# Patient Record
Sex: FEMALE | Race: WHITE | Employment: OTHER | URBAN - METROPOLITAN AREA
[De-identification: names, ages, dates, MRNs, and addresses within clinical notes are randomized per-mention and may not be internally consistent; named-entity substitution may affect disease eponyms.]

---

## 2017-08-01 ENCOUNTER — HOSPITAL ENCOUNTER (INPATIENT)
Age: 67
LOS: 3 days | Discharge: HOME | DRG: 287 | End: 2017-08-04
Attending: EMERGENCY MEDICINE | Admitting: INTERNAL MEDICINE
Payer: MEDICARE

## 2017-08-01 ENCOUNTER — APPOINTMENT (OUTPATIENT)
Dept: GENERAL RADIOLOGY | Age: 67
DRG: 287 | End: 2017-08-01
Attending: PHYSICIAN ASSISTANT
Payer: MEDICARE

## 2017-08-01 DIAGNOSIS — I48.91 ATRIAL FIBRILLATION, UNSPECIFIED TYPE (HCC): ICD-10-CM

## 2017-08-01 DIAGNOSIS — E87.1 HYPONATREMIA: ICD-10-CM

## 2017-08-01 DIAGNOSIS — R07.89 CHEST TIGHTNESS: Primary | ICD-10-CM

## 2017-08-01 PROBLEM — I50.9 CHF (CONGESTIVE HEART FAILURE) (HCC): Status: ACTIVE | Noted: 2017-08-01

## 2017-08-01 PROBLEM — R07.9 CHEST PAIN: Status: ACTIVE | Noted: 2017-08-01

## 2017-08-01 LAB
ALBUMIN SERPL BCP-MCNC: 4.4 G/DL (ref 3.5–5)
ALBUMIN/GLOB SERPL: 1.6 {RATIO} (ref 1.1–2.2)
ALP SERPL-CCNC: 87 U/L (ref 45–117)
ALT SERPL-CCNC: 23 U/L (ref 12–78)
ANION GAP BLD CALC-SCNC: 7 MMOL/L (ref 5–15)
AST SERPL W P-5'-P-CCNC: 27 U/L (ref 15–37)
BASOPHILS # BLD AUTO: 0 K/UL (ref 0–0.1)
BASOPHILS # BLD: 0 % (ref 0–1)
BILIRUB SERPL-MCNC: 0.8 MG/DL (ref 0.2–1)
BNP SERPL-MCNC: 3929 PG/ML (ref 0–125)
BUN SERPL-MCNC: 13 MG/DL (ref 6–20)
BUN/CREAT SERPL: 16 (ref 12–20)
CALCIUM SERPL-MCNC: 8.6 MG/DL (ref 8.5–10.1)
CHLORIDE SERPL-SCNC: 83 MMOL/L (ref 97–108)
CK SERPL-CCNC: 144 U/L (ref 26–192)
CO2 SERPL-SCNC: 31 MMOL/L (ref 21–32)
CREAT SERPL-MCNC: 0.8 MG/DL (ref 0.55–1.02)
DIFFERENTIAL METHOD BLD: ABNORMAL
EOSINOPHIL # BLD: 0 K/UL (ref 0–0.4)
EOSINOPHIL NFR BLD: 0 % (ref 0–7)
ERYTHROCYTE [DISTWIDTH] IN BLOOD BY AUTOMATED COUNT: 13.6 % (ref 11.5–14.5)
GLOBULIN SER CALC-MCNC: 2.7 G/DL (ref 2–4)
GLUCOSE SERPL-MCNC: 113 MG/DL (ref 65–100)
HCT VFR BLD AUTO: 35.7 % (ref 35–47)
HGB BLD-MCNC: 12.7 G/DL (ref 11.5–16)
LYMPHOCYTES # BLD AUTO: 7 % (ref 12–49)
LYMPHOCYTES # BLD: 0.6 K/UL (ref 0.8–3.5)
MCH RBC QN AUTO: 33.7 PG (ref 26–34)
MCHC RBC AUTO-ENTMCNC: 35.6 G/DL (ref 30–36.5)
MCV RBC AUTO: 94.7 FL (ref 80–99)
MONOCYTES # BLD: 0.8 K/UL (ref 0–1)
MONOCYTES NFR BLD AUTO: 9 % (ref 5–13)
NEUTS SEG # BLD: 7.7 K/UL (ref 1.8–8)
NEUTS SEG NFR BLD AUTO: 84 % (ref 32–75)
PLATELET # BLD AUTO: 188 K/UL (ref 150–400)
POTASSIUM SERPL-SCNC: 3.6 MMOL/L (ref 3.5–5.1)
PROT SERPL-MCNC: 7.1 G/DL (ref 6.4–8.2)
RBC # BLD AUTO: 3.77 M/UL (ref 3.8–5.2)
RBC MORPH BLD: ABNORMAL
SODIUM SERPL-SCNC: 121 MMOL/L (ref 136–145)
TROPONIN I SERPL-MCNC: 0.04 NG/ML
WBC # BLD AUTO: 9.1 K/UL (ref 3.6–11)

## 2017-08-01 PROCEDURE — 84484 ASSAY OF TROPONIN QUANT: CPT | Performed by: PHYSICIAN ASSISTANT

## 2017-08-01 PROCEDURE — 82550 ASSAY OF CK (CPK): CPT | Performed by: PHYSICIAN ASSISTANT

## 2017-08-01 PROCEDURE — 99285 EMERGENCY DEPT VISIT HI MDM: CPT

## 2017-08-01 PROCEDURE — 74011250636 HC RX REV CODE- 250/636: Performed by: INTERNAL MEDICINE

## 2017-08-01 PROCEDURE — 93005 ELECTROCARDIOGRAM TRACING: CPT

## 2017-08-01 PROCEDURE — 71010 XR CHEST PORT: CPT

## 2017-08-01 PROCEDURE — 74011250637 HC RX REV CODE- 250/637: Performed by: INTERNAL MEDICINE

## 2017-08-01 PROCEDURE — 65660000000 HC RM CCU STEPDOWN

## 2017-08-01 PROCEDURE — 83880 ASSAY OF NATRIURETIC PEPTIDE: CPT | Performed by: EMERGENCY MEDICINE

## 2017-08-01 PROCEDURE — 85025 COMPLETE CBC W/AUTO DIFF WBC: CPT | Performed by: PHYSICIAN ASSISTANT

## 2017-08-01 PROCEDURE — 80053 COMPREHEN METABOLIC PANEL: CPT | Performed by: PHYSICIAN ASSISTANT

## 2017-08-01 RX ORDER — POTASSIUM CHLORIDE 750 MG/1
30 TABLET, FILM COATED, EXTENDED RELEASE ORAL EVERY 4 HOURS
Status: COMPLETED | OUTPATIENT
Start: 2017-08-01 | End: 2017-08-02

## 2017-08-01 RX ORDER — DIAZEPAM 2 MG/1
2 TABLET ORAL
Status: DISCONTINUED | OUTPATIENT
Start: 2017-08-01 | End: 2017-08-04 | Stop reason: HOSPADM

## 2017-08-01 RX ORDER — LEVOTHYROXINE SODIUM 300 UG/1
300 TABLET ORAL
COMMUNITY
End: 2017-08-01 | Stop reason: CLARIF

## 2017-08-01 RX ORDER — LEVOTHYROXINE SODIUM 150 UG/1
150 TABLET ORAL
Status: DISCONTINUED | OUTPATIENT
Start: 2017-08-02 | End: 2017-08-02

## 2017-08-01 RX ORDER — METOPROLOL TARTRATE 25 MG/1
25 TABLET, FILM COATED ORAL 2 TIMES DAILY
Status: DISCONTINUED | OUTPATIENT
Start: 2017-08-01 | End: 2017-08-04 | Stop reason: HOSPADM

## 2017-08-01 RX ORDER — ONDANSETRON 2 MG/ML
4 INJECTION INTRAMUSCULAR; INTRAVENOUS
Status: DISCONTINUED | OUTPATIENT
Start: 2017-08-01 | End: 2017-08-04 | Stop reason: HOSPADM

## 2017-08-01 RX ORDER — BUTALBITAL, ACETAMINOPHEN AND CAFFEINE 50; 325; 40 MG/1; MG/1; MG/1
1-2 TABLET ORAL
Status: DISCONTINUED | OUTPATIENT
Start: 2017-08-01 | End: 2017-08-04 | Stop reason: HOSPADM

## 2017-08-01 RX ORDER — METOPROLOL TARTRATE 25 MG/1
25 TABLET, FILM COATED ORAL 2 TIMES DAILY
COMMUNITY
End: 2018-04-10 | Stop reason: SDUPTHER

## 2017-08-01 RX ORDER — BUTALBITAL, ACETAMINOPHEN AND CAFFEINE 50; 325; 40 MG/1; MG/1; MG/1
1-2 TABLET ORAL
COMMUNITY
End: 2017-08-22 | Stop reason: SDUPTHER

## 2017-08-01 RX ORDER — TRIAMTERENE AND HYDROCHLOROTHIAZIDE 37.5; 25 MG/1; MG/1
1 CAPSULE ORAL DAILY
COMMUNITY
End: 2017-08-22

## 2017-08-01 RX ORDER — LEVOTHYROXINE SODIUM 150 UG/1
150 TABLET ORAL
COMMUNITY
End: 2017-08-22 | Stop reason: SDUPTHER

## 2017-08-01 RX ORDER — ACETAMINOPHEN 325 MG/1
650 TABLET ORAL
Status: DISCONTINUED | OUTPATIENT
Start: 2017-08-01 | End: 2017-08-04 | Stop reason: HOSPADM

## 2017-08-01 RX ORDER — FUROSEMIDE 10 MG/ML
40 INJECTION INTRAMUSCULAR; INTRAVENOUS 2 TIMES DAILY
Status: DISCONTINUED | OUTPATIENT
Start: 2017-08-01 | End: 2017-08-03

## 2017-08-01 RX ORDER — BUPROPION HYDROCHLORIDE 150 MG/1
150 TABLET, EXTENDED RELEASE ORAL DAILY
COMMUNITY
End: 2017-10-03 | Stop reason: SDUPTHER

## 2017-08-01 RX ORDER — ASPIRIN 81 MG/1
81 TABLET ORAL DAILY
Status: DISCONTINUED | OUTPATIENT
Start: 2017-08-01 | End: 2017-08-04 | Stop reason: HOSPADM

## 2017-08-01 RX ORDER — GUAIFENESIN 100 MG/5ML
325 LIQUID (ML) ORAL
Status: DISCONTINUED | OUTPATIENT
Start: 2017-08-01 | End: 2017-08-01

## 2017-08-01 RX ORDER — BUPROPION HYDROCHLORIDE 150 MG/1
150 TABLET, EXTENDED RELEASE ORAL DAILY
Status: DISCONTINUED | OUTPATIENT
Start: 2017-08-02 | End: 2017-08-04 | Stop reason: HOSPADM

## 2017-08-01 RX ADMIN — APIXABAN 5 MG: 5 TABLET, FILM COATED ORAL at 23:09

## 2017-08-01 RX ADMIN — FUROSEMIDE 40 MG: 10 INJECTION, SOLUTION INTRAMUSCULAR; INTRAVENOUS at 23:09

## 2017-08-01 RX ADMIN — POTASSIUM CHLORIDE 30 MEQ: 750 TABLET, FILM COATED, EXTENDED RELEASE ORAL at 23:09

## 2017-08-01 RX ADMIN — ASPIRIN 81 MG: 81 TABLET, COATED ORAL at 23:09

## 2017-08-01 RX ADMIN — METOPROLOL TARTRATE 25 MG: 25 TABLET ORAL at 23:09

## 2017-08-01 NOTE — ED PROVIDER NOTES
HPI Comments: 79 y/o  female presents to the ED for the evaluation of chest tightness. According to patient, she was awoken in the middle of the night around 2 AM with chest tightness. She was given 324 mg ASA on arrival.  She has noticed increased stress and leg swelling. She has noticed some shortness of breath and getting short winded with exertion. She does have hx of atrial fibrillation but does not want to take medications and has already told her PCP this. No other acute medical complaints expressed at this time. The history is provided by the patient. No  was used. 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 rep 10 yrs.  THYROIDECTOMY  11/05         Family History:   Problem Relation Age of Onset    Cancer Mother      lung       Social History     Social History    Marital status:      Spouse name: N/A    Number of children: N/A    Years of education: N/A     Occupational History    Not on file. Social History Main Topics    Smoking status: Never Smoker    Smokeless tobacco: Never Used    Alcohol use Yes      Comment: soc    Drug use: No    Sexual activity: Not on file     Other Topics Concern    Not on file     Social History Narrative         ALLERGIES: Codeine    Review of Systems   Constitutional: Negative for chills, fatigue and fever. HENT: Negative for ear pain, facial swelling, hearing loss, sore throat and trouble swallowing. Eyes: Negative. Respiratory: Positive for chest tightness and shortness of breath. Negative for cough and wheezing. Cardiovascular: Positive for leg swelling. Negative for chest pain. Gastrointestinal: Negative for abdominal pain, constipation, diarrhea, nausea and vomiting. Musculoskeletal: Negative. Negative for neck pain and neck stiffness. Skin: Negative. Negative for rash and wound. Neurological: Negative for dizziness, weakness, light-headedness, numbness and headaches. Psychiatric/Behavioral: Negative. All other systems reviewed and are negative. Vitals:    08/01/17 1345 08/01/17 1400 08/01/17 1415 08/01/17 1445   BP: (!) 159/109 (!) 140/91 141/60 146/87   Pulse: 71 75 72 72   Resp: 19 21 18 15   Temp:       SpO2: 95% 96% 95% 96%   Height:                Physical Exam   Constitutional: She is oriented to person, place, and time. She appears well-developed and well-nourished. No distress. HENT:   Head: Normocephalic and atraumatic. Eyes: Conjunctivae and EOM are normal. Pupils are equal, round, and reactive to light. Neck: Normal range of motion. Neck supple. Cardiovascular: Normal heart sounds and intact distal pulses. An irregularly irregular rhythm present. No murmur heard. Pulmonary/Chest: Effort normal and breath sounds normal. She has no wheezes. Abdominal: Soft. Bowel sounds are normal. She exhibits no distension and no mass. There is no tenderness. There is no rebound and no guarding. Musculoskeletal: Normal range of motion. Neurological: She is alert and oriented to person, place, and time. She has normal reflexes. Skin: Skin is warm and dry. No rash noted. No erythema. 1-2+ pitting edema BLE    Psychiatric: She has a normal mood and affect. Her behavior is normal.   Nursing note and vitals reviewed.        [facility]  ED Course       Procedures    77 y/o male with chest tightness  Will check labs, ekg and then re-assess  Discussed with Dr. Sherine Cabezas     Reviewed lab and imaging results with patient   Spoke with Dr. Julian Rojas, PCP, regarding patients hx, pe findings and lab/imaging results  Will come see patient for admission

## 2017-08-01 NOTE — IP AVS SNAPSHOT
[address] 
[phone] Patient: Mariama Chow MRN: [id number] :1950 You are allergic to the following Allergen Reactions Opioids - Morphine Analogues Itching Nausea and Vomiting Penicillins Unknown (comments) Patient reports allergy to penicillin with unknown reaction, but states she has tolerated amoxicillin Recent Documentation Height Weight BMI Smoking Status 1.702 m 81.2 kg 28.04 kg/m2 Never Smoker About your hospitalization You were admitted on:  2017 You last received care in the:  [facility] 4 [facility] You were discharged on:  2017 Unit phone number:  [phone] Why you were hospitalized Your primary diagnosis was:  Chf (Congestive Heart Failure) (Hcc) Your diagnoses also included:  Chest Pain, Htn (Hypertension), Atrial Fibrillation (Hcc), Acquired Hypothyroidism, Hyponatremia Providers Seen During Your Hospitalizations Provider Role Specialty Primary office phone Andreea Kelsey MD Attending Provider Emergency Medicine [phone] Shanice Orr MD Attending Provider Internal Medicine [phone] Your Primary Care Physician (PCP) Primary Care Physician Office Phone Office Fax Bard Winkler [phone] Follow-up Information Follow up With Details Comments Contact Info [facility] on [facility]  [address] [address] 
[phone] [address] [facility] [address] 
[phone] Shanice Orr MD   [place] [address] 
[phone] Rosie Todd MD On 8/10/2017 Appointment time 3:00 PM [address] [place] [address] [address] [place] [address] 
[phone] Current Discharge Medication List  
  
 START taking these medications Dose & Instructions Dispensing Information Comments Morning Noon Evening Bedtime  
 apixaban 5 mg tablet Commonly known as:  Tosha Miguel Your last dose was: Your next dose is:    
   
   
 Dose:  5 mg Take 1 Tab by mouth two (2) times a day. Quantity:  60 Tab Refills:  1 CONTINUE these medications which have CHANGED Dose & Instructions Dispensing Information Comments Morning Noon Evening Bedtime  
 levothyroxine 150 mcg tablet Commonly known as:  SYNTHROID What changed:  Another medication with the same name was removed. Continue taking this medication, and follow the directions you see here. Your last dose was: Your next dose is:    
   
   
 Dose:  150 mcg Take 150 mcg by mouth Daily (before breakfast). Refills:  0 CONTINUE these medications which have NOT CHANGED Dose & Instructions Dispensing Information Comments Morning Noon Evening Bedtime  
 aspirin delayed-release 81 mg tablet Your last dose was: Your next dose is:    
   
   
 Dose:  81 mg Take 81 mg by mouth daily. Refills:  0  
     
   
   
   
  
 buPROPion  mg SR tablet Commonly known as:  Devin Hughes Your last dose was: Your next dose is:    
   
   
 Dose:  150 mg Take 150 mg by mouth daily. Refills:  0  
     
   
   
   
  
 butalbital-acetaminophen-caffeine -40 mg per tablet Commonly known as:  Braden Lin Your last dose was: Your next dose is:    
   
   
 Dose:  1-2 Tab Take 1-2 Tabs by mouth every six (6) hours as needed for Headache. Refills:  0  
     
   
   
   
  
 diazePAM 2 mg tablet Commonly known as:  VALIUM Your last dose was: Your next dose is: TAKE 1 TABLET BY MOUTH EVERY DAY AS NEEDED FOR ANXIETY Quantity:  30 Tab Refills:  3 This request is for a new prescription for a controlled substance as required by Federal/State law. RX . HAD REFILLS BUT WENT OUT OF DATE. metoprolol tartrate 25 mg tablet Commonly known as:  LOPRESSOR Your last dose was: Your next dose is:    
   
   
 Dose:  25 mg Take 25 mg by mouth two (2) times a day. Refills:  0  
     
   
   
   
  
 triamterene-hydroCHLOROthiazide 37.5-25 mg per capsule Commonly known as:  Remi Sol Your last dose was: Your next dose is:    
   
   
 Dose:  1 Cap Take 1 Cap by mouth daily. Refills:  0 Where to Get Your Medications These medications were sent to [facility]/pharmacy #[id number]- [address], [address]  [address], [address] Phone:  [phone]  
  apixaban 5 mg tablet Discharge Instructions None Discharge Instructions Attachments/References HEART FAILURE: AVOIDING TRIGGERS (ENGLISH) Discharge Orders None Introducing [facility] & HE[facility]! Dear [facility]: Thank you for requesting a [employer] account. Our records indicate that you already have an active [employer] account. You can access your account anytime at [email or web address] Did you know that you can access your hospital and ER discharge instructions at any time in [employer]? You can also review all of your test results from your hospital stay or ER visit. Additional Information If you have questions, please visit the Frequently Asked Questions section of the [employer] website at [email or web address] Remember, [employer] is NOT to be used for urgent needs. For medical emergencies, dial 911. Now available from your iPhone and Android! General Information Please provide this summary of care documentation to your next provider.  
  
  
    
    
 Patient Signature: ____________________________________________________________ Date:  ____________________________________________________________  
  
Afsaneh Mesa Provider Signature:  ____________________________________________________________ Date:  ____________________________________________________________ More Information Avoiding Triggers With Heart Failure: Care Instructions Your Care Instructions Triggers are anything that make your heart failure flare up. A flare-up is also called \"sudden heart failure\" or \"acute heart failure. \" When you have a flare-up, fluid builds up in your lungs, and you have problems breathing. You might need to go to the hospital. By watching for changes in your condition and avoiding triggers, you can prevent heart failure flare-ups. Follow-up care is a key part of your treatment and safety. Be sure to make and go to all appointments, and call your doctor if you are having problems. It's also a good idea to know your test results and keep a list of the medicines you take. How can you care for yourself at home? Watch for changes in your weight and condition · Weigh yourself without clothing at the same time each day. Record your weight. Call your doctor if you have sudden weight gain, such as more than 2 to 3 pounds in a day or 5 pounds in a week. (Your doctor may suggest a different range of weight gain.) A sudden weight gain may mean that your heart failure is getting worse. · Keep a daily record of your symptoms. Write down any changes in how you feel, such as new shortness of breath, cough, or problems eating. Also record if your ankles are more swollen than usual and if you feel more tired than usual. Note anything that you ate or did that could have triggered these changes. Limit sodium Sodium causes your body to hold on to extra water. This may cause your heart failure symptoms to get worse.  People get most of their sodium from processed foods. Fast food and restaurant meals also tend to be very high in sodium. · Your doctor may suggest that you limit sodium to 2,000 milligrams (mg) a day or less. That is less than 1 teaspoon of salt a day, including all the salt you eat in cooking or in packaged foods. · Read food labels on cans and food packages. They tell you how much sodium you get in one serving. Check the serving size. If you eat more than one serving, you are getting more sodium. · Be aware that sodium can come in forms other than salt, including monosodium glutamate (MSG), sodium citrate, and sodium bicarbonate (baking soda). MSG is often added to Asian food. You can sometimes ask for food without MSG or salt. · Slowly reducing salt will help you adjust to the taste. Take the salt shaker off the table. · Flavor your food with garlic, lemon juice, onion, vinegar, herbs, and spices instead of salt. Do not use soy sauce, steak sauce, onion salt, garlic salt, mustard, or ketchup on your food, unless it is labeled \"low-sodium\" or \"low-salt. \" 
· Make your own salad dressings, sauces, and ketchup without adding salt. · Use fresh or frozen ingredients, instead of canned ones, whenever you can. Choose low-sodium canned goods. · Eat less processed food and food from restaurants, including fast food. Exercise as directed Moderate, regular exercise is very good for your heart. It improves your blood flow and helps control your weight. But too much exercise can stress your heart and cause a heart failure flare-up. · Check with your doctor before you start an exercise program. 
· Walking is an easy way to get exercise. Start out slowly. Gradually increase the length and pace of your walk. Swimming, riding a bike, and using a treadmill are also good forms of exercise. · When you exercise, watch for signs that your heart is working too hard.  You are pushing yourself too hard if you cannot talk while you are exercising. If you become short of breath or dizzy or have chest pain, stop, sit down, and rest. 
· Do not exercise when you do not feel well. Take medicines correctly · Take your medicines exactly as prescribed. Call your doctor if you think you are having a problem with your medicine. · Make a list of all the medicines you take. Include those prescribed to you by other doctors and any over-the-counter medicines, vitamins, or supplements you take. Take this list with you when you go to any doctor. · Take your medicines at the same time every day. It may help you to post a list of all the medicines you take every day and what time of day you take them. · Make taking your medicine as simple as you can. Plan times to take your medicines when you are doing other things, such as eating a meal or getting ready for bed. This will make it easier to remember to take your medicines. · Get organized. Use helpful tools, such as daily or weekly pill containers. When should you call for help? Call 911 if you have symptoms of sudden heart failure such as: 
· You have severe trouble breathing. · You cough up pink, foamy mucus. · You have a new irregular or rapid heartbeat. Call your doctor now or seek immediate medical care if: 
· You have new or increased shortness of breath. · You are dizzy or lightheaded, or you feel like you may faint. · You have sudden weight gain, such as more than 2 to 3 pounds in a day or 5 pounds in a week. (Your doctor may suggest a different range of weight gain.) · You have increased swelling in your legs, ankles, or feet. · You are suddenly so tired or weak that you cannot do your usual activities. Watch closely for changes in your health, and be sure to contact your doctor if you develop new symptoms. Where can you learn more? Go to http://sherif-karyn.info/.  
Enter X839 in the search box to learn more about \"Avoiding Triggers With Heart Failure: Care Instructions. \" Current as of: February 23, 2017 Content Version: 11.3 © 6275-5117 [employer], [employer]. Care instructions adapted under license by [employer] (which disclaims liability or warranty for this information). If you have questions about a medical condition or this instruction, always ask your healthcare professional. [place] [address] any warranty or liability for your use of this information.

## 2017-08-01 NOTE — H&P
History and Physical    Subjective:     Paco Walker is a 78 y.o. white female who woke up with chest tightness this am.  She also had some SOB as well. She says she woke up around 2 am with sx's. She eventually called EMS around lunchtime and was brought to the ER. In the ER, EKG showed atrial fibrillation with controlled rate and non-specific T wave changes. CXR shows cardiomegaly without acute process. Her BNP was 3,900. Sodium 121. Pt's CP is currently better, and her breathing appears to be improved. I last saw pt in the spring of 2016. At that time, she was dx'ed with atrial fibrillation. She was prescribed Xarelto, but she did not take it. She has been taking her metoprolol, though. I had also referred her to a cardiologist, but she did not comply with this. She does admit to some HERNDON and b/l LE edema for many months. Pt is being admitted for inpatient management. 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 Authorizing Provider   triamterene-hydroCHLOROthiazide (DYAZIDE) 37.5-25 mg per capsule Take 1 Cap by mouth daily. Yes Historical Provider   butalbital-acetaminophen-caffeine (FIORICET, ESGIC) -40 mg per tablet Take 1-2 Tabs by mouth every six (6) hours as needed for Headache. Yes Historical Provider   buPROPion SR [facility] SR) 150 mg SR tablet Take 150 mg by mouth daily.    Yes Historical Provider   levothyroxine (SYNTHROID) 150 mcg tablet Take 150 mcg by mouth Daily (before breakfast). Yes Historical Provider   metoprolol tartrate (LOPRESSOR) 25 mg tablet Take 25 mg by mouth two (2) times a day. Yes Historical Provider   diazepam (VALIUM) 2 mg tablet TAKE 1 TABLET BY MOUTH EVERY DAY AS NEEDED FOR ANXIETY 10/6/16  Yes JAYLIN Pritchett MD   aspirin delayed-release 81 mg tablet Take 81 mg by mouth daily. Yes Historical Provider     Social History   Substance Use Topics    Smoking status: Never Smoker    Smokeless tobacco: Never Used    Alcohol use Yes      Comment: soc     Family History   Problem Relation Age of Onset    Cancer Mother      lung               Review of Systems:  As above. Objective:       Physical Exam:   In NAD. A&O. HEENT -- Unremarkable. Neck -- Supple. No JVD. Heart -- Irr Irr (Rate controlled). No R/M/G. Lungs -- CTA. Abdomen -- Soft. Non-tender. Non-distended. No masses. Bowel sounds present. Extremeties -- 1-2+ LE edema b/l. Data Review:   Recent Results (from the past 24 hour(s))   EKG, 12 LEAD, INITIAL    Collection Time: 08/01/17  1:20 PM   Result Value Ref Range    Ventricular Rate 85 BPM    Atrial Rate 340 BPM    QRS Duration 82 ms    Q-T Interval 372 ms    QTC Calculation (Bezet) 442 ms    Calculated R Axis 58 degrees    Calculated T Axis 18 degrees    Diagnosis Atrial fibrillation  No previous ECGs available      CBC WITH AUTOMATED DIFF    Collection Time: 08/01/17  2:18 PM   Result Value Ref Range    WBC 9.1 3.6 - 11.0 K/uL    RBC 3.77 (L) 3.80 - 5.20 M/uL    HGB 12.7 11.5 - 16.0 g/dL    HCT 35.7 35.0 - 47.0 %    MCV 94.7 80.0 - 99.0 FL    MCH 33.7 26.0 - 34.0 PG    MCHC 35.6 30.0 - 36.5 g/dL    RDW 13.6 11.5 - 14.5 %    PLATELET 460 081 - 817 K/uL    NEUTROPHILS 84 (H) 32 - 75 %    LYMPHOCYTES 7 (L) 12 - 49 %    MONOCYTES 9 5 - 13 %    EOSINOPHILS 0 0 - 7 %    BASOPHILS 0 0 - 1 %    ABS. NEUTROPHILS 7.7 1.8 - 8.0 K/UL    ABS. LYMPHOCYTES 0.6 (L) 0.8 - 3.5 K/UL    ABS.  MONOCYTES 0.8 0.0 - 1.0 K/UL    ABS. EOSINOPHILS 0.0 0.0 - 0.4 K/UL    ABS. BASOPHILS 0.0 0.0 - 0.1 K/UL    DF SMEAR SCANNED      RBC COMMENTS NORMOCYTIC, NORMOCHROMIC     CK W/ REFLX CKMB    Collection Time: 08/01/17  2:18 PM   Result Value Ref Range     26 - 929 U/L   METABOLIC PANEL, COMPREHENSIVE    Collection Time: 08/01/17  2:18 PM   Result Value Ref Range    Sodium 121 (L) 136 - 145 mmol/L    Potassium 3.6 3.5 - 5.1 mmol/L    Chloride 83 (L) 97 - 108 mmol/L    CO2 31 21 - 32 mmol/L    Anion gap 7 5 - 15 mmol/L    Glucose 113 (H) 65 - 100 mg/dL    BUN 13 6 - 20 MG/DL    Creatinine 0.80 0.55 - 1.02 MG/DL    BUN/Creatinine ratio 16 12 - 20      GFR est AA >60 >60 ml/min/1.73m2    GFR est non-AA >60 >60 ml/min/1.73m2    Calcium 8.6 8.5 - 10.1 MG/DL    Bilirubin, total 0.8 0.2 - 1.0 MG/DL    ALT (SGPT) 23 12 - 78 U/L    AST (SGOT) 27 15 - 37 U/L    Alk. phosphatase 87 45 - 117 U/L    Protein, total 7.1 6.4 - 8.2 g/dL    Albumin 4.4 3.5 - 5.0 g/dL    Globulin 2.7 2.0 - 4.0 g/dL    A-G Ratio 1.6 1.1 - 2.2     TROPONIN I    Collection Time: 08/01/17  2:18 PM   Result Value Ref Range    Troponin-I, Qt. 0.04 <0.05 ng/mL   NT-PRO BNP    Collection Time: 08/01/17  2:18 PM   Result Value Ref Range    NT pro-BNP 3929 (H) 0 - 125 PG/ML         Assessment:     Principal Problem:    CHF (congestive heart failure) ([facility] [address] (8/1/2017) -- I suspect this will be newly dx'ed acute systolic dysfunction. Active Problems:    HTN (hypertension) (7/14/2011)      Atrial fibrillation -- Probably chronic, dx'ed spring 2016. .... Again she did not f/u on this. Acquired hypothyroidism (4/8/2016)      Chest pain (8/1/2017) -- Possible angina. Plan:     1. Admit tele. 2.  Cont ASA and Metoprolol. 3.  IV Lasix with KCl.    4.  Check serial cardiac enzymes. 5.  Start Eliquis for her afib. 6.  ECHO ordered. 7.  Cont cards -- She may need a cath.   Pt stable at present, so cards may see in am unless something changes, etc.        Signed By: Lucy Goldsmith MD     August 1, 2017

## 2017-08-01 NOTE — ED TRIAGE NOTES
Patient arrives via EMS from [facility]. Patient reports waking up in the middle of the night with chest tightness. Received 324 ASA upon arrival. Patient reports increased stress and leg swelling \"that comes and goes. \" Denies shortness of breath.

## 2017-08-01 NOTE — PROGRESS NOTES
Admission Medication Reconciliation:    Information obtained from: Patient and medication bottles    Significant PMH/Disease States:   Past Medical History:   Diagnosis Date    Atrial fibrillation ([facility] 7/15/2011    Depression     HTN (hypertension) 7/14/2011    Paroxysmal atrial fibrillation ([facility]     Thyroid ca [facility]) 2005    Papillary    Unspecified hypothyroidism 7/15/2011       Chief Complaint for this Admission:    Chief Complaint   Patient presents with    Chest Pain         Allergies:  Opioids - morphine analogues and Penicillins    Prior to Admission Medications:   Prior to Admission Medications   Prescriptions Last Dose Informant Patient Reported? Taking?   aspirin delayed-release 81 mg tablet 8/1/2017 at Unknown time  Yes Yes   Sig: Take 81 mg by mouth daily. buPROPion SR (WELLBUTRIN SR) 150 mg SR tablet 8/1/2017 at Unknown time  Yes Yes   Sig: Take 150 mg by mouth daily. butalbital-acetaminophen-caffeine (FIORICET, ESGIC) -40 mg per tablet 7/25/2017 at Unknown time  Yes Yes   Sig: Take 1-2 Tabs by mouth every six (6) hours as needed for Headache.   diazepam (VALIUM) 2 mg tablet 7/1/2017 at Unknown time  No Yes   Sig: TAKE 1 TABLET BY MOUTH EVERY DAY AS NEEDED FOR ANXIETY   levothyroxine (SYNTHROID) 150 mcg tablet 8/1/2017 at Unknown time Self Yes Yes   Sig: Take 150 mcg by mouth six (6) days a week. Patient takes 1 tablet Mon, Tues, Wed, Thurs, Fri, and Sat   levothyroxine (SYNTHROID) 300 mcg tablet 7/25/2017 at Unknown time Self Yes Yes   Sig: Take 300 mcg by mouth every Sunday. metoprolol tartrate (LOPRESSOR) 25 mg tablet   Yes Yes   Sig: Take 25 mg by mouth two (2) times a day. triamterene-hydroCHLOROthiazide (DYAZIDE) 37.5-25 mg per capsule 7/31/2017 at Unknown time  Yes Yes   Sig: Take 1 Cap by mouth daily. Facility-Administered Medications: None          Comments/Recommendations: Gathered medication history from patient and her medication bottles.      1.) Xarelto removed from medication list and confirmed that patient is not taking any blood thinners currently. 2.) Patient is prescribed bupropion SR 150mg one tablet by mouth twice daily, but she only takes one tablet by mouth once daily. 3.) Patient is prescribed levothyroxine 150mcg: Take 2 tablets by mouth 3 days of the week and 1 tablet by mouth 4 days of the week, but she states she takes 1 tablet by mouth every day Monday-Saturday and takes 2 tablets on Sunday. 4.) Patient has some remaining diazepam from a prescription from over a year ago that she takes roughly 1 tablet every few weeks for anxiety. Last dose was about a week ago.

## 2017-08-01 NOTE — IP AVS SNAPSHOT
[address] 
[phone] Patient: Davida Truong MRN: [id number] :1950 Current Discharge Medication List  
  
START taking these medications Dose & Instructions Dispensing Information Comments Morning Noon Evening Bedtime  
 apixaban 5 mg tablet Commonly known as:  Arbutus Bon Your last dose was: Your next dose is:    
   
   
 Dose:  5 mg Take 1 Tab by mouth two (2) times a day. Quantity:  60 Tab Refills:  1 CONTINUE these medications which have CHANGED Dose & Instructions Dispensing Information Comments Morning Noon Evening Bedtime  
 levothyroxine 150 mcg tablet Commonly known as:  SYNTHROID What changed:  Another medication with the same name was removed. Continue taking this medication, and follow the directions you see here. Your last dose was: Your next dose is:    
   
   
 Dose:  150 mcg Take 150 mcg by mouth Daily (before breakfast). Refills:  0 CONTINUE these medications which have NOT CHANGED Dose & Instructions Dispensing Information Comments Morning Noon Evening Bedtime  
 aspirin delayed-release 81 mg tablet Your last dose was: Your next dose is:    
   
   
 Dose:  81 mg Take 81 mg by mouth daily. Refills:  0  
     
   
   
   
  
 buPROPion  mg SR tablet Commonly known as:  Jonathan Shaper Your last dose was: Your next dose is:    
   
   
 Dose:  150 mg Take 150 mg by mouth daily. Refills:  0  
     
   
   
   
  
 butalbital-acetaminophen-caffeine -40 mg per tablet Commonly known as:  Uneeda Coke Your last dose was: Your next dose is:    
   
   
 Dose:  1-2 Tab Take 1-2 Tabs by mouth every six (6) hours as needed for Headache. Refills:  0  
     
   
   
   
  
 diazePAM 2 mg tablet Commonly known as:  VALIUM Your last dose was: Your next dose is: TAKE 1 TABLET BY MOUTH EVERY DAY AS NEEDED FOR ANXIETY Quantity:  30 Tab Refills:  3 This request is for a new prescription for a controlled substance as required by Federal/State law. RX . HAD REFILLS BUT WENT OUT OF DATE. metoprolol tartrate 25 mg tablet Commonly known as:  LOPRESSOR Your last dose was: Your next dose is:    
   
   
 Dose:  25 mg Take 25 mg by mouth two (2) times a day. Refills:  0  
     
   
   
   
  
 triamterene-hydroCHLOROthiazide 37.5-25 mg per capsule Commonly known as:  Emelia Mccoy Your last dose was: Your next dose is:    
   
   
 Dose:  1 Cap Take 1 Cap by mouth daily. Refills:  0 Where to Get Your Medications These medications were sent to [facility]/pharmacy #[id number], [address]  [address], [address] Phone:  [phone]  
  apixaban 5 mg tablet

## 2017-08-02 PROBLEM — E87.1 HYPONATREMIA: Status: ACTIVE | Noted: 2017-08-02

## 2017-08-02 LAB
ALBUMIN SERPL BCP-MCNC: 3.9 G/DL (ref 3.5–5)
ALBUMIN/GLOB SERPL: 1.3 {RATIO} (ref 1.1–2.2)
ALP SERPL-CCNC: 80 U/L (ref 45–117)
ALT SERPL-CCNC: 21 U/L (ref 12–78)
ANION GAP BLD CALC-SCNC: 8 MMOL/L (ref 5–15)
AST SERPL W P-5'-P-CCNC: 20 U/L (ref 15–37)
ATRIAL RATE: 340 BPM
BASOPHILS # BLD AUTO: 0 K/UL (ref 0–0.1)
BASOPHILS # BLD: 0 % (ref 0–1)
BILIRUB SERPL-MCNC: 0.9 MG/DL (ref 0.2–1)
BNP SERPL-MCNC: 453 PG/ML (ref 0–100)
BUN SERPL-MCNC: 11 MG/DL (ref 6–20)
BUN/CREAT SERPL: 16 (ref 12–20)
CALCIUM SERPL-MCNC: 8.5 MG/DL (ref 8.5–10.1)
CALCULATED R AXIS, ECG10: 58 DEGREES
CALCULATED T AXIS, ECG11: 18 DEGREES
CHLORIDE SERPL-SCNC: 85 MMOL/L (ref 97–108)
CK MB CFR SERPL CALC: 1.8 % (ref 0–2.5)
CK MB SERPL-MCNC: 2 NG/ML (ref 5–25)
CK SERPL-CCNC: 111 U/L (ref 26–192)
CO2 SERPL-SCNC: 31 MMOL/L (ref 21–32)
CREAT SERPL-MCNC: 0.69 MG/DL (ref 0.55–1.02)
DIAGNOSIS, 93000: NORMAL
EOSINOPHIL # BLD: 0 K/UL (ref 0–0.4)
EOSINOPHIL NFR BLD: 0 % (ref 0–7)
ERYTHROCYTE [DISTWIDTH] IN BLOOD BY AUTOMATED COUNT: 13.5 % (ref 11.5–14.5)
GLOBULIN SER CALC-MCNC: 3 G/DL (ref 2–4)
GLUCOSE SERPL-MCNC: 98 MG/DL (ref 65–100)
HCT VFR BLD AUTO: 35.1 % (ref 35–47)
HGB BLD-MCNC: 12.4 G/DL (ref 11.5–16)
LYMPHOCYTES # BLD AUTO: 14 % (ref 12–49)
LYMPHOCYTES # BLD: 1 K/UL (ref 0.8–3.5)
MCH RBC QN AUTO: 34 PG (ref 26–34)
MCHC RBC AUTO-ENTMCNC: 35.3 G/DL (ref 30–36.5)
MCV RBC AUTO: 96.2 FL (ref 80–99)
MONOCYTES # BLD: 0.8 K/UL (ref 0–1)
MONOCYTES NFR BLD AUTO: 12 % (ref 5–13)
NEUTS SEG # BLD: 5 K/UL (ref 1.8–8)
NEUTS SEG NFR BLD AUTO: 74 % (ref 32–75)
PLATELET # BLD AUTO: 196 K/UL (ref 150–400)
POTASSIUM SERPL-SCNC: 3.3 MMOL/L (ref 3.5–5.1)
PROT SERPL-MCNC: 6.9 G/DL (ref 6.4–8.2)
Q-T INTERVAL, ECG07: 372 MS
QRS DURATION, ECG06: 82 MS
QTC CALCULATION (BEZET), ECG08: 442 MS
RBC # BLD AUTO: 3.65 M/UL (ref 3.8–5.2)
SODIUM SERPL-SCNC: 124 MMOL/L (ref 136–145)
T4 FREE SERPL-MCNC: 1.1 NG/DL (ref 0.8–1.5)
TROPONIN I SERPL-MCNC: <0.04 NG/ML
TSH SERPL DL<=0.05 MIU/L-ACNC: 13.4 UIU/ML (ref 0.36–3.74)
VENTRICULAR RATE, ECG03: 85 BPM
WBC # BLD AUTO: 6.8 K/UL (ref 3.6–11)

## 2017-08-02 PROCEDURE — 85025 COMPLETE CBC W/AUTO DIFF WBC: CPT | Performed by: INTERNAL MEDICINE

## 2017-08-02 PROCEDURE — 84443 ASSAY THYROID STIM HORMONE: CPT | Performed by: INTERNAL MEDICINE

## 2017-08-02 PROCEDURE — 93306 TTE W/DOPPLER COMPLETE: CPT

## 2017-08-02 PROCEDURE — 74011250637 HC RX REV CODE- 250/637: Performed by: INTERNAL MEDICINE

## 2017-08-02 PROCEDURE — 65660000000 HC RM CCU STEPDOWN

## 2017-08-02 PROCEDURE — 74011250636 HC RX REV CODE- 250/636: Performed by: INTERNAL MEDICINE

## 2017-08-02 PROCEDURE — 83880 ASSAY OF NATRIURETIC PEPTIDE: CPT | Performed by: INTERNAL MEDICINE

## 2017-08-02 PROCEDURE — 36415 COLL VENOUS BLD VENIPUNCTURE: CPT | Performed by: INTERNAL MEDICINE

## 2017-08-02 PROCEDURE — 82550 ASSAY OF CK (CPK): CPT | Performed by: INTERNAL MEDICINE

## 2017-08-02 PROCEDURE — 84439 ASSAY OF FREE THYROXINE: CPT | Performed by: INTERNAL MEDICINE

## 2017-08-02 PROCEDURE — 80053 COMPREHEN METABOLIC PANEL: CPT | Performed by: INTERNAL MEDICINE

## 2017-08-02 PROCEDURE — 84484 ASSAY OF TROPONIN QUANT: CPT | Performed by: INTERNAL MEDICINE

## 2017-08-02 RX ORDER — POTASSIUM CHLORIDE 750 MG/1
40 TABLET, FILM COATED, EXTENDED RELEASE ORAL EVERY 4 HOURS
Status: COMPLETED | OUTPATIENT
Start: 2017-08-02 | End: 2017-08-02

## 2017-08-02 RX ORDER — SODIUM CHLORIDE 0.9 % (FLUSH) 0.9 %
SYRINGE (ML) INJECTION
Status: COMPLETED
Start: 2017-08-02 | End: 2017-08-02

## 2017-08-02 RX ADMIN — POTASSIUM CHLORIDE 40 MEQ: 750 TABLET, FILM COATED, EXTENDED RELEASE ORAL at 08:41

## 2017-08-02 RX ADMIN — FUROSEMIDE 40 MG: 10 INJECTION, SOLUTION INTRAMUSCULAR; INTRAVENOUS at 08:42

## 2017-08-02 RX ADMIN — FUROSEMIDE 40 MG: 10 INJECTION, SOLUTION INTRAMUSCULAR; INTRAVENOUS at 17:47

## 2017-08-02 RX ADMIN — METOPROLOL TARTRATE 25 MG: 25 TABLET ORAL at 08:42

## 2017-08-02 RX ADMIN — APIXABAN 5 MG: 5 TABLET, FILM COATED ORAL at 08:42

## 2017-08-02 RX ADMIN — POTASSIUM CHLORIDE 40 MEQ: 750 TABLET, FILM COATED, EXTENDED RELEASE ORAL at 11:54

## 2017-08-02 RX ADMIN — APIXABAN 5 MG: 5 TABLET, FILM COATED ORAL at 17:47

## 2017-08-02 RX ADMIN — BUPROPION HYDROCHLORIDE 150 MG: 150 TABLET, EXTENDED RELEASE ORAL at 08:41

## 2017-08-02 RX ADMIN — POTASSIUM CHLORIDE 30 MEQ: 750 TABLET, FILM COATED, EXTENDED RELEASE ORAL at 04:21

## 2017-08-02 RX ADMIN — ACETAMINOPHEN 650 MG: 325 TABLET, FILM COATED ORAL at 11:58

## 2017-08-02 RX ADMIN — LEVOTHYROXINE SODIUM 175 MCG: 150 TABLET ORAL at 07:29

## 2017-08-02 RX ADMIN — ACETAMINOPHEN 650 MG: 325 TABLET, FILM COATED ORAL at 04:17

## 2017-08-02 RX ADMIN — Medication 10 ML: at 08:41

## 2017-08-02 RX ADMIN — METOPROLOL TARTRATE 25 MG: 25 TABLET ORAL at 17:47

## 2017-08-02 NOTE — ED NOTES
TRANSFER - OUT REPORT:    Verbal report given to HARIKA Campa(name) on Alli Bello  being transferred to [facility] (unit) for routine progression of care       Report consisted of patients Situation, Background, Assessment and   Recommendations(SBAR). Information from the following report(s) SBAR, Kardex, ED Summary, Intake/Output, MAR, Recent Results and Cardiac Rhythm afib was reviewed with the receiving nurse. Lines:   Peripheral IV 08/01/17 Left Hand (Active)   Site Assessment Clean, dry, & intact 8/1/2017  1:22 PM   Phlebitis Assessment 0 8/1/2017  1:22 PM   Infiltration Assessment 0 8/1/2017  1:22 PM   Dressing Status Clean, dry, & intact 8/1/2017  1:22 PM       Peripheral IV 08/01/17 Right Antecubital (Active)   Site Assessment Clean, dry, & intact 8/1/2017  2:20 PM   Phlebitis Assessment 0 8/1/2017  2:20 PM   Infiltration Assessment 0 8/1/2017  2:20 PM   Dressing Status Clean, dry, & intact 8/1/2017  2:20 PM   Dressing Type Transparent 8/1/2017  2:20 PM   Hub Color/Line Status Pink;Flushed;Patent 8/1/2017  2:20 PM   Action Taken Blood drawn 8/1/2017  2:20 PM        Opportunity for questions and clarification was provided.       Patient transported with:   Monitor

## 2017-08-02 NOTE — PROGRESS NOTES
Primary Nurse Krystyna Miller and Yaniv Benavides RN performed a dual skin assessment on this patient No impairment noted  Tyrell score is in flow sheet    Bedside and Verbal shift change report given to Yaniv Benavides RN (oncoming nurse) by Gerhardt Loveless (offgoing nurse). Report included the following information SBAR, Kardex, ED Summary, Procedure Summary, Intake/Output, MAR, Accordion, Recent Results and Cardiac Rhythm A. Fib.

## 2017-08-02 NOTE — CONSULTS
Cardiology Consult Note    CC: CP  Reason for consult:  CP/CHF  Requesting MD:       Subjective:      Date of  Admission: 8/1/2017  1:17 PM     Admission type:Emergency    Kianna Lopez is a 78 y.o. female admitted for CHF (congestive heart failure) ([facility] Patient complains of SS chest tightness with no radiation or nausea or dizziness. It woke her up from sleep. It accompanied with SOB. In fact she might have had PND. She describes recent worsening HERNDON. Her past cardiac data include Afib diagnosed more than a year ago. No prior history of CAD, MI, or CHF. Her enzyme and EKG were unremarkable but her BNP was elevated. She has noted recent increase of her ankle edema with some weight gain. Patient Active Problem List    Diagnosis Date Noted    Hyponatremia 08/02/2017    CHF (congestive heart failure) ([facility] 08/01/2017    Chest pain 08/01/2017    Acquired hypothyroidism 04/08/2016    Atrial fibrillation ([facility] 07/15/2011    HTN (hypertension) 07/14/2011      Lizabeth Victoria MD  Past Medical History:   Diagnosis Date    Atrial fibrillation ([facility] 7/15/2011    Depression     HTN (hypertension) 7/14/2011    Paroxysmal atrial fibrillation ([facility]     Thyroid ca [facility]) 2005    Papillary    Unspecified hypothyroidism 7/15/2011      Past Surgical History:   Procedure Laterality Date    ENDOSCOPY, COLON, DIAGNOSTIC  2003    neg. rep 10 yrs.     THYROIDECTOMY  11/05     Allergies   Allergen Reactions    Opioids - Morphine Analogues Itching and Nausea and Vomiting    Penicillins Unknown (comments)     Patient reports allergy to penicillin with unknown reaction, but states she has tolerated amoxicillin      Family History   Problem Relation Age of Onset    Cancer Mother      lung      Current Facility-Administered Medications   Medication Dose Route Frequency    levothyroxine (SYNTHROID) tablet 175 mcg  175 mcg Oral ACB    potassium chloride SR (KLOR-CON 10) tablet 40 mEq  40 mEq Oral Q4H  aspirin delayed-release tablet 81 mg  81 mg Oral DAILY    buPROPion SR (WELLBUTRIN SR) tablet 150 mg  150 mg Oral DAILY    butalbital-acetaminophen-caffeine (FIORICET, ESGIC) -40 mg per tablet 1-2 Tab  1-2 Tab Oral Q6H PRN    diazePAM (VALIUM) tablet 2 mg  2 mg Oral BID PRN    metoprolol tartrate (LOPRESSOR) tablet 25 mg  25 mg Oral BID    furosemide (LASIX) injection 40 mg  40 mg IntraVENous BID    apixaban (ELIQUIS) tablet 5 mg  5 mg Oral BID    ondansetron (ZOFRAN) injection 4 mg  4 mg IntraVENous Q6H PRN    acetaminophen (TYLENOL) tablet 650 mg  650 mg Oral Q4H PRN        Prior to Admission Medications:  Prior to Admission medications    Medication Sig Start Date End Date Taking? Authorizing Provider   triamterene-hydroCHLOROthiazide (DYAZIDE) 37.5-25 mg per capsule Take 1 Cap by mouth daily. Yes Historical Provider   butalbital-acetaminophen-caffeine (FIORICET, ESGIC) -40 mg per tablet Take 1-2 Tabs by mouth every six (6) hours as needed for Headache. Yes Historical Provider   buPROPion SR [facility] SR) 150 mg SR tablet Take 150 mg by mouth daily. Yes Historical Provider   levothyroxine (SYNTHROID) 150 mcg tablet Take 150 mcg by mouth Daily (before breakfast). Yes Historical Provider   metoprolol tartrate (LOPRESSOR) 25 mg tablet Take 25 mg by mouth two (2) times a day. Yes Historical Provider   diazepam (VALIUM) 2 mg tablet TAKE 1 TABLET BY MOUTH EVERY DAY AS NEEDED FOR ANXIETY 10/6/16  Yes JAYLIN Berumen MD   aspirin delayed-release 81 mg tablet Take 81 mg by mouth daily.    Yes Historical Provider        Review of Symptoms:  Except as noted in HPI, patient denies recent fever or chills, nausea, vomiting, diarrhea, hemoptysis, hematemesis, dysuria, myalgias, focal neurologic symptoms, ecchymosis, angioedema, odynophagia, dysphagia, sore throat, earache,rash, melena, hematochezia, depression, GERD, cold intolerance, petechia, bleeding gums, or significant weight loss.    A comprehensive review of systems was negative except for that written in the HPI. Subjective:    24 hr VS reviewed, overall VSSAF  Temp (24hrs), Av.2 °F (36.8 °C), Min:97.8 °F (36.6 °C), Max:98.6 °F (37 °C)    Patient Vitals for the past 8 hrs:   Pulse   17 0722 75   17 0358 77   17 0300 63    Patient Vitals for the past 8 hrs:   Resp   17 0722 17   17 0300 19    Patient Vitals for the past 8 hrs:   BP   17 0722 125/88   17 0358 141/85   17 0300 140/82          Intake/Output Summary (Last 24 hours) at 17 1054  Last data filed at 17 0910   Gross per 24 hour   Intake              240 ml   Output                0 ml   Net              240 ml         Physical Exam (complete single organ system exam)      Visit Vitals    /88 (BP 1 Location: Right arm, BP Patient Position: At rest;Head of bed elevated (Comment degrees))    Pulse 75    Temp 97.8 °F (36.6 °C)    Resp 17    Ht 5' 7\" (1.702 m)    Wt 80.4 kg (177 lb 4 oz)    SpO2 97%    BMI 27.76 kg/m2     General Appearance:  Well developed, well nourished,alert and oriented x 3, and individual in no acute distress. Ears/Nose/Mouth/Throat:   Hearing grossly normal.         Neck: Supple. Chest:   Lungs clear to auscultation bilaterally. Cardiovascular:  irregular rate and rhythm, S1, S2 normal, no murmur. Abdomen:   Soft, non-tender, bowel sounds are active. Extremities: 1+ edema bilaterally. Skin: Warm and dry.                  Cardiographics    Telemetry: AFIB  ECG:  atrial fibrillation, rate 80s  Echocardiogram: just done with no result yet    Labs:   Recent Results (from the past 24 hour(s))   EKG, 12 LEAD, INITIAL    Collection Time: 17  1:20 PM   Result Value Ref Range    Ventricular Rate 85 BPM    Atrial Rate 340 BPM    QRS Duration 82 ms    Q-T Interval 372 ms    QTC Calculation (Bezet) 442 ms    Calculated R Axis 58 degrees    Calculated T Axis 18 degrees Diagnosis       Atrial fibrillation  No previous ECGs available  Confirmed by Anya Miranda M.D., Nick Evans ([id number]) on 8/2/2017 7:02:04 AM     CBC WITH AUTOMATED DIFF    Collection Time: 08/01/17  2:18 PM   Result Value Ref Range    WBC 9.1 3.6 - 11.0 K/uL    RBC 3.77 (L) 3.80 - 5.20 M/uL    HGB 12.7 11.5 - 16.0 g/dL    HCT 35.7 35.0 - 47.0 %    MCV 94.7 80.0 - 99.0 FL    MCH 33.7 26.0 - 34.0 PG    MCHC 35.6 30.0 - 36.5 g/dL    RDW 13.6 11.5 - 14.5 %    PLATELET 673 928 - 209 K/uL    NEUTROPHILS 84 (H) 32 - 75 %    LYMPHOCYTES 7 (L) 12 - 49 %    MONOCYTES 9 5 - 13 %    EOSINOPHILS 0 0 - 7 %    BASOPHILS 0 0 - 1 %    ABS. NEUTROPHILS 7.7 1.8 - 8.0 K/UL    ABS. LYMPHOCYTES 0.6 (L) 0.8 - 3.5 K/UL    ABS. MONOCYTES 0.8 0.0 - 1.0 K/UL    ABS. EOSINOPHILS 0.0 0.0 - 0.4 K/UL    ABS. BASOPHILS 0.0 0.0 - 0.1 K/UL    DF SMEAR SCANNED      RBC COMMENTS NORMOCYTIC, NORMOCHROMIC     CK W/ REFLX CKMB    Collection Time: 08/01/17  2:18 PM   Result Value Ref Range     26 - 479 U/L   METABOLIC PANEL, COMPREHENSIVE    Collection Time: 08/01/17  2:18 PM   Result Value Ref Range    Sodium 121 (L) 136 - 145 mmol/L    Potassium 3.6 3.5 - 5.1 mmol/L    Chloride 83 (L) 97 - 108 mmol/L    CO2 31 21 - 32 mmol/L    Anion gap 7 5 - 15 mmol/L    Glucose 113 (H) 65 - 100 mg/dL    BUN 13 6 - 20 MG/DL    Creatinine 0.80 0.55 - 1.02 MG/DL    BUN/Creatinine ratio 16 12 - 20      GFR est AA >60 >60 ml/min/1.73m2    GFR est non-AA >60 >60 ml/min/1.73m2    Calcium 8.6 8.5 - 10.1 MG/DL    Bilirubin, total 0.8 0.2 - 1.0 MG/DL    ALT (SGPT) 23 12 - 78 U/L    AST (SGOT) 27 15 - 37 U/L    Alk.  phosphatase 87 45 - 117 U/L    Protein, total 7.1 6.4 - 8.2 g/dL    Albumin 4.4 3.5 - 5.0 g/dL    Globulin 2.7 2.0 - 4.0 g/dL    A-G Ratio 1.6 1.1 - 2.2     TROPONIN I    Collection Time: 08/01/17  2:18 PM   Result Value Ref Range    Troponin-I, Qt. 0.04 <0.05 ng/mL   NT-PRO BNP    Collection Time: 08/01/17  2:18 PM   Result Value Ref Range    NT pro-BNP 3929 (H) 0 - 125 PG/ML   CBC WITH AUTOMATED DIFF    Collection Time: 08/02/17  2:49 AM   Result Value Ref Range    WBC 6.8 3.6 - 11.0 K/uL    RBC 3.65 (L) 3.80 - 5.20 M/uL    HGB 12.4 11.5 - 16.0 g/dL    HCT 35.1 35.0 - 47.0 %    MCV 96.2 80.0 - 99.0 FL    MCH 34.0 26.0 - 34.0 PG    MCHC 35.3 30.0 - 36.5 g/dL    RDW 13.5 11.5 - 14.5 %    PLATELET 334 705 - 050 K/uL    NEUTROPHILS 74 32 - 75 %    LYMPHOCYTES 14 12 - 49 %    MONOCYTES 12 5 - 13 %    EOSINOPHILS 0 0 - 7 %    BASOPHILS 0 0 - 1 %    ABS. NEUTROPHILS 5.0 1.8 - 8.0 K/UL    ABS. LYMPHOCYTES 1.0 0.8 - 3.5 K/UL    ABS. MONOCYTES 0.8 0.0 - 1.0 K/UL    ABS. EOSINOPHILS 0.0 0.0 - 0.4 K/UL    ABS. BASOPHILS 0.0 0.0 - 0.1 K/UL   METABOLIC PANEL, COMPREHENSIVE    Collection Time: 08/02/17  2:49 AM   Result Value Ref Range    Sodium 124 (L) 136 - 145 mmol/L    Potassium 3.3 (L) 3.5 - 5.1 mmol/L    Chloride 85 (L) 97 - 108 mmol/L    CO2 31 21 - 32 mmol/L    Anion gap 8 5 - 15 mmol/L    Glucose 98 65 - 100 mg/dL    BUN 11 6 - 20 MG/DL    Creatinine 0.69 0.55 - 1.02 MG/DL    BUN/Creatinine ratio 16 12 - 20      GFR est AA >60 >60 ml/min/1.73m2    GFR est non-AA >60 >60 ml/min/1.73m2    Calcium 8.5 8.5 - 10.1 MG/DL    Bilirubin, total 0.9 0.2 - 1.0 MG/DL    ALT (SGPT) 21 12 - 78 U/L    AST (SGOT) 20 15 - 37 U/L    Alk.  phosphatase 80 45 - 117 U/L    Protein, total 6.9 6.4 - 8.2 g/dL    Albumin 3.9 3.5 - 5.0 g/dL    Globulin 3.0 2.0 - 4.0 g/dL    A-G Ratio 1.3 1.1 - 2.2     CK W/ CKMB & INDEX    Collection Time: 08/02/17  2:49 AM   Result Value Ref Range     26 - 192 U/L    CK - MB 2.0 <3.6 NG/ML    CK-MB Index 1.8 0 - 2.5     TROPONIN I    Collection Time: 08/02/17  2:49 AM   Result Value Ref Range    Troponin-I, Qt. <0.04 <0.05 ng/mL   TSH 3RD GENERATION    Collection Time: 08/02/17  2:49 AM   Result Value Ref Range    TSH 13.40 (H) 0.36 - 3.74 uIU/mL   T4, FREE    Collection Time: 08/02/17  2:49 AM   Result Value Ref Range    T4, Free 1.1 0.8 - 1.5 NG/DL   BNP Collection Time: 08/02/17  2:49 AM   Result Value Ref Range     (H) 0 - 100 pg/mL        Assessment:     Assessment:   CHF; acute diastolic;need to rule out ischemia vs AFib  AFib;chronic; rate is fine  CP;suggestive of ischemia  HTN      Plan:   Tele  Echo  She needs cath  Anticoagulation after cath  ASA deferred after cath    Brenda Mckeon MD

## 2017-08-02 NOTE — PROGRESS NOTES
attempted to visit with patient and complete initial assessment at this time. Patient had a visitor and asked that I return at another time. I do note she does not have an advanced directive in emr and has a history of thyroid cancer. I updated Anthony Bonner RN CRM on above.

## 2017-08-02 NOTE — PROGRESS NOTES
's Genevive Session, Júnior [facility]att [facility] Jerome    Admit Date: 8/1/2017    Subjective:     Pt says she is breathing better. Chest tightness gone. No new complaints. Current Facility-Administered Medications   Medication Dose Route Frequency    levothyroxine (SYNTHROID) tablet 175 mcg  175 mcg Oral ACB    potassium chloride SR (KLOR-CON 10) tablet 40 mEq  40 mEq Oral Q4H    aspirin delayed-release tablet 81 mg  81 mg Oral DAILY    buPROPion SR (WELLBUTRIN SR) tablet 150 mg  150 mg Oral DAILY    butalbital-acetaminophen-caffeine (FIORICET, ESGIC) -40 mg per tablet 1-2 Tab  1-2 Tab Oral Q6H PRN    diazePAM (VALIUM) tablet 2 mg  2 mg Oral BID PRN    metoprolol tartrate (LOPRESSOR) tablet 25 mg  25 mg Oral BID    furosemide (LASIX) injection 40 mg  40 mg IntraVENous BID    apixaban (ELIQUIS) tablet 5 mg  5 mg Oral BID    ondansetron (ZOFRAN) injection 4 mg  4 mg IntraVENous Q6H PRN    acetaminophen (TYLENOL) tablet 650 mg  650 mg Oral Q4H PRN          Objective:     Patient Vitals for the past 8 hrs:   BP Temp Pulse Resp SpO2 Weight   08/02/17 0358 141/85 98.2 °F (36.8 °C) 77 - 98 % 177 lb 4 oz (80.4 kg)   08/02/17 0300 140/82 98.4 °F (36.9 °C) 63 19 99 % -   08/01/17 2347 130/81 98.1 °F (36.7 °C) 67 17 100 % -             Physical Exam: NAD. A&O. Neck -- Supple. No JVD. Heart -- Irr Irr (Rate OK). Lungs -- Fairly CTA. Abd -- Benign. Ext -- 1+ LE edema, b/l.       Data Review   Recent Results (from the past 24 hour(s))   EKG, 12 LEAD, INITIAL    Collection Time: 08/01/17  1:20 PM   Result Value Ref Range    Ventricular Rate 85 BPM    Atrial Rate 340 BPM    QRS Duration 82 ms    Q-T Interval 372 ms    QTC Calculation (Bezet) 442 ms    Calculated R Axis 58 degrees    Calculated T Axis 18 degrees    Diagnosis       Atrial fibrillation  No previous ECGs available  Confirmed by Aurora Adame M.D., Lulu Brown ([id number]) on 8/2/2017 7:02:04 AM     CBC WITH AUTOMATED DIFF    Collection Time: 08/01/17  2:18 PM   Result Value Ref Range    WBC 9.1 3.6 - 11.0 K/uL    RBC 3.77 (L) 3.80 - 5.20 M/uL    HGB 12.7 11.5 - 16.0 g/dL    HCT 35.7 35.0 - 47.0 %    MCV 94.7 80.0 - 99.0 FL    MCH 33.7 26.0 - 34.0 PG    MCHC 35.6 30.0 - 36.5 g/dL    RDW 13.6 11.5 - 14.5 %    PLATELET 788 802 - 626 K/uL    NEUTROPHILS 84 (H) 32 - 75 %    LYMPHOCYTES 7 (L) 12 - 49 %    MONOCYTES 9 5 - 13 %    EOSINOPHILS 0 0 - 7 %    BASOPHILS 0 0 - 1 %    ABS. NEUTROPHILS 7.7 1.8 - 8.0 K/UL    ABS. LYMPHOCYTES 0.6 (L) 0.8 - 3.5 K/UL    ABS. MONOCYTES 0.8 0.0 - 1.0 K/UL    ABS. EOSINOPHILS 0.0 0.0 - 0.4 K/UL    ABS. BASOPHILS 0.0 0.0 - 0.1 K/UL    DF SMEAR SCANNED      RBC COMMENTS NORMOCYTIC, NORMOCHROMIC     CK W/ REFLX CKMB    Collection Time: 08/01/17  2:18 PM   Result Value Ref Range     26 - 989 U/L   METABOLIC PANEL, COMPREHENSIVE    Collection Time: 08/01/17  2:18 PM   Result Value Ref Range    Sodium 121 (L) 136 - 145 mmol/L    Potassium 3.6 3.5 - 5.1 mmol/L    Chloride 83 (L) 97 - 108 mmol/L    CO2 31 21 - 32 mmol/L    Anion gap 7 5 - 15 mmol/L    Glucose 113 (H) 65 - 100 mg/dL    BUN 13 6 - 20 MG/DL    Creatinine 0.80 0.55 - 1.02 MG/DL    BUN/Creatinine ratio 16 12 - 20      GFR est AA >60 >60 ml/min/1.73m2    GFR est non-AA >60 >60 ml/min/1.73m2    Calcium 8.6 8.5 - 10.1 MG/DL    Bilirubin, total 0.8 0.2 - 1.0 MG/DL    ALT (SGPT) 23 12 - 78 U/L    AST (SGOT) 27 15 - 37 U/L    Alk.  phosphatase 87 45 - 117 U/L    Protein, total 7.1 6.4 - 8.2 g/dL    Albumin 4.4 3.5 - 5.0 g/dL    Globulin 2.7 2.0 - 4.0 g/dL    A-G Ratio 1.6 1.1 - 2.2     TROPONIN I    Collection Time: 08/01/17  2:18 PM   Result Value Ref Range    Troponin-I, Qt. 0.04 <0.05 ng/mL   NT-PRO BNP    Collection Time: 08/01/17  2:18 PM   Result Value Ref Range    NT pro-BNP 3929 (H) 0 - 125 PG/ML   CBC WITH AUTOMATED DIFF    Collection Time: 08/02/17  2:49 AM   Result Value Ref Range    WBC 6.8 3.6 - 11.0 K/uL    RBC 3.65 (L) 3.80 - 5.20 M/uL    HGB 12.4 11.5 - 16.0 g/dL    HCT 35.1 35.0 - 47.0 %    MCV 96.2 80.0 - 99.0 FL    MCH 34.0 26.0 - 34.0 PG    MCHC 35.3 30.0 - 36.5 g/dL    RDW 13.5 11.5 - 14.5 %    PLATELET 554 879 - 399 K/uL    NEUTROPHILS 74 32 - 75 %    LYMPHOCYTES 14 12 - 49 %    MONOCYTES 12 5 - 13 %    EOSINOPHILS 0 0 - 7 %    BASOPHILS 0 0 - 1 %    ABS. NEUTROPHILS 5.0 1.8 - 8.0 K/UL    ABS. LYMPHOCYTES 1.0 0.8 - 3.5 K/UL    ABS. MONOCYTES 0.8 0.0 - 1.0 K/UL    ABS. EOSINOPHILS 0.0 0.0 - 0.4 K/UL    ABS. BASOPHILS 0.0 0.0 - 0.1 K/UL   METABOLIC PANEL, COMPREHENSIVE    Collection Time: 08/02/17  2:49 AM   Result Value Ref Range    Sodium 124 (L) 136 - 145 mmol/L    Potassium 3.3 (L) 3.5 - 5.1 mmol/L    Chloride 85 (L) 97 - 108 mmol/L    CO2 31 21 - 32 mmol/L    Anion gap 8 5 - 15 mmol/L    Glucose 98 65 - 100 mg/dL    BUN 11 6 - 20 MG/DL    Creatinine 0.69 0.55 - 1.02 MG/DL    BUN/Creatinine ratio 16 12 - 20      GFR est AA >60 >60 ml/min/1.73m2    GFR est non-AA >60 >60 ml/min/1.73m2    Calcium 8.5 8.5 - 10.1 MG/DL    Bilirubin, total 0.9 0.2 - 1.0 MG/DL    ALT (SGPT) 21 12 - 78 U/L    AST (SGOT) 20 15 - 37 U/L    Alk.  phosphatase 80 45 - 117 U/L    Protein, total 6.9 6.4 - 8.2 g/dL    Albumin 3.9 3.5 - 5.0 g/dL    Globulin 3.0 2.0 - 4.0 g/dL    A-G Ratio 1.3 1.1 - 2.2     CK W/ CKMB & INDEX    Collection Time: 08/02/17  2:49 AM   Result Value Ref Range     26 - 192 U/L    CK - MB 2.0 <3.6 NG/ML    CK-MB Index 1.8 0 - 2.5     TROPONIN I    Collection Time: 08/02/17  2:49 AM   Result Value Ref Range    Troponin-I, Qt. <0.04 <0.05 ng/mL   TSH 3RD GENERATION    Collection Time: 08/02/17  2:49 AM   Result Value Ref Range    TSH 13.40 (H) 0.36 - 3.74 uIU/mL   T4, FREE    Collection Time: 08/02/17  2:49 AM   Result Value Ref Range    T4, Free 1.1 0.8 - 1.5 NG/DL   BNP    Collection Time: 08/02/17  2:49 AM   Result Value Ref Range  (H) 0 - 100 pg/mL           Assessment:     Principal Problem:    CHF (congestive heart failure) -- Probably acute systolic dysfunction. Active Problems:    HTN (hypertension) (7/14/2011)      Atrial fibrillation, chronic -- again, she did not f/u with cardiology as I had recommended over a year ago. Acquired hypothyroidism (4/8/2016)      Chest pain (8/1/2017)      Hyponatremia -- Prob due to CHF & aggravated by the Dyazide. Plan:     1. Cont IV Lasix for now. K+ supplementation ordered. 2. Cont metoprolol as she was taking at home. 3. For ECHO. 4. Cont Eliquis for afib. Cont ASA in case there is some CAD. 5. Holding Dyazide, and follow Na+. 6. Cards to see. Pt may need cath. 7. Increase Levothyroxine to 175 mcg every day.           Kalin Cavazos MD

## 2017-08-02 NOTE — NURSE NAVIGATOR
Chart reviewed by Heart Failure Nurse Navigator. Heart Failure database completed. EF Pending. ACEi/ARB: Dyazide 37.5/5.25mg qd. BB: Lopressor 25mg qd. CRT not indicated at this time. NYHA Functional Class not assigned. Documentation requested for NYHA Functional Class via Provider message on the [address] [place] [address] Failure Teach Back in Patient Education. Heart Failure Avoiding Triggers on Discharge Instructions.       Cardiology;  Dr. Robert Marktein

## 2017-08-03 ENCOUNTER — HOME HEALTH ADMISSION (OUTPATIENT)
Dept: HOME HEALTH SERVICES | Facility: HOME HEALTH | Age: 67
End: 2017-08-03

## 2017-08-03 LAB
ANION GAP BLD CALC-SCNC: 8 MMOL/L (ref 5–15)
BUN SERPL-MCNC: 19 MG/DL (ref 6–20)
BUN/CREAT SERPL: 21 (ref 12–20)
CALCIUM SERPL-MCNC: 8.9 MG/DL (ref 8.5–10.1)
CHLORIDE SERPL-SCNC: 88 MMOL/L (ref 97–108)
CO2 SERPL-SCNC: 32 MMOL/L (ref 21–32)
CREAT SERPL-MCNC: 0.92 MG/DL (ref 0.55–1.02)
GLUCOSE SERPL-MCNC: 98 MG/DL (ref 65–100)
POTASSIUM SERPL-SCNC: 3.6 MMOL/L (ref 3.5–5.1)
SODIUM SERPL-SCNC: 128 MMOL/L (ref 136–145)

## 2017-08-03 PROCEDURE — 65660000000 HC RM CCU STEPDOWN

## 2017-08-03 PROCEDURE — C1894 INTRO/SHEATH, NON-LASER: HCPCS

## 2017-08-03 PROCEDURE — 80048 BASIC METABOLIC PNL TOTAL CA: CPT | Performed by: INTERNAL MEDICINE

## 2017-08-03 PROCEDURE — 74011250637 HC RX REV CODE- 250/637: Performed by: INTERNAL MEDICINE

## 2017-08-03 PROCEDURE — B2111ZZ FLUOROSCOPY OF MULTIPLE CORONARY ARTERIES USING LOW OSMOLAR CONTRAST: ICD-10-PCS | Performed by: INTERNAL MEDICINE

## 2017-08-03 PROCEDURE — 93458 L HRT ARTERY/VENTRICLE ANGIO: CPT

## 2017-08-03 PROCEDURE — 74011636320 HC RX REV CODE- 636/320: Performed by: INTERNAL MEDICINE

## 2017-08-03 PROCEDURE — 36415 COLL VENOUS BLD VENIPUNCTURE: CPT | Performed by: INTERNAL MEDICINE

## 2017-08-03 PROCEDURE — 4A023N7 MEASUREMENT OF CARDIAC SAMPLING AND PRESSURE, LEFT HEART, PERCUTANEOUS APPROACH: ICD-10-PCS | Performed by: INTERNAL MEDICINE

## 2017-08-03 PROCEDURE — 77030013744

## 2017-08-03 PROCEDURE — 74011000250 HC RX REV CODE- 250: Performed by: INTERNAL MEDICINE

## 2017-08-03 PROCEDURE — 99152 MOD SED SAME PHYS/QHP 5/>YRS: CPT

## 2017-08-03 PROCEDURE — 74011250636 HC RX REV CODE- 250/636: Performed by: INTERNAL MEDICINE

## 2017-08-03 PROCEDURE — C1760 CLOSURE DEV, VASC: HCPCS

## 2017-08-03 PROCEDURE — 77030004533 HC CATH ANGI DX IMP BSC -B

## 2017-08-03 RX ORDER — SODIUM CHLORIDE 0.9 % (FLUSH) 0.9 %
5-10 SYRINGE (ML) INJECTION AS NEEDED
Status: DISCONTINUED | OUTPATIENT
Start: 2017-08-03 | End: 2017-08-04 | Stop reason: HOSPADM

## 2017-08-03 RX ORDER — SODIUM CHLORIDE 0.9 % (FLUSH) 0.9 %
5-10 SYRINGE (ML) INJECTION EVERY 8 HOURS
Status: DISCONTINUED | OUTPATIENT
Start: 2017-08-03 | End: 2017-08-04 | Stop reason: HOSPADM

## 2017-08-03 RX ORDER — FENTANYL CITRATE 50 UG/ML
25-200 INJECTION, SOLUTION INTRAMUSCULAR; INTRAVENOUS
Status: DISCONTINUED | OUTPATIENT
Start: 2017-08-03 | End: 2017-08-03

## 2017-08-03 RX ORDER — FUROSEMIDE 40 MG/1
40 TABLET ORAL DAILY
Status: DISCONTINUED | OUTPATIENT
Start: 2017-08-03 | End: 2017-08-04 | Stop reason: HOSPADM

## 2017-08-03 RX ORDER — SODIUM CHLORIDE 0.9 % (FLUSH) 0.9 %
5-10 SYRINGE (ML) INJECTION AS NEEDED
Status: DISCONTINUED | OUTPATIENT
Start: 2017-08-03 | End: 2017-08-03

## 2017-08-03 RX ORDER — ATROPINE SULFATE 0.1 MG/ML
.5-1 INJECTION INTRAVENOUS AS NEEDED
Status: DISCONTINUED | OUTPATIENT
Start: 2017-08-03 | End: 2017-08-03

## 2017-08-03 RX ORDER — LIDOCAINE HYDROCHLORIDE 10 MG/ML
10-30 INJECTION INFILTRATION; PERINEURAL
Status: DISCONTINUED | OUTPATIENT
Start: 2017-08-03 | End: 2017-08-03

## 2017-08-03 RX ORDER — CLOPIDOGREL 300 MG/1
600 TABLET, FILM COATED ORAL ONCE
Status: DISCONTINUED | OUTPATIENT
Start: 2017-08-03 | End: 2017-08-03 | Stop reason: HOSPADM

## 2017-08-03 RX ORDER — HEPARIN SODIUM 200 [USP'U]/100ML
2000 INJECTION, SOLUTION INTRAVENOUS AS NEEDED
Status: DISCONTINUED | OUTPATIENT
Start: 2017-08-03 | End: 2017-08-03

## 2017-08-03 RX ORDER — MIDAZOLAM HYDROCHLORIDE 1 MG/ML
.5-1 INJECTION, SOLUTION INTRAMUSCULAR; INTRAVENOUS
Status: DISCONTINUED | OUTPATIENT
Start: 2017-08-03 | End: 2017-08-03

## 2017-08-03 RX ORDER — SODIUM CHLORIDE 9 MG/ML
3 INJECTION, SOLUTION INTRAVENOUS CONTINUOUS
Status: DISPENSED | OUTPATIENT
Start: 2017-08-03 | End: 2017-08-03

## 2017-08-03 RX ORDER — HEPARIN SODIUM 1000 [USP'U]/ML
1000-5000 INJECTION, SOLUTION INTRAVENOUS; SUBCUTANEOUS
Status: DISCONTINUED | OUTPATIENT
Start: 2017-08-03 | End: 2017-08-03

## 2017-08-03 RX ORDER — SODIUM CHLORIDE 9 MG/ML
1.5 INJECTION, SOLUTION INTRAVENOUS CONTINUOUS
Status: DISCONTINUED | OUTPATIENT
Start: 2017-08-03 | End: 2017-08-03 | Stop reason: HOSPADM

## 2017-08-03 RX ADMIN — FENTANYL CITRATE 25 MCG: 50 INJECTION, SOLUTION INTRAMUSCULAR; INTRAVENOUS at 07:12

## 2017-08-03 RX ADMIN — MIDAZOLAM HYDROCHLORIDE 1 MG: 1 INJECTION, SOLUTION INTRAMUSCULAR; INTRAVENOUS at 07:18

## 2017-08-03 RX ADMIN — LEVOTHYROXINE SODIUM 175 MCG: 150 TABLET ORAL at 10:05

## 2017-08-03 RX ADMIN — SODIUM CHLORIDE 1.5 ML/KG/HR: 900 INJECTION, SOLUTION INTRAVENOUS at 08:10

## 2017-08-03 RX ADMIN — MIDAZOLAM HYDROCHLORIDE 2 MG: 1 INJECTION, SOLUTION INTRAMUSCULAR; INTRAVENOUS at 07:12

## 2017-08-03 RX ADMIN — BUPROPION HYDROCHLORIDE 150 MG: 150 TABLET, EXTENDED RELEASE ORAL at 11:03

## 2017-08-03 RX ADMIN — METOPROLOL TARTRATE 25 MG: 25 TABLET ORAL at 11:03

## 2017-08-03 RX ADMIN — APIXABAN 5 MG: 5 TABLET, FILM COATED ORAL at 17:47

## 2017-08-03 RX ADMIN — FENTANYL CITRATE 50 MCG: 50 INJECTION, SOLUTION INTRAMUSCULAR; INTRAVENOUS at 07:18

## 2017-08-03 RX ADMIN — DIAZEPAM 2 MG: 2 TABLET ORAL at 21:40

## 2017-08-03 RX ADMIN — IOPAMIDOL 78 ML: 755 INJECTION, SOLUTION INTRAVENOUS at 07:28

## 2017-08-03 RX ADMIN — ASPIRIN 81 MG: 81 TABLET, COATED ORAL at 11:03

## 2017-08-03 RX ADMIN — FUROSEMIDE 40 MG: 40 TABLET ORAL at 11:03

## 2017-08-03 RX ADMIN — LIDOCAINE HYDROCHLORIDE 10 ML: 10 INJECTION, SOLUTION INFILTRATION; PERINEURAL at 07:12

## 2017-08-03 RX ADMIN — APIXABAN 5 MG: 5 TABLET, FILM COATED ORAL at 11:03

## 2017-08-03 RX ADMIN — METOPROLOL TARTRATE 25 MG: 25 TABLET ORAL at 17:47

## 2017-08-03 RX ADMIN — Medication 10 ML: at 21:41

## 2017-08-03 RX ADMIN — SODIUM CHLORIDE 3 ML/KG/HR: 900 INJECTION, SOLUTION INTRAVENOUS at 07:10

## 2017-08-03 RX ADMIN — HEPARIN SODIUM 2000 UNITS: 200 INJECTION, SOLUTION INTRAVENOUS at 07:15

## 2017-08-03 NOTE — PROGRESS NOTES
TRANSFER - IN REPORT:    Verbal report received from [address] on Joy Mares  being received from procedure area for routine progression of care. Report consisted of patients Situation, Background, Assessment and Recommendations(SBAR). Information from the following report(s) SBAR, MAR, Recent Results and Cardiac Rhythm AFIB was reviewed with the receiving clinician. Opportunity for questions and clarification was provided. Assessment completed upon patients arrival to [address] [place] [address] and care assumed. Cardiac Cath Lab Recovery Arrival Note:    Joy Mares arrived to [facility] recovery area. Patient procedure= LHC. Patient on cardiac monitor, non-invasive blood pressure, SPO2 monitor. O2 @ 2 lpm via NC.  IV  of NS on pump at 242 ml/hr. Patient status doing well without problems. Patient is A&Ox 4  . Patient reports No Pain. PROCEDURE SITE CHECK:    Procedure site:without any bleeding and No Hematoma, No pain/discomfort reported at procedure site. No change in patient status. Continue to monitor patient and status.

## 2017-08-03 NOTE — PROGRESS NOTES
TRANSFER - IN REPORT:    Verbal report received from iKm Mendenhall RN(name) on Pam Hills  being received from [facility]) for ordered procedure      Report consisted of patients Situation, Background, Assessment and   Recommendations(SBAR). Information from the following report(s) SBAR, Kardex and MAR was reviewed with the receiving nurse. Opportunity for questions and clarification was provided. Assessment completed upon patients arrival to unit and care assumed.

## 2017-08-03 NOTE — PROGRESS NOTES
Cardiac Cath Lab Procedure Area Arrival Note:    Honorio Carr arrived to Cardiac Cath Lab, Procedure Area. Patient identifiers verified with NAME and DATE OF BIRTH. Procedure verified with patient. Consent forms verified. Allergies verified. Patient informed of procedure and plan of care. Questions answered with review. Patient voiced understanding of procedure and plan of care. Patient on cardiac monitor, non-invasive blood pressure, SPO2 monitor. On room air then placed on O2 @ 2 lpm via NC.  IV of normal saline on pump at 242 ml/hr. Patient status doing well without problems. Patient is A&Ox 4. Patient reports no pain. Patient medicated during procedure with orders obtained and verified by Dr. Beth Ledesma. Refer to patients Cardiac Cath Lab PROCEDURE REPORT for vital signs, assessment, status, and response during procedure, printed at end of case. Printed report on chart or scanned into chart.

## 2017-08-03 NOTE — NURSE NAVIGATOR
Heart Failure education folder and Avoiding Hidden Sodium book given to   [employer]    Educated using teach back method. Discussed diagnosis definition and assessed patient understanding. Reviewed importance of daily weight monitoring and low sodium diet (less than 1500 mg. daily). She states she does not weigh self daily. States she will be purchasing a scale for her new condo. instructed on use of weight calendar. Reinforced daily weights and what weight gain to report to MD.  Encouraged activity and rest periods within symptom limitations and as ordered by physician. Reviewed lasix, eliquis and metoprolol, purpose of medication, potential side effects. Discussed importance of reporting signs and symptoms of exacerbation  (moving into the next zone) and when to report them to the doctor to prevent re-hospitalization. Debora Luo was encouraged to keep all appointments with doctor. advised [facility] would schedule JAYDE appt with Dr. Ashley Jones and appt will be placed on AVS at discharge. Patient agreeable to [facility] and given information on [facility]. Smoking history assessed. Pt is a non smoker. She/he has no questions at this time.       Zachariah Francis, RN-CHFN/HFNN

## 2017-08-03 NOTE — PROGRESS NOTES
TRANSFER - OUT REPORT:    Verbal report given to Jasmin SHABAZZ on Eldon Otoole being transferred to Room 410 for routine progression of care       Report consisted of patients Situation, Background, Assessment and   Recommendations(SBAR). Information from the following report(s) SBAR, MAR, Recent Results and Cardiac Rhythm AFIB was reviewed with the receiving nurse. Opportunity for questions and clarification was provided.       Pt transported to room on cardiac monitor with RN

## 2017-08-03 NOTE — PROGRESS NOTES
Met with patient at bedside to introduce role and to assess for any needs/concerns regarding disposition. Patient admitted with complaints of chest pain. She has a medical history that includes A-fib, HTN, Depression, and thyroid cancer. Post a cardiac catheter today with relatively normal results. Patient  admitted from home where she is independent with her ADLs. Her PCP is Dr Mechelle Luna and she was seen 4/2016. she does not utilize any medical services or equipment at home. She anticipates discharge to home when medically stable. Will continue to follow and assist as needed. Care Management Interventions  PCP Verified by CM:  Yes (Dr Mechelle Luna)  [address] [place] [address] (Criteria: CHF and RRAT>21): No  Reason for No Palliative Care Consult:  (Does not meet criteria)  Mode of Transport at Discharge:  (TBD)  Transition of Care Consult (CM Consult): Discharge Planning (History of CHF will need H2H at discharge)  MyChart Signup: Yes  Confirm Follow Up Transport: Self  Plan discussed with Pt/Family/Caregiver: Yes  Discharge Location  Discharge Placement: [address] [place] [address], RN CRM

## 2017-08-03 NOTE — PROCEDURES
Cardiac Catheterization Procedure Note   Patient: Manuel Rodríguez  MRN: [id number]  SSN: [id number]   YOB: 1950 Age: 78 y.o.   Sex: female    Date of Procedure: 8/3/2017   Pre-procedure Diagnosis: Congestive Heart Failure  Post-procedure Diagnosis: Non-cardiac Chest Pain  Procedure: Left Heart Cath  :  Dr. Lowell Cohen MD    Assistant(s):  None  Anesthesia: Moderate Sedation   Estimated Blood Loss: Less than 10 mL   Specimens Removed: None  Findings: LVG; EF 60% with normal LVEDP; no MR; LM/LAD normal; LCX is dominant and tortuous but normal ; RCA small non-dominant but normal  Complications: None   Implants:  None  Signed by:  Lowell Cohen MD  8/3/2017  7:32 AM

## 2017-08-03 NOTE — PROGRESS NOTES
Ina Layne, Jam Sanchez, Júnior & Jerome    Admit Date: 8/1/2017    Subjective:     Pt just had cath, and it is surprising normal -- No significant CAD, and nl LVEF. Pt without new c/o's. She seems to be breathing OK, and her edema has improved. Current Facility-Administered Medications   Medication Dose Route Frequency    clopidogrel (PLAVIX) 600 mg  600 mg Oral ONCE    0.9% sodium chloride infusion  1.5 mL/kg/hr IntraVENous CONTINUOUS    furosemide (LASIX) tablet 40 mg  40 mg Oral DAILY    levothyroxine (SYNTHROID) tablet 175 mcg  175 mcg Oral ACB    aspirin delayed-release tablet 81 mg  81 mg Oral DAILY    buPROPion SR (WELLBUTRIN SR) tablet 150 mg  150 mg Oral DAILY    butalbital-acetaminophen-caffeine (FIORICET, ESGIC) -40 mg per tablet 1-2 Tab  1-2 Tab Oral Q6H PRN    diazePAM (VALIUM) tablet 2 mg  2 mg Oral BID PRN    metoprolol tartrate (LOPRESSOR) tablet 25 mg  25 mg Oral BID    apixaban (ELIQUIS) tablet 5 mg  5 mg Oral BID    ondansetron (ZOFRAN) injection 4 mg  4 mg IntraVENous Q6H PRN    acetaminophen (TYLENOL) tablet 650 mg  650 mg Oral Q4H PRN          Objective:     Patient Vitals for the past 8 hrs:   BP Temp Pulse Resp SpO2 Weight   08/03/17 0738 142/77 97.5 °F (36.4 °C) 76 18 95 % -   08/03/17 0728 143/83 - 78 20 100 % -   08/03/17 0353 - - - - - 177 lb 14.6 oz (80.7 kg)   08/03/17 0351 129/62 98.2 °F (36.8 °C) 76 18 99 % -        08/01 1901 - 08/03 0700  In: 240 [P.O.:240]  Out: 200 [Urine:200]    Physical Exam: NAD. A&O. Neck -- Supple. No JVD. Heart -- Irr Irr (Rate OK). Lungs -- Fairly CTA. Abd -- Benign. Ext -- Trace LE edema, b/l.       Data Review   Recent Results (from the past 24 hour(s))   METABOLIC PANEL, BASIC    Collection Time: 08/03/17  6:37 AM   Result Value Ref Range    Sodium 128 (L) 136 - 145 mmol/L Potassium 3.6 3.5 - 5.1 mmol/L    Chloride 88 (L) 97 - 108 mmol/L    CO2 32 21 - 32 mmol/L    Anion gap 8 5 - 15 mmol/L    Glucose 98 65 - 100 mg/dL    BUN 19 6 - 20 MG/DL    Creatinine 0.92 0.55 - 1.02 MG/DL    BUN/Creatinine ratio 21 (H) 12 - 20      GFR est AA >60 >60 ml/min/1.73m2    GFR est non-AA >60 >60 ml/min/1.73m2    Calcium 8.9 8.5 - 10.1 MG/DL           Assessment:     Principal Problem:    CHF (congestive heart failure) -- appears to be acute diastolic dysfunction. .... Active Problems:    HTN (hypertension) (7/14/2011)      Atrial fibrillation, chronic -- again, she did not f/u with cardiology as I had recommended over a year ago. Acquired hypothyroidism (4/8/2016)      Chest pain (8/1/2017)      Hyponatremia -- Prob due to CHF & aggravated by the Dyazide. Plan:     1. On PO Lasix now. 2. Cont metoprolol as she was taking at home. 3. Cont Eliquis for afib.  4. ? Need to continue ASA? 5. Cont to hold Dyazide, and follow Na+. 6. Levothyroxine been increased to 175 mcg every day. Dr. Isabel Jones help appreciated! I am away until Monday -- My partners to follow. Pt may be able to be discharged as soon as tomorrow.           Simba Kowalski MD

## 2017-08-04 VITALS
TEMPERATURE: 97.8 F | SYSTOLIC BLOOD PRESSURE: 124 MMHG | OXYGEN SATURATION: 98 % | HEIGHT: 67 IN | BODY MASS INDEX: 28.1 KG/M2 | DIASTOLIC BLOOD PRESSURE: 75 MMHG | HEART RATE: 79 BPM | WEIGHT: 179.01 LBS | RESPIRATION RATE: 20 BRPM

## 2017-08-04 LAB
ANION GAP BLD CALC-SCNC: 10 MMOL/L (ref 5–15)
BUN SERPL-MCNC: 22 MG/DL (ref 6–20)
BUN/CREAT SERPL: 24 (ref 12–20)
CALCIUM SERPL-MCNC: 9 MG/DL (ref 8.5–10.1)
CHLORIDE SERPL-SCNC: 87 MMOL/L (ref 97–108)
CO2 SERPL-SCNC: 31 MMOL/L (ref 21–32)
CREAT SERPL-MCNC: 0.93 MG/DL (ref 0.55–1.02)
GLUCOSE SERPL-MCNC: 77 MG/DL (ref 65–100)
POTASSIUM SERPL-SCNC: 3.4 MMOL/L (ref 3.5–5.1)
SODIUM SERPL-SCNC: 128 MMOL/L (ref 136–145)

## 2017-08-04 PROCEDURE — 36415 COLL VENOUS BLD VENIPUNCTURE: CPT | Performed by: INTERNAL MEDICINE

## 2017-08-04 PROCEDURE — 74011250637 HC RX REV CODE- 250/637: Performed by: INTERNAL MEDICINE

## 2017-08-04 PROCEDURE — 80048 BASIC METABOLIC PNL TOTAL CA: CPT | Performed by: INTERNAL MEDICINE

## 2017-08-04 RX ADMIN — METOPROLOL TARTRATE 25 MG: 25 TABLET ORAL at 08:53

## 2017-08-04 RX ADMIN — FUROSEMIDE 40 MG: 40 TABLET ORAL at 08:54

## 2017-08-04 RX ADMIN — APIXABAN 5 MG: 5 TABLET, FILM COATED ORAL at 08:53

## 2017-08-04 RX ADMIN — LEVOTHYROXINE SODIUM 175 MCG: 150 TABLET ORAL at 07:13

## 2017-08-04 RX ADMIN — Medication 10 ML: at 06:00

## 2017-08-04 RX ADMIN — BUPROPION HYDROCHLORIDE 150 MG: 150 TABLET, EXTENDED RELEASE ORAL at 08:54

## 2017-08-04 RX ADMIN — ASPIRIN 81 MG: 81 TABLET, COATED ORAL at 10:40

## 2017-08-04 NOTE — PROGRESS NOTES
Referral sent to [facility] for [facility] secondary to CHF.  Authorization noted and contact information placed on AVS.    Ekta Reid RN CRM

## 2017-08-04 NOTE — PROGRESS NOTES
Medical Progress Note      NAME: Sarah Good   :  1950  MRM:  [id number]    Date/Time: 2017  9:16 AM         Problem List:     Principal Problem:    CHF (congestive heart failure) ([address] (2017)    Active Problems:    HTN (hypertension) (2011)      Atrial fibrillation ([address] (7/15/2011)      Acquired hypothyroidism (2016)      Chest pain (2017)      Hyponatremia (2017)             Subjective:     Patient feeling well. Past Medical History:   Diagnosis Date    Atrial fibrillation ([address] 7/15/2011    Depression     HTN (hypertension) 2011    [facility]oxysmal [facility] fibrillation [facility]     [facility]     Papillary    Unspecified hypothyroidism 7/15/2011       ROS:  General: negative for fever, chills, sweats, weakness  Cardiology:  negative for chest pain, palpitations, orthopnea, PND, edema, syncope          Objective:       Vitals:          Last 24hrs VS reviewed since prior progress note.  Most recent are:    Visit Vitals    /87    Pulse 81    Temp 97.7 °F (36.5 °C)    Resp 20    Ht 5' 7\" (1.702 m)    Wt 179 lb 0.2 oz (81.2 kg)    SpO2 100%    BMI 28.04 kg/m2     SpO2 Readings from Last 6 Encounters:   17 100%    O2 Flow Rate (L/min): 2 l/min     Intake/Output Summary (Last 24 hours) at 17 0916  Last data filed at 17 1201   Gross per 24 hour   Intake                0 ml   Output              650 ml   Net             -650 ml          Exam:     General   well developed, well nourished, appears stated age, in no acute distress  Respiratory   Clear To Auscultation bilaterally - no wheezes, rales, rhonchi, or crackles  Cardiology  Sl irreg  Abdominal  Soft, non-tender, non-distended, positive bowel sounds, no hepatosplenomegaly  Extremities  +1 edema    Lab Data Reviewed: (see below)    Medications Reviewed: (see below)    ______________________________________________________________________    Medications:     Current Facility-Administered Medications   Medication Dose Route Frequency    sodium chloride (NS) flush 5-10 mL  5-10 mL IntraVENous Q8H    sodium chloride (NS) flush 5-10 mL  5-10 mL IntraVENous PRN    sodium chloride (NS) flush 5-10 mL  5-10 mL IntraVENous Q8H    sodium chloride (NS) flush 5-10 mL  5-10 mL IntraVENous PRN    furosemide (LASIX) tablet 40 mg  40 mg Oral DAILY    levothyroxine (SYNTHROID) tablet 175 mcg  175 mcg Oral ACB    aspirin delayed-release tablet 81 mg  81 mg Oral DAILY    buPROPion SR (WELLBUTRIN SR) tablet 150 mg  150 mg Oral DAILY    butalbital-acetaminophen-caffeine (FIORICET, ESGIC) -40 mg per tablet 1-2 Tab  1-2 Tab Oral Q6H PRN    diazePAM (VALIUM) tablet 2 mg  2 mg Oral BID PRN    metoprolol tartrate (LOPRESSOR) tablet 25 mg  25 mg Oral BID    apixaban (ELIQUIS) tablet 5 mg  5 mg Oral BID    ondansetron (ZOFRAN) injection 4 mg  4 mg IntraVENous Q6H PRN    acetaminophen (TYLENOL) tablet 650 mg  650 mg Oral Q4H PRN            Lab Review:     Recent Labs      08/02/17   0249  08/01/17   1418   WBC  6.8  9.1   HGB  12.4  12.7   HCT  35.1  35.7   PLT  196  188     Recent Labs      08/04/17   0118  08/03/17   0637  08/02/17   0249  08/01/17   1418   NA  128*  128*  124*  121*   K  3.4*  3.6  3.3*  3.6   CL  87*  88*  85*  83*   CO2  31  32  31  31   GLU  77  98  98  113*   BUN  22*  19  11  13   CREA  0.93  0.92  0.69  0.80   CA  9.0  8.9  8.5  8.6   ALB   --    --   3.9  4.4   TBILI   --    --   0.9  0.8   SGOT   --    --   20  27   ALT   --    --   21  23     No results found for: GLUCPOC  No results for input(s): PH, PCO2, PO2, HCO3, FIO2 in the last 72 hours. No results for input(s): INR in the last 72 hours.     No lab exists for component: INREXT    Other pertinent lab: NA         Assessment:     Patient Active Problem List   Diagnosis Code    HTN (hypertension) I10    [facility] I48.91    Acquired hypothyroidism E03.9    [facility] I50.9    Chest pain R07.9    Hyponatremia E87.1          Plan:                 1. Acute diastolic dysfunction- s/p cath clean corornary arteries. 2. A fib- continue eliquis  3. Hyponatremia- follow up outpatient  4.  Discharge home this am if ok with Dr. Nadine Jernigan.                ___________________________________________________    Attending Physician: Christopher Alexander MD

## 2017-08-04 NOTE — NURSE NAVIGATOR
HF NN scheduled post hospital follow up with cardiology, Dr. Yury Huber on Thursday 8/10/17 at 3 PM at the [facility] [place] [address] location.   Appointment details placed on Discharge AVS.

## 2017-08-06 ENCOUNTER — HOME CARE VISIT (OUTPATIENT)
Dept: HOME HEALTH SERVICES | Facility: HOME HEALTH | Age: 67
End: 2017-08-06

## 2017-08-07 ENCOUNTER — HOME CARE VISIT (OUTPATIENT)
Dept: HOME HEALTH SERVICES | Facility: HOME HEALTH | Age: 67
End: 2017-08-07

## 2017-08-09 NOTE — DISCHARGE SUMMARY
Physician Discharge Summary     Patient ID:  Damon Salinas  [id number]  17 y.o.  1950    Admit date: 8/1/2017    Discharge date and time: 8/8/2017    Briefly, pt was admitted with CHF (congestive heart failure) ([facility] For details of admission, see H&P. Hospital Course:  Pt was admitted with clinical CHF (SOB, edema) and hyponatremia. She was diuresed. She underwent a cardiac cath -- Surprisingly showed normal coronaries and Nl EF. Therefore, it is felt she had acute diastolic dysfunction. (NYHA Class II). She clinically improved and is discharged home. Her Dyazide is being held due to the hyponatremia. Discharge Dx: Diastolic CHF, acute; hyponatremia. Condition at discharge: improved. Disposition: home    Patient Instructions:   Cannot display discharge medications since this patient is not currently admitted. Follow-up with Dr. Olga Salas in 1 week.       Signed:  Earl Nicholson MD  8/8/2017  9:56 PM

## 2017-08-22 PROBLEM — I50.9 CHF (CONGESTIVE HEART FAILURE) (HCC): Status: RESOLVED | Noted: 2017-08-01 | Resolved: 2017-08-22

## 2017-08-22 PROBLEM — I50.32 CHRONIC DIASTOLIC CONGESTIVE HEART FAILURE (HCC): Status: ACTIVE | Noted: 2017-08-22

## 2017-08-22 PROBLEM — Z79.899 ENCOUNTER FOR LONG-TERM (CURRENT) USE OF OTHER MEDICATIONS: Status: ACTIVE | Noted: 2017-08-22

## 2019-01-25 PROBLEM — F32.A MILD DEPRESSION: Status: ACTIVE | Noted: 2019-01-25

## 2021-02-15 ENCOUNTER — TRANSCRIBE ORDER (OUTPATIENT)
Dept: SCHEDULING | Age: 71
End: 2021-02-15

## 2021-02-15 DIAGNOSIS — C90.00 MYELOMA ASSOCIATED AMYLOIDOSIS (HCC): Primary | ICD-10-CM

## 2021-02-15 DIAGNOSIS — E85.9 MYELOMA ASSOCIATED AMYLOIDOSIS (HCC): Primary | ICD-10-CM

## 2021-10-23 ENCOUNTER — APPOINTMENT (OUTPATIENT)
Dept: GENERAL RADIOLOGY | Age: 71
End: 2021-10-23
Attending: NURSE PRACTITIONER
Payer: MEDICARE

## 2021-10-23 ENCOUNTER — HOSPITAL ENCOUNTER (EMERGENCY)
Age: 71
Discharge: HOME | End: 2021-10-23
Attending: EMERGENCY MEDICINE | Admitting: EMERGENCY MEDICINE
Payer: MEDICARE

## 2021-10-23 VITALS
OXYGEN SATURATION: 93 % | DIASTOLIC BLOOD PRESSURE: 83 MMHG | RESPIRATION RATE: 13 BRPM | HEART RATE: 68 BPM | TEMPERATURE: 98.3 F | SYSTOLIC BLOOD PRESSURE: 141 MMHG

## 2021-10-23 DIAGNOSIS — T14.8XXA HEMATOMA: Primary | ICD-10-CM

## 2021-10-23 LAB
COMMENT, HOLDF: NORMAL
SAMPLES BEING HELD,HOLD: NORMAL

## 2021-10-23 PROCEDURE — 73590 X-RAY EXAM OF LOWER LEG: CPT

## 2021-10-23 PROCEDURE — 99283 EMERGENCY DEPT VISIT LOW MDM: CPT

## 2021-10-23 NOTE — DISCHARGE INSTRUCTIONS
Your x-ray is normal.  You do have a large hematoma on your leg, which is a collection of blood that might take a little bit longer to heal since you are on Xarelto. Continue to elevate your legs and use an Ace wrap to help prevent the blood from pooling in your legs.

## 2021-10-23 NOTE — ED TRIAGE NOTES
Patient arrives ambulatory to the ED with a chief complaint of R leg hematoma. 1 week ago patient hit her R lower leg against the door jam. There is a large hematoma on her R lower leg. Patient was evaluated by her PCP on Thursday and evaluated by [facility] yesterday. Hx of afib on Xarelto. Patient presents with multiple bruising spots, large on her back and multiple spots on her arms.

## 2021-10-23 NOTE — ED PROVIDER NOTES
This is a 65-year-old female with past medical history of atrial fibrillation (chronic anticoagulation with Xarelto), depression, hypertension, anxiety, and papillary thyroid cancer who presents to the ER today for evaluation of traumatic right leg hematoma. Patient states that she bumped her right leg about 1 week ago, and subsequently developed a hematoma along her right shin. Since the injury, she has been icing it and elevating it, but she states that the swelling has not gone down. She was seen for this at an outside facility in which she was prescribed antibiotics. She is here to make sure that she does not have to have her leg amputated. She denies any numbness, weakness, fevers. Past Medical History:   Diagnosis Date    Atrial fibrillation ([facility] 7/15/2011    Depression     HTN (hypertension) 7/14/2011    Paroxysmal atrial fibrillation ([facility]     Thyroid ca [facility]) 2005    Papillary    Unspecified hypothyroidism 7/15/2011       Past Surgical History:   Procedure Laterality Date    ENDOSCOPY, COLON, DIAGNOSTIC  2003    neg. rep 10 yrs.     [place] THYROIDECTOMY  11/05         Family History:   Problem Relation Age of Onset    Cancer Mother         lung       Social History     Socioeconomic History    Marital status:      Spouse name: Not on file    Number of children: Not on file    Years of education: Not on file    Highest education level: Not on file   Occupational History    Not on file   Tobacco Use    Smoking status: Never Smoker    Smokeless tobacco: Never Used   Substance and Sexual Activity    Alcohol use: Yes     Comment: soc    Drug use: No    Sexual activity: Not on file   Other Topics Concern    Not on file   Social History Narrative    Not on file     Social Determinants of Health     Financial Resource Strain:     Difficulty of Paying Living Expenses:    Food Insecurity:     Worried About Running Out of Food in the Last Year:     [address] in the Last Year:    Transportation Needs:     Lack of Transportation (Medical):  Lack of Transportation (Non-Medical):    Physical Activity:     Days of Exercise per Week:     Minutes of Exercise per Session:    Stress:     Feeling of Stress :    Social Connections:     Frequency of Communication with Friends and Family:     Frequency of Social Gatherings with Friends and Family:     Attends [employer] Services:     Active Member of Clubs or Organizations:     Attends Club or Organization Meetings:     Marital Status:    Intimate Partner Violence:     Fear of Current or Ex-Partner:     Emotionally Abused:     Physically Abused:     Sexually Abused: ALLERGIES: Opioids - morphine analogues and Penicillins    Review of Systems   Constitutional: Negative for fever. HENT: Negative for sore throat. Eyes: Negative for visual disturbance. Respiratory: Negative for shortness of breath. Cardiovascular: Negative for palpitations. Gastrointestinal: Negative for vomiting. Genitourinary: Negative for dysuria. Musculoskeletal: Negative for myalgias. Skin: Positive for color change and wound. Neurological: Negative for dizziness and weakness. Psychiatric/Behavioral: Negative for dysphoric mood. Vitals:    10/23/21 1110   BP: (!) 174/84   Pulse: 77   Resp: 18   Temp: 98.3 °F (36.8 °C)   SpO2: 99%            Physical Exam  Vitals and nursing note reviewed. Constitutional:       General: She is not in acute distress. Appearance: Normal appearance. She is not ill-appearing. HENT:      Head: Normocephalic and atraumatic. Right Ear: External ear normal.      Left Ear: External ear normal.      Nose: Nose normal.      Mouth/Throat:      Mouth: Mucous membranes are moist.      Pharynx: Oropharynx is clear. Eyes:      General: No scleral icterus. Extraocular Movements: Extraocular movements intact.       Conjunctiva/sclera: Conjunctivae normal.      Pupils: Pupils are equal, round, and reactive to light. Cardiovascular:      Rate and Rhythm: Normal rate and regular rhythm. Pulses: Normal pulses. Dorsalis pedis pulses are 2+ on the right side and 2+ on the left side. Posterior tibial pulses are 2+ on the right side and 2+ on the left side. Heart sounds: Normal heart sounds. Comments: There is stigmata of chronic venous insufficiency in both lower extremities. There is a relatively large hematoma that feels somewhat firm and nontender to palpation along the anterior shin. Brisk capillary refill. No bony tenderness to palpation. Pulmonary:      Effort: Pulmonary effort is normal.      Breath sounds: Normal breath sounds. Abdominal:      General: Abdomen is flat. Bowel sounds are normal.      Palpations: Abdomen is soft. Musculoskeletal:         General: Normal range of motion. Cervical back: Normal range of motion and neck supple. No rigidity. No muscular tenderness. Right lower le+ Pitting Edema present. Left lower le+ Pitting Edema present. Skin:     General: Skin is warm and dry. Findings: Bruising present. Neurological:      General: No focal deficit present. Mental Status: She is alert and oriented to person, place, and time. Psychiatric:         Mood and Affect: Mood is anxious. Behavior: Behavior normal.         Thought Content: Thought content normal.         Judgment: Judgment normal.              MDM      VITAL SIGNS:  Patient Vitals for the past 4 hrs:   Temp Pulse Resp BP SpO2   10/23/21 1110 98.3 °F (36.8 °C) 77 18 (!) 174/84 99 %         LABS:  Recent Results (from the past 6 hour(s))   SAMPLES BEING HELD    Collection Time: 10/23/21 11:11 AM   Result Value Ref Range    SAMPLES BEING HELD 1RED,1PST,1LAV,1BLU     COMMENT        Add-on orders for these samples will be processed based on acceptable specimen integrity and analyte stability, which may vary by analyte.         IMAGING:  XR TIB/FIB RT   Final Result   No acute abnormality. Medications During Visit:  Medications - No data to display      DECISION MAKING:  Claude Lux is a 66 y.o. female who comes in as above. Unremarkable x-ray. Symptoms consistent with hematoma that is likely slower to reabsorb secondary to venous stasis and anticoagulation with Xarelto. Recommended continued leg elevation and using Ace wrap to facilitate venous drainage. The clinical decision making for this encounter included ordering and interpreting the above diagnostic tests with comparison to prior studies that are within our EMR. Past medical and surgical histories were reviewed, as were records from recent outpatient and emergency department visits. The above results discussed and reviewed with the patient. Patient verbalized understanding of the care plan, including any changes to current outpatient medication regimen, discussed disease process, symptom control, and follow-up care. Return precautions reviewed. IMPRESSION:  1. Hematoma        DISPOSITION:  Discharged      Current Discharge Medication List           Follow-up Information     Follow up With Specialties Details Why Contact Info    Brooklyn Vasquez MD Internal Medicine   [phone]              The patient is asked to follow-up with their primary care provider in the next several days. They are to call tomorrow for an appointment. The patient is asked to return promptly for any increased concerns or worsening of symptoms. They can return to this emergency department or any other emergency department.       Procedures

## 2021-10-25 ENCOUNTER — HOSPITAL ENCOUNTER (EMERGENCY)
Age: 71
Discharge: HOME | End: 2021-10-25
Attending: EMERGENCY MEDICINE | Admitting: EMERGENCY MEDICINE
Payer: MEDICARE

## 2021-10-25 VITALS
OXYGEN SATURATION: 100 % | TEMPERATURE: 98 F | SYSTOLIC BLOOD PRESSURE: 151 MMHG | RESPIRATION RATE: 17 BRPM | DIASTOLIC BLOOD PRESSURE: 79 MMHG | HEART RATE: 71 BPM

## 2021-10-25 DIAGNOSIS — T14.8XXA HEMATOMA: Primary | ICD-10-CM

## 2021-10-25 PROCEDURE — 99281 EMR DPT VST MAYX REQ PHY/QHP: CPT

## 2021-10-25 NOTE — DISCHARGE INSTRUCTIONS
Continue elevation and compression of your leg. This can takes several weeks to resolve. Follow-up with your primary care physician as needed for wound care.

## 2021-10-25 NOTE — ED PROVIDER NOTES
Please note that this dictation was completed with DormNoise, the computer voice recognition software.  Quite often unanticipated grammatical, syntax, homophones, and other interpretive errors are inadvertently transcribed by the computer software.  Please disregard these errors.  Please excuse any errors that have escaped final proofreading. Patient is a 71-year-old female with history of A. fib, hypertension, presenting to ED for evaluation of swelling and bruising to the left lower leg. She states that she \"banged it\" several days ago and was seen in this ED 2 days ago and has since been using compression elevation for treatment. She became concerned today because she felt like she was experiencing pain in the area, which she was not having before. She made appoint with her PCP this morning who advised her to come to the ED instead. She denies any additional medical complaints at this time. 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 rep 10 yrs.     [place] THYROIDECTOMY  11/05         Family History:   Problem Relation Age of Onset    Cancer Mother         lung       Social History     Socioeconomic History    Marital status:      Spouse name: Not on file    Number of children: Not on file    Years of education: Not on file    Highest education level: Not on file   Occupational History    Not on file   Tobacco Use    Smoking status: Never Smoker    Smokeless tobacco: Never Used   Substance and Sexual Activity    Alcohol use: Yes     Comment: soc    Drug use: No    Sexual activity: Not on file   Other Topics Concern    Not on file   Social History Narrative    Not on file Social Determinants of Health     Financial Resource Strain:     Difficulty of Paying Living Expenses:    Food Insecurity:     Worried About Running Out of Food in the Last Year:     [address] in the Last Year:    Transportation Needs:     Lack of Transportation (Medical):  Lack of Transportation (Non-Medical):    Physical Activity:     Days of Exercise per Week:     Minutes of Exercise per Session:    Stress:     Feeling of Stress :    Social Connections:     Frequency of Communication with Friends and Family:     Frequency of Social Gatherings with Friends and Family:     Attends [employer] Services:     Active Member of Clubs or Organizations:     Attends Club or Organization Meetings:     Marital Status:    Intimate Partner Violence:     Fear of Current or Ex-Partner:     Emotionally Abused:     Physically Abused:     Sexually Abused: ALLERGIES: Opioids - morphine analogues and Penicillins    Review of Systems   Constitutional: Negative for chills and fever. HENT: Negative for ear pain and sore throat. Eyes: Negative for visual disturbance. Respiratory: Negative for cough and shortness of breath. Cardiovascular: Negative for chest pain. Gastrointestinal: Negative for abdominal pain. Genitourinary: Negative for flank pain. Musculoskeletal: Negative for back pain. Skin: Positive for color change. Neurological: Negative for dizziness and headaches. Psychiatric/Behavioral: Negative for confusion. Vitals:    10/25/21 1049   BP: (!) 151/79   Pulse: 71   Resp: 17   Temp: 98 °F (36.7 °C)   SpO2: 100%            Physical Exam  Vitals and nursing note reviewed. Constitutional:       General: She is not in acute distress. Appearance: Normal appearance. She is not ill-appearing. HENT:      Head: Normocephalic and atraumatic. Mouth/Throat:      Pharynx: Oropharynx is clear. Eyes:      Extraocular Movements: Extraocular movements intact. Conjunctiva/sclera: Conjunctivae normal.   Cardiovascular:      Rate and Rhythm: Normal rate and regular rhythm. Pulmonary:      Effort: Pulmonary effort is normal.      Breath sounds: Normal breath sounds. Abdominal:      Palpations: Abdomen is soft. Tenderness: There is no abdominal tenderness. Musculoskeletal:         General: Normal range of motion. Cervical back: No rigidity. Skin:     General: Skin is warm and dry. Findings: Bruising (Large round hematoma to the right lower medial leg with surrounding tenderness, no erythema or fluctuance, no drainage) present. Neurological:      General: No focal deficit present. Mental Status: She is alert and oriented to person, place, and time. Psychiatric:         Mood and Affect: Mood normal.          MDM  Number of Diagnoses or Management Options  Hematoma  Diagnosis management comments: Patient is alert, afebrile, vitals stable. Presents for a wound check of a traumatic hematoma. She was seen here several days ago and advised on RICE. I reviewed her previous chart as well as the photo that was included in the chart, and her hematoma appears to be slightly improving and without any signs of infection. Education given to patient that the safest and most effective way to treat this is to continue with compression and her Xarelto, and that it may take several weeks to fully resolve. She will follow-up with her primary care physician with any further wound checks as needed. Return precautions outlined. Amount and/or Complexity of Data Reviewed  Discuss the patient with other providers: yes (Discussed patient with ED attending Ann-Marie Anderson MD who agrees with current management plan.   )      11:05 AM  Pt has been reevaluated. There are no new complaints, changes, or physical findings at this time. All results have been reviewed with patient and/or family. Medications have been reviewed w/ pt and/or family.  Pt and/or family's questions have been answered. Pt and/or family expressed good understanding of the dx/tx/rx and is in agreement with plan of care. Pt instructed and agreed to f/u w/ PCP and to return to ED upon further deterioration. Pt is ready for discharge. IMPRESSION:  1. Hematoma        PLAN:  1. Current Discharge Medication List        2.    Follow-up Information     Follow up With Specialties Details Why Contact Info    Lowell Rouse MD Internal Medicine Schedule an appointment as soon as possible for a visit   [id number]  4.2 [phone]              Return to ED if worse          Procedures

## 2021-11-16 ENCOUNTER — HOSPITAL ENCOUNTER (INPATIENT)
Age: 71
LOS: 15 days | Discharge: HOME HEALTH | DRG: 570 | End: 2021-12-01
Attending: EMERGENCY MEDICINE | Admitting: FAMILY MEDICINE
Payer: MEDICARE

## 2021-11-16 ENCOUNTER — APPOINTMENT (OUTPATIENT)
Dept: CT IMAGING | Age: 71
DRG: 570 | End: 2021-11-16
Attending: FAMILY MEDICINE
Payer: MEDICARE

## 2021-11-16 DIAGNOSIS — S81.801A NON-HEALING WOUND OF LOWER EXTREMITY, RIGHT, INITIAL ENCOUNTER: ICD-10-CM

## 2021-11-16 DIAGNOSIS — E87.1 HYPONATREMIA: Primary | ICD-10-CM

## 2021-11-16 DIAGNOSIS — S81.801A OPEN WOUND OF RIGHT LOWER LEG, INITIAL ENCOUNTER: ICD-10-CM

## 2021-11-16 LAB
ALBUMIN SERPL-MCNC: 4.2 G/DL (ref 3.5–5)
ALBUMIN/GLOB SERPL: 1.3 {RATIO} (ref 1.1–2.2)
ALP SERPL-CCNC: 130 U/L (ref 45–117)
ALT SERPL-CCNC: 28 U/L (ref 12–78)
ANION GAP SERPL CALC-SCNC: 5 MMOL/L (ref 5–15)
ANION GAP SERPL CALC-SCNC: 7 MMOL/L (ref 5–15)
AST SERPL-CCNC: 25 U/L (ref 15–37)
BASOPHILS # BLD: 0 K/UL (ref 0–0.1)
BASOPHILS NFR BLD: 0 % (ref 0–1)
BILIRUB SERPL-MCNC: 0.5 MG/DL (ref 0.2–1)
BUN SERPL-MCNC: 10 MG/DL (ref 6–20)
BUN SERPL-MCNC: 9 MG/DL (ref 6–20)
BUN/CREAT SERPL: 12 (ref 12–20)
BUN/CREAT SERPL: 12 (ref 12–20)
CALCIUM SERPL-MCNC: 8.6 MG/DL (ref 8.5–10.1)
CALCIUM SERPL-MCNC: 8.8 MG/DL (ref 8.5–10.1)
CHLORIDE SERPL-SCNC: 87 MMOL/L (ref 97–108)
CHLORIDE SERPL-SCNC: 90 MMOL/L (ref 97–108)
CO2 SERPL-SCNC: 26 MMOL/L (ref 21–32)
CO2 SERPL-SCNC: 28 MMOL/L (ref 21–32)
COMMENT, HOLDF: NORMAL
CREAT SERPL-MCNC: 0.78 MG/DL (ref 0.55–1.02)
CREAT SERPL-MCNC: 0.85 MG/DL (ref 0.55–1.02)
DIFFERENTIAL METHOD BLD: ABNORMAL
EOSINOPHIL # BLD: 0 K/UL (ref 0–0.4)
EOSINOPHIL NFR BLD: 0 % (ref 0–7)
ERYTHROCYTE [DISTWIDTH] IN BLOOD BY AUTOMATED COUNT: 13.6 % (ref 11.5–14.5)
GLOBULIN SER CALC-MCNC: 3.2 G/DL (ref 2–4)
GLUCOSE SERPL-MCNC: 109 MG/DL (ref 65–100)
GLUCOSE SERPL-MCNC: 158 MG/DL (ref 65–100)
HCT VFR BLD AUTO: 31.6 % (ref 35–47)
HGB BLD-MCNC: 11 G/DL (ref 11.5–16)
IMM GRANULOCYTES # BLD AUTO: 0 K/UL (ref 0–0.04)
IMM GRANULOCYTES NFR BLD AUTO: 0 % (ref 0–0.5)
LACTATE SERPL-SCNC: 0.8 MMOL/L (ref 0.4–2)
LYMPHOCYTES # BLD: 0.4 K/UL (ref 0.8–3.5)
LYMPHOCYTES NFR BLD: 5 % (ref 12–49)
MCH RBC QN AUTO: 33.3 PG (ref 26–34)
MCHC RBC AUTO-ENTMCNC: 34.8 G/DL (ref 30–36.5)
MCV RBC AUTO: 95.8 FL (ref 80–99)
MONOCYTES # BLD: 0.6 K/UL (ref 0–1)
MONOCYTES NFR BLD: 7 % (ref 5–13)
NEUTS SEG # BLD: 6.9 K/UL (ref 1.8–8)
NEUTS SEG NFR BLD: 88 % (ref 32–75)
NRBC # BLD: 0 K/UL (ref 0–0.01)
NRBC BLD-RTO: 0 PER 100 WBC
OSMOLALITY SERPL: 260 MOSM/KG H2O
PLATELET # BLD AUTO: 338 K/UL (ref 150–400)
PMV BLD AUTO: 8.9 FL (ref 8.9–12.9)
POTASSIUM SERPL-SCNC: 3.9 MMOL/L (ref 3.5–5.1)
POTASSIUM SERPL-SCNC: 4 MMOL/L (ref 3.5–5.1)
PROT SERPL-MCNC: 7.4 G/DL (ref 6.4–8.2)
RBC # BLD AUTO: 3.3 M/UL (ref 3.8–5.2)
RBC MORPH BLD: ABNORMAL
SAMPLES BEING HELD,HOLD: NORMAL
SODIUM SERPL-SCNC: 121 MMOL/L (ref 136–145)
SODIUM SERPL-SCNC: 122 MMOL/L (ref 136–145)
TSH SERPL DL<=0.05 MIU/L-ACNC: 2.45 UIU/ML (ref 0.36–3.74)
WBC # BLD AUTO: 7.9 K/UL (ref 3.6–11)

## 2021-11-16 PROCEDURE — 85025 COMPLETE CBC W/AUTO DIFF WBC: CPT

## 2021-11-16 PROCEDURE — 87205 SMEAR GRAM STAIN: CPT

## 2021-11-16 PROCEDURE — 74011000636 HC RX REV CODE- 636: Performed by: RADIOLOGY

## 2021-11-16 PROCEDURE — 87040 BLOOD CULTURE FOR BACTERIA: CPT

## 2021-11-16 PROCEDURE — 74011250636 HC RX REV CODE- 250/636: Performed by: EMERGENCY MEDICINE

## 2021-11-16 PROCEDURE — 74011250637 HC RX REV CODE- 250/637: Performed by: FAMILY MEDICINE

## 2021-11-16 PROCEDURE — 84300 ASSAY OF URINE SODIUM: CPT

## 2021-11-16 PROCEDURE — 73701 CT LOWER EXTREMITY W/DYE: CPT

## 2021-11-16 PROCEDURE — 74011250636 HC RX REV CODE- 250/636: Performed by: FAMILY MEDICINE

## 2021-11-16 PROCEDURE — 83930 ASSAY OF BLOOD OSMOLALITY: CPT

## 2021-11-16 PROCEDURE — 80053 COMPREHEN METABOLIC PANEL: CPT

## 2021-11-16 PROCEDURE — 87186 SC STD MICRODIL/AGAR DIL: CPT

## 2021-11-16 PROCEDURE — 84443 ASSAY THYROID STIM HORMONE: CPT

## 2021-11-16 PROCEDURE — 74011000250 HC RX REV CODE- 250: Performed by: FAMILY MEDICINE

## 2021-11-16 PROCEDURE — 87077 CULTURE AEROBIC IDENTIFY: CPT

## 2021-11-16 PROCEDURE — 93005 ELECTROCARDIOGRAM TRACING: CPT

## 2021-11-16 PROCEDURE — 83605 ASSAY OF LACTIC ACID: CPT

## 2021-11-16 PROCEDURE — 65270000032 HC RM SEMIPRIVATE

## 2021-11-16 PROCEDURE — 99282 EMERGENCY DEPT VISIT SF MDM: CPT

## 2021-11-16 PROCEDURE — 36415 COLL VENOUS BLD VENIPUNCTURE: CPT

## 2021-11-16 RX ORDER — METOPROLOL TARTRATE 50 MG/1
50 TABLET ORAL 2 TIMES DAILY
Status: DISCONTINUED | OUTPATIENT
Start: 2021-11-16 | End: 2021-12-01 | Stop reason: HOSPADM

## 2021-11-16 RX ORDER — SODIUM CHLORIDE 0.9 % (FLUSH) 0.9 %
5-40 SYRINGE (ML) INJECTION AS NEEDED
Status: DISCONTINUED | OUTPATIENT
Start: 2021-11-16 | End: 2021-12-01 | Stop reason: HOSPADM

## 2021-11-16 RX ORDER — SERTRALINE HYDROCHLORIDE 50 MG/1
50 TABLET, FILM COATED ORAL DAILY
Status: DISCONTINUED | OUTPATIENT
Start: 2021-11-17 | End: 2021-12-01 | Stop reason: HOSPADM

## 2021-11-16 RX ORDER — HYDRALAZINE HYDROCHLORIDE 20 MG/ML
10 INJECTION INTRAMUSCULAR; INTRAVENOUS
Status: DISCONTINUED | OUTPATIENT
Start: 2021-11-16 | End: 2021-12-01 | Stop reason: HOSPADM

## 2021-11-16 RX ORDER — SODIUM CHLORIDE 9 MG/ML
75 INJECTION, SOLUTION INTRAVENOUS CONTINUOUS
Status: DISCONTINUED | OUTPATIENT
Start: 2021-11-16 | End: 2021-11-17

## 2021-11-16 RX ORDER — DIAZEPAM 2 MG/1
2 TABLET ORAL
Status: DISCONTINUED | OUTPATIENT
Start: 2021-11-16 | End: 2021-12-01 | Stop reason: HOSPADM

## 2021-11-16 RX ORDER — SODIUM CHLORIDE 0.9 % (FLUSH) 0.9 %
5-40 SYRINGE (ML) INJECTION EVERY 8 HOURS
Status: DISCONTINUED | OUTPATIENT
Start: 2021-11-16 | End: 2021-11-26 | Stop reason: SDUPTHER

## 2021-11-16 RX ORDER — VANCOMYCIN HYDROCHLORIDE
1250
Status: DISCONTINUED | OUTPATIENT
Start: 2021-11-17 | End: 2021-11-17 | Stop reason: SDUPTHER

## 2021-11-16 RX ORDER — VANCOMYCIN 2 GRAM/500 ML IN 0.9 % SODIUM CHLORIDE INTRAVENOUS
2000 ONCE
Status: COMPLETED | OUTPATIENT
Start: 2021-11-16 | End: 2021-11-17

## 2021-11-16 RX ORDER — ACETAMINOPHEN 325 MG/1
650 TABLET ORAL
Status: DISCONTINUED | OUTPATIENT
Start: 2021-11-16 | End: 2021-12-01 | Stop reason: HOSPADM

## 2021-11-16 RX ADMIN — METOPROLOL TARTRATE 50 MG: 50 TABLET, FILM COATED ORAL at 20:30

## 2021-11-16 RX ADMIN — VANCOMYCIN HYDROCHLORIDE 2000 MG: 10 INJECTION, POWDER, LYOPHILIZED, FOR SOLUTION INTRAVENOUS at 20:30

## 2021-11-16 RX ADMIN — SODIUM CHLORIDE 500 ML: 9 INJECTION, SOLUTION INTRAVENOUS at 13:17

## 2021-11-16 RX ADMIN — CEFEPIME 2 G: 2 INJECTION, POWDER, FOR SOLUTION INTRAVENOUS at 20:30

## 2021-11-16 RX ADMIN — SODIUM CHLORIDE 75 ML/HR: 9 INJECTION, SOLUTION INTRAVENOUS at 19:00

## 2021-11-16 RX ADMIN — SACUBITRIL AND VALSARTAN 1 TABLET: 49; 51 TABLET, FILM COATED ORAL at 20:30

## 2021-11-16 RX ADMIN — DIAZEPAM 2 MG: 2 TABLET ORAL at 23:12

## 2021-11-16 RX ADMIN — IOPAMIDOL 100 ML: 612 INJECTION, SOLUTION INTRAVENOUS at 15:59

## 2021-11-16 NOTE — ED NOTES
Patient calmly resting, complaint of generalized weakness     GCS 15.   Speech clear, appropriate and w/o difficulty. normal breathing. Skin war (temp) and dry (moisture), normal color for ethnicity.       COVID Status: Not Indicated    Right lower leg wound care complete by wound care nurse

## 2021-11-16 NOTE — ROUTINE PROCESS
TRANSFER - OUT REPORT:    Verbal report given to Nabil Masters RN(name) on Wilmon Shows  being transferred to 202 bed 2(unit) for routine progression of care       Report consisted of patients Situation, Background, Assessment and   Recommendations(SBAR). Information from the following report(s) SBAR, Kardex, ED Summary, [facility] and Recent Results was reviewed with the receiving nurse. Lines:   Peripheral IV 11/16/21 Left Antecubital (Active)   Site Assessment Clean, dry, & intact 11/16/21 1247   Phlebitis Assessment 0 11/16/21 1247   Infiltration Assessment 0 11/16/21 1247   Dressing Status Clean, dry, & intact 11/16/21 1247   Dressing Type Transparent 11/16/21 1247   Hub Color/Line Status Pink 11/16/21 1247   Action Taken Blood drawn 11/16/21 1247   Alcohol Cap Used No 11/16/21 1247       Peripheral IV 11/16/21 Right Antecubital (Active)        Opportunity for questions and clarification was provided.       Patient transported with:   [employer] Guide

## 2021-11-16 NOTE — H&P
History & Physical    Primary Care Provider: Naren Liang MD  Source of Information: Patient     History of Presenting Illness:   Alonso Prasad is a 66 y.o. female who presents with leg wound    History was primarily obtained from the patient, patient with history of atrial fibrillation, currently on Xarelto, reports that she had developed a hematoma of her right leg specifically the right shin, she had been treated for the same, she was diagnosed with a hematoma on 10/17/2021. Patient reports that she was told that the hematoma should resolve by itself and then she should seek plastic surgery consult for possibly a skin graft. Patient reports that the leg wound is getting worse, she feels foul-smelling discharge coming out of the wound, she has had poor appetite and weakness. Came to the ER today, was found to be hyponatremic and was requested to be admitted to the hospital service. Patient denies taking any new medications, patient takes spironolactone for diuresis at home. Patient denies any new injuries or falls    The patient denies any Headache, blurry vision, sore throat, trouble swallowing, trouble with speech, chest pain, SOB, cough, fever, chills, N/V/D, abd pain, urinary symptoms, constipation, recent travels, sick contacts, focal or generalized neurological symptoms,  falls, injuries, rashes, contact with COVID-19 diagnosed patients, hematemesis, melena, hemoptysis, hematuria, rashes, denies starting any new medications and denies any other concerns or problems besides as mentioned above. Review of Systems:  A comprehensive review of systems was negative except for that written in the History of Present Illness.    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 rep 10 yrs.  [place] THYROIDECTOMY  11/05     Prior to Admission medications    Medication Sig Start Date End Date Taking? Authorizing Provider   butalbital-acetaminophen-caffeine (FIORICET, ESGIC) -40 mg per tablet TAKE 1 TO 2 TABLETS BY MOUTH EVERY 6 HOURS AS NEEDED FOR HEADACHE 9/18/21   Keeta Meals, MD   Entresto 49-51 mg tab tablet Take 1 Tablet by mouth two (2) times a day. 8/16/21   Provider, Historical   ondansetron (ZOFRAN ODT) 4 mg disintegrating tablet Take 1 Tablet by mouth four (4) times daily as needed for Nausea or Vomiting. 9/10/21   Miguel Arce, MD   levothyroxine (SYNTHROID) 175 mcg tablet TAKE 1 TABLET BY MOUTH EVERY DAY BEFORE BREAKFAST 8/16/21   Keeta Meals, MD   Xarelto 20 mg tab tablet TAKE 1 TABLET BY MOUTH EVERY DAY 6/28/21   [facility]   diazePAM (VALIUM) 2 mg tablet TAKE 1 TABLET BY MOUTH EVERY DAY AS NEEDED FOR ANXIETY 6/28/21   [facility]   sertraline (ZOLOFT) 50 mg tablet Take 50 mg by mouth daily. Provider, Historical   metoprolol tartrate (LOPRESSOR) 50 mg tablet Take 50 mg by mouth two (2) times a day. Provider, Historical   spironolactone (ALDACTONE) 25 mg tablet Take  by mouth daily.     Provider, Historical     Allergies   Allergen Reactions    Opioids - Morphine Analogues Itching and Nausea and Vomiting    Penicillins Unknown (comments)     Patient reports allergy to penicillin with unknown reaction, but states she has tolerated amoxicillin      Family History   Problem Relation Age of Onset    Cancer Mother         lung      Family history was discussed with the patient, all pertinent and relevant details are mentioned as above, no other pertinent and relevant family history was noted on my discussion with the patient. Patient specifically denies any history of Gaucher disease in the family  SOCIAL HISTORY:  Patient resides:  Independently x   Assisted Living    SNF    With family care       Smoking history:   None x   Former    Chronic      Alcohol history:   None    Social x   Chronic      Ambulates:   Independently x   w/cane    w/walker    w/wc    CODE STATUS:  DNR    Full x   Other      Objective:     Physical Exam:     Visit Vitals  BP (!) 169/105 (BP 1 Location: Right arm, BP Patient Position: At rest)   Pulse 83   Temp 97.8 °F (36.6 °C)   Resp 18   SpO2 97%      O2 Device: None (Room air)    General : alert x 3, awake, no acute distress, resting in bed, pleasant /female, appears to be stated age  [de-identified]: PEERL, EOMI, moist mucus membrane, TM clear  Neck: supple, no JVD, no meningeal signs  Chest: Clear to auscultation bilaterally   CVS: S1 S2 heard, Capillary refill less than 2 seconds  Abd: soft/ Non tender, non distended, BS physiological,   Ext: no clubbing, large ulcerated wound on the right lower extremity on the shin, some serosanguineous foul-smelling discharge was noted  Neuro/Psych: pleasant mood and affect, CN 2-12 grossly intact,   Skin: warm     EKG: Pending      Data Review:     Recent Days:  Recent Labs     11/16/21  1249   WBC 7.9   HGB 11.0*   HCT 31.6*        Recent Labs     11/16/21  1249   *   K 4.0   CL 87*   CO2 28   *   BUN 9   CREA 0.78   CA 8.8   ALB 4.2   ALT 28     No results for input(s): PH, PCO2, PO2, HCO3, FIO2 in the last 72 hours.     24 Hour Results:  Recent Results (from the past 24 hour(s))   CBC WITH AUTOMATED DIFF    Collection Time: 11/16/21 12:49 PM   Result Value Ref Range    WBC 7.9 3.6 - 11.0 K/uL    RBC 3.30 (L) 3.80 - 5.20 M/uL    HGB 11.0 (L) 11.5 - 16.0 g/dL    HCT 31.6 (L) 35.0 - 47.0 %    MCV 95.8 80.0 - 99.0 FL    MCH 33.3 26.0 - 34.0 PG    MCHC 34.8 30.0 - 36.5 g/dL    RDW 13.6 11.5 - 14.5 %    PLATELET 314 150 - 222 K/uL    MPV 8.9 8.9 - 12.9 FL    NRBC 0.0 0  WBC    ABSOLUTE NRBC 0.00 0.00 - 0.01 K/uL    NEUTROPHILS 88 (H) 32 - 75 %    LYMPHOCYTES 5 (L) 12 - 49 %    MONOCYTES 7 5 - 13 %    EOSINOPHILS 0 0 - 7 %    BASOPHILS 0 0 - 1 %    IMMATURE GRANULOCYTES 0 0.0 - 0.5 %    ABS. NEUTROPHILS 6.9 1.8 - 8.0 K/UL    ABS. LYMPHOCYTES 0.4 (L) 0.8 - 3.5 K/UL    ABS. MONOCYTES 0.6 0.0 - 1.0 K/UL    ABS. EOSINOPHILS 0.0 0.0 - 0.4 K/UL    ABS. BASOPHILS 0.0 0.0 - 0.1 K/UL    ABS. IMM. GRANS. 0.0 0.00 - 0.04 K/UL    DF SMEAR SCANNED      RBC COMMENTS NORMOCYTIC, NORMOCHROMIC     METABOLIC PANEL, COMPREHENSIVE    Collection Time: 11/16/21 12:49 PM   Result Value Ref Range    Sodium 122 (L) 136 - 145 mmol/L    Potassium 4.0 3.5 - 5.1 mmol/L    Chloride 87 (L) 97 - 108 mmol/L    CO2 28 21 - 32 mmol/L    Anion gap 7 5 - 15 mmol/L    Glucose 109 (H) 65 - 100 mg/dL    BUN 9 6 - 20 MG/DL    Creatinine 0.78 0.55 - 1.02 MG/DL    BUN/Creatinine ratio 12 12 - 20      GFR est AA >60 >60 ml/min/1.73m2    GFR est non-AA >60 >60 ml/min/1.73m2    Calcium 8.8 8.5 - 10.1 MG/DL    Bilirubin, total 0.5 0.2 - 1.0 MG/DL    ALT (SGPT) 28 12 - 78 U/L    AST (SGOT) 25 15 - 37 U/L    Alk. phosphatase 130 (H) 45 - 117 U/L    Protein, total 7.4 6.4 - 8.2 g/dL    Albumin 4.2 3.5 - 5.0 g/dL    Globulin 3.2 2.0 - 4.0 g/dL    A-G Ratio 1.3 1.1 - 2.2     SAMPLES BEING HELD    Collection Time: 11/16/21 12:49 PM   Result Value Ref Range    SAMPLES BEING HELD 1RED 1BLU     COMMENT        Add-on orders for these samples will be processed based on acceptable specimen integrity and analyte stability, which may vary by analyte. LACTIC ACID    Collection Time: 11/16/21 12:49 PM   Result Value Ref Range    Lactic acid 0.8 0.4 - 2.0 MMOL/L         Imaging:   No results found.       Assessment/Plan     Hyponatremia  Infected leg wound  Atrial fibrillation currently on Xarelto  Anemia  Accelerated hypertension  Hypothyroidism      Patient will be admitted with telemetry bed  Hyponatremia likely hypovolemic, patient has not been eating drinking well for the past few days, start patient on gentle IV hydration with normal saline, monitor sodium level, repeat BMP at around 9 PM tonight, calibrate fluids based on the same, supportive care close monitoring, if symptoms persist may consider further intervention and diagnostics, obtain urine sodium and urine creatinine levels, obtain fractional excretion of sodium, monitor  Patient with infected leg wound, start patient on broad-spectrum IV antibiotics, wound care consult, general surgery consult, pain control, supportive care and close monitoring, further intervention per hospital course, wound and blood cultures  Appears to be at rate controlled, monitor, continue Xarelto, continue to monitor  Monitor H&H  Hydralazine as needed, continue medication  Check TSH level    GI DVT prophylaxis: Patient is on Xarelto           Please note that this dictation was completed with [employer], the computer voice recognition software. Quite often unanticipated grammatical, syntax, homophones, and other interpretive errors are inadvertently transcribed by the computer software. Please disregard these errors. Please excuse any errors that have escaped final proofreading.          Signed By: Lynn Salas MD     November 16, 2021

## 2021-11-16 NOTE — WOUND CARE
WOCN Note:     New consult placed for assessment of right low leg chronic ulceration. Patient reports that she hit her right leg about a month ago and developed a hematoma since then it has been slow to heal.  She takes a blood thinner for her afib. Assessed in room ED R32. Chart reviewed. Past Medical History:   Past Medical History:   Diagnosis Date    Atrial fibrillation ([facility] [address] 7/15/2011    Depression     HTN (hypertension) 7/14/2011    Paroxysmal atrial fibrillation ([facility] [address]     Thyroid ca ([facility] [address] 2005    Papillary    Unspecified hypothyroidism 7/15/2011     11.16.21 WBC 7.9  Photography consent received from patient. Obtained right low leg wound culture. Assessment:   Patient is A&O x 3 and communicative. Bed: stretcher  Patient reports tenderness to right leg wound. Right DP and PT pulses palpable. 1. POA Right low leg, full thickness ulceration etiology suspicious for arterial wound (punched out wound): 7 x 6 x 1 cm  90% dusky maroon 10% yellow; small serosang exudate; no odor. Periwound without erythema. Tender to touch. Wound, Pressure Prevention & Skin Care Recommendations:    1. Minimize layers of linen/pads under patient to optimize support surface. 2.  Turn/reposition approximately every 2 hours and offload heels. 3.  Manage moisture/ Keep skin folds clean and dry. 4.  Right low leg:  Daily cleanse with saline; apply Cutimed Sorbact, dry 4x4 gauze and small sacral foam.    Discussed above plan with patient, Dr. Valdemar Rendon and RN. Consider Vascular consult.     Transition of Care:   Plan to follow as needed while admitted to hospital.    CATALINA RuizN RN [facility] [place] [facility]  Available on Perfect Serve  Pager [phone]  Office [phone]

## 2021-11-16 NOTE — ED PROVIDER NOTES
57-year-old female with past medical history significant for A. fib on Xarelto presents after ulceration and poor wound healing on her right lower extremity after large hematoma forming after mild trauma on 10/17/2021. Patient in emergency department for evaluation of hematoma twice prior to today. Patient reports over the past 1 week ulcer has formed at location of hematoma and pain has increased. Denies fever, chills, nausea, vomiting, chest pain, shortness of breath. Denies tobacco use, drug use  Primary careYvette  CardiologyRo             Past Medical History:   Diagnosis Date    Atrial fibrillation ([facility] [address] 7/15/2011    Depression     HTN (hypertension) 7/14/2011    Paroxysmal atrial fibrillation (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 rep 10 yrs.  [place] THYROIDECTOMY  11/05         Family History:   Problem Relation Age of Onset    Cancer Mother         lung       Social History     Socioeconomic History    Marital status:      Spouse name: Not on file    Number of children: Not on file    Years of education: Not on file    Highest education level: Not on file   Occupational History    Not on file   Tobacco Use    Smoking status: Never Smoker    Smokeless tobacco: Never Used   Substance and Sexual Activity    Alcohol use: Yes     Comment: soc    Drug use: No    Sexual activity: Not on file   Other Topics Concern    Not on file   Social History Narrative    Not on file     Social Determinants of Health     Financial Resource Strain:     Difficulty of Paying Living Expenses: Not on file   Food Insecurity:     Worried About Running Out of Food in the Last Year: Not on file    Bianca of Food in the Last Year: Not on file   Transportation Needs:     Lack of Transportation (Medical):  Not on file    Lack of Transportation (Non-Medical): Not on file   Physical Activity:     Days of Exercise per Week: Not on file    Minutes of Exercise per Session: Not on file   Stress:     Feeling of Stress : Not on file   Social Connections:     Frequency of Communication with Friends and Family: Not on file    Frequency of Social Gatherings with Friends and Family: Not on file    Attends Quaker Services: Not on file    Active Member of [address] [place] [address] or Organizations: Not on file    Attends Club or Organization Meetings: Not on file    Marital Status: Not on file   Intimate Partner Violence:     Fear of Current or Ex-Partner: Not on file    Emotionally Abused: Not on file    Physically Abused: Not on file    Sexually Abused: Not on file   Housing Stability:     Unable to Pay for Housing in the Last Year: Not on file    Number of [place] in the Last Year: Not on file    Unstable Housing in the Last Year: Not on file         ALLERGIES: Opioids - morphine analogues and Penicillins    Review of Systems   Constitutional: Negative for chills and fever. HENT: Negative for congestion, nosebleeds and rhinorrhea. Eyes: Negative for pain and redness. Respiratory: Negative for cough and shortness of breath. Cardiovascular: Negative for chest pain and palpitations. Gastrointestinal: Negative for abdominal pain, nausea and vomiting. Genitourinary: Negative for dysuria, frequency, vaginal bleeding and vaginal pain. Musculoskeletal: Negative for myalgias. Skin: Positive for wound. Negative for rash. Neurological: Negative for seizures, syncope and weakness. Hematological: Does not bruise/bleed easily. Psychiatric/Behavioral: Negative for agitation, confusion, dysphoric mood and suicidal ideas. The patient is not nervous/anxious. Vitals:    11/16/21 1209   BP: (!) 169/105   Pulse: 83   Resp: 18   Temp: 97.8 °F (36.6 °C)   SpO2: 97%            Physical Exam  Vitals and nursing note reviewed.    Constitutional: Appearance: She is well-developed. HENT:      Head: Normocephalic and atraumatic. Eyes:      Pupils: Pupils are equal, round, and reactive to light. Neck:      Trachea: No tracheal deviation. Cardiovascular:      Rate and Rhythm: Normal rate and regular rhythm. Heart sounds: Normal heart sounds. Pulmonary:      Effort: Pulmonary effort is normal. No respiratory distress. Breath sounds: Normal breath sounds. No stridor. No wheezing or rales. Chest:      Chest wall: No tenderness. Abdominal:      General: Bowel sounds are normal. There is no distension. Palpations: Abdomen is soft. Tenderness: There is no abdominal tenderness. There is no rebound. Musculoskeletal:         General: No tenderness. Normal range of motion. Cervical back: Normal range of motion and neck supple. Skin:     General: Skin is warm and dry. Coloration: Skin is not pale. Findings: Wound present. No rash. Neurological:      Mental Status: She is alert and oriented to person, place, and time. Cranial Nerves: No cranial nerve deficit. MDM  Number of Diagnoses or Management Options  Diagnosis management comments: 76-year-old female with history of A. fib on Xarelto presents with poor wound healing on the right lower extremity after hematoma on 10/17/2021. Patient is afebrile, nontoxic, hemodynamically stable, no respiratory distress, clear to auscultation bilaterally, normal room oxygen saturation. PlanCBC/CMP/lactate, IV fluid hydration, wound care consultation. Labs remarkable for sodium 122trending down from prior       Amount and/or Complexity of Data Reviewed  Clinical lab tests: ordered and reviewed           Procedures              1:59 PM  Evaluated by wound care nurse. Wound care nurse suggest vascular surgery consult and suggest poor healing secondary to poor arterial blood supply.        Perfect Serve Consult for Admission  2:39 PM    ED Room Number: R32/R32  Patient Name and age:  Kerry Moe 66 y.o.  female  Working Diagnosis:   1. Hyponatremia    2.  Non-healing wound of lower extremity, right, initial encounter        COVID-19 Suspicion:  no  Sepsis present:  no  Reassessment needed: no  Code Status:  Full Code  Readmission: no  Isolation Requirements:  no  Recommended Level of Care:  med/surg  Department:[facility] Adult ED - [phone]

## 2021-11-16 NOTE — ROUTINE PROCESS
TRANSFER - IN REPORT:    Verbal report received from [facility]) on Vicky Alas  being received from ED(unit) for routine progression of care      Report consisted of patients Situation, Background, Assessment and   Recommendations(SBAR). Information from the following report(s) ED Summary was reviewed with the receiving nurse. Opportunity for questions and clarification was provided. Assessment completed upon patients arrival to unit and care assumed.

## 2021-11-17 ENCOUNTER — APPOINTMENT (OUTPATIENT)
Dept: GENERAL RADIOLOGY | Age: 71
DRG: 570 | End: 2021-11-17
Attending: HOSPITALIST
Payer: MEDICARE

## 2021-11-17 ENCOUNTER — APPOINTMENT (OUTPATIENT)
Dept: VASCULAR SURGERY | Age: 71
DRG: 570 | End: 2021-11-17
Attending: HOSPITALIST
Payer: MEDICARE

## 2021-11-17 PROBLEM — S81.801A OPEN WOUND OF RIGHT LOWER LEG: Status: ACTIVE | Noted: 2021-11-17

## 2021-11-17 LAB
ALBUMIN SERPL-MCNC: 3.6 G/DL (ref 3.5–5)
ALBUMIN/GLOB SERPL: 1.2 {RATIO} (ref 1.1–2.2)
ALP SERPL-CCNC: 115 U/L (ref 45–117)
ALT SERPL-CCNC: 29 U/L (ref 12–78)
ANION GAP SERPL CALC-SCNC: 5 MMOL/L (ref 5–15)
ANION GAP SERPL CALC-SCNC: 7 MMOL/L (ref 5–15)
ANION GAP SERPL CALC-SCNC: 9 MMOL/L (ref 5–15)
AST SERPL-CCNC: 25 U/L (ref 15–37)
BASOPHILS # BLD: 0 K/UL (ref 0–0.1)
BASOPHILS NFR BLD: 0 % (ref 0–1)
BILIRUB SERPL-MCNC: 0.5 MG/DL (ref 0.2–1)
BUN SERPL-MCNC: 10 MG/DL (ref 6–20)
BUN SERPL-MCNC: 10 MG/DL (ref 6–20)
BUN SERPL-MCNC: 8 MG/DL (ref 6–20)
BUN/CREAT SERPL: 12 (ref 12–20)
BUN/CREAT SERPL: 14 (ref 12–20)
BUN/CREAT SERPL: 15 (ref 12–20)
CALCIUM SERPL-MCNC: 8.5 MG/DL (ref 8.5–10.1)
CALCIUM SERPL-MCNC: 8.6 MG/DL (ref 8.5–10.1)
CALCIUM SERPL-MCNC: 8.6 MG/DL (ref 8.5–10.1)
CHLORIDE SERPL-SCNC: 90 MMOL/L (ref 97–108)
CO2 SERPL-SCNC: 23 MMOL/L (ref 21–32)
CO2 SERPL-SCNC: 25 MMOL/L (ref 21–32)
CO2 SERPL-SCNC: 27 MMOL/L (ref 21–32)
CREAT SERPL-MCNC: 0.65 MG/DL (ref 0.55–1.02)
CREAT SERPL-MCNC: 0.67 MG/DL (ref 0.55–1.02)
CREAT SERPL-MCNC: 0.73 MG/DL (ref 0.55–1.02)
DIFFERENTIAL METHOD BLD: ABNORMAL
EOSINOPHIL # BLD: 0 K/UL (ref 0–0.4)
EOSINOPHIL NFR BLD: 0 % (ref 0–7)
ERYTHROCYTE [DISTWIDTH] IN BLOOD BY AUTOMATED COUNT: 13.7 % (ref 11.5–14.5)
GLOBULIN SER CALC-MCNC: 3 G/DL (ref 2–4)
GLUCOSE SERPL-MCNC: 113 MG/DL (ref 65–100)
GLUCOSE SERPL-MCNC: 115 MG/DL (ref 65–100)
GLUCOSE SERPL-MCNC: 119 MG/DL (ref 65–100)
HCT VFR BLD AUTO: 28.5 % (ref 35–47)
HGB BLD-MCNC: 10 G/DL (ref 11.5–16)
IMM GRANULOCYTES # BLD AUTO: 0 K/UL (ref 0–0.04)
IMM GRANULOCYTES NFR BLD AUTO: 0 % (ref 0–0.5)
LEFT ABI: 1.1
LEFT ARM BP: 152 MMHG
LEFT POSTERIOR TIBIAL: 145 MMHG
LYMPHOCYTES # BLD: 0.8 K/UL (ref 0.8–3.5)
LYMPHOCYTES NFR BLD: 9 % (ref 12–49)
MCH RBC QN AUTO: 33.1 PG (ref 26–34)
MCHC RBC AUTO-ENTMCNC: 35.1 G/DL (ref 30–36.5)
MCV RBC AUTO: 94.4 FL (ref 80–99)
MONOCYTES # BLD: 0.8 K/UL (ref 0–1)
MONOCYTES NFR BLD: 9 % (ref 5–13)
NEUTS SEG # BLD: 7.1 K/UL (ref 1.8–8)
NEUTS SEG NFR BLD: 82 % (ref 32–75)
NRBC # BLD: 0 K/UL (ref 0–0.01)
NRBC BLD-RTO: 0 PER 100 WBC
OSMOLALITY UR: 474 MOSM/KG H2O
PLATELET # BLD AUTO: 297 K/UL (ref 150–400)
PMV BLD AUTO: 8.8 FL (ref 8.9–12.9)
POTASSIUM SERPL-SCNC: 3.7 MMOL/L (ref 3.5–5.1)
POTASSIUM SERPL-SCNC: 4.1 MMOL/L (ref 3.5–5.1)
POTASSIUM SERPL-SCNC: 4.2 MMOL/L (ref 3.5–5.1)
PROT SERPL-MCNC: 6.6 G/DL (ref 6.4–8.2)
RBC # BLD AUTO: 3.02 M/UL (ref 3.8–5.2)
RBC MORPH BLD: ABNORMAL
RIGHT ABI: 1.11
RIGHT ARM BP: 156 MMHG
RIGHT POSTERIOR TIBIAL: 173 MMHG
SODIUM SERPL-SCNC: 122 MMOL/L (ref 136–145)
SODIUM UR-SCNC: 33 MMOL/L
VAS LEFT DORSALIS PEDIS BP: 171 MMHG
VAS RIGHT DORSALIS PEDIS BP: 166 MMHG
WBC # BLD AUTO: 8.7 K/UL (ref 3.6–11)

## 2021-11-17 PROCEDURE — 51798 US URINE CAPACITY MEASURE: CPT

## 2021-11-17 PROCEDURE — 74011250636 HC RX REV CODE- 250/636: Performed by: FAMILY MEDICINE

## 2021-11-17 PROCEDURE — 36415 COLL VENOUS BLD VENIPUNCTURE: CPT

## 2021-11-17 PROCEDURE — 65270000032 HC RM SEMIPRIVATE

## 2021-11-17 PROCEDURE — 80048 BASIC METABOLIC PNL TOTAL CA: CPT

## 2021-11-17 PROCEDURE — 74011000250 HC RX REV CODE- 250: Performed by: INTERNAL MEDICINE

## 2021-11-17 PROCEDURE — 80053 COMPREHEN METABOLIC PANEL: CPT

## 2021-11-17 PROCEDURE — 74011250637 HC RX REV CODE- 250/637: Performed by: SURGERY

## 2021-11-17 PROCEDURE — 74011250637 HC RX REV CODE- 250/637: Performed by: INTERNAL MEDICINE

## 2021-11-17 PROCEDURE — 85025 COMPLETE CBC W/AUTO DIFF WBC: CPT

## 2021-11-17 PROCEDURE — 71045 X-RAY EXAM CHEST 1 VIEW: CPT

## 2021-11-17 PROCEDURE — 93922 UPR/L XTREMITY ART 2 LEVELS: CPT

## 2021-11-17 PROCEDURE — 74011000250 HC RX REV CODE- 250: Performed by: FAMILY MEDICINE

## 2021-11-17 PROCEDURE — 97161 PT EVAL LOW COMPLEX 20 MIN: CPT

## 2021-11-17 PROCEDURE — 74011250637 HC RX REV CODE- 250/637: Performed by: FAMILY MEDICINE

## 2021-11-17 PROCEDURE — 99221 1ST HOSP IP/OBS SF/LOW 40: CPT | Performed by: NURSE PRACTITIONER

## 2021-11-17 PROCEDURE — 83935 ASSAY OF URINE OSMOLALITY: CPT

## 2021-11-17 RX ORDER — BUMETANIDE 0.25 MG/ML
1 INJECTION INTRAMUSCULAR; INTRAVENOUS 2 TIMES DAILY
Status: DISCONTINUED | OUTPATIENT
Start: 2021-11-17 | End: 2021-11-22

## 2021-11-17 RX ORDER — POTASSIUM CHLORIDE 750 MG/1
20 TABLET, FILM COATED, EXTENDED RELEASE ORAL
Status: COMPLETED | OUTPATIENT
Start: 2021-11-17 | End: 2021-11-17

## 2021-11-17 RX ADMIN — CEFEPIME 2 G: 2 INJECTION, POWDER, FOR SOLUTION INTRAVENOUS at 21:10

## 2021-11-17 RX ADMIN — MENTHOL, METHYL SALICYLATE: 10; 15 CREAM TOPICAL at 15:00

## 2021-11-17 RX ADMIN — MENTHOL, METHYL SALICYLATE: 10; 15 CREAM TOPICAL at 21:16

## 2021-11-17 RX ADMIN — Medication 10 ML: at 14:00

## 2021-11-17 RX ADMIN — VANCOMYCIN HYDROCHLORIDE 1250 MG: 10 INJECTION, POWDER, LYOPHILIZED, FOR SOLUTION INTRAVENOUS at 14:57

## 2021-11-17 RX ADMIN — METOPROLOL TARTRATE 50 MG: 50 TABLET, FILM COATED ORAL at 21:10

## 2021-11-17 RX ADMIN — SACUBITRIL AND VALSARTAN 1 TABLET: 49; 51 TABLET, FILM COATED ORAL at 09:15

## 2021-11-17 RX ADMIN — Medication 10 ML: at 06:00

## 2021-11-17 RX ADMIN — CEFEPIME 2 G: 2 INJECTION, POWDER, FOR SOLUTION INTRAVENOUS at 11:43

## 2021-11-17 RX ADMIN — POTASSIUM CHLORIDE 20 MEQ: 750 TABLET, FILM COATED, EXTENDED RELEASE ORAL at 16:30

## 2021-11-17 RX ADMIN — BUMETANIDE 1 MG: 0.25 INJECTION INTRAMUSCULAR; INTRAVENOUS at 16:30

## 2021-11-17 RX ADMIN — LEVOTHYROXINE SODIUM 175 MCG: 0.03 TABLET ORAL at 07:19

## 2021-11-17 RX ADMIN — RIVAROXABAN 20 MG: 20 TABLET, FILM COATED ORAL at 07:19

## 2021-11-17 RX ADMIN — Medication 10 ML: at 22:00

## 2021-11-17 RX ADMIN — MENTHOL, METHYL SALICYLATE: 10; 15 CREAM TOPICAL at 11:27

## 2021-11-17 RX ADMIN — CEFEPIME 2 G: 2 INJECTION, POWDER, FOR SOLUTION INTRAVENOUS at 04:47

## 2021-11-17 RX ADMIN — SACUBITRIL AND VALSARTAN 1 TABLET: 49; 51 TABLET, FILM COATED ORAL at 16:31

## 2021-11-17 RX ADMIN — METOPROLOL TARTRATE 50 MG: 50 TABLET, FILM COATED ORAL at 09:15

## 2021-11-17 NOTE — PROGRESS NOTES
Medicare pt has received, reviewed, and signed 1st IM letter informing them of their right to appeal the discharge. Signed copy has been placed on pt bedside chart.     JOSE Arizmendi, CRM, LMHP-e

## 2021-11-17 NOTE — PROGRESS NOTES
Bedside shift change report given to Tom  (oncoming nurse) by Judith Quijano (offgoing nurse). Report included the following information SBAR, Kardex, MAR and Recent Results.

## 2021-11-17 NOTE — PROGRESS NOTES
..Spiritual Care Assessment/Progress Note  [facility]      NAME: Vicky Alas      MRN: [id number]  AGE: 66 y.o.  SEX: female  Hinduism Affiliation: Worship   Language: English     11/17/2021     Total Time (in minutes): 5     Spiritual Assessment begun in [facility] 2N MED SURG through conversation with:         [x]Patient        [] Family    [] Friend(s)        Reason for Consult: [facility]     Spiritual beliefs: (Please include comment if needed)     [x] Identifies with a parrish tradition:  Worship       [x] Supported by a parrish community: Franco Paiz           [] Claims no spiritual orientation:           [] Seeking spiritual identity:                [] Adheres to an individual form of spirituality:           [] Not able to assess:                           Identified resources for coping:      [x] Prayer                               [x] Music                  [] Guided Imagery     [x] Family/friends                 [] Pet visits     [] Devotional reading                         [] Unknown     [] Other:                                             Interventions offered during this visit: (See comments for more details)    Patient Interventions: Affirmation of parrish, Communion (Worship), Initial/Spiritual assessment, patient floor, Prayer (actual), Prayer (assurance of)           Plan of Care:     [x] Support spiritual and/or cultural needs    [] Support AMD and/or advance care planning process      [] Support grieving process   [] Coordinate Rites and/or Rituals    [] Coordination with community clergy   [] No spiritual needs identified at this time   [] Detailed Plan of Care below (See Comments)  [] Make referral to Music Therapy  [] Make referral to Pet Therapy     [] Make referral to Addiction services  [] Make referral to [facility]  [] Make referral to Spiritual Care Partner  [] No future visits requested        [] Contact Spiritual Care for further referrals     Comments:  Lamont was in bed and expressed gratitude for her life. She was having difficulty with shortness of breath as she talked. Prayer and communion offered. She became emotional as we prayed with yuly and gratitude.     DENISE Metzger, RN, ACSW, LCSW   Page:  [phone])

## 2021-11-17 NOTE — CONSULTS
Assessment:  Hponatremia: Na 121-122. Minimally symptomatic. Suspect volume overload/hypervolemia given underlying CHF. SSRI (Zoloft) maybe contributing    RLE wound    Systolic CHF: decompensated. On Entresto    Hypothyroidism: TSH wnl. Ct Synthroid    Hypokalemia: mild    Afib    Plan/Recommendations:  Please send off Urine osm  Start IV Bumex 1mg BID  Limit free water please/Push solute intake  Oral KCl 20meq x1 dose  IV Abx  Okay to ct Zoloft for now  Strict I/Os  Am labs    Discussed with patient and Dr. Carlyn Eaton    Thanks for the consultation. Renal service will follow patient with you. Please contact me with any questions or concerns. Initial Consult note         Patient name: Faina Mccoy  MR no: [id number]  Date of admission: 11/16/2021  Date of consultation: 11/17/2021  Requested by: Dr. Alyce Brunner  Reason for consult: Hyponatremia    Patient seen and examined. History obtained from patient and chart review. Relevant labs, data and notes reviewed. HPI: Faina Mccoy is a 66 y.o. female with PMH significant for Systolic CHF, PAF, Hypothyroidism, Hx of THyroid CA presented with RLE wound. ED labs significant for low serum Na 122. Nephrology consulted to evaluate and manage Hyponatremia. Started on IV NS last night. NO improvement noted. Patient c/o worsening SOB today. Denies PMH of hyponatremia but does admit to holding diuretics for the last several days due to urinary frequency. No n/v/d. Denies excessive free water intake. No falls. PMH:  Past Medical History:   Diagnosis Date    Atrial fibrillation ([address] 7/15/2011    Depression     HTN (hypertension) 7/14/2011    Paroxysmal atrial fibrillation ([facility] [address]     Thyroid ca ([facility] [address] 2005    Papillary    Unspecified hypothyroidism 7/15/2011     PSH:  Past Surgical History:   Procedure Laterality Date    ENDOSCOPY, COLON, DIAGNOSTIC  2003    neg. rep 10 yrs.    T [address] 45  11/05       Social history:   Social History     Tobacco Use    Smoking status: Never Smoker    Smokeless tobacco: Never Used   Substance Use Topics    Alcohol use: Yes     Comment: soc    Drug use: No       Family history:  Not contributory    Allergies   Allergen Reactions    Penicillins Unknown (comments)     Patient reports allergy to penicillin with unknown reaction, but states she has tolerated amoxicillin    Opioids - Morphine Analogues Itching and Nausea and Vomiting       Current Facility-Administered Medications   Medication Dose Route Frequency Last Admin    methyl salicylate-menthol (BENGAY) 15-10 % cream   Topical TID Given at 11/17/21 1500    bumetanide (BUMEX) injection 1 mg  1 mg IntraVENous BID      potassium chloride SR (KLOR-CON 10) tablet 20 mEq  20 mEq Oral NOW      diazePAM (VALIUM) tablet 2 mg  2 mg Oral QHS PRN 2 mg at 11/16/21 2312    sacubitriL-valsartan (ENTRESTO) 49-51 mg tablet 1 Tablet  1 Tablet Oral BID 1 Tablet at 11/17/21 0915    levothyroxine (SYNTHROID) tablet 175 mcg  175 mcg Oral  mcg at 11/17/21 0719    metoprolol tartrate (LOPRESSOR) tablet 50 mg  50 mg Oral BID 50 mg at 11/17/21 0915    sertraline (ZOLOFT) tablet 50 mg  50 mg Oral DAILY      rivaroxaban (XARELTO) tablet 20 mg  20 mg Oral DAILY WITH BREAKFAST 20 mg at 11/17/21 0719    sodium chloride (NS) flush 5-40 mL  5-40 mL IntraVENous Q8H 10 mL at 11/17/21 1400    sodium chloride (NS) flush 5-40 mL  5-40 mL IntraVENous PRN      acetaminophen (TYLENOL) tablet 650 mg  650 mg Oral Q4H PRN      cefepime (MAXIPIME) 2 g in sterile water (preservative free) 10 mL IV syringe  2 g IntraVENous Q8H 2 g at 11/17/21 1143    hydrALAZINE (APRESOLINE) 20 mg/mL injection 10 mg  10 mg IntraVENous Q6H PRN      vancomycin (VANCOCIN) 1250 mg in  ml infusion  1,250 mg IntraVENous Q18H 1,250 mg at 11/17/21 1457       ROS (besides HPI):    General: No fever.  No weight changes  ENT: No hearing loss or visual changes  Cardiovascular: No Chest pain  Pulmonary: +SOB  GI: No abdominal pain. No Nausea/Vomiting/Diarrhea. No blood in stool  : No blood in urine. No foamy or cloudy urine  Musculoskeletal: No joint swelling or redness. No morning stiffness  Endocrine: no cold or heat intolerance  Psych: denies anxiety or depression  Neuro: No light headedness or dizziness    Objective   Visit Vitals  BP (!) 154/82 (BP 1 Location: Right upper arm, BP Patient Position: At rest)   Pulse 71   Temp 98.1 °F (36.7 °C)   Resp 16   Ht 5' 7\" (1.702 m)   Wt 89.3 kg (196 lb 14.4 oz)   SpO2 95%   BMI 30.84 kg/m²       Physical Exam:    Gen: NAD    HEENT: AT/NC, EOMI, moist mucous membrane, no scleral icterus    Neck: no JVD, no cervical lymphadenopathy, no carotid bruit    Lungs/Chest wall: Breath sounds normal. Symmetrical chest wall expansion. No accessory muscle use. Clear to auscultation    Cardiovascular: Irregularly irregular    Abdomen: soft, NT, ND, BS+, no HSM    Ext: no clubbing or cyanosis. +LE edema    Skin: warm and dry. No rashes    : no CVA tenderness. No coe    CNS: alert awake. Answers appropriately.      Labs/Data:    Lab Results   Component Value Date/Time    Sodium 122 (L) 11/17/2021 11:36 AM    Potassium 3.7 11/17/2021 11:36 AM    Chloride 90 (L) 11/17/2021 11:36 AM    CO2 27 11/17/2021 11:36 AM    Anion gap 5 11/17/2021 11:36 AM    Glucose 119 (H) 11/17/2021 11:36 AM    BUN 8 11/17/2021 11:36 AM    Creatinine 0.65 11/17/2021 11:36 AM    BUN/Creatinine ratio 12 11/17/2021 11:36 AM    GFR est AA >60 11/17/2021 11:36 AM    GFR est non-AA >60 11/17/2021 11:36 AM    Calcium 8.5 11/17/2021 11:36 AM       Lab Results   Component Value Date/Time    WBC 8.7 11/17/2021 01:05 AM    HGB (POC) 15.1 01/28/2020 09:43 AM    HGB 10.0 (L) 11/17/2021 01:05 AM    HCT (POC) 44.5 01/28/2020 09:43 AM    HCT 28.5 (L) 11/17/2021 01:05 AM    PLATELET 266 80/16/0184 01:05 AM    MCV 94.4 11/17/2021 01:05 AM       Urine analysis:   Results for orders placed or performed in visit on 02/22/21   AMB POC URINALYSIS DIP STICK AUTO W/O MICRO     Status: None   Result Value Ref Range Status    Color (UA POC) Yellow  Final    Clarity (UA POC) Clear  Final    Glucose (UA POC) Negative Negative Final    Bilirubin (UA POC) Negative Negative Final    Ketones (UA POC) Negative Negative Final    Specific gravity (UA POC) 1.025 1.001 - 1.035 Final    Blood (UA POC) Negative Negative Final    pH (UA POC) 6.0 4.6 - 8.0 Final    Protein (UA POC) 1+ Negative Final    Urobilinogen (UA POC) 0.2 mg/dL 0.2 - 1 Final    Nitrites (UA POC) Negative Negative Final    Leukocyte esterase (UA POC) Negative Negative Final             No components found for: SPEP, UPEP  No results found for: PUQ, PROTU2, PROTU1, BJP1, CPE1, IMEL1, MET2  No results found for: MCACR, MCA1, MCA2, MCA3, MCAU, MCAU2, MCALPOCT      Intake/Output Summary (Last 24 hours) at 11/17/2021 1514  Last data filed at 11/17/2021 1104  Gross per 24 hour   Intake 200 ml   Output    Net 200 ml       Wt Readings from Last 3 Encounters:   11/16/21 89.3 kg (196 lb 14.4 oz)   10/14/21 89.8 kg (198 lb)   09/10/21 86.2 kg (190 lb)       Signed by:  Annamaria Cooper MD  Nephrology and Hypertension  [facility]

## 2021-11-17 NOTE — PROGRESS NOTES
Transition of Care Plan   RUR- 10% Low Risk   DISPOSITION: The disposition plan is pending medical progression and recommendation.  F/U with PCP/Specialist     Transport: [facility]/family     Reason for Admission:  \"severe hematoma on lower right leg, continue to get worse. \"                   RUR Score: 10% Moderate Risk                    Plan for utilizing home health: N/A        PCP: First and Last name:  Inez Walter MD     Name of Practice: [facility]   Are you a current patient: Yes/No: Yes   Approximate date of last visit:  October 2021   Can you participate in a virtual visit with your PCP: N/A                    Current Advanced Directive/Advance Care Plan: Full Code  Has no ACP documentation on file at this time. Healthcare Decision Maker:   Click here to complete [employer] including selection of the Healthcare Decision Maker Relationship (ie \"Primary\")  Sister                         Transition of Care Plan: Pending recommendation. Reviewed chart for transitions of care and discussed in rounds. CM met with patient at bedside to explain role and offer support. Patient is alert and oriented x4 and confirmed demographics. Baseline: Does not utilize DMEs  ADLs/IDALS: Independent   Previous Home Health: N/A  Previous SNF/IPR: N/A  ER Contact: Sister - [phone]    Patient reports that she is  and lives an apartment by herself. Patient reports that she is supported by her sister. Patient is independent with ADLs/IDALs. Patient reports that she does not utilize DMEs to ambulate. Patient's preferred pharmacy is [facility] located on [address] and [address] for her prescriptions. Patient's sister is expected to transport at discharge. Care Management Interventions  PCP Verified by CM: Yes  Palliative Care Criteria Met (RRAT>21 & CHF Dx)?: No  Mode of Transport at Discharge:  Other (see comment)  Transition of Care Consult (CM Consult): Discharge Planning  MyChart Signup: No  Discharge Durable Medical Equipment: No  Physical Therapy Consult: No  Occupational Therapy Consult: No  Speech Therapy Consult: No  Support Systems: Other Family Member(s)  Confirm Follow Up Transport: Family  The Patient and/or Patient Representative was Provided with a Choice of Provider and Agrees with the Discharge Plan?: Yes  Freedom of Choice List was Provided with Basic Dialogue that Supports the Patient's Individualized Plan of Care/Goals, Treatment Preferences and Shares the Quality Data Associated with the Providers?: Yes   Resource Information Provided?: No    CM will continue to provide updates as they become available. CM will continue to follow, provide support and assist with JAYDE needs as they arise.     JOSE Baca, CRM, LMHP-e

## 2021-11-17 NOTE — PROGRESS NOTES
Pharmacist Note - Vancomycin Dosing    Consult provided for this 66 y.o. female for indication of LLE wound infection. Antibiotic regimen(s): Vancomycin, Cefepime  Patient on vancomycin PTA? NO     Recent Labs     21  1249   WBC 7.9   CREA 0.78   BUN 9     Frequency of BMP: every day x3  Height: 170.2 cm  Weight: 89.3 kg  Est CrCl: 74 ml/min; UO: n/a ml/kg/hr  Temp (24hrs), Av.7 °F (36.5 °C), Min:97.6 °F (36.4 °C), Max:97.8 °F (36.6 °C)    Cultures:  pending    MRSA Swab ordered (if applicable)? N/A    The plan below is expected to result in a target range of AUC/MORGAN 400-500    Therapy will be initiated with a loading dose of 2000 mg IV x 1 to be followed by a maintenance dose of 1250 mg IV every 18 hours. Pharmacy to follow patient daily and order levels / make dose adjustments as appropriate. *Vancomycin has been dosed used Bayesian kinetics software to target an AUC/MORGAN of 400-600, which provides adequate exposure for an assumed infection due to MRSA with an MORGAN of 1 or less while reducing the risk of nephrotoxicity as seen with traditional trough based dosing goals.

## 2021-11-17 NOTE — PROGRESS NOTES
Bedside and Verbal shift change report given to Lul Donis RN (oncoming nurse) by Reynold Coe RN (offgoing nurse). Report included the following information SBAR, Kardex, ED Summary, Procedure Summary, Intake/Output, MAR, Recent Results and Quality Measures.

## 2021-11-17 NOTE — PROGRESS NOTES
I prayed with Miladys Dunn and celebrated with her the anointing of the [address]   Father Yisel Lui

## 2021-11-17 NOTE — PROGRESS NOTES
[address] [facility] Adult  Hospitalist Group                                                                                          Hospitalist Progress Note  Jeanna Khan MD  Answering service: [phone] OR [phone] from in house phone        Date of Service:  2021  NAME:  Tiffany Selby  :  1950  MRN:  [id number]      Admission Summary:   66 y.o F with PMH of CHF,atrial fib came to the hospital due to rt LE wound. Interval history / Subjective:   Patient seen and examined. She complaints of shortness of breath today. Gen surgery team has evaluated patient LE wound today     Assessment & Plan:     #Right lower extremity ulcer,cellulitis  -recent trauma a month ago causing hematoma and subsequently wound  ?delayed healing due to venous stasis   -wound care and gen surg following  -CT -7 x 5 cm soft tissue wound is medial to the distal tibia. Underlying cellulitis and ill-defined fluid. No drainable abscess. No fracture or osteomyelitis.   -on Iv vanc and cefepime  Check CRP, LATONYA      # Acute on chronic hyponatremia:  Appears to be volume overloaded  -IVF stopped  -Urine sodium 33 yesterday and TSH wnl  -check urine osm  -IV bumex 1 mg BID  _nephrology consulted  -sodium 122 this morning, goal correction 6 meq in 24 hrs    # Chronic atrial fib:  -rate controlled on metoprolol, on xarelto which may need to be held for surgical wound debbridement    # HTN:  Continue current regimen    #CHronic diastolic heart failure:  -continue entresto,metoprolol    #Hypothyroidism:  synthroid      Code status: full  DVT prophylaxis: hold [address] [facility] discussed with: Patient/Family and Nurse  Anticipated Disposition: Home w/Family  Anticipated Discharge: Greater than 48 hours     Hospital Problems  Date Reviewed: 10/14/2021          Codes Class Noted POA    Open wound of right lower leg ICD-10-CM: S81.801A  ICD-9-CM: 891.0  2021 Unknown        Hyponatremia ICD-10-CM: E87.1  ICD-9-CM: 276.1  8/2/2017 Unknown                Review of Systems:   Pertinent items are noted in HPI. Vital Signs:    Last 24hrs VS reviewed since prior progress note. Most recent are:  Visit Vitals  BP (!) 154/82 (BP 1 Location: Right upper arm, BP Patient Position: At rest)   Pulse 71   Temp 98.1 °F (36.7 °C)   Resp 16   Ht 5' 7\" (1.702 m)   Wt 89.3 kg (196 lb 14.4 oz)   SpO2 95%   BMI 30.84 kg/m²         Intake/Output Summary (Last 24 hours) at 11/17/2021 1509  Last data filed at 11/17/2021 1104  Gross per 24 hour   Intake 200 ml   Output    Net 200 ml        Physical Examination:     I had a face to face encounter with this patient and independently examined them on 11/17/2021 as outlined below:          Constitutional:  No acute distress, cooperative, pleasant    ENT:  Oral mucosa moist, EOMI,anicteric sclera   Resp:  basal crackles b/l ,No accessory muscle use   CV:  irregular rhythm, normal rate, S1,S2 wnl    GI:  Soft, obese, non tender. normoactive bowel sounds, no hepatosplenomegaly     Musculoskeletal:  rt lower extremity wound -bandage present, b/l LE edema , RLE is erythematous,warm    Neurologic:  Moves all extremities,  AAOx3            Data Review:    Review and/or order of clinical lab test  Review and/or order of tests in the radiology section of CPT  Review and/or order of tests in the medicine section of CPT      Labs:     Recent Labs     11/17/21  0105 11/16/21  1249   WBC 8.7 7.9   HGB 10.0* 11.0*   HCT 28.5* 31.6*    338     Recent Labs     11/17/21  1136 11/17/21  0107 11/17/21  0105   * 122* 122*   K 3.7 4.2 4.1   CL 90* 90* 90*   CO2 27 23 25   BUN 8 10 10   CREA 0.65 0.73 0.67   * 115* 113*   CA 8.5 8.6 8.6     Recent Labs     11/17/21  0105 11/16/21  1249   ALT 29 28    130*   TBILI 0.5 0.5   TP 6.6 7.4   ALB 3.6 4.2   GLOB 3.0 3.2     No results for input(s): INR, PTP, APTT, INREXT in the last 72 hours.    No results for input(s): FE, TIBC, PSAT, FERR in the last 72 hours. No results found for: FOL, RBCF   No results for input(s): PH, PCO2, PO2 in the last 72 hours. No results for input(s): CPK, CKNDX, TROIQ in the last 72 hours.     No lab exists for component: CPKMB  Lab Results   Component Value Date/Time    Cholesterol, total 233 (H) 06/15/2021 12:03 PM    HDL Cholesterol 114 06/15/2021 12:03 PM    LDL, calculated 108 (H) 06/15/2021 12:03 PM    LDL, calculated 102 (H) 02/07/2014 11:06 AM    Triglyceride 66 06/15/2021 12:03 PM     No results found for: GLUCPOC  Lab Results   Component Value Date/Time    Color Yellow 02/07/2014 11:06 AM    Appearance Cloudy (A) 02/07/2014 11:06 AM    pH (UA) 7.5 02/07/2014 11:06 AM    Ketone Negative 02/07/2014 11:06 AM    Bilirubin Negative 02/07/2014 11:06 AM    Nitrites Negative 02/07/2014 11:06 AM    Leukocyte Esterase 2+ (A) 02/07/2014 11:06 AM    Bacteria Few 02/07/2014 11:06 AM    WBC 11-30 (A) 02/07/2014 11:06 AM    RBC 0-3 02/07/2014 11:06 AM         Medications Reviewed:     Current Facility-Administered Medications   Medication Dose Route Frequency    methyl salicylate-menthol (BENGAY) 15-10 % cream   Topical TID    diazePAM (VALIUM) tablet 2 mg  2 mg Oral QHS PRN    sacubitriL-valsartan (ENTRESTO) 49-51 mg tablet 1 Tablet  1 Tablet Oral BID    levothyroxine (SYNTHROID) tablet 175 mcg  175 mcg Oral ACB    metoprolol tartrate (LOPRESSOR) tablet 50 mg  50 mg Oral BID    sertraline (ZOLOFT) tablet 50 mg  50 mg Oral DAILY    rivaroxaban (XARELTO) tablet 20 mg  20 mg Oral DAILY WITH BREAKFAST    sodium chloride (NS) flush 5-40 mL  5-40 mL IntraVENous Q8H    sodium chloride (NS) flush 5-40 mL  5-40 mL IntraVENous PRN    acetaminophen (TYLENOL) tablet 650 mg  650 mg Oral Q4H PRN    cefepime (MAXIPIME) 2 g in sterile water (preservative free) 10 mL IV syringe  2 g IntraVENous Q8H    hydrALAZINE (APRESOLINE) 20 mg/mL injection 10 mg  10 mg IntraVENous Q6H PRN    vancomycin (VANCOCIN) 1250 mg in  ml infusion  1,250 mg IntraVENous Q18H     ______________________________________________________________________  EXPECTED LENGTH OF STAY: 2d 16h  ACTUAL LENGTH OF STAY:          1                 Alonso Núñez MD

## 2021-11-17 NOTE — PROGRESS NOTES
Problem: Mobility Impaired (Adult and Pediatric)  Goal: *Acute Goals and Plan of Care (Insert Text)  Description: FUNCTIONAL STATUS PRIOR TO ADMISSION: Patient was independent and active without use of DME.    HOME SUPPORT PRIOR TO ADMISSION: The patient lived alone with siblings in area to provide assistance, if needed    Physical Therapy Goals  Initiated 11/17/2021  1. Patient will transfer from bed to chair and chair to bed with independence using the least restrictive device within 7 day(s). 2.  Patient will perform sit to stand with independence within 7 day(s). 3.  Patient will ambulate with independence for 250 feet with the least restrictive device within 7 day(s). Outcome: Not Met   PHYSICAL THERAPY EVALUATION  Patient: Merlyn Dietz (34 y.o. female)  Date: 11/17/2021  Primary Diagnosis: Hyponatremia [E87.1]        Precautions:          ASSESSMENT  Based on the objective data described below, the patient presents with SOB and limited activity tolerance causing decreased functional mobility tolerance s/p admission for hyponatremia and RLE wound. Patient received in bed in NAD, but noted SOB with talking to therapist.  VSS and SPO2 98% on RA. Patient currently in Afib but HR in 60s and patient endorsing SOB is \"baseline\" for me but a \"little worse\" now due to not taking her fluid pill. Overall, patient steady with transfers and toileting (indep clothing and hygiene management) but only tolerating 10 feet of gait at a time before needing to stop to catch her breath. Instructed patient in PLB but sats stable >96% throughout session as was HR in 60-70s. At baseline, patient is fully indep, lives alone in a ground level apartment. Will benefit from PT in the acute setting to work on endurance and activity tolerance but anticipate patient is not far off from baseline.      Current Level of Function Impacting Discharge (mobility/balance): SUP for transfers, SBA for gait     Functional Outcome Measure: The patient scored Total: 75/100 on the Barthel Index which is indicative of 25% impaired ability to care for basic self needs/dependency on others. Other factors to consider for discharge: lives alone     Patient will benefit from skilled therapy intervention to address the above noted impairments. PLAN :  Recommendations and Planned Interventions: transfer training, gait training, therapeutic exercises, patient and family training/education, and therapeutic activities      Frequency/Duration: Patient will be followed by physical therapy:  3 times a week to address goals. Recommendation for discharge: (in order for the patient to meet his/her long term goals)  Physical therapy at least 2 days/week in the home vs none    This discharge recommendation:  Has been made in collaboration with the attending provider and/or case management    IF patient discharges home will need the following DME: to be determined (TBD)         SUBJECTIVE:   Patient stated Oh this is so embarrassing, I'm fine, just a little more winded than normal.    OBJECTIVE DATA SUMMARY:   HISTORY:    Past Medical History:   Diagnosis Date    Atrial fibrillation ([facility] [address] 7/15/2011    Depression     HTN (hypertension) 7/14/2011    Paroxysmal atrial fibrillation ([facility] [address]     [facility] ([facility] 2005    Papillary    Unspecified hypothyroidism 7/15/2011     Past Surgical History:   Procedure Laterality Date    ENDOSCOPY, COLON, DIAGNOSTIC  2003    neg. rep 10 yrs.     [place] THYROIDECTOMY  11/05       Personal factors and/or comorbidities impacting plan of care:     Home Situation  Home Environment: Apartment  # Steps to Enter: 0  One/Two Story Residence: One story  Living Alone: Yes  Support Systems: Other Family Member(s)  Patient Expects to be Discharged to[de-identified] Apartment  Current DME Used/Available at Home: None    EXAMINATION/PRESENTATION/DECISION MAKING:   Critical Behavior:  Neurologic State: Alert  Orientation Level: Oriented X4 Range Of Motion:            WNL              Strength: WNL                 Tone & Sensation:       WNL                           Coordination:   WNL    Functional Mobility:  Bed Mobility:     Supine to Sit: Independent  Sit to Supine: Independent  Scooting: Independent  Transfers:  Sit to Stand: Supervision  Stand to Sit: Supervision        Bed to Chair: Supervision              Balance:   Sitting: Intact  Standing: Impaired  Standing - Static: Good  Standing - Dynamic : Fair  Ambulation/Gait Training:  Distance (ft): 40 Feet (ft)  Assistive Device: Gait belt  Ambulation - Level of Assistance: Contact guard assistance        Gait Abnormalities: Decreased step clearance        Base of Support: Widened     Speed/Margarita: Pace decreased (<100 feet/min)                   Functional Measure:  Barthel Index:    Bathin  Bladder: 10  Bowels: 10  Groomin  Dressing: 10  Feeding: 10  Mobility: 0  Stairs: 0  Toilet Use: 10  Transfer (Bed to Chair and Back): 15  Total: 75/100       The Barthel ADL Index: Guidelines  1. The index should be used as a record of what a patient does, not as a record of what a patient could do. 2. The main aim is to establish degree of independence from any help, physical or verbal, however minor and for whatever reason. 3. The need for supervision renders the patient not independent. 4. A patient's performance should be established using the best available evidence. Asking the patient, friends/relatives and nurses are the usual sources, but direct observation and common sense are also important. However direct testing is not needed. 5. Usually the patient's performance over the preceding 24-48 hours is important, but occasionally longer periods will be relevant. 6. Middle categories imply that the patient supplies over 50 per cent of the effort. 7. Use of aids to be independent is allowed. Agustin Batista, Barthel, D.W. (3218). Functional evaluation: the Barthel Index.  [facility] Med J ([phone]. Sallyann Gin matthew [place], J.J.M.F, Ronak Woods., Germán Estrada., [place], [address]37 Piyush Correa (1999). Measuring the change indisability after inpatient rehabilitation; comparison of the responsiveness of the Barthel Index and Functional [place] Measure. Journal of Neurology, Neurosurgery, and Psychiatry, 66(4), 012-832. KEELY Parada, NELLY Bejarano, & Estella Wood M.A. (2004.) Assessment of post-stroke quality of life in cost-effectiveness studies: The usefulness of the Barthel Index and the EuroQoL-5D. Quality of Life Research, 15, 960-72        Physical Therapy Evaluation Charge Determination   History Examination Presentation Decision-Making   HIGH Complexity :3+ comorbidities / personal factors will impact the outcome/ POC  MEDIUM Complexity : 3 Standardized tests and measures addressing body structure, function, activity limitation and / or participation in recreation  MEDIUM Complexity : Evolving with changing characteristics  Other outcome measures Barthel 75  LOW       Based on the above components, the patient evaluation is determined to be of the following complexity level: LOW     Pain Rating:  None reported    Activity Tolerance:   Poor and observed SOB with activity    After treatment patient left in no apparent distress:   Supine in bed and Call bell within reach    COMMUNICATION/EDUCATION:   The patients plan of care was discussed with: Registered nurse. Fall prevention education was provided and the patient/caregiver indicated understanding. and Patient/family agree to work toward stated goals and plan of care.     Thank you for this referral.  Celestina Oconnell, PT   Time Calculation: 22 mins

## 2021-11-17 NOTE — CONSULTS
[facility]  Inpatient Consultation        Admit Date: 11/16/2021  Reason for Consultation: RLE wound     HPI:  Melonie Adhikari is a 66 y.o. female on xarelto w/ DHF and afib whom we are asked to see in consultation by Dr. Asuncion Crawford for the above complaint. Pt presented yesterday w/ a RLE wound sustained a week ago after she hit her leg on a door. A hematoma developed and she came to the ED and was sent home. She returned and was also found to be hyponatremic. She is on diuretics for heart failure. CT scan  1. 7 x 5 cm soft tissue wound is medial to the distal tibia. Underlying  cellulitis and ill-defined fluid. No drainable abscess. 2. No fracture or osteomyelitis.     Patient Active Problem List    Diagnosis Date Noted    Open wound of right lower leg 11/17/2021    Mild depression ([address] 01/25/2019    Chronic diastolic congestive heart failure ([address] 08/22/2017    Encounter for long-term (current) drug use 08/22/2017    Hyponatremia 08/02/2017    Chest pain 08/01/2017    Acquired hypothyroidism 04/08/2016    Atrial fibrillation ([address] 07/15/2011    HTN (hypertension) 07/14/2011     Past Medical History:   Diagnosis Date    Atrial fibrillation ([address] 7/15/2011    Depression     HTN (hypertension) 7/14/2011    Paroxysmal atrial fibrillation ([address]     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 rep 10 yrs.  [place] THYROIDECTOMY  11/05      Social History     Tobacco Use    Smoking status: Never Smoker    Smokeless tobacco: Never Used   Substance Use Topics    Alcohol use: Yes     Comment: soc      Family History   Problem Relation Age of Onset    Cancer Mother         lung      Prior to Admission medications    Medication Sig Start Date End Date Taking?  Authorizing Provider   butalbital-acetaminophen-caffeine (FIORICET, ESGIC) -40 mg per tablet TAKE 1 TO 2 TABLETS BY MOUTH EVERY 6 HOURS AS NEEDED FOR HEADACHE 9/18/21   Lawanda Goodman MD   Entresto 49-51 mg tab tablet Take 1 Tablet by mouth two (2) times a day. 8/16/21   Provider, Historical   ondansetron (ZOFRAN ODT) 4 mg disintegrating tablet Take 1 Tablet by mouth four (4) times daily as needed for Nausea or Vomiting. 9/10/21   Lawanda Goodman MD   levothyroxine (SYNTHROID) 175 mcg tablet TAKE 1 TABLET BY MOUTH EVERY DAY BEFORE BREAKFAST 8/16/21   Lawanda Goodman MD   Xarelto 20 mg tab tablet TAKE 1 TABLET BY MOUTH EVERY DAY 6/28/21   Lawanda Goodman MD   diazePAM (VALIUM) 2 mg tablet TAKE 1 TABLET BY MOUTH EVERY DAY AS NEEDED FOR ANXIETY 6/28/21   Lawanda Goodman MD   sertraline (ZOLOFT) 50 mg tablet Take 50 mg by mouth daily. Provider, Historical   metoprolol tartrate (LOPRESSOR) 50 mg tablet Take 50 mg by mouth two (2) times a day. Provider, Historical   spironolactone (ALDACTONE) 25 mg tablet Take  by mouth daily.     Provider, Historical     Current Facility-Administered Medications   Medication Dose Route Frequency    methyl salicylate-menthol (BENGAY) 15-10 % cream   Topical TID    diazePAM (VALIUM) tablet 2 mg  2 mg Oral QHS PRN    sacubitriL-valsartan (ENTRESTO) 49-51 mg tablet 1 Tablet  1 Tablet Oral BID    levothyroxine (SYNTHROID) tablet 175 mcg  175 mcg Oral ACB    metoprolol tartrate (LOPRESSOR) tablet 50 mg  50 mg Oral BID    sertraline (ZOLOFT) tablet 50 mg  50 mg Oral DAILY    rivaroxaban (XARELTO) tablet 20 mg  20 mg Oral DAILY WITH BREAKFAST    sodium chloride (NS) flush 5-40 mL  5-40 mL IntraVENous Q8H    sodium chloride (NS) flush 5-40 mL  5-40 mL IntraVENous PRN    [Held by provider] 0.9% sodium chloride infusion  75 mL/hr IntraVENous CONTINUOUS    acetaminophen (TYLENOL) tablet 650 mg  650 mg Oral Q4H PRN    cefepime (MAXIPIME) 2 g in sterile water (preservative free) 10 mL IV syringe  2 g IntraVENous Q8H    hydrALAZINE (APRESOLINE) 20 mg/mL injection 10 mg  10 mg IntraVENous Q6H PRN    vancomycin (VANCOCIN) 1250 mg in  ml infusion  1,250 mg IntraVENous Q18H     Allergies   Allergen Reactions    Penicillins Unknown (comments)     Patient reports allergy to penicillin with unknown reaction, but states she has tolerated amoxicillin    Opioids - Morphine Analogues Itching and Nausea and Vomiting          Subjective:     Review of Systems:    A comprehensive review of systems was negative except for that written in the History of Present Illness. Objective:     Blood pressure (!) 154/82, pulse (!) 58, temperature 98.1 °F (36.7 °C), resp. rate 16, height 5' 7\" (1.702 m), weight 89.3 kg (196 lb 14.4 oz), SpO2 95 %. Temp (24hrs), Av.9 °F (36.6 °C), Min:97.5 °F (36.4 °C), Max:98.3 °F (36.8 °C)      Recent Labs     21  0105 21  1249   WBC 8.7 7.9   HGB 10.0* 11.0*   HCT 28.5* 31.6*    338     Recent Labs     21  1136 21  0107 21  0105 21  1249   * 122* 122*   < > 122*   K 3.7 4.2 4.1   < > 4.0   CL 90* 90* 90*   < > 87*   CO2 27 23 25   < > 28   * 115* 113*   < > 109*   BUN 8 10 10   < > 9   CREA 0.65 0.73 0.67   < > 0.78   CA 8.5 8.6 8.6   < > 8.8   ALB  --   --  3.6  --  4.2   TBILI  --   --  0.5  --  0.5   ALT  --   --  29  --  28    < > = values in this interval not displayed. No results for input(s): AML, LPSE in the last 72 hours.       Intake/Output Summary (Last 24 hours) at 2021 1253  Last data filed at 2021 1104  Gross per 24 hour   Intake 200 ml   Output    Net 200 ml     Date 21 07 - 21 0659   Shift 0238-2547 3801-0468 5950-6724 24 Hour Total   INTAKE   P.O. 200   200   Shift Total(mL/kg) 988(6.5)   200(2.2)   OUTPUT   Shift Total(mL/kg)       Weight (kg) 89.3 89.3 89.3 89.3     _____________________  Physical Exam:     General:  Alert, cooperative, no distress, appears stated age.   Eyes:   Sclera clear. Throat: Lips, mucosa, and tongue normal.   Neck: Supple, symmetrical, trachea midline. Lungs:   Clear to auscultation bilaterally. Heart:  Regular rate and rhythm. Abdomen:   Normal BS, flat, Soft, non-tender. No masses,  No organomegaly. Extremities: Edematous; evident vascular dz;  to both LE; + bilateral pedal pulses; RLE w/ 7x7cm wound w/ necrotic tissue and an an area of fluctuance; little healthy-appearing tissue. Skin: See above             Assessment:   Active Problems:    Hyponatremia (8/2/2017)      Open wound of right lower leg (11/17/2021)            Plan:     May need debridement to clean it up - pt on xarelto so would need a couple days off this  Wound care consult  Cont abx  Cefepime and vanc  Thank you for allowing us to participate in the care of this patient. Total time spent with patient: 30 minutes. NOTE PER DR GRAHAM SHEPARD:  Pt examined, discussed and reviewed with NP, and questions answered with pt, and I agree/ concur with note NP     HPI:  Traumatic wound hematoma to right lower medial calf, patient on anticoagulation Eliquis, or atrial arrhythmia and diastolic heart failure. Measures approximately 4-5 cm in diameter, traumatized 1 month ago, had been to emergency room twice with hematoma.   CT scan leg suggests fluid beneath the right lower leg wound  ROS  Chronic dyspnea, from diastolic heart failure according to patient, right lower leg pain and chronic bilateral lower extremity edema  Physical Exam  Patient alert awake, bilateral lower extremity edema chronic, palpable dorsalis pedis pulse bilaterally, no evidence of cellulitis but there is what appears to be very poor or nonviable granulation tissue ruborous dark, right medial calf 4-5 cm diameter with no surrounding cellulitis, granulation tissue is fluctuant suggesting fluid underneath this,  Active Problems:    Hyponatremia (8/2/2017)      Open wound of right lower leg (11/17/2021) REC:   Agree with NP Librado Wheeler consult above, likely require open debridement of this wound but will get wound care to see patient first to see if they have any other suggestions, but I suspect this will heal faster if it is debrided first, but with patient's chronic lower extremity edema likely has some degree of venous stasis as well, this wound will probably take a couple of months to heal.    Once wound care is seen patient, then will make the determination about possible surgical intervention, patient is already eating this morning when seen, long-term follow-up probably can be done through the wound care center  Above reviewed with patient at length  Probably can stop antibiotics    Face-to-face time including review of any indicated imaging, discussion with patient, and other providers, exam and discussion with patient:     35        minutes      Signed By: Zaid Cody MD     November 17, 2021

## 2021-11-18 LAB
ANION GAP SERPL CALC-SCNC: 8 MMOL/L (ref 5–15)
BASOPHILS # BLD: 0 K/UL (ref 0–0.1)
BASOPHILS NFR BLD: 0 % (ref 0–1)
BUN SERPL-MCNC: 10 MG/DL (ref 6–20)
BUN/CREAT SERPL: 14 (ref 12–20)
CALCIUM SERPL-MCNC: 8.6 MG/DL (ref 8.5–10.1)
CALCULATED R AXIS, ECG10: 92 DEGREES
CALCULATED T AXIS, ECG11: 62 DEGREES
CHLORIDE SERPL-SCNC: 91 MMOL/L (ref 97–108)
CO2 SERPL-SCNC: 24 MMOL/L (ref 21–32)
COVID-19 RAPID TEST, COVR: NOT DETECTED
CREAT SERPL-MCNC: 0.73 MG/DL (ref 0.55–1.02)
CRP SERPL-MCNC: 8.58 MG/DL (ref 0–0.6)
DIAGNOSIS, 93000: NORMAL
DIFFERENTIAL METHOD BLD: ABNORMAL
EOSINOPHIL # BLD: 0 K/UL (ref 0–0.4)
EOSINOPHIL NFR BLD: 0 % (ref 0–7)
ERYTHROCYTE [DISTWIDTH] IN BLOOD BY AUTOMATED COUNT: 13.4 % (ref 11.5–14.5)
GLUCOSE SERPL-MCNC: 109 MG/DL (ref 65–100)
HCT VFR BLD AUTO: 30.9 % (ref 35–47)
HGB BLD-MCNC: 10.8 G/DL (ref 11.5–16)
IMM GRANULOCYTES # BLD AUTO: 0 K/UL (ref 0–0.04)
IMM GRANULOCYTES NFR BLD AUTO: 0 % (ref 0–0.5)
LYMPHOCYTES # BLD: 1 K/UL (ref 0.8–3.5)
LYMPHOCYTES NFR BLD: 11 % (ref 12–49)
MAGNESIUM SERPL-MCNC: 1.5 MG/DL (ref 1.6–2.4)
MCH RBC QN AUTO: 33.2 PG (ref 26–34)
MCHC RBC AUTO-ENTMCNC: 35 G/DL (ref 30–36.5)
MCV RBC AUTO: 95.1 FL (ref 80–99)
MONOCYTES # BLD: 0.9 K/UL (ref 0–1)
MONOCYTES NFR BLD: 9 % (ref 5–13)
NEUTS SEG # BLD: 7.6 K/UL (ref 1.8–8)
NEUTS SEG NFR BLD: 80 % (ref 32–75)
NRBC # BLD: 0 K/UL (ref 0–0.01)
NRBC BLD-RTO: 0 PER 100 WBC
PHOSPHATE SERPL-MCNC: 2.4 MG/DL (ref 2.6–4.7)
PLATELET # BLD AUTO: 312 K/UL (ref 150–400)
PMV BLD AUTO: 8.9 FL (ref 8.9–12.9)
POTASSIUM SERPL-SCNC: 4.1 MMOL/L (ref 3.5–5.1)
Q-T INTERVAL, ECG07: 390 MS
QRS DURATION, ECG06: 84 MS
QTC CALCULATION (BEZET), ECG08: 447 MS
RBC # BLD AUTO: 3.25 M/UL (ref 3.8–5.2)
SODIUM SERPL-SCNC: 123 MMOL/L (ref 136–145)
SOURCE, COVRS: NORMAL
VENTRICULAR RATE, ECG03: 79 BPM
WBC # BLD AUTO: 9.5 K/UL (ref 3.6–11)

## 2021-11-18 PROCEDURE — 74011250636 HC RX REV CODE- 250/636: Performed by: INTERNAL MEDICINE

## 2021-11-18 PROCEDURE — 74011000250 HC RX REV CODE- 250: Performed by: FAMILY MEDICINE

## 2021-11-18 PROCEDURE — 85025 COMPLETE CBC W/AUTO DIFF WBC: CPT

## 2021-11-18 PROCEDURE — 74011000250 HC RX REV CODE- 250: Performed by: INTERNAL MEDICINE

## 2021-11-18 PROCEDURE — 83735 ASSAY OF MAGNESIUM: CPT

## 2021-11-18 PROCEDURE — 86140 C-REACTIVE PROTEIN: CPT

## 2021-11-18 PROCEDURE — 65270000032 HC RM SEMIPRIVATE

## 2021-11-18 PROCEDURE — 74011250636 HC RX REV CODE- 250/636: Performed by: FAMILY MEDICINE

## 2021-11-18 PROCEDURE — 99232 SBSQ HOSP IP/OBS MODERATE 35: CPT | Performed by: SURGERY

## 2021-11-18 PROCEDURE — 87635 SARS-COV-2 COVID-19 AMP PRB: CPT

## 2021-11-18 PROCEDURE — 84100 ASSAY OF PHOSPHORUS: CPT

## 2021-11-18 PROCEDURE — 80048 BASIC METABOLIC PNL TOTAL CA: CPT

## 2021-11-18 PROCEDURE — 36415 COLL VENOUS BLD VENIPUNCTURE: CPT

## 2021-11-18 PROCEDURE — 74011250637 HC RX REV CODE- 250/637: Performed by: FAMILY MEDICINE

## 2021-11-18 RX ORDER — MAGNESIUM SULFATE HEPTAHYDRATE 40 MG/ML
2 INJECTION, SOLUTION INTRAVENOUS ONCE
Status: COMPLETED | OUTPATIENT
Start: 2021-11-18 | End: 2021-11-18

## 2021-11-18 RX ADMIN — MENTHOL, METHYL SALICYLATE: 10; 15 CREAM TOPICAL at 21:51

## 2021-11-18 RX ADMIN — BUMETANIDE 1 MG: 0.25 INJECTION INTRAMUSCULAR; INTRAVENOUS at 09:05

## 2021-11-18 RX ADMIN — MENTHOL, METHYL SALICYLATE: 10; 15 CREAM TOPICAL at 18:47

## 2021-11-18 RX ADMIN — DIAZEPAM 2 MG: 2 TABLET ORAL at 21:15

## 2021-11-18 RX ADMIN — MAGNESIUM SULFATE HEPTAHYDRATE 2 G: 40 INJECTION, SOLUTION INTRAVENOUS at 07:38

## 2021-11-18 RX ADMIN — ACETAMINOPHEN 650 MG: 325 TABLET ORAL at 21:18

## 2021-11-18 RX ADMIN — SACUBITRIL AND VALSARTAN 1 TABLET: 49; 51 TABLET, FILM COATED ORAL at 18:49

## 2021-11-18 RX ADMIN — ACETAMINOPHEN 650 MG: 325 TABLET ORAL at 06:03

## 2021-11-18 RX ADMIN — HYDRALAZINE HYDROCHLORIDE 10 MG: 20 INJECTION INTRAMUSCULAR; INTRAVENOUS at 04:25

## 2021-11-18 RX ADMIN — SACUBITRIL AND VALSARTAN 1 TABLET: 49; 51 TABLET, FILM COATED ORAL at 08:50

## 2021-11-18 RX ADMIN — DIAZEPAM 2 MG: 2 TABLET ORAL at 04:27

## 2021-11-18 RX ADMIN — ACETAMINOPHEN 650 MG: 325 TABLET ORAL at 14:48

## 2021-11-18 RX ADMIN — MENTHOL, METHYL SALICYLATE: 10; 15 CREAM TOPICAL at 09:03

## 2021-11-18 RX ADMIN — CEFEPIME 2 G: 2 INJECTION, POWDER, FOR SOLUTION INTRAVENOUS at 19:00

## 2021-11-18 RX ADMIN — CEFEPIME 2 G: 2 INJECTION, POWDER, FOR SOLUTION INTRAVENOUS at 13:07

## 2021-11-18 RX ADMIN — Medication 10 ML: at 07:40

## 2021-11-18 RX ADMIN — METOPROLOL TARTRATE 50 MG: 50 TABLET, FILM COATED ORAL at 09:02

## 2021-11-18 RX ADMIN — BUMETANIDE 1 MG: 0.25 INJECTION INTRAMUSCULAR; INTRAVENOUS at 18:46

## 2021-11-18 RX ADMIN — LEVOTHYROXINE SODIUM 175 MCG: 0.03 TABLET ORAL at 07:40

## 2021-11-18 RX ADMIN — CEFEPIME 2 G: 2 INJECTION, POWDER, FOR SOLUTION INTRAVENOUS at 04:26

## 2021-11-18 RX ADMIN — METOPROLOL TARTRATE 50 MG: 50 TABLET, FILM COATED ORAL at 21:15

## 2021-11-18 RX ADMIN — Medication 10 ML: at 13:08

## 2021-11-18 RX ADMIN — VANCOMYCIN HYDROCHLORIDE 1250 MG: 1.25 INJECTION, POWDER, LYOPHILIZED, FOR SOLUTION INTRAVENOUS at 08:50

## 2021-11-18 RX ADMIN — Medication 10 ML: at 21:15

## 2021-11-18 NOTE — PROGRESS NOTES
Rounded on Orthodox patients and provided Anointing of the Sick at request of patient.     Austin Stephenson

## 2021-11-18 NOTE — PROGRESS NOTES
Transition of Care Plan   RUR- 10% Low Risk   DISPOSITION: The disposition plan is pending medical progression   F/U with PCP/Specialist     Transport: [facility]/family     Disposition Plan: HHPT vs none    CM will continue to provide updates as they become available. CM will continue to follow, provide support and assist with JAYDE needs as they arise.     JOSE Govea, CRM, LMHP-e

## 2021-11-18 NOTE — PROGRESS NOTES
Surgery Progress Note    11/18/2021    Admit Date: 11/16/2021    CC: Right leg pain    Subjective:     Right leg pain. Significant SOB. Apparently baseline. Wants surgery asap. Constitutional: No fever or chills  Neurologic: No headache  Eyes: No scleral icterus or irritated eyes  Nose: No nasal pain or drainage  Mouth: No oral lesions or sore throat  Cardiac: No palpations or chest pain  Pulmonary: No cough or shortness of breath  Gastrointestinal: No nausea, emesis, diarrhea, or constipation  Genitourinary: No dysuria  Musculoskeletal: Right leg pian  Skin: No rashes or lesions  Psychiatric: No anxiety or depressed mood    Objective:     Visit Vitals  BP (!) 159/80   Pulse 84   Temp 98.1 °F (36.7 °C)   Resp 20   Ht 5' 7\" (1.702 m)   Wt 199 lb 2.6 oz (90.3 kg)   SpO2 96%   BMI 31.19 kg/m²       General: No acute distress, conversant  Eyes: PERRLA, no scleral icterus  HENT: Normocephalic without oral lesions  Neck: Trachea midline without LAD  Cardiac: Normal pulse rate and rhythm  Pulmonary: Symmetric chest rise with normal effort  GI: Soft, NT, ND, no hernia, no splenomegaly  Skin: Warm without rash  Extremities: Right medial lower leg 8 x 8cm ulcer into sub q  Psych: Appropriate mood and affect    Labs, vital signs, and I/O reviewed. Assessment:     67 y/o F with traumatic right leg ulcer and underlying hematoma needing debridement    Plan:     Cont to hold Xarelto  Optimize medically per medical team  NPO after midnight  Plan to debride in OR tomorrow afternoon. Consent discussed and obtained.  Plan to place vac to encourage granulation and help with LE edema    Anuj Day MD  Bariatric and General Surgeon  [address] [place] [address] [facility]

## 2021-11-18 NOTE — PROGRESS NOTES
[facility]                                                                                          Hospitalist Progress Note  Christy Cooper MD  Answering service: [phone] from in house phone        Date of Service:  2021  NAME:  Raynard Angelucci  :  1950  MRN:  [id number]      Admission Summary:   66 y.o F with PMH of CHF,atrial fib came to the hospital due to rt LE wound. Interval history / Subjective:   Patient seen and examined earlier. Patient is tearful this a.m. states she is usually not like this. No other acute complaints     Assessment & Plan:     #Right lower extremity ulcer,cellulitis  -recent trauma a month ago causing hematoma and subsequently wound  ?delayed healing due to venous stasis   -wound care and gen surg following > plan for surgical debridement   -CT -7 x 5 cm soft tissue wound is medial to the distal tibia. Underlying cellulitis and ill-defined fluid. No drainable abscess. No fracture or osteomyelitis.   -on Iv vanc and cefepime  -blood cultures no growth to date      # Acute on chronic hyponatremia:  Appears to be volume overloaded > slow improvement   -IVF stopped > cont on diuresis  -Urine sodium 33 yesterday and TSH wnl  -IV bumex 1 mg BID, 1.2 L fluid restriction per nephro  -may consider holding SSRI if no improvement   _nephrology consulted and following     # Chronic atrial fib:  -rate controlled on metoprolol, on xarelto  held for surgical wound debbridement    # HTN:  Continue current regimen    #CHronic diastolic heart failure:  -continue entresto,metoprolol    #Hypothyroidism:  synthroid      Code status: full  DVT prophylaxis: hold [address] [address] [facility] discussed with: Patient/Family and Nurse  Anticipated Disposition: Home w/Family  Anticipated Discharge: Greater than 48 hours     Hospital Problems  Date Reviewed: 10/14/2021          Codes Class Noted POA    Open wound of right lower leg ICD-10-CM: F28.686Q  ICD-9-CM: 891.0  11/17/2021 Unknown        Hyponatremia ICD-10-CM: E87.1  ICD-9-CM: 276.1  8/2/2017 Unknown                Review of Systems:   Pertinent items are noted in HPI. Vital Signs:    Last 24hrs VS reviewed since prior progress note. Most recent are:  Visit Vitals  BP (!) 156/81   Pulse 80   Temp 98.4 °F (36.9 °C)   Resp 18   Ht 5' 7\" (1.702 m)   Wt 90.3 kg (199 lb 2.6 oz)   SpO2 97%   BMI 31.19 kg/m²         Intake/Output Summary (Last 24 hours) at 11/18/2021 1723  Last data filed at 11/18/2021 1455  Gross per 24 hour   Intake    Output 1400 ml   Net -1400 ml        Physical Examination:     I had a face to face encounter with this patient and independently examined them on 11/18/2021 as outlined below:          Constitutional:  No acute distress, cooperative, pleasant    ENT:  Oral mucosa moist, EOMI,anicteric sclera   Resp:  basal crackles b/l ,No accessory muscle use   CV:  irregular rhythm, normal rate, S1,S2 wnl    GI:  Soft, obese, non tender.  normoactive bowel sounds, no hepatosplenomegaly     Musculoskeletal:  rt lower extremity wound -bandage present, b/l LE edema , RLE is erythematous,warm    Neurologic:  Moves all extremities,  AAOx3            Data Review:    Review and/or order of clinical lab test  Review and/or order of tests in the radiology section of CPT  Review and/or order of tests in the medicine section of CPT      Labs:     Recent Labs     11/18/21 0210 11/17/21  0105   WBC 9.5 8.7   HGB 10.8* 10.0*   HCT 30.9* 28.5*    297     Recent Labs     11/18/21  0210 11/17/21  1136 11/17/21  0107   * 122* 122*   K 4.1 3.7 4.2   CL 91* 90* 90*   CO2 24 27 23   BUN 10 8 10   CREA 0.73 0.65 0.73   * 119* 115*   CA 8.6 8.5 8.6   MG 1.5*  --   --    PHOS 2.4*  --   --      Recent Labs     11/17/21  0105 11/16/21  1249   ALT 29 28    130*   TBILI 0.5 0.5   TP 6.6 7.4   ALB 3.6 4.2   GLOB 3.0 3.2     No results for input(s): INR, PTP, APTT, INREXT, INREXT in the last 72 hours. No results for input(s): FE, TIBC, PSAT, FERR in the last 72 hours. No results found for: FOL, RBCF   No results for input(s): PH, PCO2, PO2 in the last 72 hours. No results for input(s): CPK, CKNDX, TROIQ in the last 72 hours.     No lab exists for component: CPKMB  Lab Results   Component Value Date/Time    Cholesterol, total 233 (H) 06/15/2021 12:03 PM    HDL Cholesterol 114 06/15/2021 12:03 PM    LDL, calculated 108 (H) 06/15/2021 12:03 PM    LDL, calculated 102 (H) 02/07/2014 11:06 AM    Triglyceride 66 06/15/2021 12:03 PM     No results found for: GLUCPOC  Lab Results   Component Value Date/Time    Color Yellow 02/07/2014 11:06 AM    Appearance Cloudy (A) 02/07/2014 11:06 AM    pH (UA) 7.5 02/07/2014 11:06 AM    Ketone Negative 02/07/2014 11:06 AM    Bilirubin Negative 02/07/2014 11:06 AM    Nitrites Negative 02/07/2014 11:06 AM    Leukocyte Esterase 2+ (A) 02/07/2014 11:06 AM    Bacteria Few 02/07/2014 11:06 AM    WBC 11-30 (A) 02/07/2014 11:06 AM    RBC 0-3 02/07/2014 11:06 AM         Medications Reviewed:     Current Facility-Administered Medications   Medication Dose Route Frequency    methyl salicylate-menthol (BENGAY) 15-10 % cream   Topical TID    bumetanide (BUMEX) injection 1 mg  1 mg IntraVENous BID    cefepime (MAXIPIME) 2 g in sterile water (preservative free) 10 mL IV syringe  2 g IntraVENous Q8H    Vancomycin - Pharmacy Dosing    Other Rx Dosing/Monitoring    vancomycin (VANCOCIN) 1,250 mg in 0.9% sodium chloride 250 mL (VIAL-MATE)  1,250 mg IntraVENous Q18H    diazePAM (VALIUM) tablet 2 mg  2 mg Oral QHS PRN    sacubitriL-valsartan (ENTRESTO) 49-51 mg tablet 1 Tablet  1 Tablet Oral BID    levothyroxine (SYNTHROID) tablet 175 mcg  175 mcg Oral ACB    metoprolol tartrate (LOPRESSOR) tablet 50 mg  50 mg Oral BID    sertraline (ZOLOFT) tablet 50 mg  50 mg Oral DAILY    [Held by provider] rivaroxaban (XARELTO) tablet 20 mg  20 mg Oral DAILY WITH BREAKFAST    sodium chloride (NS) flush 5-40 mL  5-40 mL IntraVENous Q8H    sodium chloride (NS) flush 5-40 mL  5-40 mL IntraVENous PRN    acetaminophen (TYLENOL) tablet 650 mg  650 mg Oral Q4H PRN    hydrALAZINE (APRESOLINE) 20 mg/mL injection 10 mg  10 mg IntraVENous Q6H PRN     ______________________________________________________________________  EXPECTED LENGTH OF STAY: 2d 16h  ACTUAL LENGTH OF STAY:          2                 Ginger Carlton MD

## 2021-11-18 NOTE — PROGRESS NOTES
Bedside shift change report given to Bruno (oncoming nurse) by Carmel Martines (offgoing nurse). Report included the following information SBAR, Kardex, Intake/Output, MAR and Recent Results.

## 2021-11-18 NOTE — PROGRESS NOTES
Patient name: Raynard Angelucci  MRN: [id number]    Nephrology Progress note:    Assessment:  Hponatremia: Na 121-122 on admission. Suspect volume overload/hypervolemia given underlying CHF. SSRI (Zoloft) maybe contributing. Only slightly better today at 123 s/p IV Bumex     RLE wound     Systolic CHF: decompensated. On Entresto     Hypothyroidism: TSH wnl. Ct Synthroid     Hypokalemia: mild-> better s/p supplementation     Afib    Plan/Recommendations:  Ct IV Bumex 1mg BID  Limit free water please/FR 1.2L/day  Push solute intake  IV Abx  Surgical debridement tomorrow  Okay to ct Zoloft for now-> will consider holding if Na does not show adequate improvement with diruresis  Strict I/Os  Am labs        Subjective:  No events overnight    ROS:   No nausea, no vomiting  No chest pain,+ shortness of breath    Exam:  Visit Vitals  BP (!) 140/82   Pulse 81   Temp 98.6 °F (37 °C)   Resp 18   Ht 5' 7\" (1.702 m)   Wt 90.3 kg (199 lb 2.6 oz)   SpO2 95%   BMI 31.19 kg/m²     Wt Readings from Last 3 Encounters:   11/18/21 90.3 kg (199 lb 2.6 oz)   10/14/21 89.8 kg (198 lb)   09/10/21 86.2 kg (190 lb)       Intake/Output Summary (Last 24 hours) at 11/18/2021 0936  Last data filed at 11/17/2021 1802  Gross per 24 hour   Intake 200 ml   Output 530 ml   Net -330 ml       Gen: NAD  HEENT: AT/NC  Lungs/Chest wall: Clear. No accessory muscle use. Cardiovascular: IRR  Abdomen/: Soft, NT, ND, BS+. Ext: Trace peripheral edema R>L  Skin: Right shin dressed  CNS: alert and awake.  Answers appropriately      Current Facility-Administered Medications   Medication Dose Route Frequency Last Admin    magnesium sulfate 2 g/50 ml IVPB (premix or compounded)  2 g IntraVENous ONCE 2 g at 11/18/21 2713    methyl salicylate-menthol (BENGAY) 15-10 % cream   Topical TID Given at 11/18/21 0903    bumetanide (BUMEX) injection 1 mg  1 mg IntraVENous BID 1 mg at 11/18/21 0905    cefepime (MAXIPIME) 2 g in sterile water (preservative free) 10 mL IV syringe  2 g IntraVENous Q8H 2 g at 11/18/21 0426    Vancomycin - Pharmacy Dosing    Other Rx Dosing/Monitoring      vancomycin (VANCOCIN) 1,250 mg in 0.9% sodium chloride 250 mL (VIAL-MATE)  1,250 mg IntraVENous Q18H 1,250 mg at 11/18/21 0850    diazePAM (VALIUM) tablet 2 mg  2 mg Oral QHS PRN 2 mg at 11/18/21 0427    sacubitriL-valsartan (ENTRESTO) 49-51 mg tablet 1 Tablet  1 Tablet Oral BID 1 Tablet at 11/18/21 0850    levothyroxine (SYNTHROID) tablet 175 mcg  175 mcg Oral  mcg at 11/18/21 0740    metoprolol tartrate (LOPRESSOR) tablet 50 mg  50 mg Oral BID 50 mg at 11/18/21 0902    sertraline (ZOLOFT) tablet 50 mg  50 mg Oral DAILY      [Held by provider] rivaroxaban (XARELTO) tablet 20 mg  20 mg Oral DAILY WITH BREAKFAST 20 mg at 11/17/21 0719    sodium chloride (NS) flush 5-40 mL  5-40 mL IntraVENous Q8H 10 mL at 11/18/21 0740    sodium chloride (NS) flush 5-40 mL  5-40 mL IntraVENous PRN      acetaminophen (TYLENOL) tablet 650 mg  650 mg Oral Q4H  mg at 11/18/21 0603    hydrALAZINE (APRESOLINE) 20 mg/mL injection 10 mg  10 mg IntraVENous Q6H PRN 10 mg at 11/18/21 0425       Labs/Data:    Lab Results   Component Value Date/Time    WBC 9.5 11/18/2021 02:10 AM    HGB (POC) 15.1 01/28/2020 09:43 AM    HGB 10.8 (L) 11/18/2021 02:10 AM    HCT (POC) 44.5 01/28/2020 09:43 AM    HCT 30.9 (L) 11/18/2021 02:10 AM    PLATELET 991 52/38/4853 02:10 AM    MCV 95.1 11/18/2021 02:10 AM       Lab Results   Component Value Date/Time    Sodium 123 (L) 11/18/2021 02:10 AM    Potassium 4.1 11/18/2021 02:10 AM    Chloride 91 (L) 11/18/2021 02:10 AM    CO2 24 11/18/2021 02:10 AM    Anion gap 8 11/18/2021 02:10 AM    Glucose 109 (H) 11/18/2021 02:10 AM    BUN 10 11/18/2021 02:10 AM    Creatinine 0.73 11/18/2021 02:10 AM    BUN/Creatinine ratio 14 11/18/2021 02:10 AM    GFR est AA >60 11/18/2021 02:10 AM    GFR est non-AA >60 11/18/2021 02:10 AM    Calcium 8.6 11/18/2021 02:10 AM       Patient seen and examined. Chart reviewed. Labs, data and other pertinent notes reviewed in last 24 hrs.     Discussed with patient and RN    Signed by:  Shadia Busch MD  [id number]

## 2021-11-18 NOTE — PROGRESS NOTES
Physical Therapy 11/18/2021    New PT orders received and chart reviewed up to date. Pt LENCHO this AM. Seen by PT yesterday 11/17 with recommendation for [facility]. Pt planned for OR tomorrow. Will follow-up as appropriate. Recommend with nursing patient to complete as able in order to maintain strength, endurance and independence: OOB to chair 3x/day and ambulating with 1 assist.     Thank you.   Chela Greene, PT, DPT

## 2021-11-19 ENCOUNTER — ANESTHESIA EVENT (OUTPATIENT)
Dept: SURGERY | Age: 71
DRG: 570 | End: 2021-11-19
Payer: MEDICARE

## 2021-11-19 ENCOUNTER — ANESTHESIA (OUTPATIENT)
Dept: SURGERY | Age: 71
DRG: 570 | End: 2021-11-19
Payer: MEDICARE

## 2021-11-19 LAB
ALBUMIN SERPL-MCNC: 3.4 G/DL (ref 3.5–5)
ALBUMIN/GLOB SERPL: 1.1 {RATIO} (ref 1.1–2.2)
ALP SERPL-CCNC: 109 U/L (ref 45–117)
ALT SERPL-CCNC: 23 U/L (ref 12–78)
ANION GAP SERPL CALC-SCNC: 7 MMOL/L (ref 5–15)
ANION GAP SERPL CALC-SCNC: 8 MMOL/L (ref 5–15)
AST SERPL-CCNC: 20 U/L (ref 15–37)
BACTERIA SPEC CULT: ABNORMAL
BASOPHILS # BLD: 0 K/UL (ref 0–0.1)
BASOPHILS NFR BLD: 1 % (ref 0–1)
BILIRUB SERPL-MCNC: 0.5 MG/DL (ref 0.2–1)
BUN SERPL-MCNC: 10 MG/DL (ref 6–20)
BUN SERPL-MCNC: 10 MG/DL (ref 6–20)
BUN/CREAT SERPL: 16 (ref 12–20)
BUN/CREAT SERPL: 18 (ref 12–20)
CALCIUM SERPL-MCNC: 8.7 MG/DL (ref 8.5–10.1)
CALCIUM SERPL-MCNC: 8.8 MG/DL (ref 8.5–10.1)
CHLORIDE SERPL-SCNC: 90 MMOL/L (ref 97–108)
CHLORIDE SERPL-SCNC: 90 MMOL/L (ref 97–108)
CO2 SERPL-SCNC: 26 MMOL/L (ref 21–32)
CO2 SERPL-SCNC: 26 MMOL/L (ref 21–32)
CREAT SERPL-MCNC: 0.56 MG/DL (ref 0.55–1.02)
CREAT SERPL-MCNC: 0.64 MG/DL (ref 0.55–1.02)
DIFFERENTIAL METHOD BLD: ABNORMAL
EOSINOPHIL # BLD: 0.1 K/UL (ref 0–0.4)
EOSINOPHIL NFR BLD: 1 % (ref 0–7)
ERYTHROCYTE [DISTWIDTH] IN BLOOD BY AUTOMATED COUNT: 13.2 % (ref 11.5–14.5)
GLOBULIN SER CALC-MCNC: 3 G/DL (ref 2–4)
GLUCOSE SERPL-MCNC: 104 MG/DL (ref 65–100)
GLUCOSE SERPL-MCNC: 97 MG/DL (ref 65–100)
GRAM STN SPEC: ABNORMAL
HCT VFR BLD AUTO: 28.9 % (ref 35–47)
HGB BLD-MCNC: 10 G/DL (ref 11.5–16)
IMM GRANULOCYTES # BLD AUTO: 0 K/UL (ref 0–0.04)
IMM GRANULOCYTES NFR BLD AUTO: 1 % (ref 0–0.5)
LYMPHOCYTES # BLD: 0.8 K/UL (ref 0.8–3.5)
LYMPHOCYTES NFR BLD: 14 % (ref 12–49)
MAGNESIUM SERPL-MCNC: 1.9 MG/DL (ref 1.6–2.4)
MCH RBC QN AUTO: 32.8 PG (ref 26–34)
MCHC RBC AUTO-ENTMCNC: 34.6 G/DL (ref 30–36.5)
MCV RBC AUTO: 94.8 FL (ref 80–99)
MONOCYTES # BLD: 0.7 K/UL (ref 0–1)
MONOCYTES NFR BLD: 12 % (ref 5–13)
NEUTS SEG # BLD: 4.1 K/UL (ref 1.8–8)
NEUTS SEG NFR BLD: 71 % (ref 32–75)
NRBC # BLD: 0 K/UL (ref 0–0.01)
NRBC BLD-RTO: 0 PER 100 WBC
PHOSPHATE SERPL-MCNC: 2.7 MG/DL (ref 2.6–4.7)
PLATELET # BLD AUTO: 310 K/UL (ref 150–400)
PMV BLD AUTO: 9.2 FL (ref 8.9–12.9)
POTASSIUM SERPL-SCNC: 3.6 MMOL/L (ref 3.5–5.1)
POTASSIUM SERPL-SCNC: 3.8 MMOL/L (ref 3.5–5.1)
PROT SERPL-MCNC: 6.4 G/DL (ref 6.4–8.2)
RBC # BLD AUTO: 3.05 M/UL (ref 3.8–5.2)
SERVICE CMNT-IMP: ABNORMAL
SODIUM SERPL-SCNC: 123 MMOL/L (ref 136–145)
SODIUM SERPL-SCNC: 124 MMOL/L (ref 136–145)
WBC # BLD AUTO: 5.8 K/UL (ref 3.6–11)

## 2021-11-19 PROCEDURE — 74011000250 HC RX REV CODE- 250: Performed by: SURGERY

## 2021-11-19 PROCEDURE — 97535 SELF CARE MNGMENT TRAINING: CPT

## 2021-11-19 PROCEDURE — 77030031139 HC SUT VCRL2 J&J -A: Performed by: SURGERY

## 2021-11-19 PROCEDURE — 74011250637 HC RX REV CODE- 250/637: Performed by: ANESTHESIOLOGY

## 2021-11-19 PROCEDURE — 97606 NEG PRS WND THER DME>50 SQCM: CPT | Performed by: SURGERY

## 2021-11-19 PROCEDURE — 80053 COMPREHEN METABOLIC PANEL: CPT

## 2021-11-19 PROCEDURE — 76010000138 HC OR TIME 0.5 TO 1 HR: Performed by: SURGERY

## 2021-11-19 PROCEDURE — 76210000017 HC OR PH I REC 1.5 TO 2 HR: Performed by: SURGERY

## 2021-11-19 PROCEDURE — 74011000250 HC RX REV CODE- 250: Performed by: FAMILY MEDICINE

## 2021-11-19 PROCEDURE — 97165 OT EVAL LOW COMPLEX 30 MIN: CPT

## 2021-11-19 PROCEDURE — 74011250637 HC RX REV CODE- 250/637: Performed by: FAMILY MEDICINE

## 2021-11-19 PROCEDURE — 11045 DBRDMT SUBQ TISS EACH ADDL: CPT | Performed by: SURGERY

## 2021-11-19 PROCEDURE — 74011250636 HC RX REV CODE- 250/636: Performed by: SURGERY

## 2021-11-19 PROCEDURE — 77030002933 HC SUT MCRYL J&J -A: Performed by: SURGERY

## 2021-11-19 PROCEDURE — 77030019952 HC CANSTR VAC ASST KCON -B: Performed by: SURGERY

## 2021-11-19 PROCEDURE — 84100 ASSAY OF PHOSPHORUS: CPT

## 2021-11-19 PROCEDURE — 74011250637 HC RX REV CODE- 250/637: Performed by: SURGERY

## 2021-11-19 PROCEDURE — 74011250636 HC RX REV CODE- 250/636: Performed by: ANESTHESIOLOGY

## 2021-11-19 PROCEDURE — 77030018717 HC DRSG GRNUFM KCON -B: Performed by: SURGERY

## 2021-11-19 PROCEDURE — 74011250636 HC RX REV CODE- 250/636: Performed by: NURSE ANESTHETIST, CERTIFIED REGISTERED

## 2021-11-19 PROCEDURE — 85025 COMPLETE CBC W/AUTO DIFF WBC: CPT

## 2021-11-19 PROCEDURE — 74011250636 HC RX REV CODE- 250/636

## 2021-11-19 PROCEDURE — 83735 ASSAY OF MAGNESIUM: CPT

## 2021-11-19 PROCEDURE — 74011250637 HC RX REV CODE- 250/637: Performed by: INTERNAL MEDICINE

## 2021-11-19 PROCEDURE — 65660000000 HC RM CCU STEPDOWN

## 2021-11-19 PROCEDURE — 76060000032 HC ANESTHESIA 0.5 TO 1 HR: Performed by: SURGERY

## 2021-11-19 PROCEDURE — 2W1LX6Z COMPRESSION OF RIGHT LOWER EXTREMITY USING PRESSURE DRESSING: ICD-10-PCS | Performed by: SURGERY

## 2021-11-19 PROCEDURE — 74011000250 HC RX REV CODE- 250: Performed by: ANESTHESIOLOGY

## 2021-11-19 PROCEDURE — 36415 COLL VENOUS BLD VENIPUNCTURE: CPT

## 2021-11-19 PROCEDURE — 74011250636 HC RX REV CODE- 250/636: Performed by: STUDENT IN AN ORGANIZED HEALTH CARE EDUCATION/TRAINING PROGRAM

## 2021-11-19 PROCEDURE — 74011000250 HC RX REV CODE- 250: Performed by: NURSE ANESTHETIST, CERTIFIED REGISTERED

## 2021-11-19 PROCEDURE — 99232 SBSQ HOSP IP/OBS MODERATE 35: CPT | Performed by: SURGERY

## 2021-11-19 PROCEDURE — 2709999900 HC NON-CHARGEABLE SUPPLY: Performed by: SURGERY

## 2021-11-19 PROCEDURE — 74011000250 HC RX REV CODE- 250: Performed by: INTERNAL MEDICINE

## 2021-11-19 PROCEDURE — 11042 DBRDMT SUBQ TIS 1ST 20SQCM/<: CPT | Performed by: SURGERY

## 2021-11-19 PROCEDURE — 74011250636 HC RX REV CODE- 250/636: Performed by: FAMILY MEDICINE

## 2021-11-19 PROCEDURE — 0JBN0ZZ EXCISION OF RIGHT LOWER LEG SUBCUTANEOUS TISSUE AND FASCIA, OPEN APPROACH: ICD-10-PCS | Performed by: SURGERY

## 2021-11-19 RX ORDER — SODIUM CHLORIDE 9 MG/ML
25 INJECTION, SOLUTION INTRAVENOUS CONTINUOUS
Status: DISCONTINUED | OUTPATIENT
Start: 2021-11-19 | End: 2021-11-19

## 2021-11-19 RX ORDER — SODIUM CHLORIDE 0.9 % (FLUSH) 0.9 %
5-40 SYRINGE (ML) INJECTION AS NEEDED
Status: DISCONTINUED | OUTPATIENT
Start: 2021-11-19 | End: 2021-11-24

## 2021-11-19 RX ORDER — MIDAZOLAM HYDROCHLORIDE 1 MG/ML
1 INJECTION, SOLUTION INTRAMUSCULAR; INTRAVENOUS AS NEEDED
Status: DISCONTINUED | OUTPATIENT
Start: 2021-11-19 | End: 2021-11-20

## 2021-11-19 RX ORDER — DIPHENHYDRAMINE HYDROCHLORIDE 50 MG/ML
12.5 INJECTION, SOLUTION INTRAMUSCULAR; INTRAVENOUS AS NEEDED
Status: DISCONTINUED | OUTPATIENT
Start: 2021-11-19 | End: 2021-11-19 | Stop reason: HOSPADM

## 2021-11-19 RX ORDER — PROPOFOL 10 MG/ML
INJECTION, EMULSION INTRAVENOUS
Status: DISCONTINUED | OUTPATIENT
Start: 2021-11-19 | End: 2021-11-19 | Stop reason: HOSPADM

## 2021-11-19 RX ORDER — KETAMINE HYDROCHLORIDE 10 MG/ML
INJECTION, SOLUTION INTRAMUSCULAR; INTRAVENOUS AS NEEDED
Status: DISCONTINUED | OUTPATIENT
Start: 2021-11-19 | End: 2021-11-19 | Stop reason: HOSPADM

## 2021-11-19 RX ORDER — FENTANYL CITRATE 50 UG/ML
25 INJECTION, SOLUTION INTRAMUSCULAR; INTRAVENOUS
Status: COMPLETED | OUTPATIENT
Start: 2021-11-19 | End: 2021-11-19

## 2021-11-19 RX ORDER — FENTANYL CITRATE 50 UG/ML
50 INJECTION, SOLUTION INTRAMUSCULAR; INTRAVENOUS AS NEEDED
Status: DISCONTINUED | OUTPATIENT
Start: 2021-11-19 | End: 2021-11-23

## 2021-11-19 RX ORDER — MIDAZOLAM HYDROCHLORIDE 1 MG/ML
INJECTION, SOLUTION INTRAMUSCULAR; INTRAVENOUS AS NEEDED
Status: DISCONTINUED | OUTPATIENT
Start: 2021-11-19 | End: 2021-11-19 | Stop reason: HOSPADM

## 2021-11-19 RX ORDER — FENTANYL CITRATE 50 UG/ML
INJECTION, SOLUTION INTRAMUSCULAR; INTRAVENOUS
Status: DISPENSED
Start: 2021-11-19 | End: 2021-11-20

## 2021-11-19 RX ORDER — SODIUM CHLORIDE 0.9 % (FLUSH) 0.9 %
5-40 SYRINGE (ML) INJECTION AS NEEDED
Status: DISCONTINUED | OUTPATIENT
Start: 2021-11-19 | End: 2021-11-19 | Stop reason: HOSPADM

## 2021-11-19 RX ORDER — OXYCODONE AND ACETAMINOPHEN 10; 325 MG/1; MG/1
1 TABLET ORAL
Status: DISCONTINUED | OUTPATIENT
Start: 2021-11-19 | End: 2021-11-21

## 2021-11-19 RX ORDER — ONDANSETRON 2 MG/ML
4 INJECTION INTRAMUSCULAR; INTRAVENOUS AS NEEDED
Status: DISCONTINUED | OUTPATIENT
Start: 2021-11-19 | End: 2021-11-19 | Stop reason: HOSPADM

## 2021-11-19 RX ORDER — SODIUM CHLORIDE, SODIUM LACTATE, POTASSIUM CHLORIDE, CALCIUM CHLORIDE 600; 310; 30; 20 MG/100ML; MG/100ML; MG/100ML; MG/100ML
75 INJECTION, SOLUTION INTRAVENOUS CONTINUOUS
Status: DISCONTINUED | OUTPATIENT
Start: 2021-11-19 | End: 2021-11-19 | Stop reason: HOSPADM

## 2021-11-19 RX ORDER — SODIUM CHLORIDE 0.9 % (FLUSH) 0.9 %
5-40 SYRINGE (ML) INJECTION EVERY 8 HOURS
Status: DISCONTINUED | OUTPATIENT
Start: 2021-11-19 | End: 2021-11-19 | Stop reason: HOSPADM

## 2021-11-19 RX ORDER — LABETALOL HYDROCHLORIDE 5 MG/ML
5 INJECTION, SOLUTION INTRAVENOUS
Status: DISCONTINUED | OUTPATIENT
Start: 2021-11-19 | End: 2021-11-19 | Stop reason: HOSPADM

## 2021-11-19 RX ORDER — OXYCODONE AND ACETAMINOPHEN 5; 325 MG/1; MG/1
1 TABLET ORAL
Status: DISCONTINUED | OUTPATIENT
Start: 2021-11-19 | End: 2021-11-21

## 2021-11-19 RX ORDER — HYDROMORPHONE HYDROCHLORIDE 1 MG/ML
0.2 INJECTION, SOLUTION INTRAMUSCULAR; INTRAVENOUS; SUBCUTANEOUS
Status: DISCONTINUED | OUTPATIENT
Start: 2021-11-19 | End: 2021-11-19 | Stop reason: HOSPADM

## 2021-11-19 RX ORDER — SODIUM CHLORIDE 0.9 % (FLUSH) 0.9 %
5-40 SYRINGE (ML) INJECTION EVERY 8 HOURS
Status: DISCONTINUED | OUTPATIENT
Start: 2021-11-19 | End: 2021-12-01 | Stop reason: HOSPADM

## 2021-11-19 RX ORDER — MIDAZOLAM HYDROCHLORIDE 1 MG/ML
0.5 INJECTION, SOLUTION INTRAMUSCULAR; INTRAVENOUS
Status: DISCONTINUED | OUTPATIENT
Start: 2021-11-19 | End: 2021-11-19 | Stop reason: HOSPADM

## 2021-11-19 RX ORDER — HYDROMORPHONE HYDROCHLORIDE 1 MG/ML
1 INJECTION, SOLUTION INTRAMUSCULAR; INTRAVENOUS; SUBCUTANEOUS
Status: DISCONTINUED | OUTPATIENT
Start: 2021-11-19 | End: 2021-12-01 | Stop reason: HOSPADM

## 2021-11-19 RX ORDER — CEFAZOLIN SODIUM 1 G/3ML
INJECTION, POWDER, FOR SOLUTION INTRAMUSCULAR; INTRAVENOUS AS NEEDED
Status: DISCONTINUED | OUTPATIENT
Start: 2021-11-19 | End: 2021-11-19 | Stop reason: HOSPADM

## 2021-11-19 RX ORDER — ACETAMINOPHEN 325 MG/1
650 TABLET ORAL ONCE
Status: COMPLETED | OUTPATIENT
Start: 2021-11-19 | End: 2021-11-19

## 2021-11-19 RX ORDER — BUPIVACAINE HYDROCHLORIDE 5 MG/ML
INJECTION, SOLUTION EPIDURAL; INTRACAUDAL AS NEEDED
Status: DISCONTINUED | OUTPATIENT
Start: 2021-11-19 | End: 2021-11-19 | Stop reason: HOSPADM

## 2021-11-19 RX ORDER — SODIUM CHLORIDE, SODIUM LACTATE, POTASSIUM CHLORIDE, CALCIUM CHLORIDE 600; 310; 30; 20 MG/100ML; MG/100ML; MG/100ML; MG/100ML
INJECTION, SOLUTION INTRAVENOUS
Status: DISCONTINUED | OUTPATIENT
Start: 2021-11-19 | End: 2021-11-19 | Stop reason: HOSPADM

## 2021-11-19 RX ORDER — LIDOCAINE HYDROCHLORIDE 10 MG/ML
0.1 INJECTION, SOLUTION EPIDURAL; INFILTRATION; INTRACAUDAL; PERINEURAL AS NEEDED
Status: DISCONTINUED | OUTPATIENT
Start: 2021-11-19 | End: 2021-12-01 | Stop reason: HOSPADM

## 2021-11-19 RX ORDER — MORPHINE SULFATE 2 MG/ML
2 INJECTION, SOLUTION INTRAMUSCULAR; INTRAVENOUS
Status: DISCONTINUED | OUTPATIENT
Start: 2021-11-19 | End: 2021-11-19 | Stop reason: HOSPADM

## 2021-11-19 RX ORDER — SODIUM CHLORIDE 9 MG/ML
25 INJECTION, SOLUTION INTRAVENOUS CONTINUOUS
Status: DISCONTINUED | OUTPATIENT
Start: 2021-11-19 | End: 2021-11-19 | Stop reason: HOSPADM

## 2021-11-19 RX ORDER — VANCOMYCIN/0.9 % SOD CHLORIDE 1.5G/250ML
1500 PLASTIC BAG, INJECTION (ML) INTRAVENOUS
Status: DISCONTINUED | OUTPATIENT
Start: 2021-11-19 | End: 2021-11-20

## 2021-11-19 RX ORDER — FENTANYL CITRATE 50 UG/ML
INJECTION, SOLUTION INTRAMUSCULAR; INTRAVENOUS AS NEEDED
Status: DISCONTINUED | OUTPATIENT
Start: 2021-11-19 | End: 2021-11-19 | Stop reason: HOSPADM

## 2021-11-19 RX ORDER — ROPIVACAINE HYDROCHLORIDE 5 MG/ML
30 INJECTION, SOLUTION EPIDURAL; INFILTRATION; PERINEURAL ONCE
Status: ACTIVE | OUTPATIENT
Start: 2021-11-19 | End: 2021-11-20

## 2021-11-19 RX ORDER — SODIUM CHLORIDE, SODIUM LACTATE, POTASSIUM CHLORIDE, CALCIUM CHLORIDE 600; 310; 30; 20 MG/100ML; MG/100ML; MG/100ML; MG/100ML
125 INJECTION, SOLUTION INTRAVENOUS CONTINUOUS
Status: DISCONTINUED | OUTPATIENT
Start: 2021-11-19 | End: 2021-11-19

## 2021-11-19 RX ORDER — POTASSIUM CHLORIDE 750 MG/1
20 TABLET, FILM COATED, EXTENDED RELEASE ORAL
Status: COMPLETED | OUTPATIENT
Start: 2021-11-19 | End: 2021-11-19

## 2021-11-19 RX ORDER — HYDROMORPHONE HYDROCHLORIDE 1 MG/ML
INJECTION, SOLUTION INTRAMUSCULAR; INTRAVENOUS; SUBCUTANEOUS
Status: COMPLETED
Start: 2021-11-19 | End: 2021-11-19

## 2021-11-19 RX ADMIN — SODIUM CHLORIDE 2 G: 9 INJECTION INTRAMUSCULAR; INTRAVENOUS; SUBCUTANEOUS at 11:28

## 2021-11-19 RX ADMIN — ACETAMINOPHEN 650 MG: 325 TABLET ORAL at 12:52

## 2021-11-19 RX ADMIN — FENTANYL CITRATE 25 MCG: 0.05 INJECTION, SOLUTION INTRAMUSCULAR; INTRAVENOUS at 15:04

## 2021-11-19 RX ADMIN — MORPHINE SULFATE 2 MG: 2 INJECTION, SOLUTION INTRAMUSCULAR; INTRAVENOUS at 15:11

## 2021-11-19 RX ADMIN — SODIUM CHLORIDE, POTASSIUM CHLORIDE, SODIUM LACTATE AND CALCIUM CHLORIDE 125 ML/HR: 600; 310; 30; 20 INJECTION, SOLUTION INTRAVENOUS at 15:58

## 2021-11-19 RX ADMIN — KETAMINE HYDROCHLORIDE 10 MG: 10 INJECTION, SOLUTION INTRAMUSCULAR; INTRAVENOUS at 13:51

## 2021-11-19 RX ADMIN — CEFEPIME 2 G: 2 INJECTION, POWDER, FOR SOLUTION INTRAVENOUS at 06:29

## 2021-11-19 RX ADMIN — DIPHENHYDRAMINE HYDROCHLORIDE 25 MG: 50 INJECTION, SOLUTION INTRAMUSCULAR; INTRAVENOUS at 14:55

## 2021-11-19 RX ADMIN — MEPERIDINE HYDROCHLORIDE 12.5 MG: 25 INJECTION INTRAMUSCULAR; INTRAVENOUS; SUBCUTANEOUS at 15:55

## 2021-11-19 RX ADMIN — HYDRALAZINE HYDROCHLORIDE 10 MG: 20 INJECTION INTRAMUSCULAR; INTRAVENOUS at 15:50

## 2021-11-19 RX ADMIN — FENTANYL CITRATE 25 MCG: 50 INJECTION, SOLUTION INTRAMUSCULAR; INTRAVENOUS at 13:39

## 2021-11-19 RX ADMIN — POTASSIUM CHLORIDE 20 MEQ: 750 TABLET, FILM COATED, EXTENDED RELEASE ORAL at 19:34

## 2021-11-19 RX ADMIN — ACETAMINOPHEN 650 MG: 325 TABLET ORAL at 21:03

## 2021-11-19 RX ADMIN — FENTANYL CITRATE 25 MCG: 50 INJECTION, SOLUTION INTRAMUSCULAR; INTRAVENOUS at 13:44

## 2021-11-19 RX ADMIN — Medication 10 ML: at 21:10

## 2021-11-19 RX ADMIN — HYDROMORPHONE HYDROCHLORIDE 1 MG: 1 INJECTION, SOLUTION INTRAMUSCULAR; INTRAVENOUS; SUBCUTANEOUS at 18:20

## 2021-11-19 RX ADMIN — SODIUM CHLORIDE, POTASSIUM CHLORIDE, SODIUM LACTATE AND CALCIUM CHLORIDE 125 ML/HR: 600; 310; 30; 20 INJECTION, SOLUTION INTRAVENOUS at 12:56

## 2021-11-19 RX ADMIN — FENTANYL CITRATE 25 MCG: 0.05 INJECTION, SOLUTION INTRAMUSCULAR; INTRAVENOUS at 14:31

## 2021-11-19 RX ADMIN — METOPROLOL TARTRATE 50 MG: 50 TABLET, FILM COATED ORAL at 08:40

## 2021-11-19 RX ADMIN — VANCOMYCIN HYDROCHLORIDE 1500 MG: 10 INJECTION, POWDER, LYOPHILIZED, FOR SOLUTION INTRAVENOUS at 23:27

## 2021-11-19 RX ADMIN — LABETALOL HYDROCHLORIDE 5 MG: 5 INJECTION INTRAVENOUS at 15:46

## 2021-11-19 RX ADMIN — VANCOMYCIN HYDROCHLORIDE 1250 MG: 1.25 INJECTION, POWDER, LYOPHILIZED, FOR SOLUTION INTRAVENOUS at 03:37

## 2021-11-19 RX ADMIN — DIAZEPAM 2 MG: 2 TABLET ORAL at 21:03

## 2021-11-19 RX ADMIN — LEVOTHYROXINE SODIUM 175 MCG: 0.03 TABLET ORAL at 06:30

## 2021-11-19 RX ADMIN — SACUBITRIL AND VALSARTAN 1 TABLET: 49; 51 TABLET, FILM COATED ORAL at 17:31

## 2021-11-19 RX ADMIN — MENTHOL, METHYL SALICYLATE: 10; 15 CREAM TOPICAL at 21:10

## 2021-11-19 RX ADMIN — CEFAZOLIN 2 G: 330 INJECTION, POWDER, FOR SOLUTION INTRAMUSCULAR; INTRAVENOUS at 13:39

## 2021-11-19 RX ADMIN — HYDROMORPHONE HYDROCHLORIDE 0.5 MG: 1 INJECTION, SOLUTION INTRAMUSCULAR; INTRAVENOUS; SUBCUTANEOUS at 14:34

## 2021-11-19 RX ADMIN — ACETAMINOPHEN 650 MG: 325 TABLET ORAL at 04:29

## 2021-11-19 RX ADMIN — FENTANYL CITRATE 25 MCG: 50 INJECTION, SOLUTION INTRAMUSCULAR; INTRAVENOUS at 13:50

## 2021-11-19 RX ADMIN — MIDAZOLAM 1 MG: 1 INJECTION INTRAMUSCULAR; INTRAVENOUS at 13:38

## 2021-11-19 RX ADMIN — SERTRALINE 50 MG: 50 TABLET, FILM COATED ORAL at 08:40

## 2021-11-19 RX ADMIN — BUMETANIDE 1 MG: 0.25 INJECTION INTRAMUSCULAR; INTRAVENOUS at 08:40

## 2021-11-19 RX ADMIN — FENTANYL CITRATE 25 MCG: 0.05 INJECTION, SOLUTION INTRAMUSCULAR; INTRAVENOUS at 14:40

## 2021-11-19 RX ADMIN — SODIUM CHLORIDE 2 G: 9 INJECTION INTRAMUSCULAR; INTRAVENOUS; SUBCUTANEOUS at 20:51

## 2021-11-19 RX ADMIN — MIDAZOLAM 1 MG: 1 INJECTION INTRAMUSCULAR; INTRAVENOUS at 13:34

## 2021-11-19 RX ADMIN — BUMETANIDE 1 MG: 0.25 INJECTION INTRAMUSCULAR; INTRAVENOUS at 17:31

## 2021-11-19 RX ADMIN — PROPOFOL 50 MCG/KG/MIN: 10 INJECTION, EMULSION INTRAVENOUS at 13:44

## 2021-11-19 RX ADMIN — Medication 10 ML: at 06:30

## 2021-11-19 RX ADMIN — FENTANYL CITRATE 25 MCG: 0.05 INJECTION, SOLUTION INTRAMUSCULAR; INTRAVENOUS at 14:50

## 2021-11-19 RX ADMIN — MIDAZOLAM 0.5 MG: 1 INJECTION INTRAMUSCULAR; INTRAVENOUS at 15:16

## 2021-11-19 RX ADMIN — METOPROLOL TARTRATE 50 MG: 50 TABLET, FILM COATED ORAL at 20:51

## 2021-11-19 RX ADMIN — HYDROMORPHONE HYDROCHLORIDE 0.5 MG: 1 INJECTION, SOLUTION INTRAMUSCULAR; INTRAVENOUS; SUBCUTANEOUS at 14:45

## 2021-11-19 RX ADMIN — SODIUM CHLORIDE, POTASSIUM CHLORIDE, SODIUM LACTATE AND CALCIUM CHLORIDE: 600; 310; 30; 20 INJECTION, SOLUTION INTRAVENOUS at 13:33

## 2021-11-19 RX ADMIN — SACUBITRIL AND VALSARTAN 1 TABLET: 49; 51 TABLET, FILM COATED ORAL at 08:40

## 2021-11-19 NOTE — PROGRESS NOTES
Problem: Self Care Deficits Care Plan (Adult)  Goal: *Acute Goals and Plan of Care (Insert Text)  Description:   FUNCTIONAL STATUS PRIOR TO ADMISSION: Patient was independent and active without use of DME. Patient was highly independent, retired . Indep in all ADL/IADL including driving. HOME SUPPORT: The patient lived alone with friends/family to provide assistance. Occupational Therapy Goals  Initiated 11/19/2021  1. Patient will perform grooming with independence within 7 day(s). 2.  Patient will perform upper body dressing with independence within 7 day(s). 3.  Patient will perform lower body dressing with independence within 7 day(s). 4.  Patient will perform all aspects of toileting with independence within 7 day(s). 5.  Patient will utilize energy conservation techniques during functional activities with verbal cues within 7 day(s). Outcome: Not Met   OCCUPATIONAL THERAPY EVALUATION  Patient: Michaela Nevarez (6 y.o. female)  Date: 11/19/2021  Primary Diagnosis: Hyponatremia [E87.1]  Procedure(s) (LRB):  DEBRIDEMENT OF RIGHT LEG ULCER POSSIBLE VAC PLACEMENT (Right)     Precautions: Falls       ASSESSMENT  Based on the objective data described below, the patient presents with impaired strength, balance, and ability to complete ADL/IADL at baseline. Patient received reclined in bed, amenable to session. Prior to admission, patient was highly independent with recent decline in function d/t RLE wound and recent diastolic heart failure diagnosis. Reviewed energy conservation techniques and potential for HEP upon postop debridement to improve overall BUE strength, patient highly motivated to return to PLOF. Able to complete most ADL/functional mobility with up to supervision. Will continue to follow in acute care setting and assess postop rehab needs. Current Level of Function Impacting Discharge (ADLs/self-care): up to supervision    Functional Outcome Measure:   The patient scored Total: 75/100 on the Barthel Index outcome measure which is indicative of 25% impaired ability to care for basic self needs/dependency on others; inferred 20% dependency on others for instrumental ADLs. Other factors to consider for discharge: pending LE surgery     Patient will benefit from skilled therapy intervention to address the above noted impairments. PLAN :  Recommendations and Planned Interventions: self care training, functional mobility training, therapeutic exercise, balance training, therapeutic activities, endurance activities, patient education, home safety training, and family training/education    Frequency/Duration: Patient will be followed by occupational therapy 3 times a week to address goals. Recommendation for discharge: (in order for the patient to meet his/her long term goals)  To be determined: pending postop performance, likely home with no needs    This discharge recommendation:  Has not yet been discussed the attending provider and/or case management    IF patient discharges home will need the following DME: TBD pending progress       SUBJECTIVE:   Patient stated this has been a major setback.     OBJECTIVE DATA SUMMARY:   HISTORY:   Past Medical History:   Diagnosis Date    Atrial fibrillation ([facility] 7/15/2011    Depression     HTN (hypertension) 7/14/2011    Paroxysmal atrial fibrillation ([facility]     [facility] 2005    Papillary    Unspecified hypothyroidism 7/15/2011     Past Surgical History:   Procedure Laterality Date    ENDOSCOPY, COLON, DIAGNOSTIC  2003    neg. rep 10 yrs.     MA THYROIDECTOMY  11/05       Expanded or extensive additional review of patient history:     Home Situation  Home Environment: Apartment  # Steps to Enter: 0  One/Two Story Residence: One story  Living Alone: Yes  Support Systems: Other Family Member(s), Friend/Neighbor  Patient Expects to be Discharged to[de-identified] Apartment  Current DME Used/Available at Home: None  Tub or Shower Type: Tub/Shower combination    Hand dominance: Right    EXAMINATION OF PERFORMANCE DEFICITS:  Cognitive/Behavioral Status:  Neurologic State: Alert  Orientation Level: Oriented X4  Cognition: Appropriate decision making; Follows commands  Perception: Appears intact  Perseveration: No perseveration noted  Safety/Judgement: Awareness of environment; Fall prevention; Insight into deficits    Skin: RLE wound    Edema: Mild BLE    Hearing: Auditory  Auditory Impairment: None    Vision/Perceptual:    Tracking: Able to track stimulus in all quadrants w/o difficulty                      Acuity: Within Defined Limits         Range of Motion:  AROM: Generally decreased, functional  PROM: Within functional limits                      Strength:  Strength: Within functional limits                Coordination:  Coordination: Within functional limits  Fine Motor Skills-Upper: Left Intact; Right Intact    Gross Motor Skills-Upper: Left Intact; Right Intact    Tone & Sensation:  Tone: Normal  Sensation: Intact                      Balance:  Sitting: Intact  Standing: Impaired; With support  Standing - Static: Good  Standing - Dynamic : Fair    Functional Mobility and Transfers for ADLs:  Bed Mobility:  Supine to Sit: Independent  Sit to Supine: Independent  Scooting: Independent    Transfers:  Sit to Stand: Supervision  Stand to Sit: Modified independent  Bathroom Mobility: Minimum assistance    ADL Assessment:  Feeding: Independent    Oral Facial Hygiene/Grooming: Supervision         Upper Body Dressing: Supervision    Lower Body Dressing: Supervision    Toileting: Supervision                ADL Intervention and task modifications:  Feeding  Drink to Mouth: Independent                        Lower Body Dressing Assistance  Dressing Assistance: Supervision  Socks: Supervision  Leg Crossed Method Used: Yes  Position Performed: Seated edge of bed         Cognitive Retraining  Safety/Judgement: Awareness of environment;  Fall prevention; Insight into deficits    Functional Measure:  Barthel Index:    Bathin  Bladder: 10  Bowels: 10  Groomin  Dressing: 10  Feeding: 10  Mobility: 0  Stairs: 0  Toilet Use: 10  Transfer (Bed to Chair and Back): 15  Total: 75/100        The Barthel ADL Index: Guidelines  1. The index should be used as a record of what a patient does, not as a record of what a patient could do. 2. The main aim is to establish degree of independence from any help, physical or verbal, however minor and for whatever reason. 3. The need for supervision renders the patient not independent. 4. A patient's performance should be established using the best available evidence. Asking the patient, friends/relatives and nurses are the usual sources, but direct observation and common sense are also important. However direct testing is not needed. 5. Usually the patient's performance over the preceding 24-48 hours is important, but occasionally longer periods will be relevant. 6. Middle categories imply that the patient supplies over 50 per cent of the effort. 7. Use of aids to be independent is allowed. Ruben Lucero., Barthel, D.W. (1841). Functional evaluation: the Barthel Index. [facility] (14)2. Ana Marie matthew FRANCES Reina, Maricruz Blair, Ann Montero., [place], [address] [place] [address] (). Measuring the change indisability after inpatient rehabilitation; comparison of the responsiveness of the Barthel Index and Functional [place] Measure. Journal of Neurology, Neurosurgery, and Psychiatry, 66(4), 968-158. Sudhir Miller, N.J.A, NELLY Coon, & Mikayla Batista, MMadisonA. (2004.) Assessment of post-stroke quality of life in cost-effectiveness studies: The usefulness of the Barthel Index and the EuroQoL-5D.  Quality of Life Research, 15, 166-16         Occupational Therapy Evaluation Charge Determination   History Examination Decision-Making   MEDIUM Complexity : Expanded review of history including physical, cognitive and psychosocial  history  LOW Complexity : 1-3 performance deficits relating to physical, cognitive , or psychosocial skils that result in activity limitations and / or participation restrictions  LOW Complexity : No comorbidities that affect functional and no verbal or physical assistance needed to complete eval tasks       Based on the above components, the patient evaluation is determined to be of the following complexity level: LOW   Pain Rating:  Mild RLE pain at wound    Activity Tolerance:   Good and tolerates ADLs without rest breaks    After treatment patient left in no apparent distress:    Supine in bed, Heels elevated for pressure relief, Call bell within reach, and Side rails x 3    COMMUNICATION/EDUCATION:   The patients plan of care was discussed with: Registered nurse. Home safety education was provided and the patient/caregiver indicated understanding., Patient/family have participated as able in goal setting and plan of care. , and Patient/family agree to work toward stated goals and plan of care. This patients plan of care is appropriate for delegation to \[facility] [place] [facility]\"".     Thank you for this referral.  Rell Li OT  Time Calculation: 25 mins

## 2021-11-19 NOTE — PERIOP NOTES
TRANSFER - OUT REPORT:    Verbal report given to Yesi Saxena (name) on [facility]  being transferred to [facility](unit) for routine post - op       Report consisted of patients Situation, Background, Assessment and   Recommendations(SBAR). Time Pre op antibiotic given:1339  Anesthesia Stop time: 1430  Morrison Present on Transfer to floor:N/A  Order for Morrison on Chart:N/A  Discharge Prescriptions with Chart:N/A    Information from the following report(s) SBAR, OR Summary, Procedure Summary, Intake/Output and MAR was reviewed with the receiving nurse. Opportunity for questions and clarification was provided. Is the patient on 02? YES       L/Min 2       Other N/A    Is the patient on a monitor? YES    Is the nurse transporting with the patient? NO    Surgical Waiting Area notified of patient's transfer from PACU? YES      The following personal items collected during your admission accompanied patient upon transfer:   Dental Appliance: Dental Appliances: None  Vision:    Hearing Aid:    Jewelry: Jewelry: None  Clothing: Clothing: None  Other Valuables:  Other Valuables: None  Valuables sent to safe:

## 2021-11-19 NOTE — PROGRESS NOTES
Physician Progress Note      Catrachito Christensen  [facility] #:                  [id number]  :                       1950  ADMIT DATE:       2021 12:24 PM  [address] [place] DATE:  RESPONDING  PROVIDER #:        Cordell Sandhu MD          QUERY TEXT:    Patient admitted with RIGHT leg wound. Per Op note dated  documentation of debridement. To reflect accurately the procedure performed please document the deepest depth of tissue removed as down to and including: The medical record reflects the following:    Risk Factors: 71F on [address] with trauma to RLE now with cellulitis and wound needing debridement    Clinical Indicators:     OP Note Edward RIVERS  Procedure: Excisional debridement of right leg ulcer with washout and placement of wound VAC  Indications:. .. slowly healing right leg ulcer with concern for residual hematoma in need of debridement  Description of Operation: We began by using a 15 blade scalpel to sharply debride the edges of the wound. I cut back the skin and subcutaneous tissue to healthy bleeding tissue. This was right over the tibia on the medial edge and lateral/posterior to the saphenous vein posteriorly. We dissected down debriding old hematoma over the fascia of the posterior compartment of the lower leg. Was mostly done bluntly. We used the knife to debride the base of the ulcer as well to healthy bleeding fascial tissue. Then used the pulse  to irrigate the wound. We used approximately 1 L of saline. We then ensured hemostasis and then placed a black wound VAC sponge over the open part of the wound. Prior to doing this we measured the wound and it was 6 x 8 x 2 cm deep. We placed the wound VAC to suction and achieved a good seal.  This concluded the procedure  ?     Treatment: Procedure as described    Thank you    Jung HUSTON RN [place]  Clinical Documentation Improvement Specialist  [phone]  Options provided:  -- Excisional debridement of skin  -- Excisional debridement of subcutaneous tissue  -- Excisional debridement of fascia  -- Excisional debridement of muscle  -- Excisional debridement of bone  -- Other - I will add my own diagnosis  -- Disagree - Not applicable / Not valid  -- Disagree - Clinically unable to determine / Unknown  -- Refer to Clinical Documentation Reviewer    PROVIDER RESPONSE TEXT:    Excisional debridement down to and including the subcutaneous tissue was performed on RLE during procedure on Nov 19 2021.     Query created by: Sidney Davsi on 11/19/2021 3:53 PM      Electronically signed by:  Nahomi Cervantes MD 11/19/2021 4:12 PM

## 2021-11-19 NOTE — PROGRESS NOTES
Patient name: Alonso Prasad  MRN: [id number]    Nephrology Progress note:    Assessment:  Hponatremia: Na 121-122 on admission. Suspect volume overload/hypervolemia given underlying CHF. SSRI (Zoloft) maybe contributing. Only slightly better today at 123-124 s/p IV Bumex     RLE wound: s/p surgical debridement and wound vac dressing     Systolic CHF: decompensated. On Entresto     Hypothyroidism: TSH wnl. Ct Synthroid     Hypokalemia: mild-> better s/p supplementation     Afib    Plan/Recommendations:  Stop IV LR  Ct IV Bumex 1mg BID  Oral KCl 20meq x1 dose  Limit free water please/FR 1.2L/day  Push solute intake  IV Abx  Lets hold Zoloft   Strict I/Os-> using Purewick  Am labs        Subjective:  No events overnight. S/p surgical debridement of RLE wound. Mild pain noted. ROS:   No nausea, no vomiting  No chest pain    Exam:  Visit Vitals  BP (!) 148/76 (BP 1 Location: Right upper arm, BP Patient Position: At rest)   Pulse 95   Temp 97.7 °F (36.5 °C)   Resp 19   Ht 5' 7\" (1.702 m)   Wt 100.9 kg (222 lb 7.1 oz)   SpO2 100%   BMI 34.84 kg/m²     Wt Readings from Last 3 Encounters:   11/19/21 100.9 kg (222 lb 7.1 oz)   10/14/21 89.8 kg (198 lb)   09/10/21 86.2 kg (190 lb)       Intake/Output Summary (Last 24 hours) at 11/19/2021 1809  Last data filed at 11/19/2021 1600  Gross per 24 hour   Intake 760 ml   Output 5 ml   Net 755 ml       Gen: NAD  HEENT: AT/NC  Lungs/Chest wall: Clear. No accessory muscle use. Cardiovascular: IRR  Abdomen/: Soft, NT, ND, BS+.    Ext: Trace peripheral edema R>L  Skin: Right shin wound vac dressing  CNS: alert and awake.  Answers appropriately      Current Facility-Administered Medications   Medication Dose Route Frequency Last Admin    lactated Ringers infusion  125 mL/hr IntraVENous CONTINUOUS 125 mL/hr at 11/19/21 1558    0.9% sodium chloride infusion  25 mL/hr IntraVENous CONTINUOUS      sodium chloride (NS) flush 5-40 mL  5-40 mL IntraVENous Q8H      sodium chloride (NS) flush 5-40 mL  5-40 mL IntraVENous PRN      lidocaine (PF) (XYLOCAINE) 10 mg/mL (1 %) injection 0.1 mL  0.1 mL SubCUTAneous PRN      fentaNYL citrate (PF) injection 50 mcg  50 mcg IntraVENous PRN      midazolam (VERSED) injection 1 mg  1 mg IntraVENous PRN      midazolam (VERSED) injection 1 mg  1 mg IntraVENous PRN      ropivacaine (PF) (NAROPIN) 5 mg/mL (0.5 %) injection 30 mL  30 mL Peripheral Nerve Block ONCE      cefepime (MAXIPIME) 2 g in 0.9% sodium chloride 10 mL IV syringe  2 g IntraVENous Q8H 2 g at 11/19/21 1128    [START ON 11/20/2021] rivaroxaban (XARELTO) tablet 20 mg  20 mg Oral DAILY WITH BREAKFAST      oxyCODONE-acetaminophen (PERCOCET 10)  mg per tablet 1 Tablet  1 Tablet Oral Q4H PRN      oxyCODONE-acetaminophen (PERCOCET) 5-325 mg per tablet 1 Tablet  1 Tablet Oral Q4H PRN      HYDROmorphone (DILAUDID) injection 1 mg  1 mg IntraVENous Q2H PRN      fentaNYL citrate (PF) 50 mcg/mL injection          vancomycin (VANCOCIN) 1500 mg in  ml infusion  1,500 mg IntraVENous Q18H      potassium chloride SR (KLOR-CON 10) tablet 20 mEq  20 mEq Oral NOW      methyl salicylate-menthol (BENGAY) 15-10 % cream   Topical TID Given at 11/18/21 2151    bumetanide (BUMEX) injection 1 mg  1 mg IntraVENous BID 1 mg at 11/19/21 1731    Vancomycin - Pharmacy Dosing    Other Rx Dosing/Monitoring      diazePAM (VALIUM) tablet 2 mg  2 mg Oral QHS PRN 2 mg at 11/18/21 2115    sacubitriL-valsartan (ENTRESTO) 49-51 mg tablet 1 Tablet  1 Tablet Oral BID 1 Tablet at 11/19/21 1731    levothyroxine (SYNTHROID) tablet 175 mcg  175 mcg Oral  mcg at 11/19/21 0630    metoprolol tartrate (LOPRESSOR) tablet 50 mg  50 mg Oral BID 50 mg at 11/19/21 0840    [Held by provider] sertraline (ZOLOFT) tablet 50 mg  50 mg Oral DAILY 50 mg at 11/19/21 0840    sodium chloride (NS) flush 5-40 mL  5-40 mL IntraVENous Q8H 10 mL at 11/19/21 0630    sodium chloride (NS) flush 5-40 mL  5-40 mL IntraVENous PRN      acetaminophen (TYLENOL) tablet 650 mg  650 mg Oral Q4H  mg at 11/19/21 0429    hydrALAZINE (APRESOLINE) 20 mg/mL injection 10 mg  10 mg IntraVENous Q6H PRN 10 mg at 11/19/21 1550       Labs/Data:    Lab Results   Component Value Date/Time    WBC 5.8 11/19/2021 03:56 AM    HGB (POC) 15.1 01/28/2020 09:43 AM    HGB 10.0 (L) 11/19/2021 03:56 AM    HCT (POC) 44.5 01/28/2020 09:43 AM    HCT 28.9 (L) 11/19/2021 03:56 AM    PLATELET 395 69/89/6716 03:56 AM    MCV 94.8 11/19/2021 03:56 AM       Lab Results   Component Value Date/Time    Sodium 123 (L) 11/19/2021 03:55 AM    Sodium 124 (L) 11/19/2021 03:55 AM    Potassium 3.8 11/19/2021 03:55 AM    Potassium 3.6 11/19/2021 03:55 AM    Chloride 90 (L) 11/19/2021 03:55 AM    Chloride 90 (L) 11/19/2021 03:55 AM    CO2 26 11/19/2021 03:55 AM    CO2 26 11/19/2021 03:55 AM    Anion gap 7 11/19/2021 03:55 AM    Anion gap 8 11/19/2021 03:55 AM    Glucose 97 11/19/2021 03:55 AM    Glucose 104 (H) 11/19/2021 03:55 AM    BUN 10 11/19/2021 03:55 AM    BUN 10 11/19/2021 03:55 AM    Creatinine 0.64 11/19/2021 03:55 AM    Creatinine 0.56 11/19/2021 03:55 AM    BUN/Creatinine ratio 16 11/19/2021 03:55 AM    BUN/Creatinine ratio 18 11/19/2021 03:55 AM    GFR est AA >60 11/19/2021 03:55 AM    GFR est AA >60 11/19/2021 03:55 AM    GFR est non-AA >60 11/19/2021 03:55 AM    GFR est non-AA >60 11/19/2021 03:55 AM    Calcium 8.7 11/19/2021 03:55 AM    Calcium 8.8 11/19/2021 03:55 AM       Patient seen and examined. Chart reviewed.  Labs, data and other pertinent notes reviewed in last 24 hrs.     Discussed with patient and RN    Signed by:  Martín Rosen MD  [facility]

## 2021-11-19 NOTE — PROGRESS NOTES
.Bedside and Verbal shift change report given to Modena Blizzard, RN (oncoming nurse) by Yesi Glass RN (offgoing nurse). Report included the following information SBAR, Kardex, ED Summary, Intake/Output, MAR, Recent Results and Cardiac Rhythm Afib.

## 2021-11-19 NOTE — ANESTHESIA PREPROCEDURE EVALUATION
Relevant Problems   No relevant active problems       Anesthetic History   No history of anesthetic complications            Review of Systems / Medical History  Patient summary reviewed, nursing notes reviewed and pertinent labs reviewed    Pulmonary  Within defined limits                 Neuro/Psych   Within defined limits           Cardiovascular    Hypertension: well controlled        Dysrhythmias : atrial fibrillation           GI/Hepatic/Renal  Within defined limits              Endo/Other      Hypothyroidism: well controlled  Obesity     Other Findings              Physical Exam    Airway  Mallampati: II  TM Distance: > 6 cm  Neck ROM: normal range of motion   Mouth opening: Normal     Cardiovascular  Regular rate and rhythm,  S1 and S2 normal,  no murmur, click, rub, or gallop             Dental  No notable dental hx       Pulmonary  Breath sounds clear to auscultation               Abdominal  GI exam deferred       Other Findings            Anesthetic Plan    ASA: 3  Anesthesia type: general          Induction: Intravenous  Anesthetic plan and risks discussed with: Patient

## 2021-11-19 NOTE — PROGRESS NOTES
I prayed with Javier Gaucher and celebrated with her the [address].   Father Jeanine Mccarty

## 2021-11-19 NOTE — PROGRESS NOTES
Problem: Falls - Risk of  Goal: *Absence of Falls  Description: Document Speedy Linette Fall Risk and appropriate interventions in the flowsheet.   Outcome: Progressing Towards Goal  Note: Fall Risk Interventions:  Mobility Interventions: Communicate number of staff needed for ambulation/transfer, OT consult for ADLs, Patient to call before getting OOB, PT Consult for mobility concerns, PT Consult for assist device competence         Medication Interventions: Evaluate medications/consider consulting pharmacy, Patient to call before getting OOB, Teach patient to arise slowly                   Problem: Patient Education: Go to Patient Education Activity  Goal: Patient/Family Education  Outcome: Progressing Towards Goal

## 2021-11-19 NOTE — PERIOP NOTES
Patient: Chava Cortes MRN: [id number]  SSN: [id number]   YOB: 1950  Age: 66 y.o. Sex: female     Patient is status post Procedure(s):  DEBRIDEMENT OF RIGHT LEG ULCER AND VAC PLACEMENT. Surgeon(s) and Role:     * Juli Chavez MD - Primary    Local/Dose/Irrigation:  See [facility]                  Peripheral IV 11/19/21 Anterior; Distal; Left Forearm (Active)                           Dressing/Packing:  Wound Leg lower Right-Dressing/Treatment: Cutimed/Sorbact; Gauze dressing/dressing sponge;  Foam (11/18/21 0602)    Splint/Cast:  ]    Other:

## 2021-11-19 NOTE — PROGRESS NOTES
Chart reviewed. Pt off the floor to the OR for surgical debridement and unable to participate in Physical Therapy at this time. Plan to follow up Monday as appropriate. May need re-evaluation post procedure. HHPT was recommended upon evaluation. Thank you.      Lindsey Roberto, PT, DPT  Geriatric Clinical Specialist

## 2021-11-19 NOTE — PROGRESS NOTES
[address] [id number]     1950  66 y.o.  female    Patient Telephone Number: [phone] (home)   Denominational Affiliation: Adventist   Language: English   Patient Active Problem List    Diagnosis Date Noted    Open wound of right lower leg 11/17/2021    Mild depression ([facility] 01/25/2019    Chronic diastolic congestive heart failure ([facility] 08/22/2017    Encounter for long-term (current) drug use 08/22/2017    Hyponatremia 08/02/2017    Chest pain 08/01/2017    Acquired hypothyroidism 04/08/2016    Atrial fibrillation ([facility] 07/15/2011    HTN (hypertension) 07/14/2011        Date: 11/19/2021            Total Time (in minutes): 5          [facility] PREHOLD/AM ADMIT    Mental Status:   [x] Alert [  ] Sourav Carp [  ]  Confused  [  ] Minimally responsive  [  ] Sleeping    Communication Status: [  ] Impaired Speech [  ] Nonverbal -N/A    Music Preferences, Background: N/A: Please see Session Observations below. Clinical Problem addressed: N/A: Please see Session Observations below. Goal(s) met in session: N/A: Please see Session Observations below.   Physical/Pain management (Scale of 1-10):    Pre-session rating ___________    Post-session rating __________  [  ] Increased relaxation   [  ] Affected breathing patterns  [  ] Decreased muscle tension   [  ] Decreased agitation  [  ] Affected heart rate    [  ] Increased alertness     Emotional/Psychological:  [  ] Increased self-expression   [  ] Decreased aggressive behavior   [  ] Decreased feelings of stress  [  ] Discussed healthy coping skills     [  ] Improved mood    [  ] Decreased withdrawn behavior     Social:  [  ] Decreased feelings of isolation/loneliness [  ] Positive social interaction   [  ] Provided support and/or comfort for family/friends    Spiritual:  [  ] Spiritual support    [  ] Expressed peace  [  ] Expressed parrish    [  ] Discussed beliefs    Techniques Utilized (Check all that apply): N/A: Please see Session Observations below. [  ] Procedural support MT [  ] Music for relaxation [  ] Patient preferred music  [  ] Adele analysis  [  ] Song choice  [  ] Music for validation  [  ] Entrainment  [  ] Movement to music [  ] Guided visualization  [  ] Phyllis Brennan  [  ] Patient instrument playing [  ] Luz Elena Fiona writing  [  ] Solis Smith along   [  ] Idelia Plump  [  ] Sensory stimulation  [  ] Active Listening  [  ] Music for spiritual support [  ] Making of CDs as gifts    Session Observations:  Referral from Father Nicole Spaulding. Patient (pt) was standing with assistance from two hospital staff members. This music therapist (MT) knocked on pt's door, introduced self and asked if this would be a good time for music therapy. Pt said she was about to go to the ED so it wasn't a good time. Staff didn't comment on this, but agreed with the pt that this wasn't a good time for music therapy. MT expressed understanding.     CARTER MonroyBC (Music Therapist-Board Certified)  Spiritual Care Department  Referral-based service

## 2021-11-19 NOTE — PROGRESS NOTES
Surgery Progress Note    11/19/2021    Admit Date: 11/16/2021    CC: Right leg pain    Subjective:     Right leg pain stable. Excited about surgery. Constitutional: No fever or chills  Neurologic: No headache  Eyes: No scleral icterus or irritated eyes  Nose: No nasal pain or drainage  Mouth: No oral lesions or sore throat  Cardiac: No palpations or chest pain  Pulmonary: No cough or shortness of breath  Gastrointestinal: No nausea, emesis, diarrhea, or constipation  Genitourinary: No dysuria  Musculoskeletal: Right leg pian  Skin: No rashes or lesions  Psychiatric: No anxiety or depressed mood    Objective:     Visit Vitals  BP (!) 168/82 (BP 1 Location: Left upper arm, BP Patient Position: At rest)   Pulse 70   Temp 97.8 °F (36.6 °C)   Resp 16   Ht 5' 7\" (1.702 m)   Wt 222 lb 7.1 oz (100.9 kg)   SpO2 96%   BMI 34.84 kg/m²       General: No acute distress, conversant  Eyes: PERRLA, no scleral icterus  HENT: Normocephalic without oral lesions  Neck: Trachea midline without LAD  Cardiac: Normal pulse rate and rhythm  Pulmonary: Symmetric chest rise with normal effort  GI: Soft, NT, ND, no hernia, no splenomegaly  Skin: Warm without rash  Extremities: Right medial lower leg 8 x 8cm ulcer into sub q  Psych: Appropriate mood and affect    Labs, vital signs, and I/O reviewed. Assessment:     67 y/o F with traumatic right leg ulcer and underlying hematoma needing debridement    Plan:     Cont to hold Xarelto  Hyponatremia stable  Optimize medically per medical team  NPO  Plan to debride in OR today around 1:30. Consent discussed and obtained.  Plan to place vac to encourage granulation and help with LE edema    Rosa Gauthier MD  Bariatric and General Surgeon  [facility]

## 2021-11-19 NOTE — PROGRESS NOTES
[facility]  Hospitalist Group                                                                                          Hospitalist Progress Note  Ely Levine MD  Answering service: [phone] from in house phone        Date of Service:  2021  NAME:  Candi Reid  :  1950  MRN:  [id number]      Admission Summary:   66 y.o F with PMH of CHF,atrial fib came to the hospital due to rt LE wound. Interval history / Subjective:   Patient seen and examined    OR today for ulcer debridement     Assessment & Plan:     #Right lower extremity ulcer,cellulitis  -recent trauma a month ago causing hematoma and subsequently wound  ?delayed healing due to venous stasis   -wound care and gen surg following > plan for surgical debridement   -CT -7 x 5 cm soft tissue wound is medial to the distal tibia. Underlying cellulitis and ill-defined fluid. No drainable abscess. No fracture or osteomyelitis.   -on Iv vanc and cefepime  -blood cultures no growth to date      # Acute on chronic hyponatremia:  Appears to be volume overloaded > slow improvement   -IVF stopped > cont on diuresis  -Urine sodium 33 yesterday and TSH wnl  -IV bumex 1 mg BID, 1.2 L fluid restriction per nephro  _nephrology consulted and following     # Chronic atrial fib:  -rate controlled on metoprolol, on xarelto  held for surgical wound debbridement    # HTN:  Continue current regimen    #CHronic diastolic heart failure:  -continue entresto,metoprolol    #Hypothyroidism:  synthroid      Code status: full  DVT prophylaxis: hold [id number] [address] [facility] discussed with: Patient/Family and Nurse  Anticipated Disposition: Home w/Family  Anticipated Discharge: Greater than 48 hours     Hospital Problems  Date Reviewed: 10/14/2021          Codes Class Noted POA    Open wound of right lower leg ICD-10-CM: S81.801A  ICD-9-CM: 891.0  2021 Unknown        Hyponatremia ICD-10-CM: E87.1  ICD-9-CM: 276.1  2017 Unknown                Review of Systems:   Pertinent items are noted in HPI. Vital Signs:    Last 24hrs VS reviewed since prior progress note. Most recent are:  Visit Vitals  BP (!) 154/84   Pulse 82   Temp 97.4 °F (36.3 °C)   Resp 16   Ht 5' 7\" (1.702 m)   Wt 100.9 kg (222 lb 7.1 oz)   SpO2 96%   BMI 34.84 kg/m²         Intake/Output Summary (Last 24 hours) at 11/19/2021 1414  Last data filed at 11/18/2021 1455  Gross per 24 hour   Intake    Output 900 ml   Net -900 ml        Physical Examination:     I had a face to face encounter with this patient and independently examined them on 11/19/2021 as outlined below:          Constitutional:  No acute distress, cooperative, pleasant    ENT:  Oral mucosa moist, EOMI,anicteric sclera   Resp:  basal crackles b/l ,No accessory muscle use   CV:  irregular rhythm, normal rate, S1,S2 wnl    GI:  Soft, obese, non tender. normoactive bowel sounds, no hepatosplenomegaly     Musculoskeletal:  rt lower extremity wound -bandage present, b/l LE edema , RLE is erythematous,warm    Neurologic:  Moves all extremities,  AAOx3            Data Review:    Review and/or order of clinical lab test  Review and/or order of tests in the radiology section of CPT  Review and/or order of tests in the medicine section of CPT      Labs:     Recent Labs     11/19/21 0356 11/18/21 0210   WBC 5.8 9.5   HGB 10.0* 10.8*   HCT 28.9* 30.9*    312     Recent Labs     11/19/21 0355 11/18/21 0210 11/17/21  1136   *  124* 123* 122*   K 3.8  3.6 4.1 3.7   CL 90*  90* 91* 90*   CO2 26  26 24 27   BUN 10  10 10 8   CREA 0.64  0.56 0.73 0.65   GLU 97  104* 109* 119*   CA 8.7  8.8 8.6 8.5   MG 1.9 1.5*  --    PHOS 2.7 2.4*  --      Recent Labs     11/19/21  0355 11/17/21  0105   ALT 23 29    115   TBILI 0.5 0.5   TP 6.4 6.6   ALB 3.4* 3.6   GLOB 3.0 3.0     No results for input(s): INR, PTP, APTT, INREXT, INREXT in the last 72 hours.    No results for input(s): FE, TIBC, PSAT, FERR in the last 72 hours. No results found for: FOL, RBCF   No results for input(s): PH, PCO2, PO2 in the last 72 hours. No results for input(s): CPK, CKNDX, TROIQ in the last 72 hours.     No lab exists for component: CPKMB  Lab Results   Component Value Date/Time    Cholesterol, total 233 (H) 06/15/2021 12:03 PM    HDL Cholesterol 114 06/15/2021 12:03 PM    LDL, calculated 108 (H) 06/15/2021 12:03 PM    LDL, calculated 102 (H) 02/07/2014 11:06 AM    Triglyceride 66 06/15/2021 12:03 PM     No results found for: GLUCPOC  Lab Results   Component Value Date/Time    Color Yellow 02/07/2014 11:06 AM    Appearance Cloudy (A) 02/07/2014 11:06 AM    pH (UA) 7.5 02/07/2014 11:06 AM    Ketone Negative 02/07/2014 11:06 AM    Bilirubin Negative 02/07/2014 11:06 AM    Nitrites Negative 02/07/2014 11:06 AM    Leukocyte Esterase 2+ (A) 02/07/2014 11:06 AM    Bacteria Few 02/07/2014 11:06 AM    WBC 11-30 (A) 02/07/2014 11:06 AM    RBC 0-3 02/07/2014 11:06 AM         Medications Reviewed:     Current Facility-Administered Medications   Medication Dose Route Frequency    lactated Ringers infusion  125 mL/hr IntraVENous CONTINUOUS    0.9% sodium chloride infusion  25 mL/hr IntraVENous CONTINUOUS    sodium chloride (NS) flush 5-40 mL  5-40 mL IntraVENous Q8H    sodium chloride (NS) flush 5-40 mL  5-40 mL IntraVENous PRN    lidocaine (PF) (XYLOCAINE) 10 mg/mL (1 %) injection 0.1 mL  0.1 mL SubCUTAneous PRN    fentaNYL citrate (PF) injection 50 mcg  50 mcg IntraVENous PRN    midazolam (VERSED) injection 1 mg  1 mg IntraVENous PRN    midazolam (VERSED) injection 1 mg  1 mg IntraVENous PRN    ropivacaine (PF) (NAROPIN) 5 mg/mL (0.5 %) injection 30 mL  30 mL Peripheral Nerve Block ONCE    cefepime (MAXIPIME) 2 g in 0.9% sodium chloride 10 mL IV syringe  2 g IntraVENous Q8H    bupivacaine (PF) (MARCAINE) 0.5 % (5 mg/mL) injection    PRN    methyl salicylate-menthol (BENGAY) 15-10 % cream   Topical TID    bumetanide (BUMEX) injection 1 mg  1 mg IntraVENous BID    Vancomycin - Pharmacy Dosing    Other Rx Dosing/Monitoring    vancomycin (VANCOCIN) 1,250 mg in 0.9% sodium chloride 250 mL (VIAL-MATE)  1,250 mg IntraVENous Q18H    diazePAM (VALIUM) tablet 2 mg  2 mg Oral QHS PRN    sacubitriL-valsartan (ENTRESTO) 49-51 mg tablet 1 Tablet  1 Tablet Oral BID    levothyroxine (SYNTHROID) tablet 175 mcg  175 mcg Oral ACB    metoprolol tartrate (LOPRESSOR) tablet 50 mg  50 mg Oral BID    sertraline (ZOLOFT) tablet 50 mg  50 mg Oral DAILY    [Held by provider] rivaroxaban (XARELTO) tablet 20 mg  20 mg Oral DAILY WITH BREAKFAST    sodium chloride (NS) flush 5-40 mL  5-40 mL IntraVENous Q8H    sodium chloride (NS) flush 5-40 mL  5-40 mL IntraVENous PRN    acetaminophen (TYLENOL) tablet 650 mg  650 mg Oral Q4H PRN    hydrALAZINE (APRESOLINE) 20 mg/mL injection 10 mg  10 mg IntraVENous Q6H PRN     Facility-Administered Medications Ordered in Other Encounters   Medication Dose Route Frequency    midazolam (VERSED) injection   IntraVENous PRN    fentaNYL citrate (PF) injection   IntraVENous PRN    ketamine (KETALAR) 10 mg/mL injection   IntraVENous PRN    propofoL (DIPRIVAN) 10 mg/mL injection   IntraVENous CONTINUOUS    lactated Ringers infusion   IntraVENous CONTINUOUS     ______________________________________________________________________  EXPECTED LENGTH OF STAY: 2d 16h  ACTUAL LENGTH OF STAY:          3                 Opal Paul MD

## 2021-11-20 LAB
ANION GAP SERPL CALC-SCNC: 13 MMOL/L (ref 5–15)
BASOPHILS # BLD: 0.1 K/UL (ref 0–0.1)
BASOPHILS NFR BLD: 1 % (ref 0–1)
BUN SERPL-MCNC: 12 MG/DL (ref 6–20)
BUN/CREAT SERPL: 16 (ref 12–20)
CALCIUM SERPL-MCNC: 9.1 MG/DL (ref 8.5–10.1)
CHLORIDE SERPL-SCNC: 92 MMOL/L (ref 97–108)
CO2 SERPL-SCNC: 22 MMOL/L (ref 21–32)
CREAT SERPL-MCNC: 0.76 MG/DL (ref 0.55–1.02)
DIFFERENTIAL METHOD BLD: ABNORMAL
EOSINOPHIL # BLD: 0.1 K/UL (ref 0–0.4)
EOSINOPHIL NFR BLD: 1 % (ref 0–7)
ERYTHROCYTE [DISTWIDTH] IN BLOOD BY AUTOMATED COUNT: 14 % (ref 11.5–14.5)
GLUCOSE SERPL-MCNC: 91 MG/DL (ref 65–100)
HCT VFR BLD AUTO: 33.2 % (ref 35–47)
HGB BLD-MCNC: 11.2 G/DL (ref 11.5–16)
IMM GRANULOCYTES # BLD AUTO: 0 K/UL (ref 0–0.04)
IMM GRANULOCYTES NFR BLD AUTO: 0 % (ref 0–0.5)
LYMPHOCYTES # BLD: 1 K/UL (ref 0.8–3.5)
LYMPHOCYTES NFR BLD: 13 % (ref 12–49)
MAGNESIUM SERPL-MCNC: 1.9 MG/DL (ref 1.6–2.4)
MCH RBC QN AUTO: 33.2 PG (ref 26–34)
MCHC RBC AUTO-ENTMCNC: 33.7 G/DL (ref 30–36.5)
MCV RBC AUTO: 98.5 FL (ref 80–99)
MONOCYTES # BLD: 0.8 K/UL (ref 0–1)
MONOCYTES NFR BLD: 10 % (ref 5–13)
NEUTS SEG # BLD: 5.7 K/UL (ref 1.8–8)
NEUTS SEG NFR BLD: 75 % (ref 32–75)
NRBC # BLD: 0 K/UL (ref 0–0.01)
NRBC BLD-RTO: 0 PER 100 WBC
PHOSPHATE SERPL-MCNC: 4 MG/DL (ref 2.6–4.7)
PLATELET # BLD AUTO: 360 K/UL (ref 150–400)
PMV BLD AUTO: 8.9 FL (ref 8.9–12.9)
POTASSIUM SERPL-SCNC: 4.4 MMOL/L (ref 3.5–5.1)
RBC # BLD AUTO: 3.37 M/UL (ref 3.8–5.2)
SODIUM SERPL-SCNC: 127 MMOL/L (ref 136–145)
WBC # BLD AUTO: 7.7 K/UL (ref 3.6–11)

## 2021-11-20 PROCEDURE — 84100 ASSAY OF PHOSPHORUS: CPT

## 2021-11-20 PROCEDURE — 51798 US URINE CAPACITY MEASURE: CPT

## 2021-11-20 PROCEDURE — 74011000250 HC RX REV CODE- 250: Performed by: SURGERY

## 2021-11-20 PROCEDURE — 83735 ASSAY OF MAGNESIUM: CPT

## 2021-11-20 PROCEDURE — 74011250636 HC RX REV CODE- 250/636: Performed by: SURGERY

## 2021-11-20 PROCEDURE — 36415 COLL VENOUS BLD VENIPUNCTURE: CPT

## 2021-11-20 PROCEDURE — 65660000000 HC RM CCU STEPDOWN

## 2021-11-20 PROCEDURE — 80048 BASIC METABOLIC PNL TOTAL CA: CPT

## 2021-11-20 PROCEDURE — 74011250636 HC RX REV CODE- 250/636: Performed by: STUDENT IN AN ORGANIZED HEALTH CARE EDUCATION/TRAINING PROGRAM

## 2021-11-20 PROCEDURE — 74011250637 HC RX REV CODE- 250/637: Performed by: STUDENT IN AN ORGANIZED HEALTH CARE EDUCATION/TRAINING PROGRAM

## 2021-11-20 PROCEDURE — 74011250637 HC RX REV CODE- 250/637: Performed by: SURGERY

## 2021-11-20 PROCEDURE — 85025 COMPLETE CBC W/AUTO DIFF WBC: CPT

## 2021-11-20 PROCEDURE — 74011250637 HC RX REV CODE- 250/637: Performed by: INTERNAL MEDICINE

## 2021-11-20 RX ORDER — FLUTICASONE PROPIONATE 50 MCG
2 SPRAY, SUSPENSION (ML) NASAL DAILY
Status: DISCONTINUED | OUTPATIENT
Start: 2021-11-20 | End: 2021-12-01 | Stop reason: HOSPADM

## 2021-11-20 RX ORDER — HYDROCORTISONE 10 MG/ML
LOTION TOPICAL
Status: DISCONTINUED | OUTPATIENT
Start: 2021-11-20 | End: 2021-12-01 | Stop reason: HOSPADM

## 2021-11-20 RX ORDER — SULFAMETHOXAZOLE AND TRIMETHOPRIM 800; 160 MG/1; MG/1
1 TABLET ORAL EVERY 12 HOURS
Status: COMPLETED | OUTPATIENT
Start: 2021-11-20 | End: 2021-11-25

## 2021-11-20 RX ADMIN — BUMETANIDE 1 MG: 0.25 INJECTION INTRAMUSCULAR; INTRAVENOUS at 09:10

## 2021-11-20 RX ADMIN — SODIUM CHLORIDE 2 G: 9 INJECTION INTRAMUSCULAR; INTRAVENOUS; SUBCUTANEOUS at 04:55

## 2021-11-20 RX ADMIN — MENTHOL, METHYL SALICYLATE: 10; 15 CREAM TOPICAL at 21:23

## 2021-11-20 RX ADMIN — BUMETANIDE 1 MG: 0.25 INJECTION INTRAMUSCULAR; INTRAVENOUS at 19:27

## 2021-11-20 RX ADMIN — Medication 10 ML: at 13:19

## 2021-11-20 RX ADMIN — VANCOMYCIN HYDROCHLORIDE 1500 MG: 10 INJECTION, POWDER, LYOPHILIZED, FOR SOLUTION INTRAVENOUS at 15:51

## 2021-11-20 RX ADMIN — SODIUM CHLORIDE 2 G: 9 INJECTION INTRAMUSCULAR; INTRAVENOUS; SUBCUTANEOUS at 19:27

## 2021-11-20 RX ADMIN — SACUBITRIL AND VALSARTAN 1 TABLET: 49; 51 TABLET, FILM COATED ORAL at 09:10

## 2021-11-20 RX ADMIN — Medication 10 ML: at 07:13

## 2021-11-20 RX ADMIN — METOPROLOL TARTRATE 50 MG: 50 TABLET, FILM COATED ORAL at 09:10

## 2021-11-20 RX ADMIN — LEVOTHYROXINE SODIUM 175 MCG: 0.03 TABLET ORAL at 07:13

## 2021-11-20 RX ADMIN — SACUBITRIL AND VALSARTAN 1 TABLET: 49; 51 TABLET, FILM COATED ORAL at 19:27

## 2021-11-20 RX ADMIN — ACETAMINOPHEN 650 MG: 325 TABLET ORAL at 21:14

## 2021-11-20 RX ADMIN — Medication 10 ML: at 21:15

## 2021-11-20 RX ADMIN — METOPROLOL TARTRATE 50 MG: 50 TABLET, FILM COATED ORAL at 21:14

## 2021-11-20 RX ADMIN — Medication 10 ML: at 07:14

## 2021-11-20 RX ADMIN — DIAZEPAM 2 MG: 2 TABLET ORAL at 21:14

## 2021-11-20 RX ADMIN — SODIUM CHLORIDE 2 G: 9 INJECTION INTRAMUSCULAR; INTRAVENOUS; SUBCUTANEOUS at 13:18

## 2021-11-20 RX ADMIN — HYDROMORPHONE HYDROCHLORIDE 1 MG: 1 INJECTION, SOLUTION INTRAMUSCULAR; INTRAVENOUS; SUBCUTANEOUS at 00:57

## 2021-11-20 RX ADMIN — RIVAROXABAN 20 MG: 20 TABLET, FILM COATED ORAL at 09:10

## 2021-11-20 RX ADMIN — FLUTICASONE PROPIONATE 2 SPRAY: 50 SPRAY, METERED NASAL at 13:18

## 2021-11-20 RX ADMIN — HYDROMORPHONE HYDROCHLORIDE 1 MG: 1 INJECTION, SOLUTION INTRAMUSCULAR; INTRAVENOUS; SUBCUTANEOUS at 23:59

## 2021-11-20 RX ADMIN — MENTHOL, METHYL SALICYLATE: 10; 15 CREAM TOPICAL at 09:12

## 2021-11-20 RX ADMIN — ACETAMINOPHEN 650 MG: 325 TABLET ORAL at 07:20

## 2021-11-20 RX ADMIN — SULFAMETHOXAZOLE AND TRIMETHOPRIM 1 TABLET: 800; 160 TABLET ORAL at 21:14

## 2021-11-20 NOTE — ROUTINE PROCESS
Bedside shift change report given to Lokesh (oncoming nurse) by Amber Sanchez (offgoing nurse). Report included the following information SBAR, Kardex, Intake/Output, MAR and Recent Results.

## 2021-11-20 NOTE — PROGRESS NOTES
Transition of Care Plan   RUR- 10% Low Risk   DISPOSITION: The disposition plan is home with family assistance.  Home with [facility] [address]  [phone] - [facility].  F/U with PCP/Specialist     Transport: [facility]/family     Patient has been recommended for HHPT/OT/Skilled Nursing and wound vac support. Order was created and submitted for review via [employer]. [facility]  [phone] - has accepted patient    CM spoke with attending, patient will also need wound vac support. Possible weekend discharge. AVS has been updated. At 11:09am - CM perfect served attending to provide the above update. At 5:25pm - CM met with patient at bedside to provide the above information. CM will continue to provide updates as they become available.  CM will continue to follow, provide support and assist with JAYDE needs as they arise    Frederick Castañeda, JOSE, CRM, LMHP-e

## 2021-11-20 NOTE — PROGRESS NOTES
Bedside and Verbal shift change report given to Rashmi Ledesma RN (oncoming nurse) by Floretta Hammans, RN (offgoing nurse). Report included the following information SBAR, Kardex, ED Summary, Procedure Summary, Intake/Output, MAR, Recent Results and Cardiac Rhythm Afib.

## 2021-11-20 NOTE — PROGRESS NOTES
[facility]  Hospitalist Group                                                                                          Hospitalist Progress Note  Marcos Holman MD  Answering service: [phone] OR 36 from in house phone        Date of Service:  2021  NAME:  Bart Peterson  :  1950  MRN:  [id number]      Admission Summary:   66 y.o F with PMH of CHF,atrial fib came to the hospital due to rt LE wound. Interval history / Subjective:   Patient seen and examined s/p debridement with wound vac in place. Was having skin itching, benadryl helped      Assessment & Plan:     #Right lower extremity ulcer,cellulitis  -recent trauma a month ago causing hematoma and subsequently wound  ?delayed healing due to venous stasis   -wound care and gen surg following > s/p surgical debridement  with wound vac in place  -CT -7 x 5 cm soft tissue wound is medial to the distal tibia. Underlying cellulitis and ill-defined fluid. No drainable abscess. No fracture or osteomyelitis.   -on Iv vanc and cefepime  -blood cultures no growth to date, stenotrophomonas maltophilia on culture, will consult ID for abx management going forward       # Acute on chronic hyponatremia:  Appears to be volume overloaded >improved Na  124>127   -IVF stopped > cont on diuresis  -Urine sodium 33 yesterday and TSH wnl  -IV bumex 1 mg BID, 1.2 L fluid restriction per nephro  _nephrology consulted and following, held SSRI     # Chronic atrial fib:  -rate controlled on metoprolol, on xarelto  held for surgical wound debbridement    # HTN:  Continue current regimen    #CHronic diastolic heart failure:  -continue entresto,metoprolol    #Hypothyroidism:  synthroid      Code status: full  DVT prophylaxis: hold [id number] [facility] discussed with: Patient/Family and Nurse  Anticipated Disposition: Home w/Family  Anticipated Discharge: Greater than 48 hours     Hospital Problems  Date Reviewed: 10/14/2021 Codes Class Noted POA    Open wound of right lower leg ICD-10-CM: S81.801A  ICD-9-CM: 891.0  11/17/2021 Unknown        Hyponatremia ICD-10-CM: E87.1  ICD-9-CM: 276.1  8/2/2017 Unknown                Review of Systems:   Pertinent items are noted in HPI. Vital Signs:    Last 24hrs VS reviewed since prior progress note. Most recent are:  Visit Vitals  BP (!) 144/93 (BP 1 Location: Left upper arm, BP Patient Position: At rest)   Pulse 80   Temp 97.8 °F (36.6 °C)   Resp 16   Ht 5' 7\" (1.702 m)   Wt 101.8 kg (224 lb 6.9 oz)   SpO2 98%   BMI 35.15 kg/m²         Intake/Output Summary (Last 24 hours) at 11/20/2021 1327  Last data filed at 11/19/2021 1600  Gross per 24 hour   Intake 760 ml   Output 5 ml   Net 755 ml        Physical Examination:     I had a face to face encounter with this patient and independently examined them on 11/20/2021 as outlined below:          Constitutional:  No acute distress, cooperative, pleasant    ENT:  Oral mucosa moist, EOMI,anicteric sclera   Resp:  basal crackles b/l ,No accessory muscle use   CV:  irregular rhythm, normal rate, S1,S2 wnl    GI:  Soft, obese, non tender.  normoactive bowel sounds, no hepatosplenomegaly     Musculoskeletal:  rt lower extremity wound wound vac in place, b/l LE edema , RLE is erythematous,warm    Neurologic:  Moves all extremities,  AAOx3            Data Review:    Review and/or order of clinical lab test  Review and/or order of tests in the radiology section of CPT  Review and/or order of tests in the medicine section of CPT      Labs:     Recent Labs     11/20/21  0516 11/19/21  0356   WBC 7.7 5.8   HGB 11.2* 10.0*   HCT 33.2* 28.9*    310     Recent Labs     11/20/21  0516 11/19/21  0355 11/18/21  0210   * 123*  124* 123*   K 4.4 3.8  3.6 4.1   CL 92* 90*  90* 91*   CO2 22 26  26 24   BUN 12 10  10 10   CREA 0.76 0.64  0.56 0.73   GLU 91 97  104* 109*   CA 9.1 8.7  8.8 8.6   MG 1.9 1.9 1.5*   PHOS 4.0 2.7 2.4*     Recent Labs 11/19/21  0355   ALT 23      TBILI 0.5   TP 6.4   ALB 3.4*   GLOB 3.0     No results for input(s): INR, PTP, APTT, INREXT, INREXT in the last 72 hours. No results for input(s): FE, TIBC, PSAT, FERR in the last 72 hours. No results found for: FOL, RBCF   No results for input(s): PH, PCO2, PO2 in the last 72 hours. No results for input(s): CPK, CKNDX, TROIQ in the last 72 hours.     No lab exists for component: CPKMB  Lab Results   Component Value Date/Time    Cholesterol, total 233 (H) 06/15/2021 12:03 PM    HDL Cholesterol 114 06/15/2021 12:03 PM    LDL, calculated 108 (H) 06/15/2021 12:03 PM    LDL, calculated 102 (H) 02/07/2014 11:06 AM    Triglyceride 66 06/15/2021 12:03 PM     No results found for: GLUCPOC  Lab Results   Component Value Date/Time    Color Yellow 02/07/2014 11:06 AM    Appearance Cloudy (A) 02/07/2014 11:06 AM    pH (UA) 7.5 02/07/2014 11:06 AM    Ketone Negative 02/07/2014 11:06 AM    Bilirubin Negative 02/07/2014 11:06 AM    Nitrites Negative 02/07/2014 11:06 AM    Leukocyte Esterase 2+ (A) 02/07/2014 11:06 AM    Bacteria Few 02/07/2014 11:06 AM    WBC 11-30 (A) 02/07/2014 11:06 AM    RBC 0-3 02/07/2014 11:06 AM         Medications Reviewed:     Current Facility-Administered Medications   Medication Dose Route Frequency    fluticasone propionate (FLONASE) 50 mcg/actuation nasal spray 2 Spray  2 Spray Both Nostrils DAILY    hydrocortisone (ALA-MILTON) 1 % lotion   Topical BID PRN    sodium chloride (NS) flush 5-40 mL  5-40 mL IntraVENous Q8H    sodium chloride (NS) flush 5-40 mL  5-40 mL IntraVENous PRN    lidocaine (PF) (XYLOCAINE) 10 mg/mL (1 %) injection 0.1 mL  0.1 mL SubCUTAneous PRN    fentaNYL citrate (PF) injection 50 mcg  50 mcg IntraVENous PRN    midazolam (VERSED) injection 1 mg  1 mg IntraVENous PRN    midazolam (VERSED) injection 1 mg  1 mg IntraVENous PRN    cefepime (MAXIPIME) 2 g in 0.9% sodium chloride 10 mL IV syringe  2 g IntraVENous Q8H    rivaroxaban (XARELTO) tablet 20 mg  20 mg Oral DAILY WITH BREAKFAST    oxyCODONE-acetaminophen (PERCOCET 10)  mg per tablet 1 Tablet  1 Tablet Oral Q4H PRN    oxyCODONE-acetaminophen (PERCOCET) 5-325 mg per tablet 1 Tablet  1 Tablet Oral Q4H PRN    HYDROmorphone (DILAUDID) injection 1 mg  1 mg IntraVENous Q2H PRN    vancomycin (VANCOCIN) 1500 mg in  ml infusion  1,500 mg IntraVENous R59A    methyl salicylate-menthol (BENGAY) 15-10 % cream   Topical TID    bumetanide (BUMEX) injection 1 mg  1 mg IntraVENous BID    Vancomycin - Pharmacy Dosing    Other Rx Dosing/Monitoring    diazePAM (VALIUM) tablet 2 mg  2 mg Oral QHS PRN    sacubitriL-valsartan (ENTRESTO) 49-51 mg tablet 1 Tablet  1 Tablet Oral BID    levothyroxine (SYNTHROID) tablet 175 mcg  175 mcg Oral ACB    metoprolol tartrate (LOPRESSOR) tablet 50 mg  50 mg Oral BID    [Held by provider] sertraline (ZOLOFT) tablet 50 mg  50 mg Oral DAILY    sodium chloride (NS) flush 5-40 mL  5-40 mL IntraVENous Q8H    sodium chloride (NS) flush 5-40 mL  5-40 mL IntraVENous PRN    acetaminophen (TYLENOL) tablet 650 mg  650 mg Oral Q4H PRN    hydrALAZINE (APRESOLINE) 20 mg/mL injection 10 mg  10 mg IntraVENous Q6H PRN     ______________________________________________________________________  EXPECTED LENGTH OF STAY: 4d 2h  ACTUAL LENGTH OF STAY:          4                 Nieves Hooper MD

## 2021-11-20 NOTE — PROGRESS NOTES
Patient name: Leida Thompson  MRN: [id number]    Nephrology Progress note:    Assessment:  Hponatremia: Na 121-122 on admission. Suspect volume overload/hypervolemia given underlying CHF. SSRI (Zoloft) maybe contributing. Improving today to 127 s/p IV Bumex/Holding Zoloft     RLE wound: s/p surgical debridement and wound vac dressing 69/07/02     Systolic CHF: decompensated. On Entresto     Hypothyroidism: TSH wnl. Ct Synthroid     Hypokalemia: mild-> better s/p supplementation     Afib    Plan/Recommendations:  Ct IV Bumex 1mg BID  Limit free water please/FR 1.2L/day  Push solute intake  IV Abx  Lets hold Zoloft   Strict I/Os-> using Purewick  Am labs        Subjective:  No events overnight. Mild pain over RLE wound. Vac dressing in place    ROS:   No nausea, no vomiting  No chest pain    Exam:  Visit Vitals  BP (!) 144/93 (BP 1 Location: Left upper arm, BP Patient Position: At rest)   Pulse 72   Temp 97.8 °F (36.6 °C)   Resp 16   Ht 5' 7\" (1.702 m)   Wt 101.8 kg (224 lb 6.9 oz)   SpO2 98%   BMI 35.15 kg/m²     Wt Readings from Last 3 Encounters:   11/20/21 101.8 kg (224 lb 6.9 oz)   10/14/21 89.8 kg (198 lb)   09/10/21 86.2 kg (190 lb)     No intake or output data in the 24 hours ending 11/20/21 1625    Gen: NAD  HEENT: AT/NC  Lungs/Chest wall: Clear. No accessory muscle use. Cardiovascular: IRR  Abdomen/: Soft, NT, ND, BS+. +PureWick  Ext: Trace peripheral edema R>L  Skin: Right shin wound vac dressing  CNS: alert and awake.  Answers appropriately      Current Facility-Administered Medications   Medication Dose Route Frequency Last Admin    fluticasone propionate (FLONASE) 50 mcg/actuation nasal spray 2 Spray  2 Spray Both Nostrils DAILY 2 [place] at 11/20/21 1318    hydrocortisone (ALA-MILTON) 1 % lotion   Topical BID PRN      sodium chloride (NS) flush 5-40 mL  5-40 mL IntraVENous Q8H 10 mL at 11/20/21 1319    sodium chloride (NS) flush 5-40 mL  5-40 mL IntraVENous PRN      lidocaine (PF) (XYLOCAINE) 10 mg/mL (1 %) injection 0.1 mL  0.1 mL SubCUTAneous PRN      fentaNYL citrate (PF) injection 50 mcg  50 mcg IntraVENous PRN      cefepime (MAXIPIME) 2 g in 0.9% sodium chloride 10 mL IV syringe  2 g IntraVENous Q8H 2 g at 11/20/21 1318    rivaroxaban (XARELTO) tablet 20 mg  20 mg Oral DAILY WITH BREAKFAST 20 mg at 11/20/21 0910    oxyCODONE-acetaminophen (PERCOCET 10)  mg per tablet 1 Tablet  1 Tablet Oral Q4H PRN      oxyCODONE-acetaminophen (PERCOCET) 5-325 mg per tablet 1 Tablet  1 Tablet Oral Q4H PRN      HYDROmorphone (DILAUDID) injection 1 mg  1 mg IntraVENous Q2H PRN 1 mg at 11/20/21 0057    vancomycin (VANCOCIN) 1500 mg in  ml infusion  1,500 mg IntraVENous Q18H 1,500 mg at 11/20/21 1659    methyl salicylate-menthol (BENGAY) 15-10 % cream   Topical TID Given at 11/20/21 0912    bumetanide (BUMEX) injection 1 mg  1 mg IntraVENous BID 1 mg at 11/20/21 0910    Vancomycin - Pharmacy Dosing    Other Rx Dosing/Monitoring      diazePAM (VALIUM) tablet 2 mg  2 mg Oral QHS PRN 2 mg at 11/19/21 2103    sacubitriL-valsartan (ENTRESTO) 49-51 mg tablet 1 Tablet  1 Tablet Oral BID 1 Tablet at 11/20/21 0910    levothyroxine (SYNTHROID) tablet 175 mcg  175 mcg Oral  mcg at 11/20/21 0713    metoprolol tartrate (LOPRESSOR) tablet 50 mg  50 mg Oral BID 50 mg at 11/20/21 0910    [Held by provider] sertraline (ZOLOFT) tablet 50 mg  50 mg Oral DAILY 50 mg at 11/19/21 0840    sodium chloride (NS) flush 5-40 mL  5-40 mL IntraVENous Q8H 10 mL at 11/20/21 1319    sodium chloride (NS) flush 5-40 mL  5-40 mL IntraVENous PRN      acetaminophen (TYLENOL) tablet 650 mg  650 mg Oral Q4H  mg at 11/20/21 0720    hydrALAZINE (APRESOLINE) 20 mg/mL injection 10 mg  10 mg IntraVENous Q6H PRN 10 mg at 11/19/21 1550       Labs/Data:    Lab Results   Component Value Date/Time    WBC 7.7 11/20/2021 05:16 AM    HGB (POC) 15.1 01/28/2020 09:43 AM    HGB 11.2 (L) 11/20/2021 05:16 AM    HCT (POC) 44.5 01/28/2020 09:43 AM    HCT 33.2 (L) 11/20/2021 05:16 AM    PLATELET 115 00/63/2552 05:16 AM    MCV 98.5 11/20/2021 05:16 AM       Lab Results   Component Value Date/Time    Sodium 127 (L) 11/20/2021 05:16 AM    Potassium 4.4 11/20/2021 05:16 AM    Chloride 92 (L) 11/20/2021 05:16 AM    CO2 22 11/20/2021 05:16 AM    Anion gap 13 11/20/2021 05:16 AM    Glucose 91 11/20/2021 05:16 AM    BUN 12 11/20/2021 05:16 AM    Creatinine 0.76 11/20/2021 05:16 AM    BUN/Creatinine ratio 16 11/20/2021 05:16 AM    GFR est AA >60 11/20/2021 05:16 AM    GFR est non-AA >60 11/20/2021 05:16 AM    Calcium 9.1 11/20/2021 05:16 AM       Patient seen and examined. Chart reviewed. Labs, data and other pertinent notes reviewed in last 24 hrs.     Discussed with patient     Signed by:  Tolu Quarles MD  [id number] APR

## 2021-11-21 LAB
ANION GAP SERPL CALC-SCNC: 8 MMOL/L (ref 5–15)
BACTERIA SPEC CULT: NORMAL
BASOPHILS # BLD: 0.1 K/UL (ref 0–0.1)
BASOPHILS NFR BLD: 1 % (ref 0–1)
BUN SERPL-MCNC: 12 MG/DL (ref 6–20)
BUN/CREAT SERPL: 16 (ref 12–20)
CALCIUM SERPL-MCNC: 9.2 MG/DL (ref 8.5–10.1)
CHLORIDE SERPL-SCNC: 88 MMOL/L (ref 97–108)
CO2 SERPL-SCNC: 28 MMOL/L (ref 21–32)
CREAT SERPL-MCNC: 0.73 MG/DL (ref 0.55–1.02)
CREAT UR-MCNC: 26.3 MG/DL
DIFFERENTIAL METHOD BLD: ABNORMAL
EOSINOPHIL # BLD: 0.2 K/UL (ref 0–0.4)
EOSINOPHIL NFR BLD: 3 % (ref 0–7)
ERYTHROCYTE [DISTWIDTH] IN BLOOD BY AUTOMATED COUNT: 13.4 % (ref 11.5–14.5)
GLUCOSE SERPL-MCNC: 99 MG/DL (ref 65–100)
HCT VFR BLD AUTO: 32 % (ref 35–47)
HGB BLD-MCNC: 10.7 G/DL (ref 11.5–16)
IMM GRANULOCYTES # BLD AUTO: 0 K/UL (ref 0–0.04)
IMM GRANULOCYTES NFR BLD AUTO: 0 % (ref 0–0.5)
LYMPHOCYTES # BLD: 1.2 K/UL (ref 0.8–3.5)
LYMPHOCYTES NFR BLD: 20 % (ref 12–49)
MCH RBC QN AUTO: 32.4 PG (ref 26–34)
MCHC RBC AUTO-ENTMCNC: 33.4 G/DL (ref 30–36.5)
MCV RBC AUTO: 97 FL (ref 80–99)
MONOCYTES # BLD: 0.8 K/UL (ref 0–1)
MONOCYTES NFR BLD: 13 % (ref 5–13)
NEUTS SEG # BLD: 3.8 K/UL (ref 1.8–8)
NEUTS SEG NFR BLD: 63 % (ref 32–75)
NRBC # BLD: 0 K/UL (ref 0–0.01)
NRBC BLD-RTO: 0 PER 100 WBC
OSMOLALITY UR: 306 MOSM/KG H2O
PLATELET # BLD AUTO: 398 K/UL (ref 150–400)
PMV BLD AUTO: 9.1 FL (ref 8.9–12.9)
POTASSIUM SERPL-SCNC: 4 MMOL/L (ref 3.5–5.1)
RBC # BLD AUTO: 3.3 M/UL (ref 3.8–5.2)
RBC MORPH BLD: ABNORMAL
SERVICE CMNT-IMP: NORMAL
SODIUM SERPL-SCNC: 124 MMOL/L (ref 136–145)
SODIUM UR-SCNC: 97 MMOL/L
WBC # BLD AUTO: 6.1 K/UL (ref 3.6–11)

## 2021-11-21 PROCEDURE — 74011250637 HC RX REV CODE- 250/637: Performed by: SURGERY

## 2021-11-21 PROCEDURE — 85025 COMPLETE CBC W/AUTO DIFF WBC: CPT

## 2021-11-21 PROCEDURE — 80048 BASIC METABOLIC PNL TOTAL CA: CPT

## 2021-11-21 PROCEDURE — 84300 ASSAY OF URINE SODIUM: CPT

## 2021-11-21 PROCEDURE — 82570 ASSAY OF URINE CREATININE: CPT

## 2021-11-21 PROCEDURE — 74011250637 HC RX REV CODE- 250/637: Performed by: INTERNAL MEDICINE

## 2021-11-21 PROCEDURE — 65660000000 HC RM CCU STEPDOWN

## 2021-11-21 PROCEDURE — 74011250636 HC RX REV CODE- 250/636: Performed by: STUDENT IN AN ORGANIZED HEALTH CARE EDUCATION/TRAINING PROGRAM

## 2021-11-21 PROCEDURE — 74011000250 HC RX REV CODE- 250: Performed by: SURGERY

## 2021-11-21 PROCEDURE — 36415 COLL VENOUS BLD VENIPUNCTURE: CPT

## 2021-11-21 PROCEDURE — 74011000250 HC RX REV CODE- 250: Performed by: STUDENT IN AN ORGANIZED HEALTH CARE EDUCATION/TRAINING PROGRAM

## 2021-11-21 PROCEDURE — 83935 ASSAY OF URINE OSMOLALITY: CPT

## 2021-11-21 RX ADMIN — Medication 10 ML: at 06:00

## 2021-11-21 RX ADMIN — Medication 10 ML: at 14:00

## 2021-11-21 RX ADMIN — LEVOTHYROXINE SODIUM 175 MCG: 0.03 TABLET ORAL at 07:00

## 2021-11-21 RX ADMIN — WATER 2.5 MG: 1 INJECTION INTRAMUSCULAR; INTRAVENOUS; SUBCUTANEOUS at 19:28

## 2021-11-21 RX ADMIN — METOPROLOL TARTRATE 50 MG: 50 TABLET, FILM COATED ORAL at 11:00

## 2021-11-21 RX ADMIN — METOPROLOL TARTRATE 50 MG: 50 TABLET, FILM COATED ORAL at 23:22

## 2021-11-21 RX ADMIN — MENTHOL, METHYL SALICYLATE: 10; 15 CREAM TOPICAL at 11:08

## 2021-11-21 RX ADMIN — BUMETANIDE 1 MG: 0.25 INJECTION INTRAMUSCULAR; INTRAVENOUS at 10:55

## 2021-11-21 RX ADMIN — Medication 10 ML: at 23:21

## 2021-11-21 RX ADMIN — BUMETANIDE 1 MG: 0.25 INJECTION INTRAMUSCULAR; INTRAVENOUS at 17:37

## 2021-11-21 RX ADMIN — SACUBITRIL AND VALSARTAN 1 TABLET: 49; 51 TABLET, FILM COATED ORAL at 17:37

## 2021-11-21 RX ADMIN — FLUTICASONE PROPIONATE 2 SPRAY: 50 SPRAY, METERED NASAL at 10:55

## 2021-11-21 RX ADMIN — SULFAMETHOXAZOLE AND TRIMETHOPRIM 1 TABLET: 800; 160 TABLET ORAL at 10:54

## 2021-11-21 RX ADMIN — SACUBITRIL AND VALSARTAN 1 TABLET: 49; 51 TABLET, FILM COATED ORAL at 10:54

## 2021-11-21 RX ADMIN — RIVAROXABAN 20 MG: 20 TABLET, FILM COATED ORAL at 11:00

## 2021-11-21 RX ADMIN — Medication 10 ML: at 23:32

## 2021-11-21 RX ADMIN — SULFAMETHOXAZOLE AND TRIMETHOPRIM 1 TABLET: 800; 160 TABLET ORAL at 23:22

## 2021-11-21 RX ADMIN — WATER 2.5 MG: 1 INJECTION INTRAMUSCULAR; INTRAVENOUS; SUBCUTANEOUS at 11:03

## 2021-11-21 NOTE — PROGRESS NOTES
Patient name: Hali Vaca  MRN: [id number]    Nephrology Progress note:    Assessment:  Hponatremia: Na 121-122 on admission. Suspect volume overload/hypervolemia given underlying CHF. SSRI (Zoloft) maybe contributing. Improving to 127 yesterday with  IV Bumex/Holding Zoloft but surprisingly dropped back down to 124 today     RLE wound: s/p surgical debridement and wound vac dressing 18/22/70     Systolic CHF: decompensated. On Entresto     Hypothyroidism: TSH wnl. Ct Synthroid     Hypokalemia: mild-> better s/p supplementation     Afib    Plan/Recommendations:  Ct IV Bumex 1mg BID  May need to consider Tolvaptan if Na does not improve with IV Bumex  Limit free water please/FR 1.2L/day  Push solute intake  IV Abx  Holding Zoloft   Strict I/Os-> using Purewick  Am labs        Subjective:  Mentation seems off from prior baseline. Vac dressing in place  I/Os incomplete. Not sure if she is truly following a fluid restriction.     ROS:   No nausea, no vomiting  No chest pain    Exam:  Visit Vitals  BP (!) 159/96 (BP 1 Location: Left upper arm, BP Patient Position: At rest)   Pulse 85   Temp 98.7 °F (37.1 °C)   Resp 16   Ht 5' 7\" (1.702 m)   Wt 101.8 kg (224 lb 6.9 oz)   SpO2 98%   BMI 35.15 kg/m²     Wt Readings from Last 3 Encounters:   11/20/21 101.8 kg (224 lb 6.9 oz)   10/14/21 89.8 kg (198 lb)   09/10/21 86.2 kg (190 lb)       Intake/Output Summary (Last 24 hours) at 11/21/2021 1548  Last data filed at 11/20/2021 2132  Gross per 24 hour   Intake 240 ml   Output 700 ml   Net -460 ml       Gen: NAD  HEENT: AT/NC  Lungs/Chest wall: Clear. No accessory muscle use.    Cardiovascular: IRR  Abdomen/: Soft, NT, ND, BS+. +PureWick  Ext: Trace peripheral edema R>L  Skin: Right shin wound vac dressing        Current Facility-Administered Medications   Medication Dose Route Frequency Last Admin    OLANZapine (ZyPREXA) 2.5 mg in sterile water (preservative free) 0.5 mL injection  2.5 mg IntraMUSCular Q8H PRN 2.5 mg at 11/21/21 1103    fluticasone propionate (FLONASE) 50 mcg/actuation nasal spray 2 Spray  2 Spray Both Nostrils DAILY 2 [place] at 11/21/21 1055    hydrocortisone (ALA-MILTON) 1 % lotion   Topical BID PRN      trimethoprim-sulfamethoxazole (BACTRIM DS, SEPTRA DS) 160-800 mg per tablet 1 Tablet  1 Tablet Oral Q12H 1 Tablet at 11/21/21 1054    sodium chloride (NS) flush 5-40 mL  5-40 mL IntraVENous Q8H 10 mL at 11/21/21 0600    sodium chloride (NS) flush 5-40 mL  5-40 mL IntraVENous PRN      lidocaine (PF) (XYLOCAINE) 10 mg/mL (1 %) injection 0.1 mL  0.1 mL SubCUTAneous PRN      fentaNYL citrate (PF) injection 50 mcg  50 mcg IntraVENous PRN      rivaroxaban (XARELTO) tablet 20 mg  20 mg Oral DAILY WITH BREAKFAST 20 mg at 11/21/21 1100    HYDROmorphone (DILAUDID) injection 1 mg  1 mg IntraVENous Q2H PRN 1 mg at 11/20/21 8764    methyl salicylate-menthol (BENGAY) 15-10 % cream   Topical TID Given at 11/21/21 1108    bumetanide (BUMEX) injection 1 mg  1 mg IntraVENous BID 1 mg at 11/21/21 1055    diazePAM (VALIUM) tablet 2 mg  2 mg Oral QHS PRN 2 mg at 11/20/21 2114    sacubitriL-valsartan (ENTRESTO) 49-51 mg tablet 1 Tablet  1 Tablet Oral BID 1 Tablet at 11/21/21 1054    levothyroxine (SYNTHROID) tablet 175 mcg  175 mcg Oral  mcg at 11/21/21 0700    metoprolol tartrate (LOPRESSOR) tablet 50 mg  50 mg Oral BID 50 mg at 11/21/21 1100    [Held by provider] sertraline (ZOLOFT) tablet 50 mg  50 mg Oral DAILY 50 mg at 11/19/21 0840    sodium chloride (NS) flush 5-40 mL  5-40 mL IntraVENous Q8H 10 mL at 11/21/21 0600    sodium chloride (NS) flush 5-40 mL  5-40 mL IntraVENous PRN      acetaminophen (TYLENOL) tablet 650 mg  650 mg Oral Q4H  mg at 11/20/21 2114    hydrALAZINE (APRESOLINE) 20 mg/mL injection 10 mg  10 mg IntraVENous Q6H PRN 10 mg at 11/19/21 1550       Labs/Data:    Lab Results   Component Value Date/Time    WBC 6.1 11/21/2021 03:50 AM    HGB (POC) 15.1 01/28/2020 09:43 AM    HGB 10.7 (L) 11/21/2021 03:50 AM    HCT (POC) 44.5 01/28/2020 09:43 AM    HCT 32.0 (L) 11/21/2021 03:50 AM    PLATELET 858 34/68/7324 03:50 AM    MCV 97.0 11/21/2021 03:50 AM       Lab Results   Component Value Date/Time    Sodium 124 (L) 11/21/2021 03:50 AM    Potassium 4.0 11/21/2021 03:50 AM    Chloride 88 (L) 11/21/2021 03:50 AM    CO2 28 11/21/2021 03:50 AM    Anion gap 8 11/21/2021 03:50 AM    Glucose 99 11/21/2021 03:50 AM    BUN 12 11/21/2021 03:50 AM    Creatinine 0.73 11/21/2021 03:50 AM    BUN/Creatinine ratio 16 11/21/2021 03:50 AM    GFR est AA >60 11/21/2021 03:50 AM    GFR est non-AA >60 11/21/2021 03:50 AM    Calcium 9.2 11/21/2021 03:50 AM       Patient seen and examined. Chart reviewed. Labs, data and other pertinent notes reviewed in last 24 hrs.     Discussed with patient, RN  and Dr. Kaia Tse by:  Nick Burciaga MD  [email or web address]

## 2021-11-21 NOTE — PROGRESS NOTES
[address] [facility] Adult  Hospitalist Group                                                                                          Hospitalist Progress Note  Olivia Wilkinson MD  Answering service: [phone] from in house phone        Date of Service:  2021  NAME:  Felix Pineda  :  1950  MRN:  [id number]      Admission Summary:   66 y.o F with PMH of CHF,atrial fib came to the hospital due to rt LE wound. Interval history / Subjective:   Patient seen and examined was very confused and agitated this a.m. Assessment & Plan:     #Right lower extremity ulcer,cellulitis  -recent trauma a month ago causing hematoma and subsequently wound  ?delayed healing due to venous stasis   -wound care and gen surg following > s/p surgical debridement  with wound vac in place  -CT -7 x 5 cm soft tissue wound is medial to the distal tibia. Underlying cellulitis and ill-defined fluid. No drainable abscess. No fracture or osteomyelitis.   -on bactrim per ID recs, s/p cefepime and vanco, complete 10 days orally  -blood cultures no growth to date, stenotrophomonas maltophilia/serratia/enterococcus (mixed halle) on culture, bld cx ngtd   -cont wound care, wound vac management per surgery     Acute encephalopathy  -metabolic (hyponatremia) vs delirium   -will repeat urine studies, zyprexa prn, will not give any more doses of benadryl (she had this yesterday can worsen delerium)  -zyprexa prn      # Acute on chronic hyponatremia:  Appears to be volume overloaded >improved Na  124>127 > 124   -IVF stopped > cont on diuresis, unclear why she dropped again, may repeat urine studies    -Urine sodium 33 yesterday and TSH wnl  -IV bumex 1 mg BID, 1.2 L fluid restriction per nephro  _nephrology consulted and following, held SSRI     # Chronic atrial fib:  -rate controlled on metoprolol, on xarelto  held for surgical wound debbridement    # HTN:  Continue current regimen    #CHronic diastolic heart failure:  -continue entresto,metoprolol    #Hypothyroidism:  synthroid      Code status: full  DVT prophylaxis: [address] [facility] discussed with: Patient/Family and Nurse  Anticipated Disposition: Home w/Family  Anticipated Discharge: Greater than 48 hours     Hospital Problems  Date Reviewed: 10/14/2021          Codes Class Noted POA    Open wound of right lower leg ICD-10-CM: S81.801A  ICD-9-CM: 891.0  11/17/2021 Unknown        Hyponatremia ICD-10-CM: E87.1  ICD-9-CM: 276.1  8/2/2017 Unknown                Review of Systems:   Pertinent items are noted in HPI. Vital Signs:    Last 24hrs VS reviewed since prior progress note. Most recent are:  Visit Vitals  BP (!) 174/95 (BP 1 Location: Left upper arm, BP Patient Position: At rest)   Pulse 98   Temp 97.6 °F (36.4 °C)   Resp 16   Ht 5' 7\" (1.702 m)   Wt 101.8 kg (224 lb 6.9 oz)   SpO2 100%   BMI 35.15 kg/m²         Intake/Output Summary (Last 24 hours) at 11/21/2021 1015  Last data filed at 11/20/2021 2132  Gross per 24 hour   Intake 240 ml   Output 700 ml   Net -460 ml        Physical Examination:     I had a face to face encounter with this patient and independently examined them on 11/21/2021 as outlined below:          Constitutional:  No acute distress, cooperative, pleasant    ENT:  Oral mucosa moist, EOMI,anicteric sclera   Resp:  diminished breath sounds b/l ,No accessory muscle use   CV:  irregular rhythm, normal rate, S1,S2 wnl    GI:  Soft, obese, non tender.  normoactive bowel sounds, no hepatosplenomegaly     Musculoskeletal:  rt lower extremity wound wound vac in place, b/l LE edema , RLE is erythematous,warm    Neurologic:  Moves all extremities,  AAOx3            Data Review:    Review and/or order of clinical lab test  Review and/or order of tests in the radiology section of CPT  Review and/or order of tests in the medicine section of CPT      Labs:     Recent Labs     11/21/21  0350 11/20/21  0516   WBC 6.1 7.7   HGB 10.7* 11.2*   HCT 32.0* 33.2*    360     Recent Labs     11/21/21  0350 11/20/21  0516 11/19/21  0355   * 127* 123*  124*   K 4.0 4.4 3.8  3.6   CL 88* 92* 90*  90*   CO2 28 22 26  26   BUN 12 12 10  10   CREA 0.73 0.76 0.64  0.56   GLU 99 91 97  104*   CA 9.2 9.1 8.7  8.8   MG  --  1.9 1.9   PHOS  --  4.0 2.7     Recent Labs     11/19/21  0355   ALT 23      TBILI 0.5   TP 6.4   ALB 3.4*   GLOB 3.0     No results for input(s): INR, PTP, APTT, INREXT, INREXT in the last 72 hours. No results for input(s): FE, TIBC, PSAT, FERR in the last 72 hours. No results found for: FOL, RBCF   No results for input(s): PH, PCO2, PO2 in the last 72 hours. No results for input(s): CPK, CKNDX, TROIQ in the last 72 hours.     No lab exists for component: CPKMB  Lab Results   Component Value Date/Time    Cholesterol, total 233 (H) 06/15/2021 12:03 PM    HDL Cholesterol 114 06/15/2021 12:03 PM    LDL, calculated 108 (H) 06/15/2021 12:03 PM    LDL, calculated 102 (H) 02/07/2014 11:06 AM    Triglyceride 66 06/15/2021 12:03 PM     No results found for: GLUCPOC  Lab Results   Component Value Date/Time    Color Yellow 02/07/2014 11:06 AM    Appearance Cloudy (A) 02/07/2014 11:06 AM    pH (UA) 7.5 02/07/2014 11:06 AM    Ketone Negative 02/07/2014 11:06 AM    Bilirubin Negative 02/07/2014 11:06 AM    Nitrites Negative 02/07/2014 11:06 AM    Leukocyte Esterase 2+ (A) 02/07/2014 11:06 AM    Bacteria Few 02/07/2014 11:06 AM    WBC 11-30 (A) 02/07/2014 11:06 AM    RBC 0-3 02/07/2014 11:06 AM         Medications Reviewed:     Current Facility-Administered Medications   Medication Dose Route Frequency    OLANZapine (ZyPREXA) 2.5 mg in sterile water (preservative free) 0.5 mL injection  2.5 mg IntraMUSCular Q8H PRN    fluticasone propionate (FLONASE) 50 mcg/actuation nasal spray 2 Spray  2 Spray Both Nostrils DAILY    hydrocortisone (ALA-MILTON) 1 % lotion   Topical BID PRN    trimethoprim-sulfamethoxazole (BACTRIM DS, SEPTRA DS) 160-800 mg per tablet 1 Tablet  1 Tablet Oral Q12H    sodium chloride (NS) flush 5-40 mL  5-40 mL IntraVENous Q8H    sodium chloride (NS) flush 5-40 mL  5-40 mL IntraVENous PRN    lidocaine (PF) (XYLOCAINE) 10 mg/mL (1 %) injection 0.1 mL  0.1 mL SubCUTAneous PRN    fentaNYL citrate (PF) injection 50 mcg  50 mcg IntraVENous PRN    rivaroxaban (XARELTO) tablet 20 mg  20 mg Oral DAILY WITH BREAKFAST    HYDROmorphone (DILAUDID) injection 1 mg  1 mg IntraVENous Q2H PRN    methyl salicylate-menthol (BENGAY) 15-10 % cream   Topical TID    bumetanide (BUMEX) injection 1 mg  1 mg IntraVENous BID    diazePAM (VALIUM) tablet 2 mg  2 mg Oral QHS PRN    sacubitriL-valsartan (ENTRESTO) 49-51 mg tablet 1 Tablet  1 Tablet Oral BID    levothyroxine (SYNTHROID) tablet 175 mcg  175 mcg Oral ACB    metoprolol tartrate (LOPRESSOR) tablet 50 mg  50 mg Oral BID    [Held by provider] sertraline (ZOLOFT) tablet 50 mg  50 mg Oral DAILY    sodium chloride (NS) flush 5-40 mL  5-40 mL IntraVENous Q8H    sodium chloride (NS) flush 5-40 mL  5-40 mL IntraVENous PRN    acetaminophen (TYLENOL) tablet 650 mg  650 mg Oral Q4H PRN    hydrALAZINE (APRESOLINE) 20 mg/mL injection 10 mg  10 mg IntraVENous Q6H PRN     ______________________________________________________________________  EXPECTED LENGTH OF STAY: 4d 2h  ACTUAL LENGTH OF STAY:          5                 Giorgi Kimble MD

## 2021-11-22 ENCOUNTER — APPOINTMENT (OUTPATIENT)
Dept: CT IMAGING | Age: 71
DRG: 570 | End: 2021-11-22
Attending: STUDENT IN AN ORGANIZED HEALTH CARE EDUCATION/TRAINING PROGRAM
Payer: MEDICARE

## 2021-11-22 LAB
ANION GAP SERPL CALC-SCNC: 8 MMOL/L (ref 5–15)
ANION GAP SERPL CALC-SCNC: 9 MMOL/L (ref 5–15)
APPEARANCE UR: CLEAR
BASOPHILS # BLD: 0.1 K/UL (ref 0–0.1)
BASOPHILS NFR BLD: 1 % (ref 0–1)
BILIRUB UR QL: NEGATIVE
BUN SERPL-MCNC: 10 MG/DL (ref 6–20)
BUN SERPL-MCNC: 27 MG/DL (ref 6–20)
BUN/CREAT SERPL: 13 (ref 12–20)
BUN/CREAT SERPL: 23 (ref 12–20)
CALCIUM SERPL-MCNC: 9.3 MG/DL (ref 8.5–10.1)
CALCIUM SERPL-MCNC: 9.4 MG/DL (ref 8.5–10.1)
CHLORIDE SERPL-SCNC: 81 MMOL/L (ref 97–108)
CHLORIDE SERPL-SCNC: 84 MMOL/L (ref 97–108)
CO2 SERPL-SCNC: 31 MMOL/L (ref 21–32)
CO2 SERPL-SCNC: 32 MMOL/L (ref 21–32)
COLOR UR: NORMAL
COMMENT, HOLDF: NORMAL
CREAT SERPL-MCNC: 0.78 MG/DL (ref 0.55–1.02)
CREAT SERPL-MCNC: 1.15 MG/DL (ref 0.55–1.02)
DIFFERENTIAL METHOD BLD: ABNORMAL
EOSINOPHIL # BLD: 0 K/UL (ref 0–0.4)
EOSINOPHIL NFR BLD: 0 % (ref 0–7)
ERYTHROCYTE [DISTWIDTH] IN BLOOD BY AUTOMATED COUNT: 13.1 % (ref 11.5–14.5)
FOLATE SERPL-MCNC: 22.5 NG/ML (ref 5–21)
GLUCOSE SERPL-MCNC: 110 MG/DL (ref 65–100)
GLUCOSE SERPL-MCNC: 110 MG/DL (ref 65–100)
GLUCOSE UR STRIP.AUTO-MCNC: NEGATIVE MG/DL
HCT VFR BLD AUTO: 32.7 % (ref 35–47)
HGB BLD-MCNC: 11.3 G/DL (ref 11.5–16)
HGB UR QL STRIP: NEGATIVE
IMM GRANULOCYTES # BLD AUTO: 0 K/UL (ref 0–0.04)
IMM GRANULOCYTES NFR BLD AUTO: 0 % (ref 0–0.5)
KETONES UR QL STRIP.AUTO: NEGATIVE MG/DL
LEUKOCYTE ESTERASE UR QL STRIP.AUTO: NEGATIVE
LYMPHOCYTES # BLD: 0.8 K/UL (ref 0.8–3.5)
LYMPHOCYTES NFR BLD: 10 % (ref 12–49)
MCH RBC QN AUTO: 32.7 PG (ref 26–34)
MCHC RBC AUTO-ENTMCNC: 34.6 G/DL (ref 30–36.5)
MCV RBC AUTO: 94.5 FL (ref 80–99)
MONOCYTES # BLD: 0.8 K/UL (ref 0–1)
MONOCYTES NFR BLD: 11 % (ref 5–13)
NEUTS SEG # BLD: 5.8 K/UL (ref 1.8–8)
NEUTS SEG NFR BLD: 78 % (ref 32–75)
NITRITE UR QL STRIP.AUTO: NEGATIVE
NRBC # BLD: 0 K/UL (ref 0–0.01)
NRBC BLD-RTO: 0 PER 100 WBC
PH UR STRIP: 7.5 [PH] (ref 5–8)
PLATELET # BLD AUTO: 338 K/UL (ref 150–400)
PMV BLD AUTO: 8.5 FL (ref 8.9–12.9)
POTASSIUM SERPL-SCNC: 3.6 MMOL/L (ref 3.5–5.1)
POTASSIUM SERPL-SCNC: 3.7 MMOL/L (ref 3.5–5.1)
PROT UR STRIP-MCNC: NEGATIVE MG/DL
RBC # BLD AUTO: 3.46 M/UL (ref 3.8–5.2)
RBC MORPH BLD: ABNORMAL
SAMPLES BEING HELD,HOLD: NORMAL
SODIUM SERPL-SCNC: 121 MMOL/L (ref 136–145)
SODIUM SERPL-SCNC: 124 MMOL/L (ref 136–145)
SP GR UR REFRACTOMETRY: 1.01 (ref 1–1.03)
URATE SERPL-MCNC: 4.2 MG/DL (ref 2.6–6)
UROBILINOGEN UR QL STRIP.AUTO: 0.2 EU/DL (ref 0.2–1)
VIT B12 SERPL-MCNC: 633 PG/ML (ref 193–986)
WBC # BLD AUTO: 7.5 K/UL (ref 3.6–11)

## 2021-11-22 PROCEDURE — 74011250637 HC RX REV CODE- 250/637: Performed by: INTERNAL MEDICINE

## 2021-11-22 PROCEDURE — 80048 BASIC METABOLIC PNL TOTAL CA: CPT

## 2021-11-22 PROCEDURE — 74011250637 HC RX REV CODE- 250/637: Performed by: STUDENT IN AN ORGANIZED HEALTH CARE EDUCATION/TRAINING PROGRAM

## 2021-11-22 PROCEDURE — 81003 URINALYSIS AUTO W/O SCOPE: CPT

## 2021-11-22 PROCEDURE — 84550 ASSAY OF BLOOD/URIC ACID: CPT

## 2021-11-22 PROCEDURE — 97535 SELF CARE MNGMENT TRAINING: CPT

## 2021-11-22 PROCEDURE — 82607 VITAMIN B-12: CPT

## 2021-11-22 PROCEDURE — 74011250637 HC RX REV CODE- 250/637: Performed by: SURGERY

## 2021-11-22 PROCEDURE — 74011250636 HC RX REV CODE- 250/636: Performed by: SURGERY

## 2021-11-22 PROCEDURE — 99231 SBSQ HOSP IP/OBS SF/LOW 25: CPT | Performed by: SURGERY

## 2021-11-22 PROCEDURE — 85025 COMPLETE CBC W/AUTO DIFF WBC: CPT

## 2021-11-22 PROCEDURE — 97605 NEG PRS WND THER DME<=50SQCM: CPT

## 2021-11-22 PROCEDURE — 2709999900 HC NON-CHARGEABLE SUPPLY

## 2021-11-22 PROCEDURE — 70450 CT HEAD/BRAIN W/O DYE: CPT

## 2021-11-22 PROCEDURE — 36415 COLL VENOUS BLD VENIPUNCTURE: CPT

## 2021-11-22 PROCEDURE — 82533 TOTAL CORTISOL: CPT

## 2021-11-22 PROCEDURE — 74011000250 HC RX REV CODE- 250: Performed by: SURGERY

## 2021-11-22 PROCEDURE — 77030018717 HC DRSG GRNUFM KCON -B

## 2021-11-22 PROCEDURE — 82746 ASSAY OF FOLIC ACID SERUM: CPT

## 2021-11-22 PROCEDURE — 65660000000 HC RM CCU STEPDOWN

## 2021-11-22 RX ORDER — HYDRALAZINE HYDROCHLORIDE 50 MG/1
50 TABLET, FILM COATED ORAL 3 TIMES DAILY
Status: DISCONTINUED | OUTPATIENT
Start: 2021-11-22 | End: 2021-12-01 | Stop reason: HOSPADM

## 2021-11-22 RX ORDER — BUMETANIDE 1 MG/1
1 TABLET ORAL 2 TIMES DAILY
Status: DISCONTINUED | OUTPATIENT
Start: 2021-11-22 | End: 2021-11-23

## 2021-11-22 RX ADMIN — Medication 10 ML: at 22:00

## 2021-11-22 RX ADMIN — SACUBITRIL AND VALSARTAN 1 TABLET: 49; 51 TABLET, FILM COATED ORAL at 17:04

## 2021-11-22 RX ADMIN — LEVOTHYROXINE SODIUM 175 MCG: 0.03 TABLET ORAL at 06:58

## 2021-11-22 RX ADMIN — SULFAMETHOXAZOLE AND TRIMETHOPRIM 1 TABLET: 800; 160 TABLET ORAL at 08:14

## 2021-11-22 RX ADMIN — ACETAMINOPHEN 650 MG: 325 TABLET ORAL at 13:57

## 2021-11-22 RX ADMIN — MENTHOL, METHYL SALICYLATE: 10; 15 CREAM TOPICAL at 17:05

## 2021-11-22 RX ADMIN — METOPROLOL TARTRATE 50 MG: 50 TABLET, FILM COATED ORAL at 08:14

## 2021-11-22 RX ADMIN — BUMETANIDE 1 MG: 0.25 INJECTION INTRAMUSCULAR; INTRAVENOUS at 08:14

## 2021-11-22 RX ADMIN — RIVAROXABAN 20 MG: 20 TABLET, FILM COATED ORAL at 08:14

## 2021-11-22 RX ADMIN — HYDRALAZINE HYDROCHLORIDE 50 MG: 50 TABLET, FILM COATED ORAL at 08:14

## 2021-11-22 RX ADMIN — Medication 1 PACKET: at 10:35

## 2021-11-22 RX ADMIN — Medication 10 ML: at 07:15

## 2021-11-22 RX ADMIN — SACUBITRIL AND VALSARTAN 1 TABLET: 49; 51 TABLET, FILM COATED ORAL at 08:13

## 2021-11-22 RX ADMIN — HYDROMORPHONE HYDROCHLORIDE 1 MG: 1 INJECTION, SOLUTION INTRAMUSCULAR; INTRAVENOUS; SUBCUTANEOUS at 22:03

## 2021-11-22 RX ADMIN — HYDROMORPHONE HYDROCHLORIDE 1 MG: 1 INJECTION, SOLUTION INTRAMUSCULAR; INTRAVENOUS; SUBCUTANEOUS at 08:14

## 2021-11-22 RX ADMIN — BUMETANIDE 1 MG: 1 TABLET ORAL at 17:04

## 2021-11-22 RX ADMIN — FLUTICASONE PROPIONATE 2 SPRAY: 50 SPRAY, METERED NASAL at 10:41

## 2021-11-22 RX ADMIN — HYDRALAZINE HYDROCHLORIDE 50 MG: 50 TABLET, FILM COATED ORAL at 17:04

## 2021-11-22 RX ADMIN — BUMETANIDE 1 MG: 1 TABLET ORAL at 13:57

## 2021-11-22 RX ADMIN — HYDRALAZINE HYDROCHLORIDE 50 MG: 50 TABLET, FILM COATED ORAL at 22:03

## 2021-11-22 RX ADMIN — HYDROMORPHONE HYDROCHLORIDE 1 MG: 1 INJECTION, SOLUTION INTRAMUSCULAR; INTRAVENOUS; SUBCUTANEOUS at 17:04

## 2021-11-22 RX ADMIN — SULFAMETHOXAZOLE AND TRIMETHOPRIM 1 TABLET: 800; 160 TABLET ORAL at 22:04

## 2021-11-22 RX ADMIN — METOPROLOL TARTRATE 50 MG: 50 TABLET, FILM COATED ORAL at 22:03

## 2021-11-22 NOTE — WOUND CARE
WOCN Note:      Follow up for VAC change with Dr. Mir Li.  11.19.21 s/p excisional debridement of right leg ulcer and placement of wound vac. Patient reports that she hit her right leg about a month ago and developed a hematoma since then it has been slow to heal.  She takes a blood thinner for her afib.     Assessed in room 202  Chart reviewed. 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     Assessment:   Patient is A&O x 3 and communicative. Bed: [facility]  Patient premedicated for pain. Right DP and PT pulses palpable.     1. POA Right low leg, open full thickness surgical wound: 7 x 6 x 1.8 cm; 100% red/pink; small serosang exudate; no odor. Periwound without erythema. Removed VAC and 2 pieces of black sponge; irrigated with saline; resumed NPWT at 125 mmHg using 2 black sponge.         Wound, Pressure Prevention & Skin Care Recommendations:    1. Minimize layers of linen/pads under patient to optimize support surface. 2.  Turn/reposition approximately every 2 hours and offload heels. 3.  Manage moisture/ Keep skin folds clean and dry.   4.  Right low leg:  Continue NPWT at 125 mmHg.       Discussed above plan with patient and Dr Mir Li.     Transition of Care:   Plan to follow Friday for [address], CATALINAN RN [facility]  Available on Perfect Serve  Pager [phone]  Office [phone]

## 2021-11-22 NOTE — PROGRESS NOTES
RENAL  PROGRESS NOTE        Subjective:   Complaining of FR  Objective:   VITALS SIGNS:    Visit Vitals  BP (!) 163/94 (BP 1 Location: Left upper arm, BP Patient Position: At rest)   Pulse 84   Temp 97.9 °F (36.6 °C)   Resp 20   Ht 5' 7\" (1.702 m)   Wt 100 kg (220 lb 7.4 oz)   SpO2 96%   BMI 34.53 kg/m²       O2 Device: None (Room air)   O2 Flow Rate (L/min): 2 l/min   Temp (24hrs), Av.1 °F (36.7 °C), Min:97.5 °F (36.4 °C), Max:98.7 °F (37.1 °C)         PHYSICAL EXAM:  NAD    DATA REVIEW:     INTAKE / OUTPUT:   Last shift:      No intake/output data recorded. Last 3 shifts:  1901 -  0700  In: 240 [P.O.:240]  Out: 1650 [Urine:1600; Drains:50]    Intake/Output Summary (Last 24 hours) at 2021 0954  Last data filed at 2021 0659  Gross per 24 hour   Intake 0 ml   Output 950 ml   Net -950 ml         LABS:   Recent Labs     21  0214 21  0350 21  0516   WBC 7.5 6.1 7.7   HGB 11.3* 10.7* 11.2*   HCT 32.7* 32.0* 33.2*    398 360     Recent Labs     21  0214 21  0350 21  0516   * 124* 127*   K 3.6 4.0 4.4   CL 84* 88* 92*   CO2 32 28 22   * 99 91   BUN 10 12 12   CREA 0.78 0.73 0.76   CA 9.4 9.2 9.1   MG  --   --  1.9   PHOS  --   --  4.0         Assessment:  Hponatremia: Na 121-122 on admission. Suspect  SIADH(also was on zoloft)     RLE wound: s/p surgical debridement and wound vac dressing 71/32/39     Systolic CHF      Hypothyroidism: TSH wnl.  Ct Synthroid     Hypokalemia: mild-> better s/p supplementation     Afib     Plan/Recommendations:    Bumex   Ure-Na  Limit free water please/FR 1.2L/day   Holding Zoloft   Repeat labs  Age approppriate cancer screen  Discussed in length with her(25 min)  Tyrell Lyons MD

## 2021-11-22 NOTE — PROGRESS NOTES
Problem: Self Care Deficits Care Plan (Adult)  Goal: *Acute Goals and Plan of Care (Insert Text)  Description:   FUNCTIONAL STATUS PRIOR TO ADMISSION: Patient was independent and active without use of DME. Patient was highly independent, retired . Indep in all ADL/IADL including driving. HOME SUPPORT: The patient lived alone with friends/family to provide assistance. Occupational Therapy Goals  Initiated 11/19/2021  1. Patient will perform grooming with independence within 7 day(s). 2.  Patient will perform upper body dressing with independence within 7 day(s). 3.  Patient will perform lower body dressing with independence within 7 day(s). 4.  Patient will perform all aspects of toileting with independence within 7 day(s). 5.  Patient will utilize energy conservation techniques during functional activities with verbal cues within 7 day(s). Outcome: Progressing Towards Goal   OCCUPATIONAL THERAPY TREATMENT  Patient: Sophia Leep (80 y.o. female)  Date: 11/22/2021  Diagnosis: Hyponatremia [E87.1]   <principal problem not specified>  Procedure(s) (LRB):  DEBRIDEMENT OF RIGHT LEG ULCER AND VAC PLACEMENT (Right) 3 Days Post-Op  Precautions:    Chart, occupational therapy assessment, plan of care, and goals were reviewed. ASSESSMENT  Patient continues with skilled OT services and is progressing towards goals. She completes seated ADL with set up, supervision- increased assistance required in stand for all ADL and related mobility due to impaired standing balance. Patient noted with posterior LOB during these activities. Patient provided with RW and instructed in safe use with improved balance noted. Continued safety training recommended. Patient does have impaired decision making though was able to follow all commands; she lacks some insight into her deficits.      Current Level of Function Impacting Discharge (ADLs): generally min assistance for activity in stand due to impaired standing balance    Other factors to consider for discharge:          PLAN :  Patient continues to benefit from skilled intervention to address the above impairments. Continue treatment per established plan of care to address goals. Recommend with staff: Haley Soliman for meals, ambulate to bathroom for ADL (use RW)    Recommend next OT session: progress POC- standing ADL (work on balance and safety awareness) safe RW use during ADL/IADL     Recommendation for discharge: (in order for the patient to meet his/her long term goals)  Occupational therapy at least 2 days/week in the home     This discharge recommendation:  Has been made in collaboration with the attending provider and/or case management    IF patient discharges home will need the following DME: RW likely needed        SUBJECTIVE:   Patient pleasant and cooperative    OBJECTIVE DATA SUMMARY:   Cognitive/Behavioral Status:  Neurologic State: Alert; Confused  Orientation Level: Oriented X4  Cognition: Follows commands; Impulsive             Functional Mobility and Transfers for ADLs:  Bed Mobility:  Supine to Sit: Supervision  Scooting: Supervision    Transfers:  Sit to Stand: Contact guard assistance  Functional Transfers  Bathroom Mobility: Minimum assistance  Toilet Transfer : Contact guard assistance  Cues: Physical assistance; Tactile cues provided; Verbal cues provided  Adaptive Equipment: Walker (comment); Grab bars       Balance:  Sitting: Intact; Without support  Standing: Impaired; Without support  Standing - Static: Constant support; Fair  Standing - Dynamic : Constant support; Fair    ADL Intervention:            Upper Body Bathing  Bathing Assistance: Set-up  Position Performed:  Other (comment) (seated on toilet)    Lower Body Bathing  Bathing Assistance: Minimum assistance  Position Performed: Seated on toilet    Upper [address]: Set-up    Lower Body Dressing Assistance  Dressing Assistance: Contact guard assistance  Protective Undergarmet: Contact guard assistance  Position Performed: Other (comment) (seated on toilet)    Toileting  Toileting Assistance: Contact guard assistance  Clothing Management: Contact guard assistance  Adaptive Equipment: Grab bars; Walker       Pain:  None reported     Activity Tolerance:   Good    After treatment patient left in no apparent distress:   Supine in bed, Call bell within reach, Bed / chair alarm activated, and Side rails x 3    COMMUNICATION/COLLABORATION:   The patients plan of care was discussed with: Registered nurse.      Nicholas Stone OT  Time Calculation: 39 mins

## 2021-11-22 NOTE — PROGRESS NOTES
Problem: Falls - Risk of  Goal: *Absence of Falls  Description: Document Mila Castro Fall Risk and appropriate interventions in the flowsheet.   Outcome: Progressing Towards Goal  Note: Fall Risk Interventions:  Mobility Interventions: Patient to call before getting OOB    Mentation Interventions: Adequate sleep, hydration, pain control, Bed/chair exit alarm, Door open when patient unattended    Medication Interventions: Bed/chair exit alarm         History of Falls Interventions: Bed/chair exit alarm         Problem: Patient Education: Go to Patient Education Activity  Goal: Patient/Family Education  Outcome: Progressing Towards Goal     Problem: Patient Education: Go to Patient Education Activity  Goal: Patient/Family Education  Outcome: Progressing Towards Goal     Problem: Patient Education: Go to Patient Education Activity  Goal: Patient/Family Education  Outcome: Progressing Towards Goal     Problem: Delirium Treatment  Goal: *Level of consciousness restored to baseline  Outcome: Progressing Towards Goal  Goal: *Level of environmental perceptions restored to baseline  Outcome: Progressing Towards Goal  Goal: *Sensory perception restored to baseline  Outcome: Progressing Towards Goal  Goal: *Emotional stability restored to baseline  Outcome: Progressing Towards Goal  Goal: *Functional assessment restored to baseline  Outcome: Progressing Towards Goal  Goal: *Absence of falls  Outcome: Progressing Towards Goal  Goal: *Will remain free of delirium, CAM Score negative  Outcome: Progressing Towards Goal  Goal: *Cognitive status will be restored to baseline  Outcome: Progressing Towards Goal  Goal: Interventions  Outcome: Progressing Towards Goal     Problem: Patient Education: Go to Patient Education Activity  Goal: Patient/Family Education  Outcome: Progressing Towards Goal

## 2021-11-22 NOTE — PROGRESS NOTES
Bedside and Verbal shift change report given to HARIKA Bautista (oncoming nurse) by Ayse Wheeler (offgoing nurse).  Report included the following information SBAR, Kardex, ED Summary, I&O, Mar, Recent Results, Med Rec, Cardiac Rhythm AFib

## 2021-11-22 NOTE — PROGRESS NOTES
Surgery Progress Note    11/22/2021    Admit Date: 11/16/2021    CC: Right leg pain    11/19 Debridement right leg ulcer and vac placement    Subjective:     Minimal pain. Patient upset about weekend events but unable to verbalize specific things. Seems anxious and mildly confused this AM.    Constitutional: No fever or chills  Neurologic: No headache  Eyes: No scleral icterus or irritated eyes  Nose: No nasal pain or drainage  Mouth: No oral lesions or sore throat  Cardiac: No palpations or chest pain  Pulmonary: No cough or shortness of breath  Gastrointestinal:No nausea, emesis, diarrhea, or constipation  Genitourinary: No dysuria  Musculoskeletal: Right leg pain  Skin: No rashes or lesions  Psychiatric: Anxious and confused    Objective:     Visit Vitals  BP (!) 163/94 (BP 1 Location: Left upper arm, BP Patient Position: At rest)   Pulse 84   Temp 97.9 °F (36.6 °C)   Resp 20   Ht 5' 7\" (1.702 m)   Wt 220 lb 7.4 oz (100 kg)   SpO2 96%   BMI 34.53 kg/m²       General: No acute distress, conversant  Eyes: PERRLA, no scleral icterus  HENT: Normocephalic without oral lesions  Neck: Trachea midline without LAD  Cardiac: Normal pulse rate and rhythm  Pulmonary: Symmetric chest rise with normal effort  GI: Soft, NT, ND, no hernia, no splenomegaly  Skin: Warm without rash  Extremities: Right leg wound base healthy bleeding granulation. Edema of leg. Some erythema going distally. Psych: Anxious and GCS 14    Labs, vital signs, and I/O reviewed. Assessment:     65 y/o F with traumatic right leg ulcer status post debridement. Plan:     Anxiety and confusion. Worse than last week. Delirium vs Hyponatremia? PRN pain control  Reg diet. On bactrim per ID  Vac changed today with wound care. Looks good. Vac replaced. Kerlex and Ace wrap applied to leg for compression. Will leave vac on until Friday. Plan for twice weekly changes going forward at home with black sponge. Continue ace wrap for gentle compression for edema. Okay for discharge from surgery POV once medicine clears and vac approved. I will sign paperwork when ready. Can follow up in office in 3-4 weeks, the day before her next vac change because I will need to take it down and look at visit.     Jose D Brown MD  Bariatric and General Surgeon  [facility]

## 2021-11-22 NOTE — PROGRESS NOTES
[address] [id number]     1950  66 y.o.  female    Patient Telephone Number: [phone] (home)   Catholic Affiliation: Taoist   Language: English   Patient Active Problem List    Diagnosis Date Noted    Open wound of right lower leg 11/17/2021    Mild depression ([facility] 01/25/2019    Chronic diastolic congestive heart failure ([facility] 08/22/2017    Encounter for long-term (current) drug use 08/22/2017    Hyponatremia 08/02/2017    Chest pain 08/01/2017    Acquired hypothyroidism 04/08/2016    Atrial fibrillation ([facility] 07/15/2011    HTN (hypertension) 07/14/2011        Date: 11/22/2021            Total Time (in minutes): 5          [address] MED SURG    Mental Status:   [x] Alert [  ] [place] [  ]  Confused  [  ] Minimally responsive  [  ] Sleeping    Communication Status: [  ] Impaired Speech [  ] Nonverbal -N/A    Physical Status:   [  ] Oxygen in use  [  ] Hard of Hearing [  ] Vision Impaired  [  ] Ambulatory  [  ] Ambulatory with assistance [  ] Non-ambulatory -N/A    Music Preferences, Background: N/A: Please see Session Observations below.     Clinical Problem to be addressed: Emotional support    Goal(s) met in session: N/A: Please See Session Observations Below  Physical/Pain management (Scale of 1-10):    Pre-session rating ___________    Post-session rating __________  [  ] Increased relaxation   [  ] Affected breathing patterns  [  ] Decreased muscle tension   [  ] Decreased agitation  [  ] Affected heart rate    [  ] Increased alertness     Emotional/Psychological:  [  ] Increased self-expression   [  ] Decreased aggressive behavior   [  ] Decreased feelings of stress  [  ] Discussed healthy coping skills     [  ] Improved mood    [  ] Decreased withdrawn behavior     Social:  [  ] Decreased feelings of isolation/loneliness [  ] Positive social interaction   [  ] Provided support and/or comfort for family/friends    Spiritual:  [  ] Spiritual support    [  ] Expressed peace  [  ] Expressed parrish    [  ] Discussed beliefs    Techniques Utilized (Check all that apply): N/A: Please See Session Observations Below  [  ] Procedural support MT [  ] Music for relaxation [  ] Patient preferred music  [  ] Adele analysis  [  ] Song choice  [  ] Music for validation  [  ] Entrainment  [  ] Movement to music [  ] Guided visualization  [  ] Deland Lanes  [  ] Patient instrument playing [  ] Alma Power writing  [  ] Gato Race along   [  ] Kallie Weber  [  ] Sensory stimulation  [x] Active Listening  [  ] Music for spiritual support [  ] Making of CDs as gifts    Session Observations:  F/up visit; Patient (pt) was alert, sitting up in bed with her sister at bedside. This music therapist eligible (MTE) introduced self and asked how they were doing. Pt responded to this stating today was not a good day for music therapy, but expressed an openness to a follow up on a different day. Pt's sister exited at this time to speak with the pt's nurse. Pt began so share about her health and concerns with this MTE at this time. MTE provided active listening and words of validation in response to this. Pt's sister and nurse entered the room and MTE exited to allow privacy.      Sravanthi Mayorga, Music Therapist Eligible   Spiritual Care Department

## 2021-11-22 NOTE — PROGRESS NOTES
ID Progress Note  2021    Subjective:     No new complaints noted    Objective:     Antibiotics:  1. Bactrim       Vitals:   Visit Vitals  /86 (BP 1 Location: Right upper arm, BP Patient Position: At rest)   Pulse 95   Temp 97.6 °F (36.4 °C)   Resp 20   Ht 5' 7\" (1.702 m)   Wt 100 kg (220 lb 7.4 oz)   SpO2 95%   BMI 34.53 kg/m²        Tmax:  Temp (24hrs), Av.9 °F (36.6 °C), Min:97.5 °F (36.4 °C), Max:98.4 °F (36.9 °C)      Exam:  Wound vac in place with sero-sanguinous drainage    Labs:      Recent Labs     21  0214 21  0350 21  0516   WBC 7.5 6.1 7.7   HGB 11.3* 10.7* 11.2*    398 360   BUN 10 12 12   CREA 0.78 0.73 0.76       Cultures:     No results found for: SDES  Lab Results   Component Value Date/Time    Culture result: HEAVY MIXED SKIN HALLE ISOLATED 2021 04:26 PM    Culture result: HEAVY ENTEROBACTER CLOACAE COMPLEX (A) 2021 04:26 PM    Culture result: HEAVY Serratia fonticola (A) 2021 04:26 PM    Culture result: HEAVY PROVIDENCIA RETTGERI (A) 2021 04:26 PM    Culture result: (A) 2021 04:26 PM     HEAVY STENOTROPHOMONAS ([employer] MALTOPHILIA CLSI GUIDELINES DO NOT RECOMMEND [address] TRIMETHOPRIM/SULFAMETHOXAZOLE IS THE DRUG OF CHOICE. Radiology:     Line/Insert Date:           Assessment:     1. RLE hematoma--status post evacuation  2. Polymicrobial halle from wound culture    Objective:     1. Total of 10 days therapy with Bactrim  2.  Wound vac/wound care    Alba Wilkes MD

## 2021-11-22 NOTE — PROGRESS NOTES
[address] [facility] Adult  Hospitalist Group                                                                                          Hospitalist Progress Note  Chano Tenorio MD  Answering service: [phone] from in house phone        Date of Service:  2021  NAME:  Bogdan Phillips  :  1950  MRN:  [id number]      Admission Summary:   66 y.o F with PMH of CHF,atrial fib came to the hospital due to rt LE wound. Interval history / Subjective:   Patient still confused, wanting to drink more water. Discussed with nurse. Assessment & Plan:     #Right lower extremity ulcer,cellulitis  -recent trauma a month ago causing hematoma and subsequently wound  --CT -7 x 5 cm soft tissue wound is medial to the distal tibia. Underlying cellulitis and ill-defined fluid. No drainable abscess. No fracture or osteomyelitis.   -wound care and gen surg following > s/p surgical debridement  with wound vac in place  -per surgery stable for d/c home with wound vac, plan to keep on until  Friday   -on bactrim PO per ID recs, s/p cefepime and vanco, complete 10 days orally  -blood cultures no growth to date, stenotrophomonas maltophilia/serratia/enterococcus (mixed halle) on culture, bld cx ngtd   -cont wound care, wound vac management per surgery     Acute encephalopathy  -metabolic (hyponatremia) vs delirium vs psych disorder (patient had pressure tangential speech)  -zyprexa prn, SSRI on hold 2/2 hyponatremia   -obtain psych consult   -obtain CT head   -TSH wnl, obtain b12, folate  -rpt UA if + trt for uti, currently already on bactrim   -hyponatremia management per nephro   -zyprexa prn      # Acute on chronic hyponatremia:  Appears to be volume overloaded  124>127 > 124 >124  -IVF stopped > cont on diuresis bumex, unclear why she dropped again she may be taking in more h20 and no following fluid restriction   -Urine sodium 33 and TSH wnl  -IV bumex 1 mg BID, 1.2 L fluid restriction per nephro  _nephrology consulted and following, held SSRI 11/19    # Chronic atrial fib:  -rate controlled on metoprolol, on xarelto  held for surgical wound debbridement    # HTN:  Continue current regimen    #CHronic diastolic heart failure class II:  -continue entresto,metoprolol    #Hypothyroidism:  synthroid       Code status: full  DVT prophylaxis: [address] [address] [facility] discussed with: Patient/Family and Nurse  Anticipated Disposition: Home w/Family  Anticipated Discharge: Greater than 48 hours     Hospital Problems  Date Reviewed: 10/14/2021          Codes Class Noted POA    Open wound of right lower leg ICD-10-CM: S81.801A  ICD-9-CM: 891.0  11/17/2021 Unknown        Hyponatremia ICD-10-CM: E87.1  ICD-9-CM: 276.1  8/2/2017 Unknown                Review of Systems:   Pertinent items are noted in HPI. Vital Signs:    Last 24hrs VS reviewed since prior progress note. Most recent are:  Visit Vitals  /78 (BP 1 Location: Left upper arm, BP Patient Position: At rest)   Pulse 94   Temp 98.1 °F (36.7 °C)   Resp 20   Ht 5' 7\" (1.702 m)   Wt 100 kg (220 lb 7.4 oz)   SpO2 93%   BMI 34.53 kg/m²         Intake/Output Summary (Last 24 hours) at 11/22/2021 1356  Last data filed at 11/22/2021 1004  Gross per 24 hour   Intake 350 ml   Output 950 ml   Net -600 ml        Physical Examination:     I had a face to face encounter with this patient and independently examined them on 11/22/2021 as outlined below:          Constitutional:  No acute distress, cooperative, pleasant    ENT:  Oral mucosa moist, EOMI,anicteric sclera   Resp:  diminished breath sounds b/l ,No accessory muscle use   CV:  irregular rhythm, normal rate, S1,S2 wnl    GI:  Soft, obese, non tender.  normoactive bowel sounds, no hepatosplenomegaly     Musculoskeletal:  rt lower extremity wound wound vac in place, b/l LE edema , RLE is erythematous,warm    Neurologic:  Moves all extremities,  AAOx3            Data Review:    Review and/or order of clinical lab test  Review and/or order of tests in the radiology section of CPT  Review and/or order of tests in the medicine section of CPT      Labs:     Recent Labs     11/22/21 0214 11/21/21  0350   WBC 7.5 6.1   HGB 11.3* 10.7*   HCT 32.7* 32.0*    398     Recent Labs     11/22/21  0214 11/21/21  0350 11/20/21  0516   * 124* 127*   K 3.6 4.0 4.4   CL 84* 88* 92*   CO2 32 28 22   BUN 10 12 12   CREA 0.78 0.73 0.76   * 99 91   CA 9.4 9.2 9.1   MG  --   --  1.9   PHOS  --   --  4.0     No results for input(s): ALT, AP, TBIL, TBILI, TP, ALB, GLOB, GGT, AML, LPSE in the last 72 hours. No lab exists for component: SGOT, GPT, AMYP, HLPSE  No results for input(s): INR, PTP, APTT, INREXT, INREXT in the last 72 hours. No results for input(s): FE, TIBC, PSAT, FERR in the last 72 hours. No results found for: FOL, RBCF   No results for input(s): PH, PCO2, PO2 in the last 72 hours. No results for input(s): CPK, CKNDX, TROIQ in the last 72 hours.     No lab exists for component: CPKMB  Lab Results   Component Value Date/Time    Cholesterol, total 233 (H) 06/15/2021 12:03 PM    HDL Cholesterol 114 06/15/2021 12:03 PM    LDL, calculated 108 (H) 06/15/2021 12:03 PM    LDL, calculated 102 (H) 02/07/2014 11:06 AM    Triglyceride 66 06/15/2021 12:03 PM     No results found for: GLUCPOC  Lab Results   Component Value Date/Time    Color Yellow 02/07/2014 11:06 AM    Appearance Cloudy (A) 02/07/2014 11:06 AM    pH (UA) 7.5 02/07/2014 11:06 AM    Ketone Negative 02/07/2014 11:06 AM    Bilirubin Negative 02/07/2014 11:06 AM    Nitrites Negative 02/07/2014 11:06 AM    Leukocyte Esterase 2+ (A) 02/07/2014 11:06 AM    Bacteria Few 02/07/2014 11:06 AM    WBC 11-30 (A) 02/07/2014 11:06 AM    RBC 0-3 02/07/2014 11:06 AM         Medications Reviewed:     Current Facility-Administered Medications   Medication Dose Route Frequency    hydrALAZINE (APRESOLINE) tablet 50 mg  50 mg Oral TID    bumetanide (BUMEX) tablet 1 mg  1 mg Oral BID    urea (URE-NA) 15 gram packet 1 Packet  1 Packet Oral DAILY    OLANZapine (ZyPREXA) 2.5 mg in sterile water (preservative free) 0.5 mL injection  2.5 mg IntraMUSCular Q8H PRN    fluticasone propionate (FLONASE) 50 mcg/actuation nasal spray 2 Spray  2 Spray Both Nostrils DAILY    hydrocortisone (ALA-MILTON) 1 % lotion   Topical BID PRN    trimethoprim-sulfamethoxazole (BACTRIM DS, SEPTRA DS) 160-800 mg per tablet 1 Tablet  1 Tablet Oral Q12H    sodium chloride (NS) flush 5-40 mL  5-40 mL IntraVENous Q8H    sodium chloride (NS) flush 5-40 mL  5-40 mL IntraVENous PRN    lidocaine (PF) (XYLOCAINE) 10 mg/mL (1 %) injection 0.1 mL  0.1 mL SubCUTAneous PRN    fentaNYL citrate (PF) injection 50 mcg  50 mcg IntraVENous PRN    rivaroxaban (XARELTO) tablet 20 mg  20 mg Oral DAILY WITH BREAKFAST    HYDROmorphone (DILAUDID) injection 1 mg  1 mg IntraVENous Q2H PRN    methyl salicylate-menthol (BENGAY) 15-10 % cream   Topical TID    diazePAM (VALIUM) tablet 2 mg  2 mg Oral QHS PRN    sacubitriL-valsartan (ENTRESTO) 49-51 mg tablet 1 Tablet  1 Tablet Oral BID    levothyroxine (SYNTHROID) tablet 175 mcg  175 mcg Oral ACB    metoprolol tartrate (LOPRESSOR) tablet 50 mg  50 mg Oral BID    [Held by provider] sertraline (ZOLOFT) tablet 50 mg  50 mg Oral DAILY    sodium chloride (NS) flush 5-40 mL  5-40 mL IntraVENous Q8H    sodium chloride (NS) flush 5-40 mL  5-40 mL IntraVENous PRN    acetaminophen (TYLENOL) tablet 650 mg  650 mg Oral Q4H PRN    hydrALAZINE (APRESOLINE) 20 mg/mL injection 10 mg  10 mg IntraVENous Q6H PRN     ______________________________________________________________________  EXPECTED LENGTH OF STAY: 4d 19h  ACTUAL LENGTH OF STAY:          6                 Kateene Rm, MD

## 2021-11-22 NOTE — PROGRESS NOTES
Transition of Care Plan  RUR- 10% Low Risk  DISPOSITION: The disposition plan is home with family assistance. Home with home [address]  [phone] - [facility]. F/U with PCP/Specialist    Transport: [facility]/family      Patient has been recommended for HHPT/OT/Skilled Nursing and wound vac support. [facility]  [phone] - has accepted patient. At 1:04pm - Wound Vac document has been printed and placed at bedside chart. CM perfect served attending.     CM will continue to provide updates as they become available.  CM will continue to follow, provide support and assist with JAYDE needs as they arise     Matheus Evans, MSW, CRM, LMHP-e

## 2021-11-23 LAB
ANION GAP SERPL CALC-SCNC: 9 MMOL/L (ref 5–15)
ANION GAP SERPL CALC-SCNC: 9 MMOL/L (ref 5–15)
BASOPHILS # BLD: 0.1 K/UL (ref 0–0.1)
BASOPHILS NFR BLD: 1 % (ref 0–1)
BUN SERPL-MCNC: 27 MG/DL (ref 6–20)
BUN SERPL-MCNC: 30 MG/DL (ref 6–20)
BUN/CREAT SERPL: 18 (ref 12–20)
BUN/CREAT SERPL: 22 (ref 12–20)
CALCIUM SERPL-MCNC: 9.1 MG/DL (ref 8.5–10.1)
CALCIUM SERPL-MCNC: 9.5 MG/DL (ref 8.5–10.1)
CHLORIDE SERPL-SCNC: 82 MMOL/L (ref 97–108)
CHLORIDE SERPL-SCNC: 82 MMOL/L (ref 97–108)
CO2 SERPL-SCNC: 32 MMOL/L (ref 21–32)
CO2 SERPL-SCNC: 32 MMOL/L (ref 21–32)
COMMENT, HOLDF: NORMAL
CORTIS SERPL-MCNC: 13.6 UG/DL
CREAT SERPL-MCNC: 1.36 MG/DL (ref 0.55–1.02)
CREAT SERPL-MCNC: 1.53 MG/DL (ref 0.55–1.02)
DIFFERENTIAL METHOD BLD: ABNORMAL
EOSINOPHIL # BLD: 0.1 K/UL (ref 0–0.4)
EOSINOPHIL NFR BLD: 1 % (ref 0–7)
ERYTHROCYTE [DISTWIDTH] IN BLOOD BY AUTOMATED COUNT: 13.7 % (ref 11.5–14.5)
GLUCOSE SERPL-MCNC: 132 MG/DL (ref 65–100)
GLUCOSE SERPL-MCNC: 94 MG/DL (ref 65–100)
HCT VFR BLD AUTO: 32.3 % (ref 35–47)
HGB BLD-MCNC: 11 G/DL (ref 11.5–16)
IMM GRANULOCYTES # BLD AUTO: 0 K/UL (ref 0–0.04)
IMM GRANULOCYTES NFR BLD AUTO: 0 % (ref 0–0.5)
LYMPHOCYTES # BLD: 1.2 K/UL (ref 0.8–3.5)
LYMPHOCYTES NFR BLD: 17 % (ref 12–49)
MCH RBC QN AUTO: 32.7 PG (ref 26–34)
MCHC RBC AUTO-ENTMCNC: 34.1 G/DL (ref 30–36.5)
MCV RBC AUTO: 96.1 FL (ref 80–99)
MONOCYTES # BLD: 1 K/UL (ref 0–1)
MONOCYTES NFR BLD: 14 % (ref 5–13)
NEUTS SEG # BLD: 4.6 K/UL (ref 1.8–8)
NEUTS SEG NFR BLD: 67 % (ref 32–75)
NRBC # BLD: 0 K/UL (ref 0–0.01)
NRBC BLD-RTO: 0 PER 100 WBC
PLATELET # BLD AUTO: 296 K/UL (ref 150–400)
PMV BLD AUTO: 8.6 FL (ref 8.9–12.9)
POTASSIUM SERPL-SCNC: 3.7 MMOL/L (ref 3.5–5.1)
POTASSIUM SERPL-SCNC: 3.9 MMOL/L (ref 3.5–5.1)
RBC # BLD AUTO: 3.36 M/UL (ref 3.8–5.2)
SAMPLES BEING HELD,HOLD: NORMAL
SODIUM SERPL-SCNC: 123 MMOL/L (ref 136–145)
SODIUM SERPL-SCNC: 123 MMOL/L (ref 136–145)
WBC # BLD AUTO: 7 K/UL (ref 3.6–11)

## 2021-11-23 PROCEDURE — 36415 COLL VENOUS BLD VENIPUNCTURE: CPT

## 2021-11-23 PROCEDURE — 74011250636 HC RX REV CODE- 250/636: Performed by: SURGERY

## 2021-11-23 PROCEDURE — 85025 COMPLETE CBC W/AUTO DIFF WBC: CPT

## 2021-11-23 PROCEDURE — 74011250637 HC RX REV CODE- 250/637: Performed by: STUDENT IN AN ORGANIZED HEALTH CARE EDUCATION/TRAINING PROGRAM

## 2021-11-23 PROCEDURE — 74011250637 HC RX REV CODE- 250/637: Performed by: INTERNAL MEDICINE

## 2021-11-23 PROCEDURE — 74011250637 HC RX REV CODE- 250/637: Performed by: SURGERY

## 2021-11-23 PROCEDURE — 80048 BASIC METABOLIC PNL TOTAL CA: CPT

## 2021-11-23 PROCEDURE — 97535 SELF CARE MNGMENT TRAINING: CPT

## 2021-11-23 PROCEDURE — 65660000000 HC RM CCU STEPDOWN

## 2021-11-23 RX ADMIN — HYDRALAZINE HYDROCHLORIDE 50 MG: 50 TABLET, FILM COATED ORAL at 17:00

## 2021-11-23 RX ADMIN — HYDROMORPHONE HYDROCHLORIDE 1 MG: 1 INJECTION, SOLUTION INTRAMUSCULAR; INTRAVENOUS; SUBCUTANEOUS at 13:17

## 2021-11-23 RX ADMIN — MENTHOL, METHYL SALICYLATE: 10; 15 CREAM TOPICAL at 21:04

## 2021-11-23 RX ADMIN — FLUTICASONE PROPIONATE 2 SPRAY: 50 SPRAY, METERED NASAL at 09:39

## 2021-11-23 RX ADMIN — Medication 10 ML: at 17:01

## 2021-11-23 RX ADMIN — METOPROLOL TARTRATE 50 MG: 50 TABLET, FILM COATED ORAL at 09:38

## 2021-11-23 RX ADMIN — HYDROMORPHONE HYDROCHLORIDE 1 MG: 1 INJECTION, SOLUTION INTRAMUSCULAR; INTRAVENOUS; SUBCUTANEOUS at 21:04

## 2021-11-23 RX ADMIN — Medication 10 ML: at 21:04

## 2021-11-23 RX ADMIN — SULFAMETHOXAZOLE AND TRIMETHOPRIM 1 TABLET: 800; 160 TABLET ORAL at 21:04

## 2021-11-23 RX ADMIN — METOPROLOL TARTRATE 50 MG: 50 TABLET, FILM COATED ORAL at 21:04

## 2021-11-23 RX ADMIN — Medication 10 ML: at 21:05

## 2021-11-23 RX ADMIN — LEVOTHYROXINE SODIUM 175 MCG: 0.03 TABLET ORAL at 06:48

## 2021-11-23 RX ADMIN — TOLVAPTAN 15 MG: 30 TABLET ORAL at 11:08

## 2021-11-23 RX ADMIN — SULFAMETHOXAZOLE AND TRIMETHOPRIM 1 TABLET: 800; 160 TABLET ORAL at 09:38

## 2021-11-23 RX ADMIN — HYDRALAZINE HYDROCHLORIDE 50 MG: 50 TABLET, FILM COATED ORAL at 21:04

## 2021-11-23 RX ADMIN — HYDRALAZINE HYDROCHLORIDE 50 MG: 50 TABLET, FILM COATED ORAL at 09:38

## 2021-11-23 RX ADMIN — MENTHOL, METHYL SALICYLATE: 10; 15 CREAM TOPICAL at 03:54

## 2021-11-23 RX ADMIN — RIVAROXABAN 20 MG: 20 TABLET, FILM COATED ORAL at 06:49

## 2021-11-23 NOTE — CONSULTS
PSYCHIATRY CONSULT NOTE:    REASON FOR CONSULT: Altered mental status    HISTORY OF PRESENTING COMPLAINT:  Juan Pickett is a 66 y.o. WHITE/NON- female who is currently admitted to the medical floor at [address]. Since admission, Ms. Miguel has been exhibiting altered mental status, confusion, and occasional agitation. Her Zoloft has been discontinued due to hyponatremia. During assessment, Ms. Miguel was calm and cooperative. Her speech was of normal rate and rhythm but she was hyperverbal and spoke without pause. Speech was tangential. She repeatedly insisted \"I'm not cuckoo\" and other such statements. She states she is not feeling depressed or anxious and denies SI/plan/intent or thoughts of harming others. She denied perceptual disturbances but towards the end of assessment gestured to the other side of the room, which was empty, and implied she believed someone was there who she didn't want to overhear what she was saying. PAST PSYCHIATRIC HISTORY:  Ms. Curtis Cook denies a hx of inpatient hospitalization. She denies a hx of suicide attempts or ideation. She denies a hx of perceptual disturbance. She reports she has taken Prozac in the past as well as other medications she cannot remember. She denies a hx of substance abuse. She is  with no children. 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 She reports she lives independently. PAST MEDICAL HISTORY:    Please see H&P for details. 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 Authorizing Provider   metoprolol tartrate (LOPRESSOR) 50 mg tablet Take 50 mg by mouth two (2) times a day.    Yes Provider, Historical butalbital-acetaminophen-caffeine (FIORICET, ESGIC) -40 mg per tablet TAKE 1 TO 2 TABLETS BY MOUTH EVERY 6 HOURS AS NEEDED FOR HEADACHE 9/18/21   Victor Hugo Dumont MD   Entresto 49-51 mg tab tablet Take 1 Tablet by mouth two (2) times a day. 8/16/21   Provider, Historical   ondansetron (ZOFRAN ODT) 4 mg disintegrating tablet Take 1 Tablet by mouth four (4) times daily as needed for Nausea or Vomiting. 9/10/21   Victor Hugo Dumont MD   levothyroxine (SYNTHROID) 175 mcg tablet TAKE 1 TABLET BY MOUTH EVERY DAY BEFORE BREAKFAST 8/16/21   Victor Hugo Dumont MD   Xarelto 20 mg tab tablet TAKE 1 TABLET BY MOUTH EVERY DAY 6/28/21   Victor Hugo Dumont MD   diazePAM (VALIUM) 2 mg tablet TAKE 1 TABLET BY MOUTH EVERY DAY AS NEEDED FOR ANXIETY 6/28/21   Victor Hugo Dumont MD   sertraline (ZOLOFT) 50 mg tablet Take 50 mg by mouth daily. Provider, Historical   spironolactone (ALDACTONE) 25 mg tablet Take  by mouth daily.     Provider, Historical     Vitals:    11/23/21 0600 11/23/21 0800 11/23/21 0929 11/23/21 0958   BP:  113/73     Pulse: 63 72  85   Resp:  19     Temp:  97.7 °F (36.5 °C)     SpO2:  95%     Weight:       Height:   5' 7\" (1.702 m)      Lab Results   Component Value Date/Time    WBC 7.0 11/23/2021 09:50 AM    HGB (POC) 15.1 01/28/2020 09:43 AM    HGB 11.0 (L) 11/23/2021 09:50 AM    HCT (POC) 44.5 01/28/2020 09:43 AM    HCT 32.3 (L) 11/23/2021 09:50 AM    PLATELET 076 22/21/8347 09:50 AM    MCV 96.1 11/23/2021 09:50 AM     Lab Results   Component Value Date/Time    Sodium 121 (L) 11/22/2021 08:32 PM    Potassium 3.7 11/22/2021 08:32 PM    Chloride 81 (L) 11/22/2021 08:32 PM    CO2 31 11/22/2021 08:32 PM    Anion gap 9 11/22/2021 08:32 PM    Glucose 110 (H) 11/22/2021 08:32 PM    BUN 27 (H) 11/22/2021 08:32 PM    Creatinine 1.15 (H) 11/22/2021 08:32 PM    BUN/Creatinine ratio 23 (H) 11/22/2021 08:32 PM    GFR est AA 56 (L) 11/22/2021 08:32 PM    GFR est non-AA 46 (L) 11/22/2021 08:32 PM    Calcium 9.3 11/22/2021 08:32 PM    Bilirubin, total 0.5 11/19/2021 03:55 AM    Alk. phosphatase 109 11/19/2021 03:55 AM    Protein, total 6.4 11/19/2021 03:55 AM    Albumin 3.4 (L) 11/19/2021 03:55 AM    Globulin 3.0 11/19/2021 03:55 AM    A-G Ratio 1.1 11/19/2021 03:55 AM    ALT (SGPT) 23 11/19/2021 03:55 AM    AST (SGOT) 20 11/19/2021 03:55 AM     No results found for: VALF2, VALAC, VALP, VALPR, DS6, CRBAM, CRBAMP, CARB2, XCRBAM  No results found for: LITHM    MENTAL STATUS EXAM:    General appearance: in hospital bed, moderately groomed, psychomotor activity is WNL  Eye contact: appropriate  Speech: tangential, hyperverbal, somewhat pressured. Affect : appropriate  Mood: \"good\"  Thought Process: slightly disorganized  Perception: denies AVH but exhibited possible perceptual disturbance   Thought Content: denies SI/plan/intent  Insight: poor  Judgemnt: poor  Cognition: slight impairment    ASSESSMENT AND PLAN:  Susan Persaud meets criteria for a diagnosis of acute metabolic encephalopathy, unspecified anxiety disorder.  -Recommendations: Continue to hold Zoloft. Recommend avoiding Valium (currently listed as PRN for sleep) as this could further impair mental status and also could potentially lead to disinhibition. Unclear at this time whether pt's hyperverbal/tangential speech is a significant deviation from baseline, but she is clearly still exhibiting altered mental status and confusion. At this time I would recommend continuing with the PRN Zyprexa for agitation, and continuing to monitor for improved mental status as hyponatremia resolves. Thank your your consult. Please feel free to consult us again as needed.

## 2021-11-23 NOTE — PROGRESS NOTES
ID Progress Note  2021    Subjective:     No new complaints noted    Objective:     Antibiotics:  1. Bactrim       Vitals:   Visit Vitals  /69 (BP 1 Location: Left upper arm, BP Patient Position: At rest)   Pulse 79   Temp 98 °F (36.7 °C)   Resp 18   Ht 5' 7\" (1.702 m)   Wt 98.2 kg (216 lb 7.9 oz)   SpO2 96%   BMI 33.91 kg/m²        Tmax:  Temp (24hrs), Av °F (36.7 °C), Min:97.4 °F (36.3 °C), Max:98.4 °F (36.9 °C)      Exam:  Wound vac in place with sero-sanguinous drainage    Labs:      Recent Labs     21  0950 21  0214 21  0350   WBC 7.0  --  7.5 6.1   HGB 11.0*  --  11.3* 10.7*     --  338 398   BUN 30* 27* 10 12   CREA 1.36* 1.15* 0.78 0.73       Cultures:     No results found for: SDES  Lab Results   Component Value Date/Time    Culture result: HEAVY MIXED SKIN HALLE ISOLATED 2021 04:26 PM    Culture result: HEAVY ENTEROBACTER CLOACAE COMPLEX (A) 2021 04:26 PM    Culture result: HEAVY Serratia fonticola (A) 2021 04:26 PM    Culture result: HEAVY PROVIDENCIA RETTGERI (A) 2021 04:26 PM    Culture result: (A) 2021 04:26 PM     HEAVY STENOTROPHOMONAS ([employer]anti [employer] MALTOPHILIA CLSI GUIDELINES DO NOT RECOMMEND [address] TRIMETHOPRIM/SULFAMETHOXAZOLE IS THE DRUG OF CHOICE. Radiology:     Line/Insert Date:           Assessment:     1. RLE hematoma--status post evacuation  2. Polymicrobial halle from wound culture    Objective:     1. Total of 10 days therapy with Bactrim  2.  Wound vac/wound care    Killian Izaguirre MD

## 2021-11-23 NOTE — PROGRESS NOTES
Bedside shift change report given to Uzma Little (oncoming nurse) by Sergio Carr (offgoing nurse). Report included the following information SBAR, Procedure Summary, MAR, Med Rec Status and Cardiac Rhythm NSR.

## 2021-11-23 NOTE — PROGRESS NOTES
[address] [facility]  Hospitalist Group                                                                                          Hospitalist Progress Note  Lana Roman MD  Answering service: [phone] OR [phone] from in house phone        Date of Service:  2021  NAME:  Hali Vaca  :  1950  MRN:  [id number]      Admission Summary:   66 y.o F with PMH of CHF,atrial fib came to the hospital due to rt LE wound. Interval history / Subjective:   Patient seen and examined patient was not confused this morning code tells me that time place and person  Sodium dropped further tolvaptan ordered by nephrology fluid restriction taken off  Holding SSRI     Assessment & Plan:     #Right lower extremity ulcer,cellulitis  -recent trauma a month ago causing hematoma and subsequently wound  --CT -7 x 5 cm soft tissue wound is medial to the distal tibia. Underlying cellulitis and ill-defined fluid. No drainable abscess. No fracture or osteomyelitis.   -wound care and gen surg following > s/p surgical debridement  with wound vac in place  -per surgery stable for d/c home with wound vac, plan to keep on until  Friday   -on bactrim PO per ID recs, s/p cefepime and vanco, complete 10 days orally  -blood cultures no growth to date, stenotrophomonas maltophilia/serratia/enterococcus (mixed halle) on culture, bld cx ngtd   -cont wound care, wound vac management per surgery     Acute metabolic encephalopathy  -metabolic (hyponatremia) vs delirium vs psych disorder (patient had pressure tangential speech)  -zyprexa prn, SSRI on hold 2/2 hyponatremia   -obtain psych consult   -CT head within normal limit  -Review THS B12 folate--normal limit  -rpt UA if + trt for uti, currently already on bactrim    -hyponatremia management per nephro 1 dose of tolvaptan today  -zyprexa prn      # Acute on chronic hyponatremia:  Appears to be volume overloaded  124>127 > 124 >124> 121  -IVF stopped > event per nephrology  -Urine sodium 33 and TSH wnl  -Reticulocyte send fluid restriction per nephrology  -holding SSRI    # Chronic atrial fib:  -rate controlled on metoprolol, on xarelto      # HTN:  Continue current regimen    #CHronic diastolic heart failure class II:  -continue entresto,metoprolol    #Hypothyroidism:  synthroid       Code status: full  DVT prophylaxis: [address] [facility] discussed with: Patient/Family and Nurse  Anticipated Disposition: Home w/Family  Anticipated Discharge: Greater than 48 hours     Hospital Problems  Date Reviewed: 10/14/2021          Codes Class Noted POA    Open wound of right lower leg ICD-10-CM: S81.801A  ICD-9-CM: 891.0  11/17/2021 Unknown        Hyponatremia ICD-10-CM: E87.1  ICD-9-CM: 276.1  8/2/2017 Unknown                Review of Systems:   Pertinent items are noted in HPI. Vital Signs:    Last 24hrs VS reviewed since prior progress note. Most recent are:  Visit Vitals  /73 (BP 1 Location: Right upper arm, BP Patient Position: At rest)   Pulse 72   Temp 97.7 °F (36.5 °C)   Resp 19   Ht 5' 7\" (1.702 m)   Wt 98.2 kg (216 lb 7.9 oz)   SpO2 95%   BMI 33.91 kg/m²         Intake/Output Summary (Last 24 hours) at 11/23/2021 3916  Last data filed at 11/22/2021 2351  Gross per 24 hour   Intake 500 ml   Output    Net 500 ml        Physical Examination:     I had a face to face encounter with this patient and independently examined them on 11/23/2021 as outlined below:          Constitutional:  No acute distress, cooperative, pleasant    ENT:  Oral mucosa moist, EOMI,anicteric sclera   Resp:  diminished breath sounds b/l ,No accessory muscle use   CV:  irregular rhythm, normal rate, S1,S2 wnl    GI:  Soft, obese, non tender.  normoactive bowel sounds, no hepatosplenomegaly     Musculoskeletal:  rt lower extremity wound wound vac in place, b/l LE edema , RLE is erythematous,warm    Neurologic:  Moves all extremities,  AAOx3            Data Review:    Review and/or order of clinical lab test  Review and/or order of tests in the radiology section of CPT  Review and/or order of tests in the medicine section of [facility]      Labs:     Recent Labs     11/22/21 0214 11/21/21  0350   WBC 7.5 6.1   HGB 11.3* 10.7*   HCT 32.7* 32.0*    398     Recent Labs     11/22/21 2032 11/22/21 0214 11/21/21  0350   * 124* 124*   K 3.7 3.6 4.0   CL 81* 84* 88*   CO2 31 32 28   BUN 27* 10 12   CREA 1.15* 0.78 0.73   * 110* 99   CA 9.3 9.4 9.2   URICA 4.2  --   --      No results for input(s): ALT, AP, TBIL, TBILI, TP, ALB, GLOB, GGT, AML, LPSE in the last 72 hours. No lab exists for component: SGOT, GPT, AMYP, HLPSE  No results for input(s): INR, PTP, APTT, INREXT, INREXT in the last 72 hours. No results for input(s): FE, TIBC, PSAT, FERR in the last 72 hours. Lab Results   Component Value Date/Time    Folate 22.5 (H) 11/22/2021 02:14 AM      No results for input(s): PH, PCO2, PO2 in the last 72 hours. No results for input(s): CPK, CKNDX, TROIQ in the last 72 hours.     No lab exists for component: CPKMB  Lab Results   Component Value Date/Time    Cholesterol, total 233 (H) 06/15/2021 12:03 PM    HDL Cholesterol 114 06/15/2021 12:03 PM    LDL, calculated 108 (H) 06/15/2021 12:03 PM    LDL, calculated 102 (H) 02/07/2014 11:06 AM    Triglyceride 66 06/15/2021 12:03 PM     No results found for: Mahin Vega  Lab Results   Component Value Date/Time    Color YELLOW/STRAW 11/22/2021 04:50 PM    Appearance CLEAR 11/22/2021 04:50 PM    Specific gravity 1.007 11/22/2021 04:50 PM    pH (UA) 7.5 11/22/2021 04:50 PM    Protein Negative 11/22/2021 04:50 PM    Glucose Negative 11/22/2021 04:50 PM    Ketone Negative 11/22/2021 04:50 PM    Bilirubin Negative 11/22/2021 04:50 PM    Urobilinogen 0.2 11/22/2021 04:50 PM    Nitrites Negative 11/22/2021 04:50 PM    Leukocyte Esterase Negative 11/22/2021 04:50 PM    Bacteria Few 02/07/2014 11:06 AM    WBC 11-30 (A) 02/07/2014 11:06 AM    RBC 0-3 02/07/2014 11:06 AM         Medications Reviewed:     Current Facility-Administered Medications   Medication Dose Route Frequency    tolvaptan (SAMSCA) tablet (0.5 X 30 MG) 15 mg  15 mg Oral ONCE    hydrALAZINE (APRESOLINE) tablet 50 mg  50 mg Oral TID    OLANZapine (ZyPREXA) 2.5 mg in sterile water (preservative free) 0.5 mL injection  2.5 mg IntraMUSCular Q8H PRN    fluticasone propionate (FLONASE) 50 mcg/actuation nasal spray 2 Spray  2 Spray Both Nostrils DAILY    hydrocortisone (ALA-MILTON) 1 % lotion   Topical BID PRN    trimethoprim-sulfamethoxazole (BACTRIM DS, SEPTRA DS) 160-800 mg per tablet 1 Tablet  1 Tablet Oral Q12H    sodium chloride (NS) flush 5-40 mL  5-40 mL IntraVENous Q8H    sodium chloride (NS) flush 5-40 mL  5-40 mL IntraVENous PRN    lidocaine (PF) (XYLOCAINE) 10 mg/mL (1 %) injection 0.1 mL  0.1 mL SubCUTAneous PRN    fentaNYL citrate (PF) injection 50 mcg  50 mcg IntraVENous PRN    rivaroxaban (XARELTO) tablet 20 mg  20 mg Oral DAILY WITH BREAKFAST    HYDROmorphone (DILAUDID) injection 1 mg  1 mg IntraVENous Q2H PRN    methyl salicylate-menthol (BENGAY) 15-10 % cream   Topical TID    diazePAM (VALIUM) tablet 2 mg  2 mg Oral QHS PRN    levothyroxine (SYNTHROID) tablet 175 mcg  175 mcg Oral ACB    metoprolol tartrate (LOPRESSOR) tablet 50 mg  50 mg Oral BID    [Held by provider] sertraline (ZOLOFT) tablet 50 mg  50 mg Oral DAILY    sodium chloride (NS) flush 5-40 mL  5-40 mL IntraVENous Q8H    sodium chloride (NS) flush 5-40 mL  5-40 mL IntraVENous PRN    acetaminophen (TYLENOL) tablet 650 mg  650 mg Oral Q4H PRN    hydrALAZINE (APRESOLINE) 20 mg/mL injection 10 mg  10 mg IntraVENous Q6H PRN     ______________________________________________________________________  EXPECTED LENGTH OF STAY: 4d 19h  ACTUAL LENGTH OF STAY:          7                 Carlos Alberto Car MD

## 2021-11-23 NOTE — PROGRESS NOTES
Comprehensive Nutrition Assessment    Type and Reason for Visit: Initial, RD nutrition re-screen/LOS    Nutrition Recommendations/Plan:    1. Encourage po intake with all meals with focus on high protein foods and supplements  2. Fluid restriction per renal    Nutrition Assessment:    Pt admitted for hyponatremia. PMHx: afib, depression, HTN, thyroid CA. RL extremity ulcer from cellulitis. S/p debridement with wound VAC placement on 11/19. ID following for abx. Acute encephalopathy with psych consult entered - more clear today per MD notes. Also with hyponatremia with SIADH - tolvaptan rx. Bumex in place. Pt visited today. IN bed eating breakfast with about 25% consumed so far. She reports appetite improving significantly since yesterday since feeling much better overall. Very easily distracted during conversation with limited information obtained before psych visiting for eval. Will add Ensure Compact BID for now for added protein with wound but low fluid content (440kcal, 18g protein). Wt gain with 3+ edema present. UBW closer to 190#. Malnutrition Assessment:  Malnutrition Status: At risk for malnutrition (specify)    Context:  Acute illness     Findings of the 6 clinical characteristics of malnutrition:   Energy Intake:  1 - 75% or less of est energy req for 7 or more days  Weight Loss:  No significant weight loss     Body Fat Loss:  Unable to assess,     Muscle Mass Loss:  Unable to assess,    Fluid Accumulation:  Unable to assess,     Strength:  Normal  strength     Nutritionally Significant Medications: synthroid, tolvaptan; PRN: valium    Estimated Daily Nutrient Needs:  Energy (kcal): 1890-2062kcal (MSJ x 1.2 x 1.1-1.2); Weight Used for Energy Requirements: Admission (89kg)  Protein (g): 89-98g (1-1.1g/kg);  Weight Used for Protein Requirements: Admission (89kg)  Fluid (ml/day): 1200; Method Used for Fluid Requirements:  (per renal )    Nutrition Related Findings:       BM: 3+ BLLE  Edema: 11/19  Wounds:  Full thickness, Wound vac       Current Nutrition Therapies:   Diet: regular  Supplements: none  Meal intake:   Patient Vitals for the past 168 hrs:   % Diet Eaten   11/22/21 1004 76 - 100%   11/18/21 1306 26 - 50%   11/18/21 1009 51 - 75%     Anthropometric Measures:  · Height:  5' 7\" (170.2 cm)  · Current Body Wt:  89 kg (196 lb 3.4 oz) (standing scale on admit)   · Admission Body Wt:       · Usual Body Wt:        · Ideal Body Wt:  135:  145.3 %   Wt Readings:   11/23/21 98.2 kg (216 lb 7.9 oz)   10/14/21 89.8 kg (198 lb)   09/10/21 86.2 kg (190 lb)   06/15/21 85.3 kg (188 lb)   02/22/21 85.3 kg (188 lb)   01/28/20 78.9 kg (174 lb)     Nutrition Diagnosis:   · Inadequate oral intake (improving) related to cognitive or neurological impairment, increased demand for energy/nutrients as evidenced by intake 51-75%, wounds    · Altered nutrition-related lab values related to renal dysfunction as evidenced by lab values    Nutrition Interventions:   Food and/or Nutrient Delivery: Continue current diet, Start oral nutrition supplement  Nutrition Education and Counseling: No recommendations at this time  Coordination of Nutrition Care: Continue to monitor while inpatient    Goals:  Consumption of at least 75% meals and 1-2 supplements/day in 5-7 days       Nutrition Monitoring and Evaluation:   Behavioral-Environmental Outcomes: None identified  Food/Nutrient Intake Outcomes: Food and nutrient intake, Supplement intake  Physical Signs/Symptoms Outcomes: Weight, Biochemical data, Fluid status or edema    Discharge Planning:     Too soon to determine     Ketty Braxton RD  [facility], Contact via [employer]

## 2021-11-23 NOTE — PROGRESS NOTES
RENAL  PROGRESS NOTE        Subjective:   Seen earlier,was resting  Objective:   VITALS SIGNS:    Visit Vitals  /73 (BP 1 Location: Right upper arm, BP Patient Position: At rest)   Pulse 72   Temp 97.7 °F (36.5 °C)   Resp 19   Ht 5' 7\" (1.702 m)   Wt 98.2 kg (216 lb 7.9 oz)   SpO2 95%   BMI 33.91 kg/m²       O2 Device: None (Room air)   O2 Flow Rate (L/min): 2 l/min   Temp (24hrs), Av.9 °F (36.6 °C), Min:97.4 °F (36.3 °C), Max:98.4 °F (36.9 °C)         PHYSICAL EXAM:  resting    DATA REVIEW:     INTAKE / OUTPUT:   Last shift:      No intake/output data recorded. Last 3 shifts:  190 -  0700  In: 500 [P.O.:500]  Out: 950 [Urine:900; Drains:50]    Intake/Output Summary (Last 24 hours) at 2021 0809  Last data filed at 2021 2351  Gross per 24 hour   Intake 500 ml   Output    Net 500 ml         LABS:   Recent Labs     21  0214 21  0350   WBC 7.5 6.1   HGB 11.3* 10.7*   HCT 32.7* 32.0*    398     Recent Labs     21  0214 21  0350   * 124* 124*   K 3.7 3.6 4.0   CL 81* 84* 88*   CO2 31 32 28   * 110* 99   BUN 27* 10 12   CREA 1.15* 0.78 0.73   CA 9.3 9.4 9.2         Assessment:  Hponatremia: Na 121-122 on admission. Suspect  SIADH(also was on zoloft);sodium dropped 121   JANESSA:could be henodynamics,Bactrim effects  RLE wound: s/p surgical debridement and wound vac dressing 88/15/42   Systolic CHF    Hypothyroidism: TSH wnl.  Ct Synthroid   Hypokalemia: mild-> better s/p supplementation   Afib     Plan/Recommendations:    Stop Bumex ,entresto  Stop Ure-Na,not sure if AKImake it not work   One dose Tolvaptan;no FR for 24 hr;repeat sodium   Holding Zoloft   Repeat labs  Age approppriate cancer screen     Chao Gallagher MD

## 2021-11-23 NOTE — PROGRESS NOTES
Problem: Self Care Deficits Care Plan (Adult)  Goal: *Acute Goals and Plan of Care (Insert Text)  Description:   FUNCTIONAL STATUS PRIOR TO ADMISSION: Patient was independent and active without use of DME. Patient was highly independent, retired . Indep in all ADL/IADL including driving. HOME SUPPORT: The patient lived alone with friends/family to provide assistance. Occupational Therapy Goals  Initiated 11/19/2021  1. Patient will perform grooming with independence within 7 day(s). 2.  Patient will perform upper body dressing with independence within 7 day(s). 3.  Patient will perform lower body dressing with independence within 7 day(s). 4.  Patient will perform all aspects of toileting with independence within 7 day(s). 5.  Patient will utilize energy conservation techniques during functional activities with verbal cues within 7 day(s). Outcome: Progressing Towards Goal   OCCUPATIONAL THERAPY TREATMENT  Patient: Raynard Angelucci (56 y.o. female)  Date: 11/23/2021  Diagnosis: Hyponatremia [E87.1]   <principal problem not specified>  Procedure(s) (LRB):  DEBRIDEMENT OF RIGHT LEG ULCER AND VAC PLACEMENT (Right) 4 Days Post-Op  Precautions:  fall  Chart, occupational therapy assessment, plan of care, and goals were reviewed. ASSESSMENT  Patient continues with skilled OT services and is progressing towards goals. Patient extremely tangential in conversation and unwilling to stand EOB today, however agreeable to grooming tasks seated EOB. Patient still with wound vac for RLE ulcer. Patient talking throughout brushing teeth and OT started a time to keep her on track. Patient with decreased attention, generalized weakness, decreased endurance, and decreased activity tolerance. Patient also with intermittent confusion throughout session. Continue to recommend home with [facility] and supervision for all ADL tasks vs SNF for management of wound vac.      Current Level of Function Impacting Discharge (ADLs): did not see patient standing; EOB setup/supervision    Other factors to consider for discharge: cognition/psych         PLAN :  Patient continues to benefit from skilled intervention to address the above impairments. Continue treatment per established plan of care to address goals. Recommend with staff: OOB x 3 to chair with chair alarm     Recommend next OT session: standing ADL    Recommendation for discharge: (in order for the patient to meet his/her long term goals)  Occupational therapy at least 2 days/week in the home AND ensure assist and/or supervision for safety with ADL tasks vs SNF pending needs for management of wound vac    This discharge recommendation:  Has not yet been discussed the attending provider and/or case management    IF patient discharges home will need the following DME: TBD       SUBJECTIVE:   Patient stated You are my erna.     OBJECTIVE DATA SUMMARY:   Cognitive/Behavioral Status:  Neurologic State: Alert  Orientation Level: Oriented X4  Cognition: Appropriate for age attention/concentration  Perception: Appears intact  Perseveration: Perseverates during conversation  Safety/Judgement: Insight into deficits    Functional Mobility and Transfers for ADLs:  Bed Mobility:  Supine to Sit: Modified independent  Sit to Supine: Independent    Transfers:  Sit to Stand:  (refused to attempt)          Balance:  Sitting: Intact;  Without support  Standing:  (refused to attempt)    ADL Intervention:       Grooming  Position Performed: Seated edge of bed  Brushing Teeth: Supervision; Set-up                   Lower Body Dressing Assistance  Socks: Supervision         Cognitive Retraining  Safety/Judgement: Insight into deficits    Pain:  No complaints    Activity Tolerance:   Fair and requires rest breaks    After treatment patient left in no apparent distress:   Supine in bed, Call bell within reach, and Bed / chair alarm activated    COMMUNICATION/COLLABORATION:   The patients plan of care was discussed with: Physical therapist and Registered nurse.      Sarath Pineda  Time Calculation: 34 mins

## 2021-11-23 NOTE — PROGRESS NOTES
Attempted therapy, pt has just performed OT and does not wish to participate, states \"I am already late for my afternoon nap\" Will continue to follow    Rubio Piper, DPT, PT

## 2021-11-24 LAB
ANION GAP SERPL CALC-SCNC: 10 MMOL/L (ref 5–15)
BASOPHILS # BLD: 0.1 K/UL (ref 0–0.1)
BASOPHILS NFR BLD: 1 % (ref 0–1)
BUN SERPL-MCNC: 29 MG/DL (ref 6–20)
BUN/CREAT SERPL: 18 (ref 12–20)
CALCIUM SERPL-MCNC: 9.6 MG/DL (ref 8.5–10.1)
CHLORIDE SERPL-SCNC: 83 MMOL/L (ref 97–108)
CO2 SERPL-SCNC: 31 MMOL/L (ref 21–32)
CREAT SERPL-MCNC: 1.62 MG/DL (ref 0.55–1.02)
DIFFERENTIAL METHOD BLD: ABNORMAL
EOSINOPHIL # BLD: 0.1 K/UL (ref 0–0.4)
EOSINOPHIL NFR BLD: 1 % (ref 0–7)
ERYTHROCYTE [DISTWIDTH] IN BLOOD BY AUTOMATED COUNT: 13.5 % (ref 11.5–14.5)
GLUCOSE SERPL-MCNC: 109 MG/DL (ref 65–100)
HCT VFR BLD AUTO: 33.4 % (ref 35–47)
HGB BLD-MCNC: 11.5 G/DL (ref 11.5–16)
IMM GRANULOCYTES # BLD AUTO: 0 K/UL (ref 0–0.04)
IMM GRANULOCYTES NFR BLD AUTO: 0 % (ref 0–0.5)
LYMPHOCYTES # BLD: 1.2 K/UL (ref 0.8–3.5)
LYMPHOCYTES NFR BLD: 17 % (ref 12–49)
MCH RBC QN AUTO: 32.4 PG (ref 26–34)
MCHC RBC AUTO-ENTMCNC: 34.4 G/DL (ref 30–36.5)
MCV RBC AUTO: 94.1 FL (ref 80–99)
MONOCYTES # BLD: 1 K/UL (ref 0–1)
MONOCYTES NFR BLD: 15 % (ref 5–13)
NEUTS SEG # BLD: 4.3 K/UL (ref 1.8–8)
NEUTS SEG NFR BLD: 66 % (ref 32–75)
NRBC # BLD: 0 K/UL (ref 0–0.01)
NRBC BLD-RTO: 0 PER 100 WBC
PLATELET # BLD AUTO: 345 K/UL (ref 150–400)
PMV BLD AUTO: 8.6 FL (ref 8.9–12.9)
POTASSIUM SERPL-SCNC: 3.9 MMOL/L (ref 3.5–5.1)
RBC # BLD AUTO: 3.55 M/UL (ref 3.8–5.2)
SODIUM SERPL-SCNC: 124 MMOL/L (ref 136–145)
WBC # BLD AUTO: 6.6 K/UL (ref 3.6–11)

## 2021-11-24 PROCEDURE — 74011250636 HC RX REV CODE- 250/636: Performed by: SURGERY

## 2021-11-24 PROCEDURE — 97530 THERAPEUTIC ACTIVITIES: CPT

## 2021-11-24 PROCEDURE — 36415 COLL VENOUS BLD VENIPUNCTURE: CPT

## 2021-11-24 PROCEDURE — 85025 COMPLETE CBC W/AUTO DIFF WBC: CPT

## 2021-11-24 PROCEDURE — 97116 GAIT TRAINING THERAPY: CPT

## 2021-11-24 PROCEDURE — 97535 SELF CARE MNGMENT TRAINING: CPT

## 2021-11-24 PROCEDURE — 80048 BASIC METABOLIC PNL TOTAL CA: CPT

## 2021-11-24 PROCEDURE — 65660000000 HC RM CCU STEPDOWN

## 2021-11-24 PROCEDURE — 74011250637 HC RX REV CODE- 250/637: Performed by: HOSPITALIST

## 2021-11-24 PROCEDURE — 74011250637 HC RX REV CODE- 250/637: Performed by: SURGERY

## 2021-11-24 PROCEDURE — 99231 SBSQ HOSP IP/OBS SF/LOW 25: CPT | Performed by: SURGERY

## 2021-11-24 PROCEDURE — 74011250637 HC RX REV CODE- 250/637: Performed by: INTERNAL MEDICINE

## 2021-11-24 PROCEDURE — 74011250637 HC RX REV CODE- 250/637: Performed by: STUDENT IN AN ORGANIZED HEALTH CARE EDUCATION/TRAINING PROGRAM

## 2021-11-24 RX ADMIN — SULFAMETHOXAZOLE AND TRIMETHOPRIM 1 TABLET: 800; 160 TABLET ORAL at 21:32

## 2021-11-24 RX ADMIN — HYDROMORPHONE HYDROCHLORIDE 1 MG: 1 INJECTION, SOLUTION INTRAMUSCULAR; INTRAVENOUS; SUBCUTANEOUS at 13:02

## 2021-11-24 RX ADMIN — FLUTICASONE PROPIONATE 2 SPRAY: 50 SPRAY, METERED NASAL at 08:49

## 2021-11-24 RX ADMIN — Medication 10 ML: at 07:10

## 2021-11-24 RX ADMIN — METOPROLOL TARTRATE 50 MG: 50 TABLET, FILM COATED ORAL at 08:49

## 2021-11-24 RX ADMIN — Medication 10 ML: at 22:31

## 2021-11-24 RX ADMIN — MENTHOL, METHYL SALICYLATE: 10; 15 CREAM TOPICAL at 22:26

## 2021-11-24 RX ADMIN — HYDRALAZINE HYDROCHLORIDE 50 MG: 50 TABLET, FILM COATED ORAL at 08:49

## 2021-11-24 RX ADMIN — MENTHOL, METHYL SALICYLATE: 10; 15 CREAM TOPICAL at 16:21

## 2021-11-24 RX ADMIN — MENTHOL, METHYL SALICYLATE: 10; 15 CREAM TOPICAL at 08:49

## 2021-11-24 RX ADMIN — LEVOTHYROXINE SODIUM 175 MCG: 0.03 TABLET ORAL at 07:10

## 2021-11-24 RX ADMIN — DIAZEPAM 2 MG: 2 TABLET ORAL at 21:32

## 2021-11-24 RX ADMIN — ACETAMINOPHEN 650 MG: 325 TABLET ORAL at 21:32

## 2021-11-24 RX ADMIN — SULFAMETHOXAZOLE AND TRIMETHOPRIM 1 TABLET: 800; 160 TABLET ORAL at 08:49

## 2021-11-24 RX ADMIN — RIVAROXABAN 15 MG: 15 TABLET, FILM COATED ORAL at 07:10

## 2021-11-24 RX ADMIN — Medication 10 ML: at 22:30

## 2021-11-24 RX ADMIN — METOPROLOL TARTRATE 50 MG: 50 TABLET, FILM COATED ORAL at 21:00

## 2021-11-24 NOTE — PROGRESS NOTES
I prayed with Debora Pichardo and celebrated with her the [address].   Father Corry Henriquezman

## 2021-11-24 NOTE — PROGRESS NOTES
Surgery Progress Note    11/24/2021    Admit Date: 11/16/2021    CC: Right leg pain    11/19 Debridement right leg ulcer and vac placement    Subjective:     Improved mentation. Asking good questions. Constitutional: No fever or chills  Neurologic: No headache  Eyes: No scleral icterus or irritated eyes  Nose: No nasal pain or drainage  Mouth: No oral lesions or sore throat  Cardiac: No palpations or chest pain  Pulmonary: No cough or shortness of breath  Gastrointestinal:No nausea, emesis, diarrhea, or constipation  Genitourinary: No dysuria  Musculoskeletal: Right leg pain  Skin: No rashes or lesions  Psychiatric: Calm    Objective:     Visit Vitals  /83 (BP 1 Location: Left upper arm, BP Patient Position: At rest)   Pulse 68   Temp 98.1 °F (36.7 °C)   Resp 18   Ht 5' 7\" (1.702 m)   Wt 205 lb 0.4 oz (93 kg)   SpO2 95%   BMI 32.11 kg/m²       General: No acute distress, conversant  Eyes: PERRLA, no scleral icterus  HENT: Normocephalic without oral lesions  Neck: Trachea midline without LAD  Cardiac: Normal pulse rate and rhythm  Pulmonary: Symmetric chest rise with normal effort  GI: Soft, NT, ND, no hernia, no splenomegaly  Skin: Warm without rash  Extremities: Right leg wrapped with vac in place. Psych: Anxious and GCS 15    Labs, vital signs, and I/O reviewed. Assessment:     65 y/o F with traumatic right leg ulcer status post debridement. Plan:     Mentally sound today  PRN pain control  Reg diet. On bactrim per ID  Plan vac change Friday with wound care. Can be discharged with wound care when cleared by medical team with twice weekly vac changes. Paperwork signed on chart. Follow up with me in 3-4 weeks in office.     Yusuf Montes De Oca MD  Bariatric and General Surgeon  [facility]

## 2021-11-24 NOTE — PROGRESS NOTES
[facility]                                                                                          Hospitalist Progress Note  Jama Melgar MD  Answering service: [phone] OR [phone] from in house phone        Date of Service:  2021  NAME:  Mine Huang  :  1950  MRN:  [id number]      Admission Summary:   66 y.o F with PMH of CHF,atrial fib came to the hospital due to rt LE wound. Interval history / Subjective:   Patient seen and examined patient was not confused this morning   Sodium slightly improved with 1 dose of tolvaptan Bactrim will be done tomorrow for leg wound     Assessment & Plan:     #Right lower extremity ulcer,cellulitis  -recent trauma a month ago causing hematoma and subsequently wound  --CT -7 x 5 cm soft tissue wound is medial to the distal tibia. Underlying cellulitis and ill-defined fluid. No drainable abscess. No fracture or osteomyelitis.   -wound care and gen surg following > s/p surgical debridement  with wound vac in place  -per surgery stable for d/c home with wound vac, plan to keep on until  Friday   -on bactrim PO per ID recs, s/p cefepime and vanco, complete 10 days orally last day tomorrow  -blood cultures no growth to date, stenotrophomonas maltophilia/serratia/enterococcus (mixed halle) on culture, bld cx ngtd   -cont wound care, wound vac management per surgery     Acute metabolic encephalopathy  -metabolic (hyponatremia) vs delirium vs psych disorder (patient had pressure tangential speech)  -zyprexa prn, SSRI on hold 2/2 hyponatremia   -obtain psych consult preceded recommendation  -CT head within normal limit  -Review THS B12 folate--normal limit  -rpt UA if + trt for uti, currently already on bactrim    -hyponatremia management per nephro 1 dose of tolvaptan today sodium 124      # Acute on chronic hyponatremia:  Appears to be volume overloaded  124>127 > 124 >124> 121>124  -IVF stopped > event per nephrology  -Urine sodium 33 and TSH wnl  -Reticulocyte send fluid restriction per nephrology  -holding SSRI    # Chronic atrial fib:  -rate controlled on metoprolol, on xarelto      # HTN:  Continue current regimen    #CHronic diastolic heart failure class II:  -continue entresto,metoprolol    #Hypothyroidism:  synthroid       Code status: full  DVT prophylaxis: [facility] discussed with: Patient/Family and Nurse  Anticipated Disposition: Home w/Family  Anticipated Discharge: Greater than 48 hours     Hospital Problems  Date Reviewed: 10/14/2021          Codes Class Noted POA    Open wound of right lower leg ICD-10-CM: S81.801A  ICD-9-CM: 891.0  11/17/2021 Unknown        Hyponatremia ICD-10-CM: E87.1  ICD-9-CM: 276.1  8/2/2017 Unknown                Review of Systems:   Pertinent items are noted in HPI. Vital Signs:    Last 24hrs VS reviewed since prior progress note. Most recent are:  Visit Vitals  /61 (BP 1 Location: Left upper arm, BP Patient Position: Sitting)   Pulse 84   Temp 98 °F (36.7 °C)   Resp 18   Ht 5' 7\" (1.702 m)   Wt 93 kg (205 lb 0.4 oz)   SpO2 95%   BMI 32.11 kg/m²         Intake/Output Summary (Last 24 hours) at 11/24/2021 1056  Last data filed at 11/24/2021 1015  Gross per 24 hour   Intake 120 ml   Output 400 ml   Net -280 ml        Physical Examination:     I had a face to face encounter with this patient and independently examined them on 11/24/2021 as outlined below:          Constitutional:  No acute distress, cooperative, pleasant    ENT:  Oral mucosa moist, EOMI,anicteric sclera   Resp:  diminished breath sounds b/l ,No accessory muscle use   CV:  irregular rhythm, normal rate, S1,S2 wnl    GI:  Soft, obese, non tender.  normoactive bowel sounds, no hepatosplenomegaly     Musculoskeletal:  rt lower extremity wound wound vac in place, b/l LE edema , RLE is erythematous,warm    Neurologic:  Moves all extremities,  AAOx3            Data Review:    Review and/or order of clinical lab test  Review and/or order of tests in the radiology section of CPT  Review and/or order of tests in the medicine section of CPT      Labs:     Recent Labs     11/24/21  0411 11/23/21  0950   WBC 6.6 7.0   HGB 11.5 11.0*   HCT 33.4* 32.3*    296     Recent Labs     11/24/21  0411 11/23/21  1809 11/23/21  0950 11/22/21 2032 11/22/21 2032   * 123* 123*   < > 121*   K 3.9 3.7 3.9   < > 3.7   CL 83* 82* 82*   < > 81*   CO2 31 32 32   < > 31   BUN 29* 27* 30*   < > 27*   CREA 1.62* 1.53* 1.36*   < > 1.15*   * 132* 94   < > 110*   CA 9.6 9.5 9.1   < > 9.3   URICA  --   --   --   --  4.2    < > = values in this interval not displayed. No results for input(s): ALT, AP, TBIL, TBILI, TP, ALB, GLOB, GGT, AML, LPSE in the last 72 hours. No lab exists for component: SGOT, GPT, AMYP, HLPSE  No results for input(s): INR, PTP, APTT, INREXT, INREXT in the last 72 hours. No results for input(s): FE, TIBC, PSAT, FERR in the last 72 hours. Lab Results   Component Value Date/Time    Folate 22.5 (H) 11/22/2021 02:14 AM      No results for input(s): PH, PCO2, PO2 in the last 72 hours. No results for input(s): CPK, CKNDX, TROIQ in the last 72 hours.     No lab exists for component: CPKMB  Lab Results   Component Value Date/Time    Cholesterol, total 233 (H) 06/15/2021 12:03 PM    HDL Cholesterol 114 06/15/2021 12:03 PM    LDL, calculated 108 (H) 06/15/2021 12:03 PM    LDL, calculated 102 (H) 02/07/2014 11:06 AM    Triglyceride 66 06/15/2021 12:03 PM     No results found for: [facility]  Lab Results   Component Value Date/Time    Color YELLOW/STRAW 11/22/2021 04:50 PM    Appearance CLEAR 11/22/2021 04:50 PM    Specific gravity 1.007 11/22/2021 04:50 PM    pH (UA) 7.5 11/22/2021 04:50 PM    Protein Negative 11/22/2021 04:50 PM    Glucose Negative 11/22/2021 04:50 PM    Ketone Negative 11/22/2021 04:50 PM    Bilirubin Negative 11/22/2021 04:50 PM    Urobilinogen 0.2 11/22/2021 04:50 PM    Nitrites Negative 11/22/2021 04:50 PM    Leukocyte Esterase Negative 11/22/2021 04:50 PM    Bacteria Few 02/07/2014 11:06 AM    WBC 11-30 (A) 02/07/2014 11:06 AM    RBC 0-3 02/07/2014 11:06 AM         Medications Reviewed:     Current Facility-Administered Medications   Medication Dose Route Frequency    rivaroxaban (XARELTO) tablet 15 mg  15 mg Oral DAILY WITH BREAKFAST    hydrALAZINE (APRESOLINE) tablet 50 mg  50 mg Oral TID    OLANZapine (ZyPREXA) 2.5 mg in sterile water (preservative free) 0.5 mL injection  2.5 mg IntraMUSCular Q8H PRN    fluticasone propionate (FLONASE) 50 mcg/actuation nasal spray 2 Spray  2 Spray Both Nostrils DAILY    hydrocortisone (ALA-MILTON) 1 % lotion   Topical BID PRN    trimethoprim-sulfamethoxazole (BACTRIM DS, SEPTRA DS) 160-800 mg per tablet 1 Tablet  1 Tablet Oral Q12H    sodium chloride (NS) flush 5-40 mL  5-40 mL IntraVENous Q8H    lidocaine (PF) (XYLOCAINE) 10 mg/mL (1 %) injection 0.1 mL  0.1 mL SubCUTAneous PRN    HYDROmorphone (DILAUDID) injection 1 mg  1 mg IntraVENous Q2H PRN    methyl salicylate-menthol (BENGAY) 15-10 % cream   Topical TID    diazePAM (VALIUM) tablet 2 mg  2 mg Oral QHS PRN    levothyroxine (SYNTHROID) tablet 175 mcg  175 mcg Oral ACB    metoprolol tartrate (LOPRESSOR) tablet 50 mg  50 mg Oral BID    [Held by provider] sertraline (ZOLOFT) tablet 50 mg  50 mg Oral DAILY    sodium chloride (NS) flush 5-40 mL  5-40 mL IntraVENous Q8H    sodium chloride (NS) flush 5-40 mL  5-40 mL IntraVENous PRN    acetaminophen (TYLENOL) tablet 650 mg  650 mg Oral Q4H PRN    hydrALAZINE (APRESOLINE) 20 mg/mL injection 10 mg  10 mg IntraVENous Q6H PRN     ______________________________________________________________________  EXPECTED LENGTH OF STAY: 4d 19h  ACTUAL LENGTH OF STAY:          8                 Yelena Mohan MD

## 2021-11-24 NOTE — PROGRESS NOTES
RENAL  PROGRESS NOTE        Subjective:   Feels ok  Objective:   VITALS SIGNS:    Visit Vitals  /61 (BP 1 Location: Left upper arm, BP Patient Position: Sitting)   Pulse 84   Temp 98 °F (36.7 °C)   Resp 18   Ht 5' 7\" (1.702 m)   Wt 93 kg (205 lb 0.4 oz)   SpO2 95%   BMI 32.11 kg/m²       O2 Device: None (Room air)   O2 Flow Rate (L/min): 2 l/min   Temp (24hrs), Av °F (36.7 °C), Min:97.6 °F (36.4 °C), Max:98.4 °F (36.9 °C)         PHYSICAL EXAM:   NAD    DATA REVIEW:     INTAKE / OUTPUT:   Last shift:      No intake/output data recorded. Last 3 shifts:  190 -  0700  In: 150 [P.O.:150]  Out: -   No intake or output data in the 24 hours ending 21 0935      LABS:   Recent Labs     21  0411 21  0950 21  0214   WBC 6.6 7.0 7.5   HGB 11.5 11.0* 11.3*   HCT 33.4* 32.3* 32.7*    296 338     Recent Labs     21  0411 21  1809 21  0950   * 123* 123*   K 3.9 3.7 3.9   CL 83* 82* 82*   CO2 31 32 32   * 132* 94   BUN 29* 27* 30*   CREA 1.62* 1.53* 1.36*   CA 9.6 9.5 9.1         Assessment:  Hponatremia: Na 121-122 on admission.   Suspect  SIADH(also was on zoloft);sodium dropped 121   JANESSA:could be henodynamics,Bactrim effects  RLE wound: s/p surgical debridement and wound vac dressing    Systolic CHF     Hypokalemia: mild-> better s/p supplementation   Afib     Plan/Recommendations:    S/p Stop Bumex ,entresto   One dose Tolvaptan on 21 ,sodium better but JANESSA will affect free water clearance   Holding Zoloft   Bladder scan  Bactrim will be done tomorrow     Age approppriate cancer screen     Aura Alvarez MD

## 2021-11-24 NOTE — PROGRESS NOTES
Bedside and Verbal shift change report given to Citlali (oncoming nurse) by Robert Perry (offgoing nurse). Report included the following information SBAR, Kardex, MAR and Recent Results.

## 2021-11-24 NOTE — PROGRESS NOTES
Problem: Mobility Impaired (Adult and Pediatric)  Goal: *Acute Goals and Plan of Care (Insert Text)  Description: FUNCTIONAL STATUS PRIOR TO ADMISSION: Patient was independent and active without use of DME.    HOME SUPPORT PRIOR TO ADMISSION: The patient lived alone with siblings in area to provide assistance, if needed    Physical Therapy Goals  Initiated 11/17/2021  1. Patient will transfer from bed to chair and chair to bed with independence using the least restrictive device within 7 day(s). 2.  Patient will perform sit to stand with independence within 7 day(s). 3.  Patient will ambulate with independence for 250 feet with the least restrictive device within 7 day(s). Outcome: Progressing Towards Goal     PHYSICAL THERAPY TREATMENT  Patient: Nesha Sol (69 y.o. female)  Date: 11/24/2021  Diagnosis: Hyponatremia [E87.1]   <principal problem not specified>  Procedure(s) (LRB):  DEBRIDEMENT OF RIGHT LEG ULCER AND VAC PLACEMENT (Right) 5 Days Post-Op  Precautions:    Chart, physical therapy assessment, plan of care and goals were reviewed. ASSESSMENT  Patient continues with skilled PT services and is progressing towards goals. Pt tolerates supine to standing with heavy distraction, tangental conversation, and increased attention to task. Pt able to stand with perseveration on lines/leads and requires firm redirection to task for safety. Pt able to perform gait training for 20ft with cues for staying upright, attention to RW, increased safety with RW use. Pt remains highly distractable but redirectable. Pt left with call bell and OT at the side of the bed. Current Level of Function Impacting Discharge (mobility/balance): CGA-SBA    Other factors to consider for discharge:          PLAN :  Patient continues to benefit from skilled intervention to address the above impairments. Continue treatment per established plan of care. to address goals.     Recommendation for discharge: (in order for the patient to meet his/her long term goals)  Physical therapy at least 2 days/week in the home     This discharge recommendation:  Has been made in collaboration with the attending provider and/or case management    IF patient discharges home will need the following DME: to be determined (TBD)       SUBJECTIVE:   Patient stated I guess I do need to do more, I was telling the doctor that when he told me I wasn't doing enough this morning, but I dont know if I can do anything!  Circular logic and quick to tangent in conversation, requires max cues and encouragement    OBJECTIVE DATA SUMMARY:   Critical Behavior:  Neurologic State: Alert  Orientation Level: Oriented X4  Cognition: Appropriate for age attention/concentration  Safety/Judgement: Insight into deficits  Functional Mobility Training:  Bed Mobility:     Supine to Sit: Contact guard assistance  Sit to Supine: Supervision  Scooting: Contact guard assistance; Supervision        Transfers:  Sit to Stand: Contact guard assistance  Stand to Sit: Contact guard assistance        Bed to Chair: Contact guard assistance                    Balance:  Sitting: Intact  Standing: Impaired; Without support  Standing - Static: Fair; Good; Constant support  Standing - Dynamic : Fair; Good; Constant support  Ambulation/Gait Training:  Distance (ft): 20 Feet (ft) (20+20)  Assistive Device: Gait belt; Walker, rolling  Ambulation - Level of Assistance: Stand-by assistance        Gait Abnormalities: Decreased step clearance (RW ahead of CHARO)        Base of Support: Widened     Speed/Margarita: Pace decreased (<100 feet/min); Shuffled           Pain Rating:  C/o itching, no pain    Activity Tolerance:   Good, Fair and requires rest breaks    After treatment patient left in no apparent distress:   Call bell within reach and sitting EOB with OT    COMMUNICATION/COLLABORATION:   The patients plan of care was discussed with: Registered nurse and Case management.      Debora Lam PT   Time Calculation: 45 mins

## 2021-11-24 NOTE — PROGRESS NOTES
Problem: Self Care Deficits Care Plan (Adult)  Goal: *Acute Goals and Plan of Care (Insert Text)  Description:   FUNCTIONAL STATUS PRIOR TO ADMISSION: Patient was independent and active without use of DME. Patient was highly independent, retired . Indep in all ADL/IADL including driving. HOME SUPPORT: The patient lived alone with friends/family to provide assistance. Occupational Therapy Goals  Initiated 11/19/2021  1. Patient will perform grooming with independence within 7 day(s). 2.  Patient will perform upper body dressing with independence within 7 day(s). 3.  Patient will perform lower body dressing with independence within 7 day(s). 4.  Patient will perform all aspects of toileting with independence within 7 day(s). 5.  Patient will utilize energy conservation techniques during functional activities with verbal cues within 7 day(s). Outcome: Progressing Towards Goal   OCCUPATIONAL THERAPY TREATMENT  Patient: Claude Lux (89 y.o. female)  Date: 11/24/2021  Diagnosis: Hyponatremia [E87.1]   <principal problem not specified>  Procedure(s) (LRB):  DEBRIDEMENT OF RIGHT LEG ULCER AND VAC PLACEMENT (Right) 5 Days Post-Op  Precautions:  fall   Chart, occupational therapy assessment, plan of care, and goals were reviewed. ASSESSMENT  Patient continues with skilled OT services and is progressing towards goals. Patient received on commode with PT and required CGA for pericare and clothing management during toileting tasks. Patient then required multi-modal cues for utilizing walker for safety to mobilize to sink for grooming task. Patient then utilized walker for functional mobility to EOB to complete LB dressing with MIN A for management of wound vac. Continue to recommend [address] [place] [address] with supervision for safety.      Current Level of Function Impacting Discharge (ADLs): up to MIN A    Other factors to consider for discharge: safety concerns; cognitive concerns         PLAN :  Patient continues to benefit from skilled intervention to address the above impairments. Continue treatment per established plan of care to address goals. Recommend with staff: OOB x 3 to chair    Recommend next OT session: LB dressing     Recommendation for discharge: (in order for the patient to meet his/her long term goals)  Occupational therapy at least 2 days/week in the home AND ensure assist and/or supervision for safety with ADLs and IADLs as needed    This discharge recommendation:  Has not yet been discussed the attending provider and/or case management    IF patient discharges home will need the following DME: TBD       SUBJECTIVE:   Patient stated Do you like [place] Remitly or Remitly better? Thor Lias    OBJECTIVE DATA SUMMARY:   Cognitive/Behavioral Status:  Neurologic State: Alert  Orientation Level: Appropriate for age  Cognition: Appropriate for age attention/concentration  Perception: Appears intact  Perseveration: No perseveration noted  Safety/Judgement: Insight into deficits    Functional Mobility and Transfers for ADLs:  Bed Mobility:  Supine to Sit: Contact guard assistance  Sit to Supine: Supervision  Scooting: Contact guard assistance; Supervision    Transfers:  Sit to Stand: Contact guard assistance  Functional Transfers  Toilet Transfer : Contact guard assistance  Bed to Chair: Contact guard assistance    Balance:  Sitting: Intact  Standing: Impaired;  Without support  Standing - Static: Fair; Constant support  Standing - Dynamic : Fair; Constant support    ADL Intervention:       Grooming  Position Performed: Standing  Washing Hands: Supervision                   Lower Body Dressing Assistance  Underpants: Minimum assistance (for management of wound vac)    Toileting  Toileting Assistance: Contact guard assistance  Bladder Hygiene: Contact guard assistance    Cognitive Retraining  Safety/Judgement: Insight into deficits      Pain:  No complaints    Activity Tolerance:   Fair and requires rest breaks    After treatment patient left in no apparent distress:   Supine in bed, Call bell within reach, and Caregiver / family present    COMMUNICATION/COLLABORATION:   The patients plan of care was discussed with: Physical therapist and Registered nurse.      Sarath Pineda  Time Calculation: 30 mins

## 2021-11-24 NOTE — PROGRESS NOTES
Transition of Care Plan  RUR- 10% Low Risk  DISPOSITION: The disposition plan is home with family assistance. Home with home [address], [phone]) [phone] - [facility]. F/U with PCP/Specialist    Transport: Family    Patient has been recommended for HHPT/OT/Skilled Nursing and wound vac support. [facility]  [phone] - has accepted patient. Wound vac documentation was provided at bedside chart on 11/22/21 for attending and wound care nurse to sign. It is reported that patient will likely discharge home on Friday.     CM will continue to provide updates as they become available.  CM will continue to follow, provide support and assist with JAYDE needs as they arise     JSOE Sanderson, CRM, LMHP-e

## 2021-11-25 LAB
ANION GAP SERPL CALC-SCNC: 5 MMOL/L (ref 5–15)
ANION GAP SERPL CALC-SCNC: 9 MMOL/L (ref 5–15)
BASOPHILS # BLD: 0.1 K/UL (ref 0–0.1)
BASOPHILS NFR BLD: 1 % (ref 0–1)
BUN SERPL-MCNC: 29 MG/DL (ref 6–20)
BUN SERPL-MCNC: 30 MG/DL (ref 6–20)
BUN/CREAT SERPL: 17 (ref 12–20)
BUN/CREAT SERPL: 19 (ref 12–20)
CALCIUM SERPL-MCNC: 8.9 MG/DL (ref 8.5–10.1)
CALCIUM SERPL-MCNC: 9.2 MG/DL (ref 8.5–10.1)
CHLORIDE SERPL-SCNC: 83 MMOL/L (ref 97–108)
CHLORIDE SERPL-SCNC: 85 MMOL/L (ref 97–108)
CO2 SERPL-SCNC: 30 MMOL/L (ref 21–32)
CO2 SERPL-SCNC: 33 MMOL/L (ref 21–32)
CREAT SERPL-MCNC: 1.6 MG/DL (ref 0.55–1.02)
CREAT SERPL-MCNC: 1.67 MG/DL (ref 0.55–1.02)
DIFFERENTIAL METHOD BLD: ABNORMAL
EOSINOPHIL # BLD: 0.1 K/UL (ref 0–0.4)
EOSINOPHIL NFR BLD: 1 % (ref 0–7)
ERYTHROCYTE [DISTWIDTH] IN BLOOD BY AUTOMATED COUNT: 13.6 % (ref 11.5–14.5)
GLUCOSE SERPL-MCNC: 102 MG/DL (ref 65–100)
GLUCOSE SERPL-MCNC: 122 MG/DL (ref 65–100)
HCT VFR BLD AUTO: 30.9 % (ref 35–47)
HGB BLD-MCNC: 10.8 G/DL (ref 11.5–16)
IMM GRANULOCYTES # BLD AUTO: 0 K/UL (ref 0–0.04)
IMM GRANULOCYTES NFR BLD AUTO: 0 % (ref 0–0.5)
LYMPHOCYTES # BLD: 1.2 K/UL (ref 0.8–3.5)
LYMPHOCYTES NFR BLD: 17 % (ref 12–49)
MCH RBC QN AUTO: 32.6 PG (ref 26–34)
MCHC RBC AUTO-ENTMCNC: 35 G/DL (ref 30–36.5)
MCV RBC AUTO: 93.4 FL (ref 80–99)
MONOCYTES # BLD: 1.1 K/UL (ref 0–1)
MONOCYTES NFR BLD: 15 % (ref 5–13)
NEUTS SEG # BLD: 4.7 K/UL (ref 1.8–8)
NEUTS SEG NFR BLD: 66 % (ref 32–75)
NRBC # BLD: 0 K/UL (ref 0–0.01)
NRBC BLD-RTO: 0 PER 100 WBC
PLATELET # BLD AUTO: 307 K/UL (ref 150–400)
PMV BLD AUTO: 8.7 FL (ref 8.9–12.9)
POTASSIUM SERPL-SCNC: 3.8 MMOL/L (ref 3.5–5.1)
POTASSIUM SERPL-SCNC: 4 MMOL/L (ref 3.5–5.1)
RBC # BLD AUTO: 3.31 M/UL (ref 3.8–5.2)
SODIUM SERPL-SCNC: 122 MMOL/L (ref 136–145)
SODIUM SERPL-SCNC: 122 MMOL/L (ref 136–145)
SODIUM SERPL-SCNC: 123 MMOL/L (ref 136–145)
WBC # BLD AUTO: 7.1 K/UL (ref 3.6–11)

## 2021-11-25 PROCEDURE — 74011250637 HC RX REV CODE- 250/637: Performed by: HOSPITALIST

## 2021-11-25 PROCEDURE — 65660000000 HC RM CCU STEPDOWN

## 2021-11-25 PROCEDURE — 80048 BASIC METABOLIC PNL TOTAL CA: CPT

## 2021-11-25 PROCEDURE — 85025 COMPLETE CBC W/AUTO DIFF WBC: CPT

## 2021-11-25 PROCEDURE — 36415 COLL VENOUS BLD VENIPUNCTURE: CPT

## 2021-11-25 PROCEDURE — 51798 US URINE CAPACITY MEASURE: CPT

## 2021-11-25 PROCEDURE — 94760 N-INVAS EAR/PLS OXIMETRY 1: CPT

## 2021-11-25 PROCEDURE — 74011250637 HC RX REV CODE- 250/637: Performed by: INTERNAL MEDICINE

## 2021-11-25 PROCEDURE — 84295 ASSAY OF SERUM SODIUM: CPT

## 2021-11-25 PROCEDURE — 74011250637 HC RX REV CODE- 250/637: Performed by: STUDENT IN AN ORGANIZED HEALTH CARE EDUCATION/TRAINING PROGRAM

## 2021-11-25 PROCEDURE — 74011250636 HC RX REV CODE- 250/636: Performed by: SURGERY

## 2021-11-25 PROCEDURE — 74011250637 HC RX REV CODE- 250/637: Performed by: SURGERY

## 2021-11-25 RX ADMIN — HYDRALAZINE HYDROCHLORIDE 50 MG: 50 TABLET, FILM COATED ORAL at 10:39

## 2021-11-25 RX ADMIN — METOPROLOL TARTRATE 50 MG: 50 TABLET, FILM COATED ORAL at 10:39

## 2021-11-25 RX ADMIN — Medication 10 ML: at 21:27

## 2021-11-25 RX ADMIN — RIVAROXABAN 15 MG: 15 TABLET, FILM COATED ORAL at 07:21

## 2021-11-25 RX ADMIN — HYDROCORTISONE: 0.01 LOTION TOPICAL at 20:19

## 2021-11-25 RX ADMIN — SULFAMETHOXAZOLE AND TRIMETHOPRIM 1 TABLET: 800; 160 TABLET ORAL at 10:39

## 2021-11-25 RX ADMIN — MENTHOL, METHYL SALICYLATE: 10; 15 CREAM TOPICAL at 21:27

## 2021-11-25 RX ADMIN — LEVOTHYROXINE SODIUM 175 MCG: 0.03 TABLET ORAL at 07:21

## 2021-11-25 RX ADMIN — MENTHOL, METHYL SALICYLATE: 10; 15 CREAM TOPICAL at 10:39

## 2021-11-25 RX ADMIN — ACETAMINOPHEN 650 MG: 325 TABLET ORAL at 10:39

## 2021-11-25 RX ADMIN — TOLVAPTAN 15 MG: 30 TABLET ORAL at 10:38

## 2021-11-25 RX ADMIN — FLUTICASONE PROPIONATE 2 SPRAY: 50 SPRAY, METERED NASAL at 10:40

## 2021-11-25 RX ADMIN — Medication 10 ML: at 07:21

## 2021-11-25 RX ADMIN — HYDRALAZINE HYDROCHLORIDE 50 MG: 50 TABLET, FILM COATED ORAL at 18:24

## 2021-11-25 RX ADMIN — HYDROMORPHONE HYDROCHLORIDE 1 MG: 1 INJECTION, SOLUTION INTRAMUSCULAR; INTRAVENOUS; SUBCUTANEOUS at 10:38

## 2021-11-25 RX ADMIN — HYDROMORPHONE HYDROCHLORIDE 1 MG: 1 INJECTION, SOLUTION INTRAMUSCULAR; INTRAVENOUS; SUBCUTANEOUS at 18:28

## 2021-11-25 RX ADMIN — MENTHOL, METHYL SALICYLATE: 10; 15 CREAM TOPICAL at 18:26

## 2021-11-25 NOTE — ROUTINE PROCESS
Bedside shift change report given to kari ramos rn (oncoming nurse) by Sherri Be (offgoing nurse). Report included the following information SBAR and Kardex.

## 2021-11-25 NOTE — PROGRESS NOTES
RENAL  PROGRESS NOTE        Subjective:   resting  Objective:   VITALS SIGNS:    Visit Vitals  /71 (BP 1 Location: Right upper arm, BP Patient Position: At rest)   Pulse 84   Temp 98.1 °F (36.7 °C)   Resp 18   Ht 5' 7\" (1.702 m)   Wt 98 kg (216 lb 0.8 oz)   SpO2 94%   BMI 33.84 kg/m²       O2 Device: None (Room air)   O2 Flow Rate (L/min): 2 l/min   Temp (24hrs), Av.9 °F (36.6 °C), Min:97.8 °F (36.6 °C), Max:98.1 °F (36.7 °C)         PHYSICAL EXAM:  resting    DATA REVIEW:     INTAKE / OUTPUT:   Last shift:      701 - 1900  In: -   Out: 700 [Urine:700]  Last 3 shifts: 1901 -  0700  In: 240 [P.O.:240]  Out: 1300 [Urine:1250; Drains:50]    Intake/Output Summary (Last 24 hours) at 2021 0845  Last data filed at 2021 0728  Gross per 24 hour   Intake 240 ml   Output 2000 ml   Net -1760 ml         LABS:   Recent Labs     21  0030 21  0411 21  0950   WBC 7.1 6.6 7.0   HGB 10.8* 11.5 11.0*   HCT 30.9* 33.4* 32.3*    345 296     Recent Labs     21  0030 21  0411 21  1809   * 124* 123*   K 3.8 3.9 3.7   CL 83* 83* 82*   CO2 30 31 32   * 109* 132*   BUN 29* 29* 27*   CREA 1.67* 1.62* 1.53*   CA 9.2 9.6 9.5         Assessment:  Hponatremia: Na 121-122 on admission.   Suspect  SIADH(also was on zoloft);sodium dropped 122;POLYDPISIC   JANESSA:could be henodynamics,Bactrim effects  RLE wound: s/p surgical debridement and wound vac dressing    Systolic CHF     Hypokalemia: mild-> better s/p supplementation   Afib     Plan/Recommendations:    S/p Stop Bumex ,entresto    1st Tolvaptan on 21 ,2nd Tolvatan today;sodium better but JANESSA will affect free water clearance   Holding Zoloft   Bladder scan  Bactrim will be done  today     Age approppriate cancer screen     Shaina Cunningham MD

## 2021-11-25 NOTE — PROGRESS NOTES
Bedside and Verbal shift change report given to Citlali (oncoming nurse) by Anel Krishnamurthy (offgoing nurse). Report included the following information SBAR, Kardex, MAR and Recent Results.

## 2021-11-25 NOTE — PROGRESS NOTES
[address] [facility]  Hospitalist Group                                                                                          Hospitalist Progress Note  Lynne Bearden MD  Answering service: [phone] or [phone] from in house phone        Date of Service:  2021  NAME:  Sophia Lepe  :  1950  MRN:  [id number]      Admission Summary:     Patient presents with right lower extremity ulcer with cellulitis, patient is s/p surgical debridement , currently with wound VAC in place. Patient also with acute on chronic hyponatremia and metabolic encephalopathy. Interval history / Subjective:       Patient has no acute complaint, feeling better today.      Assessment & Plan:     Right lower extremity cellulitis associated with right leg ulcer  -Patient with recent trauma to her leg causing wound with subsequent hematoma  -CT of the right lower extremity shows 7 x 5 cm soft tissue wound medial to the distal tibia, with underlying cellulitis and ill-defined fluid, negative for osteomyelitis  -S/p surgical debridement , now with wound VAC  -Wound culture growing heavy Enterobacter, Serratia, Providencia, Stenotrophomonas, blood cultures negative  -General surgery following, plan to reevaluate the wound tomorrow  -ID following, on Bactrim, completed 10-day course today    Acute metabolic encephalopathy  -Acute metabolic encephalopathy likely due to hyponatremia, as well as hospital induced delirium  -Work-up including CT head which shows no acute pathology, TSH, B12 and folate within normal limits, negative screening for UTI  -Seen improving mentally today  -Previously evaluated by psychiatry    Acute on chronic hyponatremia  -Suspect SIADH, nephrology following  -Holding Bumex, Entresto and Zoloft  -Patient received tolvaptan     JANESSA  -Likely due to hemodynamic/Bactrim effect  -Creatinine of 1.67 today, nephrology following  -Monitor intake and output, avoid nephrotoxin    Hypertension  -Continue hydralazine and metoprolol    Hypothyroidism  -Continue Synthroid    Chronic atrial fibrillation  -Metoprolol for rate control, on anticoagulation with Xarelto  -Monitor in telemetry    Chronic diastolic heart failure  -Continue metoprolol, Entresto on hold by nephrology    Code status: Full  DVT prophylaxis: On Xarelto    Care Plan discussed with: Patient/Family and Nurse  Anticipated Disposition: Home w/Family  Anticipated Discharge: 24 hours to 48 hours     Hospital Problems  Date Reviewed: 10/14/2021          Codes Class Noted POA    Open wound of right lower leg ICD-10-CM: S81.801A  ICD-9-CM: 891.0  11/17/2021 Unknown        Hyponatremia ICD-10-CM: E87.1  ICD-9-CM: 276.1  8/2/2017 Unknown                Review of Systems:   A comprehensive review of systems was negative except for that written in the HPI. Vital Signs:    Last 24hrs VS reviewed since prior progress note. Most recent are:  Visit Vitals  /67 (BP 1 Location: Right arm, BP Patient Position: At rest)   Pulse 68   Temp 98.1 °F (36.7 °C)   Resp 18   Ht 5' 7\" (1.702 m)   Wt 98 kg (216 lb 0.8 oz)   SpO2 93%   BMI 33.84 kg/m²         Intake/Output Summary (Last 24 hours) at 11/25/2021 1230  Last data filed at 11/25/2021 9493  Gross per 24 hour   Intake 120 ml   Output 1550 ml   Net -1430 ml        Physical Examination:     I had a face to face encounter with this patient and independently examined them on 11/25/2021 as outlined below:          Constitutional:  No acute distress, cooperative, pleasant    ENT:  Oral mucosa moist, oropharynx benign. Resp:  CTA bilaterally. No wheezing/rhonchi/rales. No accessory muscle use   CV:  Regular rhythm, normal rate, no murmurs, gallops, rubs    GI:  Soft, non distended, non tender.  normoactive bowel sounds, no hepatosplenomegaly     Musculoskeletal:  No edema, warm, 2+ pulses throughout, right lower extremity wrapped in dressing, wound VAC is intact Neurologic:  Moves all extremities. AAOx3, CN II-XII reviewed            Data Review:    Review and/or order of clinical lab test      Labs:     Recent Labs     11/25/21  0030 11/24/21 0411   WBC 7.1 6.6   HGB 10.8* 11.5   HCT 30.9* 33.4*    345     Recent Labs     11/25/21  0030 11/24/21  0411 11/23/21  1809 11/23/21  0950 11/22/21 2032   * 124* 123*   < > 121*   K 3.8 3.9 3.7   < > 3.7   CL 83* 83* 82*   < > 81*   CO2 30 31 32   < > 31   BUN 29* 29* 27*   < > 27*   CREA 1.67* 1.62* 1.53*   < > 1.15*   * 109* 132*   < > 110*   CA 9.2 9.6 9.5   < > 9.3   URICA  --   --   --   --  4.2    < > = values in this interval not displayed. No results for input(s): ALT, AP, TBIL, TBILI, TP, ALB, GLOB, GGT, AML, LPSE in the last 72 hours. No lab exists for component: SGOT, GPT, AMYP, HLPSE  No results for input(s): INR, PTP, APTT, INREXT in the last 72 hours. No results for input(s): FE, TIBC, PSAT, FERR in the last 72 hours. Lab Results   Component Value Date/Time    Folate 22.5 (H) 11/22/2021 02:14 AM      No results for input(s): PH, PCO2, PO2 in the last 72 hours. No results for input(s): CPK, CKNDX, TROIQ in the last 72 hours.     No lab exists for component: CPKMB  Lab Results   Component Value Date/Time    Cholesterol, total 233 (H) 06/15/2021 12:03 PM    HDL Cholesterol 114 06/15/2021 12:03 PM    LDL, calculated 108 (H) 06/15/2021 12:03 PM    LDL, calculated 102 (H) 02/07/2014 11:06 AM    Triglyceride 66 06/15/2021 12:03 PM     No results found for: [facility]  Lab Results   Component Value Date/Time    Color YELLOW/STRAW 11/22/2021 04:50 PM    Appearance CLEAR 11/22/2021 04:50 PM    Specific gravity 1.007 11/22/2021 04:50 PM    pH (UA) 7.5 11/22/2021 04:50 PM    Protein Negative 11/22/2021 04:50 PM    Glucose Negative 11/22/2021 04:50 PM    Ketone Negative 11/22/2021 04:50 PM    Bilirubin Negative 11/22/2021 04:50 PM    Urobilinogen 0.2 11/22/2021 04:50 PM    Nitrites Negative 11/22/2021 04:50 PM    Leukocyte Esterase Negative 11/22/2021 04:50 PM    Bacteria Few 02/07/2014 11:06 AM    WBC 11-30 (A) 02/07/2014 11:06 AM    RBC 0-3 02/07/2014 11:06 AM         Medications Reviewed:     Current Facility-Administered Medications   Medication Dose Route Frequency    rivaroxaban (XARELTO) tablet 15 mg  15 mg Oral DAILY WITH BREAKFAST    hydrALAZINE (APRESOLINE) tablet 50 mg  50 mg Oral TID    OLANZapine (ZyPREXA) 2.5 mg in sterile water (preservative free) 0.5 mL injection  2.5 mg IntraMUSCular Q8H PRN    fluticasone propionate (FLONASE) 50 mcg/actuation nasal spray 2 Spray  2 Spray Both Nostrils DAILY    hydrocortisone (ALA-MILTON) 1 % lotion   Topical BID PRN    sodium chloride (NS) flush 5-40 mL  5-40 mL IntraVENous Q8H    lidocaine (PF) (XYLOCAINE) 10 mg/mL (1 %) injection 0.1 mL  0.1 mL SubCUTAneous PRN    HYDROmorphone (DILAUDID) injection 1 mg  1 mg IntraVENous Q2H PRN    methyl salicylate-menthol (BENGAY) 15-10 % cream   Topical TID    diazePAM (VALIUM) tablet 2 mg  2 mg Oral QHS PRN    levothyroxine (SYNTHROID) tablet 175 mcg  175 mcg Oral ACB    metoprolol tartrate (LOPRESSOR) tablet 50 mg  50 mg Oral BID    [Held by provider] sertraline (ZOLOFT) tablet 50 mg  50 mg Oral DAILY    sodium chloride (NS) flush 5-40 mL  5-40 mL IntraVENous Q8H    sodium chloride (NS) flush 5-40 mL  5-40 mL IntraVENous PRN    acetaminophen (TYLENOL) tablet 650 mg  650 mg Oral Q4H PRN    hydrALAZINE (APRESOLINE) 20 mg/mL injection 10 mg  10 mg IntraVENous Q6H PRN     ______________________________________________________________________  EXPECTED LENGTH OF STAY: 4d 19h  ACTUAL LENGTH OF STAY:          9                 Jia Rodriguez MD

## 2021-11-25 NOTE — PROGRESS NOTES
Problem: Falls - Risk of  Goal: *Absence of Falls  Description: Document Weston Hartman Fall Risk and appropriate interventions in the flowsheet.   Outcome: Progressing Towards Goal  Note: Fall Risk Interventions:  Mobility Interventions: Communicate number of staff needed for ambulation/transfer    Mentation Interventions: Adequate sleep, hydration, pain control    Medication Interventions: Bed/chair exit alarm    Elimination Interventions: Call light in reach, Toileting schedule/hourly rounds    History of Falls Interventions: Bed/chair exit alarm         Problem: Patient Education: Go to Patient Education Activity  Goal: Patient/Family Education  Outcome: Progressing Towards Goal

## 2021-11-25 NOTE — PROGRESS NOTES
Bedside shift change report given to Rashmi Ledesma (oncoming nurse) by Louisa Rivero (offgoing nurse). Report included the following information SBAR, Kardex, Intake/Output, MAR and Recent Results.

## 2021-11-26 LAB
ANION GAP SERPL CALC-SCNC: 7 MMOL/L (ref 5–15)
BASOPHILS # BLD: 0.1 K/UL (ref 0–0.1)
BASOPHILS NFR BLD: 1 % (ref 0–1)
BUN SERPL-MCNC: 28 MG/DL (ref 6–20)
BUN/CREAT SERPL: 19 (ref 12–20)
CALCIUM SERPL-MCNC: 9.4 MG/DL (ref 8.5–10.1)
CHLORIDE SERPL-SCNC: 87 MMOL/L (ref 97–108)
CO2 SERPL-SCNC: 31 MMOL/L (ref 21–32)
CREAT SERPL-MCNC: 1.44 MG/DL (ref 0.55–1.02)
DIFFERENTIAL METHOD BLD: ABNORMAL
EOSINOPHIL # BLD: 0.1 K/UL (ref 0–0.4)
EOSINOPHIL NFR BLD: 1 % (ref 0–7)
ERYTHROCYTE [DISTWIDTH] IN BLOOD BY AUTOMATED COUNT: 13.6 % (ref 11.5–14.5)
GLUCOSE SERPL-MCNC: 91 MG/DL (ref 65–100)
HCT VFR BLD AUTO: 30.5 % (ref 35–47)
HGB BLD-MCNC: 10.4 G/DL (ref 11.5–16)
IMM GRANULOCYTES # BLD AUTO: 0 K/UL (ref 0–0.04)
IMM GRANULOCYTES NFR BLD AUTO: 0 % (ref 0–0.5)
LYMPHOCYTES # BLD: 1 K/UL (ref 0.8–3.5)
LYMPHOCYTES NFR BLD: 15 % (ref 12–49)
MCH RBC QN AUTO: 32.6 PG (ref 26–34)
MCHC RBC AUTO-ENTMCNC: 34.1 G/DL (ref 30–36.5)
MCV RBC AUTO: 95.6 FL (ref 80–99)
MONOCYTES # BLD: 0.8 K/UL (ref 0–1)
MONOCYTES NFR BLD: 12 % (ref 5–13)
NEUTS SEG # BLD: 4.7 K/UL (ref 1.8–8)
NEUTS SEG NFR BLD: 71 % (ref 32–75)
NRBC # BLD: 0 K/UL (ref 0–0.01)
NRBC BLD-RTO: 0 PER 100 WBC
PLATELET # BLD AUTO: 255 K/UL (ref 150–400)
PMV BLD AUTO: 8.9 FL (ref 8.9–12.9)
POTASSIUM SERPL-SCNC: 3.9 MMOL/L (ref 3.5–5.1)
RBC # BLD AUTO: 3.19 M/UL (ref 3.8–5.2)
SODIUM SERPL-SCNC: 125 MMOL/L (ref 136–145)
WBC # BLD AUTO: 6.8 K/UL (ref 3.6–11)

## 2021-11-26 PROCEDURE — 85025 COMPLETE CBC W/AUTO DIFF WBC: CPT

## 2021-11-26 PROCEDURE — 74011250637 HC RX REV CODE- 250/637: Performed by: SURGERY

## 2021-11-26 PROCEDURE — 65660000000 HC RM CCU STEPDOWN

## 2021-11-26 PROCEDURE — 74011250636 HC RX REV CODE- 250/636: Performed by: SURGERY

## 2021-11-26 PROCEDURE — 74011250637 HC RX REV CODE- 250/637: Performed by: STUDENT IN AN ORGANIZED HEALTH CARE EDUCATION/TRAINING PROGRAM

## 2021-11-26 PROCEDURE — 80048 BASIC METABOLIC PNL TOTAL CA: CPT

## 2021-11-26 PROCEDURE — 97535 SELF CARE MNGMENT TRAINING: CPT

## 2021-11-26 PROCEDURE — 99231 SBSQ HOSP IP/OBS SF/LOW 25: CPT | Performed by: SURGERY

## 2021-11-26 PROCEDURE — 36415 COLL VENOUS BLD VENIPUNCTURE: CPT

## 2021-11-26 PROCEDURE — 74011250637 HC RX REV CODE- 250/637: Performed by: HOSPITALIST

## 2021-11-26 RX ADMIN — MENTHOL, METHYL SALICYLATE: 10; 15 CREAM TOPICAL at 22:42

## 2021-11-26 RX ADMIN — HYDRALAZINE HYDROCHLORIDE 50 MG: 50 TABLET, FILM COATED ORAL at 08:27

## 2021-11-26 RX ADMIN — HYDROMORPHONE HYDROCHLORIDE 1 MG: 1 INJECTION, SOLUTION INTRAMUSCULAR; INTRAVENOUS; SUBCUTANEOUS at 16:36

## 2021-11-26 RX ADMIN — METOPROLOL TARTRATE 50 MG: 50 TABLET, FILM COATED ORAL at 22:42

## 2021-11-26 RX ADMIN — LEVOTHYROXINE SODIUM 175 MCG: 0.03 TABLET ORAL at 07:10

## 2021-11-26 RX ADMIN — HYDRALAZINE HYDROCHLORIDE 50 MG: 50 TABLET, FILM COATED ORAL at 22:42

## 2021-11-26 RX ADMIN — Medication 10 ML: at 07:10

## 2021-11-26 RX ADMIN — Medication 10 ML: at 22:43

## 2021-11-26 RX ADMIN — Medication 10 ML: at 16:36

## 2021-11-26 RX ADMIN — ACETAMINOPHEN 650 MG: 325 TABLET ORAL at 08:27

## 2021-11-26 RX ADMIN — Medication 10 ML: at 16:37

## 2021-11-26 RX ADMIN — METOPROLOL TARTRATE 50 MG: 50 TABLET, FILM COATED ORAL at 08:28

## 2021-11-26 RX ADMIN — HYDROMORPHONE HYDROCHLORIDE 1 MG: 1 INJECTION, SOLUTION INTRAMUSCULAR; INTRAVENOUS; SUBCUTANEOUS at 08:25

## 2021-11-26 RX ADMIN — RIVAROXABAN 15 MG: 15 TABLET, FILM COATED ORAL at 07:10

## 2021-11-26 RX ADMIN — ACETAMINOPHEN 650 MG: 325 TABLET ORAL at 16:36

## 2021-11-26 NOTE — PROGRESS NOTES
[address] [facility] Adult  Hospitalist Group                                                                                          Hospitalist Progress Note  Sharifa Abbott MD  Answering service: [phone] OR [phone] from in house phone        Date of Service:  2021  NAME:  Carl Rice  :  1950  MRN:  [id number]      Admission Summary:     Patient presents with right lower extremity ulcer with cellulitis, patient is s/p surgical debridement , currently with wound VAC in place. Patient also with acute on chronic hyponatremia and metabolic encephalopathy. Interval history / Subjective:       Patient has no acute complaint, feeling better today.      Assessment & Plan:     Right lower extremity cellulitis associated with right leg ulcer  -Patient with recent trauma to her leg causing wound with subsequent hematoma  -CT of the right lower extremity shows 7 x 5 cm soft tissue wound medial to the distal tibia, with underlying cellulitis and ill-defined fluid, negative for osteomyelitis  -S/p surgical debridement , now with wound VAC  -Wound culture growing heavy Enterobacter, Serratia, Providencia, Stenotrophomonas, blood cultures negative  -General surgery following, plan to reevaluate the wound tomorrow  -ID following, on Bactrim, completed 10-day course   -Wound VAC changed today by general surgery, patient will need to go home with wound VAC with Tuesday/Friday for changes at home    Acute metabolic encephalopathy  -Acute metabolic encephalopathy likely due to hyponatremia, as well as hospital induced delirium  -Work-up including CT head which shows no acute pathology, TSH, B12 and folate within normal limits, negative screening for UTI  -Seen improving mentally today  -Previously evaluated by psychiatry    Acute on chronic hyponatremia  -Suspect SIADH, nephrology following  -Holding Bumex, Entresto and Zoloft  -Patient received tolvaptan ,   -Sodium level is slowly improving    JANESSA  -Likely due to hemodynamic/Bactrim effect  -Creatinine of 1.67 today, nephrology following  -Monitor intake and output, avoid nephrotoxin    Hypertension  -Continue hydralazine and metoprolol    Hypothyroidism  -Continue Synthroid    Chronic atrial fibrillation  -Metoprolol for rate control, on anticoagulation with Xarelto  -Monitor in telemetry    Chronic diastolic heart failure  -Continue metoprolol, Entresto on hold by nephrology    Code status: Full  DVT prophylaxis: On [address] [facility] discussed with: Patient/Family and Nurse  Anticipated Disposition: Home with home health  Anticipated Discharge: 24 hours to 48 hours     Hospital Problems  Date Reviewed: 10/14/2021          Codes Class Noted POA    Open wound of right lower leg ICD-10-CM: S81.801A  ICD-9-CM: 891.0  11/17/2021 Unknown        Hyponatremia ICD-10-CM: E87.1  ICD-9-CM: 276.1  8/2/2017 Unknown                Review of Systems:   A comprehensive review of systems was negative except for that written in the HPI. Vital Signs:    Last 24hrs VS reviewed since prior progress note. Most recent are:  Visit Vitals  BP 90/62 (BP 1 Location: Right upper arm, BP Patient Position: At rest)   Pulse 60   Temp 97.6 °F (36.4 °C)   Resp 20   Ht 5' 7\" (1.702 m)   Wt 101.1 kg (222 lb 14.2 oz)   SpO2 94%   BMI 34.91 kg/m²         Intake/Output Summary (Last 24 hours) at 11/26/2021 1207  Last data filed at 11/26/2021 9103  Gross per 24 hour   Intake 625 ml   Output 1650 ml   Net -1025 ml        Physical Examination:     I had a face to face encounter with this patient and independently examined them on 11/26/2021 as outlined below:          Constitutional:  No acute distress, cooperative, pleasant    ENT:  Oral mucosa moist, oropharynx benign. Resp:  CTA bilaterally. No wheezing/rhonchi/rales. No accessory muscle use   CV:  Regular rhythm, normal rate, no murmurs, gallops, rubs    GI:  Soft, non distended, non tender.  normoactive bowel sounds, no hepatosplenomegaly     Musculoskeletal:  No edema, warm, 2+ pulses throughout, right lower extremity wrapped in dressing, wound VAC is intact    Neurologic:  Moves all extremities. AAOx3, CN II-XII reviewed            Data Review:    Review and/or order of clinical lab test      Labs:     Recent Labs     11/26/21 0441 11/25/21  0030   WBC 6.8 7.1   HGB 10.4* 10.8*   HCT 30.5* 30.9*    307     Recent Labs     11/26/21 0441 11/25/21  1946 11/25/21  1154 11/25/21  0030 11/25/21  0030   * 123* 122*   < > 122*   K 3.9 4.0  --   --  3.8   CL 87* 85*  --   --  83*   CO2 31 33*  --   --  30   BUN 28* 30*  --   --  29*   CREA 1.44* 1.60*  --   --  1.67*   GLU 91 122*  --   --  102*   CA 9.4 8.9  --   --  9.2    < > = values in this interval not displayed. No results for input(s): ALT, AP, TBIL, TBILI, TP, ALB, GLOB, GGT, AML, LPSE in the last 72 hours. No lab exists for component: SGOT, GPT, AMYP, HLPSE  No results for input(s): INR, PTP, APTT, INREXT, INREXT in the last 72 hours. No results for input(s): FE, TIBC, PSAT, FERR in the last 72 hours. Lab Results   Component Value Date/Time    Folate 22.5 (H) 11/22/2021 02:14 AM      No results for input(s): PH, PCO2, PO2 in the last 72 hours. No results for input(s): CPK, CKNDX, TROIQ in the last 72 hours.     No lab exists for component: CPKMB  Lab Results   Component Value Date/Time    Cholesterol, total 233 (H) 06/15/2021 12:03 PM    HDL Cholesterol 114 06/15/2021 12:03 PM    LDL, calculated 108 (H) 06/15/2021 12:03 PM    LDL, calculated 102 (H) 02/07/2014 11:06 AM    Triglyceride 66 06/15/2021 12:03 PM     No results found for: [facility]  Lab Results   Component Value Date/Time    Color YELLOW/STRAW 11/22/2021 04:50 PM    Appearance CLEAR 11/22/2021 04:50 PM    Specific gravity 1.007 11/22/2021 04:50 PM    pH (UA) 7.5 11/22/2021 04:50 PM    Protein Negative 11/22/2021 04:50 PM    Glucose Negative 11/22/2021 04:50 PM    Ketone Negative 11/22/2021 04:50 PM    Bilirubin Negative 11/22/2021 04:50 PM    Urobilinogen 0.2 11/22/2021 04:50 PM    Nitrites Negative 11/22/2021 04:50 PM    Leukocyte Esterase Negative 11/22/2021 04:50 PM    Bacteria Few 02/07/2014 11:06 AM    WBC 11-30 (A) 02/07/2014 11:06 AM    RBC 0-3 02/07/2014 11:06 AM         Medications Reviewed:     Current Facility-Administered Medications   Medication Dose Route Frequency    rivaroxaban (XARELTO) tablet 15 mg  15 mg Oral DAILY WITH BREAKFAST    hydrALAZINE (APRESOLINE) tablet 50 mg  50 mg Oral TID    OLANZapine (ZyPREXA) 2.5 mg in sterile water (preservative free) 0.5 mL injection  2.5 mg IntraMUSCular Q8H PRN    fluticasone propionate (FLONASE) 50 mcg/actuation nasal spray 2 Spray  2 Spray Both Nostrils DAILY    hydrocortisone (ALA-MILTON) 1 % lotion   Topical BID PRN    sodium chloride (NS) flush 5-40 mL  5-40 mL IntraVENous Q8H    lidocaine (PF) (XYLOCAINE) 10 mg/mL (1 %) injection 0.1 mL  0.1 mL SubCUTAneous PRN    HYDROmorphone (DILAUDID) injection 1 mg  1 mg IntraVENous Q2H PRN    methyl salicylate-menthol (BENGAY) 15-10 % cream   Topical TID    diazePAM (VALIUM) tablet 2 mg  2 mg Oral QHS PRN    levothyroxine (SYNTHROID) tablet 175 mcg  175 mcg Oral ACB    metoprolol tartrate (LOPRESSOR) tablet 50 mg  50 mg Oral BID    [Held by provider] sertraline (ZOLOFT) tablet 50 mg  50 mg Oral DAILY    sodium chloride (NS) flush 5-40 mL  5-40 mL IntraVENous Q8H    sodium chloride (NS) flush 5-40 mL  5-40 mL IntraVENous PRN    acetaminophen (TYLENOL) tablet 650 mg  650 mg Oral Q4H PRN    hydrALAZINE (APRESOLINE) 20 mg/mL injection 10 mg  10 mg IntraVENous Q6H PRN     ______________________________________________________________________  EXPECTED LENGTH OF STAY: 4d 19h  ACTUAL LENGTH OF STAY:          10                 Alicia Rosario MD

## 2021-11-26 NOTE — PROGRESS NOTES
[facility]  PROGRESS NOTE        Subjective:   Rounded earlier,resting  Objective:   VITALS SIGNS:    Visit Vitals  BP (!) 142/79 (BP 1 Location: Right upper arm, BP Patient Position: At rest)   Pulse 81   Temp 98.3 °F (36.8 °C)   Resp 20   Ht 5' 7\" (1.702 m)   Wt 101.1 kg (222 lb 14.2 oz)   SpO2 92%   BMI 34.91 kg/m²       O2 Device: None (Room air)   O2 Flow Rate (L/min): 2 l/min   Temp (24hrs), Av.3 °F (36.8 °C), Min:98 °F (36.7 °C), Max:98.7 °F (37.1 °C)         PHYSICAL EXAM:  resting    DATA REVIEW:     INTAKE / OUTPUT:   Last shift:      701 - 1900  In: -   Out: 500 [Urine:500]  Last 3 shifts: 1901 - 700  In: 745 [P.O.:745]  Out:  [Urine:]    Intake/Output Summary (Last 24 hours) at 2021 0900  Last data filed at 2021 8694  Gross per 24 hour   Intake 625 ml   Output 1650 ml   Net -1025 ml         LABS:   Recent Labs     21  0441 21  0030 21  0411   WBC 6.8 7.1 6.6   HGB 10.4* 10.8* 11.5   HCT 30.5* 30.9* 33.4*    307 345     Recent Labs     21  0441 21  1946 21  1154 21  0030 21  0030   * 123* 122*   < > 122*   K 3.9 4.0  --   --  3.8   CL 87* 85*  --   --  83*   CO2 31 33*  --   --  30   GLU 91 122*  --   --  102*   BUN 28* 30*  --   --  29*   CREA 1.44* 1.60*  --   --  1.67*   CA 9.4 8.9  --   --  9.2    < > = values in this interval not displayed. Assessment:  Hponatremia: Na 121-122 on admission.   Suspect  SIADH(also was on zoloft); ;POLYDPISIC;sodium better   JANESSA:could be henodynamics,Bactrim effects;now has urinary retention;TURNER 21  RLE wound: s/p surgical debridement and wound vac dressing    Systolic CHF     Hypokalemia: mild-> better s/p supplementation   Afib     Plan/Recommendations:    S/p Stop Bumex ,entresto    1st Tolvaptan on 21 ,2nd Tolvatan 21 ;sodium better,EXPECTED TO IMPROVE NOW JANESSA BETTER /  free water clearance should be better  Keep coe  tolvaptan in AM if not better  Also difficulty with FR     Age approppriate cancer screen     Agnes Morris MD

## 2021-11-26 NOTE — PROGRESS NOTES
Problem: Self Care Deficits Care Plan (Adult)  Goal: *Acute Goals and Plan of Care (Insert Text)  Description:   FUNCTIONAL STATUS PRIOR TO ADMISSION: Patient was independent and active without use of DME. Patient was highly independent, retired . Indep in all ADL/IADL including driving. HOME SUPPORT: The patient lived alone with friends/family to provide assistance. Occupational Therapy Goals  Initiated 11/19/2021, continued upon reassessment 11/26/2021  1. Patient will perform grooming with independence within 7 day(s). 2.  Patient will perform upper body dressing with independence within 7 day(s). 3.  Patient will perform lower body dressing with independence within 7 day(s). 4.  Patient will perform all aspects of toileting with independence within 7 day(s). 5.  Patient will utilize energy conservation techniques during functional activities with verbal cues within 7 day(s). Outcome: Progressing Towards Goal     OCCUPATIONAL THERAPY TREATMENT/WEEKLY RE-ASSESSMENT  Patient: Leida Thompson (42 y.o. female)  Date: 11/26/2021  Diagnosis: Hyponatremia [E87.1]   <principal problem not specified>  Procedure(s) (LRB):  DEBRIDEMENT OF RIGHT LEG ULCER AND VAC PLACEMENT (Right) 7 Days Post-Op  Precautions:   fall  Chart, occupational therapy assessment, plan of care, and goals were reviewed. ASSESSMENT  Patient continues with skilled OT services and is progressing towards goals. Patient continues to be limited by decreased command following, decreased insight into deficits/need for safety precautions, and decreased activity tolerance. Pt received supine and agreeable to participation in session, however only partially oriented and with difficulty attending to task. Pt very tangential throughout session and during conversation, with difficulty being redirected. Pt did participate in grooming, basic UB bathing and completed dressing with set-up.  Pt left supine, bed alarm on, and with all needs met. Continue to recommend [address] with supervision for OOB activity/ADLs/IADL tasks for safety. Current Level of Function Impacting Discharge (ADLs): set up to min A     Other factors to consider for discharge: PLOF         PLAN :  Goals have been updated based on progression since last assessment. Patient continues to benefit from skilled intervention to address the above impairments. Continue to follow patient 3 times a week to address goals. Recommendation for discharge: (in order for the patient to meet his/her long term goals)  Occupational therapy at least 2 days/week in the home AND ensure assist and/or supervision for safety with OOB activity/ADL/IADL tasks    This discharge recommendation:  Has not yet been discussed the attending provider and/or case management    IF patient discharges home will need the following DME:TBD       SUBJECTIVE:   Patient stated You know I was prom and homecoming [place] in [address], I'm not being vain it's true! Jam Smith    OBJECTIVE DATA SUMMARY:   Cognitive/Behavioral Status:  Neurologic State: Alert  Orientation Level: Oriented to person; Oriented to situation  Cognition: Decreased attention/concentration; Poor safety awareness             Functional Mobility and Transfers for ADLs:  Bed Mobility:  Supine to Sit: Contact guard assistance  Sit to Supine: Contact guard assistance    Transfers:             Balance:  Sitting: Intact    ADL Intervention:       Grooming  Position Performed: Seated edge of bed  Washing Face: Set-up  Brushing Teeth: Set-up  Brushing/Combing Hair: Set-up  Cues: Visual cues provided; Verbal cues provided    Upper Body Bathing  Bathing Assistance: Set-up  Position Performed: Seated edge of bed         Upper Body [address]: Set-up                     Pain:  None reported    Activity Tolerance:   Fair    After treatment patient left in no apparent distress:   Supine in bed, Call bell within reach, Bed / chair alarm activated and Side rails x 3    COMMUNICATION/COLLABORATION:   The patients plan of care was discussed with: Registered nurse.      Diomedes Reeves OT  Time Calculation: 24 mins

## 2021-11-26 NOTE — WOUND CARE
WOCN Note:     Follow-up visit for NPWT dressing change with Dr. Peggy Gonzalez  Seen in room 202    Chart shows:  Patient admitted on 11/16/21 with hyponatremia. s/p excisional debridement of right leg ulcer and placement of wound vac on 11/19/21 by Dr. Peggy Gonzalez. Patient reports that she hit her right leg about a month ago and developed a hematoma since then it has been slow to heal.  She takes a blood thinner for her afib. 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 Pre-medicated by RN   Mobility: no assistance with repositioning. Able to turn independently from side to side in bed   Continence: Continent of urine and stool   Last Tyrell Score: 19  Surface: [place] mattress  Diet: regular, oral nutritional supplements being followed by dietician     1. POA Right anterior lower leg surgical wound   Etiology: hematoma  Full thickness   6.8 x 4.8 x 0.6 cm  Base is 100% red   moderate amount serosang exudate. 150 ml exudate in vac canister  no odor   Well defined edges  Periwound intact & without erythema. 2 small open blood filled blisters to 11 o'clock and 2 o'clock   Treatment: Previous vac dressing removed. Removed 2 pieces black foam. Wound cleansed with saline. Skin prep to periwound, wound framed hydrocolloid to protect periwound skin. 1 piece black foam to wound, covered with transparent drape, track pad applied. Vac was set at 50mmHg, increased to 125mmHg per Dr. Mayo Gomez orders.  Patient tolerated well    PI Prevention:  Remind patient to turn/reposition approximately every 2 hours  Offload heels with pillows at all times in bed. Discussed with RNAly completed with:   [x] Patient           [] Family member       [] Caregiver          [] Nursing  [] Other    Topics Discussed: home wound vac, home health, nutrition/protien to promote wound healing    Patient/Caregiver Teaching:  Level of patient/caregiver understanding able to:   [x] Indicates understanding       [] Needs reinforcement  [] Unsuccessful      [] Verbal Understanding  [] Demonstrated understanding       [] No evidence of learning  [] Refused teaching         [] N/A    Transition of Care:  For wound vac changes Tues/Alexandre OLIVAS, RN, [address] [facility],[address], [facility]  Certified Wound and Ostomy Nurse  office [phone]  Can be reached through [address]  pager [phone] or call  to page

## 2021-11-26 NOTE — PROGRESS NOTES
Surgery Progress Note    11/26/2021    Admit Date: 11/16/2021    CC: Right leg pain    11/19 Debridement right leg ulcer and vac placement    Subjective:     Joking this AM. Impressed with progress from compression wrap. Constitutional: No fever or chills  Neurologic: No headache  Eyes: No scleral icterus or irritated eyes  Nose: No nasal pain or drainage  Mouth: No oral lesions or sore throat  Cardiac: No palpations or chest pain  Pulmonary: No cough or shortness of breath  Gastrointestinal:No nausea, emesis, diarrhea, or constipation  Genitourinary: No dysuria  Musculoskeletal: Right leg pain  Skin: No rashes or lesions  Psychiatric: Calm    Objective:     Visit Vitals  BP (!) 142/79 (BP 1 Location: Right upper arm, BP Patient Position: At rest)   Pulse 81   Temp 98.3 °F (36.8 °C)   Resp 20   Ht 5' 7\" (1.702 m)   Wt 222 lb 14.2 oz (101.1 kg)   SpO2 92%   BMI 34.91 kg/m²       General: No acute distress, conversant  Eyes: PERRLA, no scleral icterus  HENT: Normocephalic without oral lesions  Neck: Trachea midline without LAD  Cardiac: Normal pulse rate and rhythm  Pulmonary: Symmetric chest rise with normal effort  GI: Soft, NT, ND, no hernia, no splenomegaly  Skin: Warm without rash  Extremities: Right leg wrapped with vac in place. Psych: GCS 15    Labs, vital signs, and I/O reviewed. Assessment:     67 y/o F with traumatic right leg ulcer status post debridement    Plan:     PRN pain control  Reg diet  Vac changed today. Will change again Tuesday for Tuesday/Friday changes at home. Can be discharged with wound care when cleared by medical team with twice weekly vac changes. Paperwork signed on chart. Follow up with me in 3-4 weeks in office.     Delfino Britt MD  Bariatric and General Surgeon  [facility]

## 2021-11-26 NOTE — ROUTINE PROCESS
Bedside shift change report given to Patti Alexandra (oncoming nurse) by Amanda Pulido (offgoing nurse). Report included the following information SBAR, Kardex, Intake/Output, MAR and Recent Results.

## 2021-11-27 LAB
ANION GAP SERPL CALC-SCNC: 6 MMOL/L (ref 5–15)
BASOPHILS # BLD: 0.1 K/UL (ref 0–0.1)
BASOPHILS NFR BLD: 1 % (ref 0–1)
BUN SERPL-MCNC: 30 MG/DL (ref 6–20)
BUN/CREAT SERPL: 22 (ref 12–20)
CALCIUM SERPL-MCNC: 9 MG/DL (ref 8.5–10.1)
CHLORIDE SERPL-SCNC: 87 MMOL/L (ref 97–108)
CO2 SERPL-SCNC: 33 MMOL/L (ref 21–32)
CREAT SERPL-MCNC: 1.34 MG/DL (ref 0.55–1.02)
DIFFERENTIAL METHOD BLD: ABNORMAL
EOSINOPHIL # BLD: 0.1 K/UL (ref 0–0.4)
EOSINOPHIL NFR BLD: 1 % (ref 0–7)
ERYTHROCYTE [DISTWIDTH] IN BLOOD BY AUTOMATED COUNT: 13.5 % (ref 11.5–14.5)
GLUCOSE SERPL-MCNC: 100 MG/DL (ref 65–100)
HCT VFR BLD AUTO: 29.7 % (ref 35–47)
HGB BLD-MCNC: 10 G/DL (ref 11.5–16)
IMM GRANULOCYTES # BLD AUTO: 0 K/UL (ref 0–0.04)
IMM GRANULOCYTES NFR BLD AUTO: 0 % (ref 0–0.5)
LYMPHOCYTES # BLD: 0.9 K/UL (ref 0.8–3.5)
LYMPHOCYTES NFR BLD: 17 % (ref 12–49)
MCH RBC QN AUTO: 32.5 PG (ref 26–34)
MCHC RBC AUTO-ENTMCNC: 33.7 G/DL (ref 30–36.5)
MCV RBC AUTO: 96.4 FL (ref 80–99)
MONOCYTES # BLD: 0.9 K/UL (ref 0–1)
MONOCYTES NFR BLD: 16 % (ref 5–13)
NEUTS SEG # BLD: 3.5 K/UL (ref 1.8–8)
NEUTS SEG NFR BLD: 65 % (ref 32–75)
NRBC # BLD: 0 K/UL (ref 0–0.01)
NRBC BLD-RTO: 0 PER 100 WBC
PLATELET # BLD AUTO: 241 K/UL (ref 150–400)
PMV BLD AUTO: 9 FL (ref 8.9–12.9)
POTASSIUM SERPL-SCNC: 3.9 MMOL/L (ref 3.5–5.1)
RBC # BLD AUTO: 3.08 M/UL (ref 3.8–5.2)
SODIUM SERPL-SCNC: 126 MMOL/L (ref 136–145)
WBC # BLD AUTO: 5.4 K/UL (ref 3.6–11)

## 2021-11-27 PROCEDURE — 36415 COLL VENOUS BLD VENIPUNCTURE: CPT

## 2021-11-27 PROCEDURE — 74011250637 HC RX REV CODE- 250/637: Performed by: STUDENT IN AN ORGANIZED HEALTH CARE EDUCATION/TRAINING PROGRAM

## 2021-11-27 PROCEDURE — 80048 BASIC METABOLIC PNL TOTAL CA: CPT

## 2021-11-27 PROCEDURE — 74011250636 HC RX REV CODE- 250/636: Performed by: SURGERY

## 2021-11-27 PROCEDURE — 74011250637 HC RX REV CODE- 250/637: Performed by: HOSPITALIST

## 2021-11-27 PROCEDURE — 85025 COMPLETE CBC W/AUTO DIFF WBC: CPT

## 2021-11-27 PROCEDURE — 74011250637 HC RX REV CODE- 250/637: Performed by: SURGERY

## 2021-11-27 PROCEDURE — 65660000000 HC RM CCU STEPDOWN

## 2021-11-27 RX ADMIN — RIVAROXABAN 15 MG: 15 TABLET, FILM COATED ORAL at 08:54

## 2021-11-27 RX ADMIN — ACETAMINOPHEN 650 MG: 325 TABLET ORAL at 19:03

## 2021-11-27 RX ADMIN — HYDROMORPHONE HYDROCHLORIDE 1 MG: 1 INJECTION, SOLUTION INTRAMUSCULAR; INTRAVENOUS; SUBCUTANEOUS at 19:04

## 2021-11-27 RX ADMIN — LEVOTHYROXINE SODIUM 175 MCG: 0.03 TABLET ORAL at 07:02

## 2021-11-27 RX ADMIN — Medication 10 ML: at 21:19

## 2021-11-27 RX ADMIN — ACETAMINOPHEN 650 MG: 325 TABLET ORAL at 01:02

## 2021-11-27 RX ADMIN — METOPROLOL TARTRATE 50 MG: 50 TABLET, FILM COATED ORAL at 21:18

## 2021-11-27 RX ADMIN — HYDROCORTISONE: 0.01 LOTION TOPICAL at 21:23

## 2021-11-27 RX ADMIN — MENTHOL, METHYL SALICYLATE: 10; 15 CREAM TOPICAL at 21:18

## 2021-11-27 RX ADMIN — METOPROLOL TARTRATE 50 MG: 50 TABLET, FILM COATED ORAL at 08:55

## 2021-11-27 RX ADMIN — Medication 10 ML: at 07:03

## 2021-11-27 RX ADMIN — HYDRALAZINE HYDROCHLORIDE 50 MG: 50 TABLET, FILM COATED ORAL at 18:18

## 2021-11-27 RX ADMIN — HYDROMORPHONE HYDROCHLORIDE 1 MG: 1 INJECTION, SOLUTION INTRAMUSCULAR; INTRAVENOUS; SUBCUTANEOUS at 13:33

## 2021-11-27 RX ADMIN — ACETAMINOPHEN 650 MG: 325 TABLET ORAL at 13:33

## 2021-11-27 RX ADMIN — HYDRALAZINE HYDROCHLORIDE 50 MG: 50 TABLET, FILM COATED ORAL at 21:18

## 2021-11-27 RX ADMIN — HYDROMORPHONE HYDROCHLORIDE 1 MG: 1 INJECTION, SOLUTION INTRAMUSCULAR; INTRAVENOUS; SUBCUTANEOUS at 01:02

## 2021-11-27 RX ADMIN — Medication 10 ML: at 14:00

## 2021-11-27 RX ADMIN — HYDRALAZINE HYDROCHLORIDE 50 MG: 50 TABLET, FILM COATED ORAL at 08:55

## 2021-11-27 NOTE — PROGRESS NOTES
[facility]  Hospitalist Group                                                                                          Hospitalist Progress Note  Lynne Bearden MD  Answering service: [phone] or [phone] from in house phone        Date of Service:  2021  NAME:  Sophia Lepe  :  1950  MRN:  [id number]      Admission Summary:     Patient presents with right lower extremity ulcer with cellulitis, patient is s/p surgical debridement , currently with wound VAC in place. Patient also with acute on chronic hyponatremia and metabolic encephalopathy. Interval history / Subjective:       Patient has no acute complaint, feeling better today.      Assessment & Plan:     Right lower extremity cellulitis associated with right leg ulcer  -Patient with recent trauma to her leg causing wound with subsequent hematoma  -CT of the right lower extremity shows 7 x 5 cm soft tissue wound medial to the distal tibia, with underlying cellulitis and ill-defined fluid, negative for osteomyelitis  -S/p surgical debridement , now with wound VAC  -Wound culture growing heavy Enterobacter, Serratia, Providencia, Stenotrophomonas, blood cultures negative  -General surgery following, plan to reevaluate the wound tomorrow  -ID following, on Bactrim, completed 10-day course   -Wound VAC changed today by general surgery, patient will need to go home with wound VAC with Tuesday/Friday for changes at home    Acute metabolic encephalopathy  -Acute metabolic encephalopathy likely due to hyponatremia, as well as hospital induced delirium  -Work-up including CT head which shows no acute pathology, TSH, B12 and folate within normal limits, negative screening for UTI  -Seen improving mentally today  -Previously evaluated by psychiatry    Acute on chronic hyponatremia  -Suspect SIADH, nephrology following  -Holding Bumex, Entresto and Zoloft  -Patient received tolvaptan ,   -Sodium level is slowly improving    JANESSA  -Likely due to hemodynamic/Bactrim effect  -Creatinine of 1.67 today, nephrology following  -Monitor intake and output, avoid nephrotoxin    Hypertension  -Continue hydralazine and metoprolol    Hypothyroidism  -Continue Synthroid    Chronic atrial fibrillation  -Metoprolol for rate control, on anticoagulation with Xarelto  -Monitor in telemetry    Chronic diastolic heart failure  -Continue metoprolol, Entresto on hold by nephrology    Code status: Full  DVT prophylaxis: On [facility] discussed with: Patient/Family and Nurse  Anticipated Disposition: Home with home health  Anticipated Discharge: 24 hours to 48 hours     Hospital Problems  Date Reviewed: 10/14/2021          Codes Class Noted POA    Open wound of right lower leg ICD-10-CM: S81.801A  ICD-9-CM: 891.0  11/17/2021 Unknown        Hyponatremia ICD-10-CM: E87.1  ICD-9-CM: 276.1  8/2/2017 Unknown                Review of Systems:   A comprehensive review of systems was negative except for that written in the HPI. Vital Signs:    Last 24hrs VS reviewed since prior progress note. Most recent are:  Visit Vitals  /66 (BP 1 Location: Right upper arm, BP Patient Position: At rest)   Pulse 72   Temp 97.8 °F (36.6 °C)   Resp 19   Ht 5' 7\" (1.702 m)   Wt 101.1 kg (222 lb 14.2 oz)   SpO2 95%   BMI 34.91 kg/m²         Intake/Output Summary (Last 24 hours) at 11/27/2021 1041  Last data filed at 11/27/2021 3254  Gross per 24 hour   Intake 900 ml   Output 775 ml   Net 125 ml        Physical Examination:     I had a face to face encounter with this patient and independently examined them on 11/27/2021 as outlined below:          Constitutional:  No acute distress, cooperative, pleasant    ENT:  Oral mucosa moist, oropharynx benign. Resp:  CTA bilaterally. No wheezing/rhonchi/rales. No accessory muscle use   CV:  Regular rhythm, normal rate, no murmurs, gallops, rubs    GI:  Soft, non distended, non tender.  normoactive bowel sounds, no hepatosplenomegaly     Musculoskeletal:  No edema, warm, 2+ pulses throughout, right lower extremity wrapped in dressing, wound VAC is intact    Neurologic:  Moves all extremities. AAOx3, CN II-XII reviewed            Data Review:    Review and/or order of clinical lab test      Labs:     Recent Labs     11/27/21 0121 11/26/21 0441   WBC 5.4 6.8   HGB 10.0* 10.4*   HCT 29.7* 30.5*    255     Recent Labs     11/27/21 0121 11/26/21 0441 11/25/21  1946   * 125* 123*   K 3.9 3.9 4.0   CL 87* 87* 85*   CO2 33* 31 33*   BUN 30* 28* 30*   CREA 1.34* 1.44* 1.60*    91 122*   CA 9.0 9.4 8.9     No results for input(s): ALT, AP, TBIL, TBILI, TP, ALB, GLOB, GGT, AML, LPSE in the last 72 hours. No lab exists for component: SGOT, GPT, AMYP, HLPSE  No results for input(s): INR, PTP, APTT, INREXT, INREXT in the last 72 hours. No results for input(s): FE, TIBC, PSAT, FERR in the last 72 hours. Lab Results   Component Value Date/Time    Folate 22.5 (H) 11/22/2021 02:14 AM      No results for input(s): PH, PCO2, PO2 in the last 72 hours. No results for input(s): CPK, CKNDX, TROIQ in the last 72 hours.     No lab exists for component: CPKMB  Lab Results   Component Value Date/Time    Cholesterol, total 233 (H) 06/15/2021 12:03 PM    HDL Cholesterol 114 06/15/2021 12:03 PM    LDL, calculated 108 (H) 06/15/2021 12:03 PM    LDL, calculated 102 (H) 02/07/2014 11:06 AM    Triglyceride 66 06/15/2021 12:03 PM     No results found for: [facility]  Lab Results   Component Value Date/Time    Color YELLOW/STRAW 11/22/2021 04:50 PM    Appearance CLEAR 11/22/2021 04:50 PM    Specific gravity 1.007 11/22/2021 04:50 PM    pH (UA) 7.5 11/22/2021 04:50 PM    Protein Negative 11/22/2021 04:50 PM    Glucose Negative 11/22/2021 04:50 PM    Ketone Negative 11/22/2021 04:50 PM    Bilirubin Negative 11/22/2021 04:50 PM    Urobilinogen 0.2 11/22/2021 04:50 PM    Nitrites Negative 11/22/2021 04:50 PM    Leukocyte Esterase Negative 11/22/2021 04:50 PM    Bacteria Few 02/07/2014 11:06 AM    WBC 11-30 (A) 02/07/2014 11:06 AM    RBC 0-3 02/07/2014 11:06 AM         Medications Reviewed:     Current Facility-Administered Medications   Medication Dose Route Frequency    rivaroxaban (XARELTO) tablet 15 mg  15 mg Oral DAILY WITH BREAKFAST    hydrALAZINE (APRESOLINE) tablet 50 mg  50 mg Oral TID    OLANZapine (ZyPREXA) 2.5 mg in sterile water (preservative free) 0.5 mL injection  2.5 mg IntraMUSCular Q8H PRN    fluticasone propionate (FLONASE) 50 mcg/actuation nasal spray 2 Spray  2 Spray Both Nostrils DAILY    hydrocortisone (ALA-MILTON) 1 % lotion   Topical BID PRN    sodium chloride (NS) flush 5-40 mL  5-40 mL IntraVENous Q8H    lidocaine (PF) (XYLOCAINE) 10 mg/mL (1 %) injection 0.1 mL  0.1 mL SubCUTAneous PRN    HYDROmorphone (DILAUDID) injection 1 mg  1 mg IntraVENous Q2H PRN    methyl salicylate-menthol (BENGAY) 15-10 % cream   Topical TID    diazePAM (VALIUM) tablet 2 mg  2 mg Oral QHS PRN    levothyroxine (SYNTHROID) tablet 175 mcg  175 mcg Oral ACB    metoprolol tartrate (LOPRESSOR) tablet 50 mg  50 mg Oral BID    [Held by provider] sertraline (ZOLOFT) tablet 50 mg  50 mg Oral DAILY    sodium chloride (NS) flush 5-40 mL  5-40 mL IntraVENous PRN    acetaminophen (TYLENOL) tablet 650 mg  650 mg Oral Q4H PRN    hydrALAZINE (APRESOLINE) 20 mg/mL injection 10 mg  10 mg IntraVENous Q6H PRN     ______________________________________________________________________  EXPECTED LENGTH OF STAY: 4d 19h  ACTUAL LENGTH OF STAY:          11                 Albino Goldmann, MD

## 2021-11-27 NOTE — PROGRESS NOTES
TRANSITIONS OF CARE:  RUR;  DISPOSITION:  CRM met briefly with patient who was very anxious and complaining of a severe headache and right foot wound throbbing. I notified patient's nurse Karie Vo and will re-visit later this afternoon.

## 2021-11-27 NOTE — PROGRESS NOTES
RENAL  PROGRESS NOTE        Subjective:   Chart reviewed  Objective:   VITALS SIGNS:    Visit Vitals  /66 (BP 1 Location: Left upper arm, BP Patient Position: At rest)   Pulse (!) 58   Temp 97.8 °F (36.6 °C)   Resp 19   Ht 5' 7\" (1.702 m)   Wt 101.1 kg (222 lb 14.2 oz)   SpO2 95%   BMI 34.91 kg/m²       O2 Device: None (Room air)   O2 Flow Rate (L/min): 2 l/min   Temp (24hrs), Av.1 °F (36.7 °C), Min:97.6 °F (36.4 °C), Max:98.5 °F (36.9 °C)         PHYSICAL EXAM:  resting    DATA REVIEW:     INTAKE / OUTPUT:   Last shift:      701 - 1900  In: -   Out: 425 [Urine:425]  Last 3 shifts: 1901 - 700  In: 1125 [P.O.:1125]  Out:  [Urine:2000]    Intake/Output Summary (Last 24 hours) at 2021 0856  Last data filed at 2021 0709  Gross per 24 hour   Intake 900 ml   Output 775 ml   Net 125 ml         LABS:   Recent Labs     21  01221  0441 21  0030   WBC 5.4 6.8 7.1   HGB 10.0* 10.4* 10.8*   HCT 29.7* 30.5* 30.9*    255 307     Recent Labs     21  01221  0441 21  1946   * 125* 123*   K 3.9 3.9 4.0   CL 87* 87* 85*   CO2 33* 31 33*    91 122*   BUN 30* 28* 30*   CREA 1.34* 1.44* 1.60*   CA 9.0 9.4 8.9         Assessment:  Hponatremia: Na 121-122 on admission.   Suspect  SIADH(also was on zoloft); ;POLYDPISIC;sodium better   JANESSA:could be henodynamics,Bactrim effects;now has urinary retention;COE 21  RLE wound: s/p surgical debridement and wound vac dressing 70/32/89   Systolic CHF     Hypokalemia: mild-> better s/p supplementation   Afib     Plan/Recommendations:    S/p Stop Bumex ,entresto    1st Tolvaptan on 21 ,2nd Tolvatan 21 ;sodium better,EXPECTED TO IMPROVE NOW JANESSA BETTER /  free water clearance should be better  Keep coe  Sodium better  Can dc in AM if keeps improving   Also difficulty with FR     Age approppriate cancer screen     Alison Ramirez MD

## 2021-11-27 NOTE — PROGRESS NOTES
TRANSITIONS OF CARE:  RUR:13%  DISPOSITION: HOME WITH HOME HEALTH AND WOUND VAC. CRM attempted to meet with patient x 2 this am and she declined a visit as she was complaining of a headache and leg pain. I will attempt to revisit later today. I note that wound care nurse assisted Dr Betito Rodriguez in changing out the wound vacumn. The wound care prescription is on the chart and will need to be completed by wound care nurse with input from Physician.

## 2021-11-27 NOTE — PROGRESS NOTES
Problem: Falls - Risk of  Goal: *Absence of Falls  Description: Document Mary Blood Fall Risk and appropriate interventions in the flowsheet. Outcome: Progressing Towards Goal  Note: Fall Risk Interventions:  Mobility Interventions: Communicate number of staff needed for ambulation/transfer    Mentation Interventions: Adequate sleep, hydration, pain control, Bed/chair exit alarm    Medication Interventions: Bed/chair exit alarm    Elimination Interventions: Call light in reach, Patient to call for help with toileting needs, Toileting schedule/hourly rounds    History of Falls Interventions: Bed/chair exit alarm         Problem: Pressure Injury - Risk of  Goal: *Prevention of pressure injury  Description: Document Tyrell Scale and appropriate interventions in the flowsheet.   Outcome: Progressing Towards Goal  Note: Pressure Injury Interventions:  Sensory Interventions: Chair cushion    Moisture Interventions: Absorbent underpads, Apply protective barrier, creams and emollients    Activity Interventions: Increase time out of bed    Mobility Interventions: Float heels, Pressure redistribution bed/mattress (bed type)    Nutrition Interventions: Document food/fluid/supplement intake    Friction and Shear Interventions: HOB 30 degrees or less

## 2021-11-27 NOTE — PROGRESS NOTES
TRANSITIONS OF CARE:  RUR;  DISPOSITION;HOME WITH HOME HEALTH AND A WOUND VAC.   CRM attempted to meet with patient this pm and she declined as she stated her pain medication was working and she wanted to rest.

## 2021-11-28 LAB
ANION GAP SERPL CALC-SCNC: 7 MMOL/L (ref 5–15)
BASOPHILS # BLD: 0.1 K/UL (ref 0–0.1)
BASOPHILS NFR BLD: 1 % (ref 0–1)
BUN SERPL-MCNC: 32 MG/DL (ref 6–20)
BUN/CREAT SERPL: 26 (ref 12–20)
CALCIUM SERPL-MCNC: 9.3 MG/DL (ref 8.5–10.1)
CHLORIDE SERPL-SCNC: 87 MMOL/L (ref 97–108)
CO2 SERPL-SCNC: 30 MMOL/L (ref 21–32)
CREAT SERPL-MCNC: 1.23 MG/DL (ref 0.55–1.02)
DIFFERENTIAL METHOD BLD: ABNORMAL
EOSINOPHIL # BLD: 0.1 K/UL (ref 0–0.4)
EOSINOPHIL NFR BLD: 2 % (ref 0–7)
ERYTHROCYTE [DISTWIDTH] IN BLOOD BY AUTOMATED COUNT: 13.7 % (ref 11.5–14.5)
GLUCOSE SERPL-MCNC: 114 MG/DL (ref 65–100)
HCT VFR BLD AUTO: 32.9 % (ref 35–47)
HGB BLD-MCNC: 11.2 G/DL (ref 11.5–16)
IMM GRANULOCYTES # BLD AUTO: 0 K/UL (ref 0–0.04)
IMM GRANULOCYTES NFR BLD AUTO: 0 % (ref 0–0.5)
LYMPHOCYTES # BLD: 1.5 K/UL (ref 0.8–3.5)
LYMPHOCYTES NFR BLD: 26 % (ref 12–49)
MCH RBC QN AUTO: 32.8 PG (ref 26–34)
MCHC RBC AUTO-ENTMCNC: 34 G/DL (ref 30–36.5)
MCV RBC AUTO: 96.5 FL (ref 80–99)
MONOCYTES # BLD: 0.7 K/UL (ref 0–1)
MONOCYTES NFR BLD: 12 % (ref 5–13)
NEUTS SEG # BLD: 3.4 K/UL (ref 1.8–8)
NEUTS SEG NFR BLD: 59 % (ref 32–75)
NRBC # BLD: 0 K/UL (ref 0–0.01)
NRBC BLD-RTO: 0 PER 100 WBC
PLATELET # BLD AUTO: 279 K/UL (ref 150–400)
PMV BLD AUTO: 9.3 FL (ref 8.9–12.9)
POTASSIUM SERPL-SCNC: 4.1 MMOL/L (ref 3.5–5.1)
RBC # BLD AUTO: 3.41 M/UL (ref 3.8–5.2)
SODIUM SERPL-SCNC: 124 MMOL/L (ref 136–145)
SODIUM SERPL-SCNC: 130 MMOL/L (ref 136–145)
WBC # BLD AUTO: 5.8 K/UL (ref 3.6–11)

## 2021-11-28 PROCEDURE — 80048 BASIC METABOLIC PNL TOTAL CA: CPT

## 2021-11-28 PROCEDURE — 94762 N-INVAS EAR/PLS OXIMTRY CONT: CPT

## 2021-11-28 PROCEDURE — 85025 COMPLETE CBC W/AUTO DIFF WBC: CPT

## 2021-11-28 PROCEDURE — 74011250637 HC RX REV CODE- 250/637: Performed by: INTERNAL MEDICINE

## 2021-11-28 PROCEDURE — 74011250637 HC RX REV CODE- 250/637: Performed by: HOSPITALIST

## 2021-11-28 PROCEDURE — 74011250637 HC RX REV CODE- 250/637: Performed by: STUDENT IN AN ORGANIZED HEALTH CARE EDUCATION/TRAINING PROGRAM

## 2021-11-28 PROCEDURE — 74011250637 HC RX REV CODE- 250/637: Performed by: SURGERY

## 2021-11-28 PROCEDURE — 36415 COLL VENOUS BLD VENIPUNCTURE: CPT

## 2021-11-28 PROCEDURE — 65660000000 HC RM CCU STEPDOWN

## 2021-11-28 PROCEDURE — 74011250636 HC RX REV CODE- 250/636: Performed by: SURGERY

## 2021-11-28 PROCEDURE — 84295 ASSAY OF SERUM SODIUM: CPT

## 2021-11-28 RX ADMIN — METOPROLOL TARTRATE 50 MG: 50 TABLET, FILM COATED ORAL at 10:10

## 2021-11-28 RX ADMIN — HYDROMORPHONE HYDROCHLORIDE 1 MG: 1 INJECTION, SOLUTION INTRAMUSCULAR; INTRAVENOUS; SUBCUTANEOUS at 10:45

## 2021-11-28 RX ADMIN — RIVAROXABAN 15 MG: 15 TABLET, FILM COATED ORAL at 07:43

## 2021-11-28 RX ADMIN — HYDROCORTISONE: 0.01 LOTION TOPICAL at 10:11

## 2021-11-28 RX ADMIN — DIAZEPAM 2 MG: 2 TABLET ORAL at 23:28

## 2021-11-28 RX ADMIN — ACETAMINOPHEN 650 MG: 325 TABLET ORAL at 18:26

## 2021-11-28 RX ADMIN — MENTHOL, METHYL SALICYLATE: 10; 15 CREAM TOPICAL at 18:31

## 2021-11-28 RX ADMIN — HYDRALAZINE HYDROCHLORIDE 50 MG: 50 TABLET, FILM COATED ORAL at 10:10

## 2021-11-28 RX ADMIN — ACETAMINOPHEN 650 MG: 325 TABLET ORAL at 10:45

## 2021-11-28 RX ADMIN — Medication 10 ML: at 18:25

## 2021-11-28 RX ADMIN — LEVOTHYROXINE SODIUM 175 MCG: 0.03 TABLET ORAL at 07:42

## 2021-11-28 RX ADMIN — HYDROMORPHONE HYDROCHLORIDE 1 MG: 1 INJECTION, SOLUTION INTRAMUSCULAR; INTRAVENOUS; SUBCUTANEOUS at 18:27

## 2021-11-28 RX ADMIN — MENTHOL, METHYL SALICYLATE: 10; 15 CREAM TOPICAL at 10:11

## 2021-11-28 RX ADMIN — HYDRALAZINE HYDROCHLORIDE 50 MG: 50 TABLET, FILM COATED ORAL at 18:26

## 2021-11-28 RX ADMIN — Medication 10 ML: at 07:43

## 2021-11-28 RX ADMIN — MENTHOL, METHYL SALICYLATE: 10; 15 CREAM TOPICAL at 23:29

## 2021-11-28 RX ADMIN — TOLVAPTAN 15 MG: 30 TABLET ORAL at 12:40

## 2021-11-28 NOTE — PROGRESS NOTES
Rivaroxaban Monitoring  Indication: Afib  Dose: 15 mg daily  Significant Dis: none   (use not recommended with carbamazepine, phenytoin, rifampin, Tornillo's Wort, ketoconazole, itraconazole, lopinavir/ritonavir, ritonavir, conivaptan, phenobarbital)    Recent Labs     11/28/21  0151 11/27/21  0121 11/27/21  0121 11/26/21  0441 11/26/21  0441   HGB 11.2*  --  10.0*  --  10.4*   CREA 1.23*   < > 1.34*   < > 1.44*      < > 241   < > 255    < > = values in this interval not displayed. Est CrCl: 50-55 ml/min  Plan: Change to rivaroxaban 20 mg daily now that renal function has improved.   Rivaroxaban Tip Sheet

## 2021-11-28 NOTE — PROGRESS NOTES
RENAL  PROGRESS NOTE        Subjective:   Chart reviewed again  Objective:   VITALS SIGNS:    Visit Vitals  /68 (BP 1 Location: Left upper arm, BP Patient Position: At rest)   Pulse 60   Temp 97.7 °F (36.5 °C)   Resp 19   Ht 5' 7\" (1.702 m)   Wt 101 kg (222 lb 10.6 oz)   SpO2 97%   BMI 34.87 kg/m²       O2 Device: None (Room air)   O2 Flow Rate (L/min): 2 l/min   Temp (24hrs), Av.2 °F (36.8 °C), Min:97.7 °F (36.5 °C), Max:98.6 °F (37 °C)         PHYSICAL EXAM:  deferred    DATA REVIEW:     INTAKE / OUTPUT:   Last shift:      No intake/output data recorded. Last 3 shifts:  1901 -  0700  In: 540 [P.O.:540]  Out: 1275 [Urine:1275]    Intake/Output Summary (Last 24 hours) at 2021 0935  Last data filed at 2021 1825  Gross per 24 hour   Intake    Output 500 ml   Net -500 ml         LABS:   Recent Labs     21  0151 21  0121 21  0441   WBC 5.8 5.4 6.8   HGB 11.2* 10.0* 10.4*   HCT 32.9* 29.7* 30.5*    241 255     Recent Labs     21  0151 21  0121 21  0441   * 126* 125*   K 4.1 3.9 3.9   CL 87* 87* 87*   CO2 30 33* 31   * 100 91   BUN 32* 30* 28*   CREA 1.23* 1.34* 1.44*   CA 9.3 9.0 9.4         Assessment:  Hponatremia: Na 121-122 on admission.   Suspect  SIADH(also was on zoloft); ;POLYDPISIC;sodium better   JANESSA:could be henodynamics,Bactrim effects;now has urinary retention;COE .  RLE wound: s/p surgical debridement and wound vac dressing    Systolic CHF     Hypokalemia: mild-> better s/p supplementation   Afib     Plan/Recommendations:    S/p Stop Bumex ,entresto    1st Tolvaptan on 21 ,2nd Tolvatan 21 ;sodium better,EXPECTED TO IMPROVE NOW JANESSA BETTER /  free water clearance should be better;3rd Tolvaptan today(might need OP Ure-Na_  Keep coe   Also difficulty with FR     Age approppriate cancer screen     Floridalma Avelar MD

## 2021-11-28 NOTE — PROGRESS NOTES
Bedside and Verbal shift change report given to Kofi Newman (oncoming nurse) by Annmarie Jackson (offgoing nurse). Report included the following information SBAR, Kardex and MAR.

## 2021-11-28 NOTE — PROGRESS NOTES
Bedside and Verbal shift change report given to Citlali (oncoming nurse) by Monalisa Lynch (offgoing nurse). Report included the following information SBAR, Kardex, MAR and Recent Results.

## 2021-11-28 NOTE — PROGRESS NOTES
[facility]  Hospitalist Group                                                                                          Hospitalist Progress Note  Sharifa Abbott MD  Answering service: [phone] OR [phone] from in house phone        Date of Service:  2021  NAME:  Carl   :  1950  MRN:  [id number]      Admission Summary:     Patient presents with right lower extremity ulcer with cellulitis, patient is s/p surgical debridement , currently with wound VAC in place. Patient also with acute on chronic hyponatremia and metabolic encephalopathy. Interval history / Subjective:       Patient has no acute complaint, feeling better today.      Assessment & Plan:     Right lower extremity cellulitis associated with right leg ulcer  -Patient with recent trauma to her leg causing wound with subsequent hematoma  -CT of the right lower extremity shows 7 x 5 cm soft tissue wound medial to the distal tibia, with underlying cellulitis and ill-defined fluid, negative for osteomyelitis  -S/p surgical debridement , now with wound VAC  -Wound culture growing heavy Enterobacter, Serratia, Providencia, Stenotrophomonas, blood cultures negative  -General surgery following, plan to reevaluate the wound tomorrow  -ID following, on Bactrim, completed 10-day course   -Wound VAC changed  by general surgery, patient will need to go home with wound VAC with Tuesday/Friday for changes at home    Acute metabolic encephalopathy  -Acute metabolic encephalopathy likely due to hyponatremia, as well as hospital induced delirium  -Work-up including CT head which shows no acute pathology, TSH, B12 and folate within normal limits, negative screening for UTI  -Seen improving mentally  -Previously evaluated by psychiatry    Acute on chronic hyponatremia  -Suspect SIADH, nephrology following  -Holding Bumex, Entresto and Zoloft  -Patient received tolvaptan , ,   -Continue monitor sodium level    JANESSA  -Likely due to hemodynamic/Bactrim effect  -Creatinine of 1.67 today, nephrology following  -Monitor intake and output, avoid nephrotoxin    Hypertension  -Continue hydralazine and metoprolol    Hypothyroidism  -Continue Synthroid    Chronic atrial fibrillation  -Metoprolol for rate control, on anticoagulation with Xarelto  -Monitor in telemetry    Chronic diastolic heart failure  -Continue metoprolol, Entresto on hold by nephrology    Code status: Full  DVT prophylaxis: On [facility] discussed with: Patient/Family and Nurse  Anticipated Disposition: Home with home health  Anticipated Discharge: 24 hours to 48 hours     Hospital Problems  Date Reviewed: 10/14/2021          Codes Class Noted POA    Open wound of right lower leg ICD-10-CM: S81.801A  ICD-9-CM: 891.0  11/17/2021 Unknown        Hyponatremia ICD-10-CM: E87.1  ICD-9-CM: 276.1  8/2/2017 Unknown                Review of Systems:   A comprehensive review of systems was negative except for that written in the HPI. Vital Signs:    Last 24hrs VS reviewed since prior progress note. Most recent are:  Visit Vitals  /74 (BP 1 Location: Right upper arm, BP Patient Position: At rest)   Pulse 79   Temp 98 °F (36.7 °C)   Resp 18   Ht 5' 7\" (1.702 m)   Wt 101 kg (222 lb 10.6 oz)   SpO2 95%   BMI 34.87 kg/m²         Intake/Output Summary (Last 24 hours) at 11/28/2021 1142  Last data filed at 11/27/2021 1825  Gross per 24 hour   Intake    Output 500 ml   Net -500 ml        Physical Examination:     I had a face to face encounter with this patient and independently examined them on 11/28/2021 as outlined below:          Constitutional:  No acute distress, cooperative, pleasant    ENT:  Oral mucosa moist, oropharynx benign. Resp:  CTA bilaterally. No wheezing/rhonchi/rales. No accessory muscle use   CV:  Regular rhythm, normal rate, no murmurs, gallops, rubs    GI:  Soft, non distended, non tender.  normoactive bowel sounds, no hepatosplenomegaly     Musculoskeletal:  No edema, warm, 2+ pulses throughout, right lower extremity wrapped in dressing, wound VAC is intact    Neurologic:  Moves all extremities. AAOx3, CN II-XII reviewed            Data Review:    Review and/or order of clinical lab test      Labs:     Recent Labs     11/28/21 0151 11/27/21  0121   WBC 5.8 5.4   HGB 11.2* 10.0*   HCT 32.9* 29.7*    241     Recent Labs     11/28/21 0151 11/27/21 0121 11/26/21  0441   * 126* 125*   K 4.1 3.9 3.9   CL 87* 87* 87*   CO2 30 33* 31   BUN 32* 30* 28*   CREA 1.23* 1.34* 1.44*   * 100 91   CA 9.3 9.0 9.4     No results for input(s): ALT, AP, TBIL, TBILI, TP, ALB, GLOB, GGT, AML, LPSE in the last 72 hours. No lab exists for component: SGOT, GPT, AMYP, HLPSE  No results for input(s): INR, PTP, APTT, INREXT, INREXT in the last 72 hours. No results for input(s): FE, TIBC, PSAT, FERR in the last 72 hours. Lab Results   Component Value Date/Time    Folate 22.5 (H) 11/22/2021 02:14 AM      No results for input(s): PH, PCO2, PO2 in the last 72 hours. No results for input(s): CPK, CKNDX, TROIQ in the last 72 hours.     No lab exists for component: CPKMB  Lab Results   Component Value Date/Time    Cholesterol, total 233 (H) 06/15/2021 12:03 PM    HDL Cholesterol 114 06/15/2021 12:03 PM    LDL, calculated 108 (H) 06/15/2021 12:03 PM    LDL, calculated 102 (H) 02/07/2014 11:06 AM    Triglyceride 66 06/15/2021 12:03 PM     No results found for: [facility]  Lab Results   Component Value Date/Time    Color YELLOW/STRAW 11/22/2021 04:50 PM    Appearance CLEAR 11/22/2021 04:50 PM    Specific gravity 1.007 11/22/2021 04:50 PM    pH (UA) 7.5 11/22/2021 04:50 PM    Protein Negative 11/22/2021 04:50 PM    Glucose Negative 11/22/2021 04:50 PM    Ketone Negative 11/22/2021 04:50 PM    Bilirubin Negative 11/22/2021 04:50 PM    Urobilinogen 0.2 11/22/2021 04:50 PM    Nitrites Negative 11/22/2021 04:50 PM    Leukocyte Esterase Negative 11/22/2021 04:50 PM    Bacteria Few 02/07/2014 11:06 AM    WBC 11-30 (A) 02/07/2014 11:06 AM    RBC 0-3 02/07/2014 11:06 AM         Medications Reviewed:     Current Facility-Administered Medications   Medication Dose Route Frequency    [START ON 11/29/2021] rivaroxaban (XARELTO) tablet 20 mg  20 mg Oral DAILY WITH BREAKFAST    tolvaptan (SAMSCA) tablet (0.5 X 30 MG) 15 mg  15 mg Oral ONCE    hydrALAZINE (APRESOLINE) tablet 50 mg  50 mg Oral TID    OLANZapine (ZyPREXA) 2.5 mg in sterile water (preservative free) 0.5 mL injection  2.5 mg IntraMUSCular Q8H PRN    fluticasone propionate (FLONASE) 50 mcg/actuation nasal spray 2 Spray  2 Spray Both Nostrils DAILY    hydrocortisone (ALA-MILTON) 1 % lotion   Topical BID PRN    sodium chloride (NS) flush 5-40 mL  5-40 mL IntraVENous Q8H    lidocaine (PF) (XYLOCAINE) 10 mg/mL (1 %) injection 0.1 mL  0.1 mL SubCUTAneous PRN    HYDROmorphone (DILAUDID) injection 1 mg  1 mg IntraVENous Q2H PRN    methyl salicylate-menthol (BENGAY) 15-10 % cream   Topical TID    diazePAM (VALIUM) tablet 2 mg  2 mg Oral QHS PRN    levothyroxine (SYNTHROID) tablet 175 mcg  175 mcg Oral ACB    metoprolol tartrate (LOPRESSOR) tablet 50 mg  50 mg Oral BID    [Held by provider] sertraline (ZOLOFT) tablet 50 mg  50 mg Oral DAILY    sodium chloride (NS) flush 5-40 mL  5-40 mL IntraVENous PRN    acetaminophen (TYLENOL) tablet 650 mg  650 mg Oral Q4H PRN    hydrALAZINE (APRESOLINE) 20 mg/mL injection 10 mg  10 mg IntraVENous Q6H PRN     ______________________________________________________________________  EXPECTED LENGTH OF STAY: 4d 19h  ACTUAL LENGTH OF STAY:          12                 Thu Negrete MD

## 2021-11-28 NOTE — PROGRESS NOTES
TRANSITIONS OF CARE :  RUR OFF [facility]   DISPOSITION. HOME WITH HOME HEALTH AND A WOUND VAC. CRM met briefly with patient and she is anxious to go home. I note that the WOUND VAC ORDER NEEDS TO BE COMPLETED ON 11/29/21.  CRM will follow up in am.

## 2021-11-29 LAB
ANION GAP SERPL CALC-SCNC: 7 MMOL/L (ref 5–15)
BASOPHILS # BLD: 0.1 K/UL (ref 0–0.1)
BASOPHILS NFR BLD: 1 % (ref 0–1)
BUN SERPL-MCNC: 28 MG/DL (ref 6–20)
BUN/CREAT SERPL: 30 (ref 12–20)
CALCIUM SERPL-MCNC: 9.1 MG/DL (ref 8.5–10.1)
CHLORIDE SERPL-SCNC: 89 MMOL/L (ref 97–108)
CO2 SERPL-SCNC: 30 MMOL/L (ref 21–32)
CREAT SERPL-MCNC: 0.94 MG/DL (ref 0.55–1.02)
DIFFERENTIAL METHOD BLD: ABNORMAL
EOSINOPHIL # BLD: 0.1 K/UL (ref 0–0.4)
EOSINOPHIL NFR BLD: 2 % (ref 0–7)
ERYTHROCYTE [DISTWIDTH] IN BLOOD BY AUTOMATED COUNT: 13.6 % (ref 11.5–14.5)
GLUCOSE SERPL-MCNC: 92 MG/DL (ref 65–100)
HCT VFR BLD AUTO: 31.4 % (ref 35–47)
HGB BLD-MCNC: 10.6 G/DL (ref 11.5–16)
IMM GRANULOCYTES # BLD AUTO: 0 K/UL (ref 0–0.04)
IMM GRANULOCYTES NFR BLD AUTO: 0 % (ref 0–0.5)
LYMPHOCYTES # BLD: 1.2 K/UL (ref 0.8–3.5)
LYMPHOCYTES NFR BLD: 16 % (ref 12–49)
MCH RBC QN AUTO: 32.5 PG (ref 26–34)
MCHC RBC AUTO-ENTMCNC: 33.8 G/DL (ref 30–36.5)
MCV RBC AUTO: 96.3 FL (ref 80–99)
MONOCYTES # BLD: 0.6 K/UL (ref 0–1)
MONOCYTES NFR BLD: 9 % (ref 5–13)
NEUTS SEG # BLD: 5.4 K/UL (ref 1.8–8)
NEUTS SEG NFR BLD: 72 % (ref 32–75)
NRBC # BLD: 0 K/UL (ref 0–0.01)
NRBC BLD-RTO: 0 PER 100 WBC
PLATELET # BLD AUTO: 232 K/UL (ref 150–400)
PMV BLD AUTO: 9.4 FL (ref 8.9–12.9)
POTASSIUM SERPL-SCNC: 3.8 MMOL/L (ref 3.5–5.1)
RBC # BLD AUTO: 3.26 M/UL (ref 3.8–5.2)
SODIUM SERPL-SCNC: 126 MMOL/L (ref 136–145)
WBC # BLD AUTO: 7.3 K/UL (ref 3.6–11)

## 2021-11-29 PROCEDURE — 74011250637 HC RX REV CODE- 250/637: Performed by: STUDENT IN AN ORGANIZED HEALTH CARE EDUCATION/TRAINING PROGRAM

## 2021-11-29 PROCEDURE — 85025 COMPLETE CBC W/AUTO DIFF WBC: CPT

## 2021-11-29 PROCEDURE — 74011250637 HC RX REV CODE- 250/637: Performed by: SURGERY

## 2021-11-29 PROCEDURE — 74011250637 HC RX REV CODE- 250/637: Performed by: FAMILY MEDICINE

## 2021-11-29 PROCEDURE — 65660000000 HC RM CCU STEPDOWN

## 2021-11-29 PROCEDURE — 36415 COLL VENOUS BLD VENIPUNCTURE: CPT

## 2021-11-29 PROCEDURE — 80048 BASIC METABOLIC PNL TOTAL CA: CPT

## 2021-11-29 PROCEDURE — 74011250636 HC RX REV CODE- 250/636: Performed by: SURGERY

## 2021-11-29 PROCEDURE — 74011250637 HC RX REV CODE- 250/637: Performed by: INTERNAL MEDICINE

## 2021-11-29 RX ORDER — FUROSEMIDE 20 MG/1
20 TABLET ORAL DAILY
Status: DISCONTINUED | OUTPATIENT
Start: 2021-11-29 | End: 2021-11-29

## 2021-11-29 RX ORDER — POTASSIUM CHLORIDE 750 MG/1
20 TABLET, FILM COATED, EXTENDED RELEASE ORAL DAILY
Status: DISCONTINUED | OUTPATIENT
Start: 2021-11-29 | End: 2021-12-01 | Stop reason: HOSPADM

## 2021-11-29 RX ORDER — BUMETANIDE 1 MG/1
1 TABLET ORAL DAILY
Status: DISCONTINUED | OUTPATIENT
Start: 2021-11-29 | End: 2021-12-01 | Stop reason: HOSPADM

## 2021-11-29 RX ORDER — SODIUM CHLORIDE TAB 1 GM 1 G
2 TAB MISCELLANEOUS 2 TIMES DAILY WITH MEALS
Status: DISCONTINUED | OUTPATIENT
Start: 2021-11-29 | End: 2021-12-01 | Stop reason: HOSPADM

## 2021-11-29 RX ADMIN — Medication 2 G: at 16:04

## 2021-11-29 RX ADMIN — Medication 10 ML: at 21:11

## 2021-11-29 RX ADMIN — ACETAMINOPHEN 650 MG: 325 TABLET ORAL at 08:31

## 2021-11-29 RX ADMIN — METOPROLOL TARTRATE 50 MG: 50 TABLET, FILM COATED ORAL at 21:11

## 2021-11-29 RX ADMIN — METOPROLOL TARTRATE 50 MG: 50 TABLET, FILM COATED ORAL at 08:21

## 2021-11-29 RX ADMIN — HYDROMORPHONE HYDROCHLORIDE 1 MG: 1 INJECTION, SOLUTION INTRAMUSCULAR; INTRAVENOUS; SUBCUTANEOUS at 16:30

## 2021-11-29 RX ADMIN — RIVAROXABAN 20 MG: 20 TABLET, FILM COATED ORAL at 07:15

## 2021-11-29 RX ADMIN — DIAZEPAM 2 MG: 2 TABLET ORAL at 21:21

## 2021-11-29 RX ADMIN — HYDRALAZINE HYDROCHLORIDE 50 MG: 50 TABLET, FILM COATED ORAL at 08:21

## 2021-11-29 RX ADMIN — HYDROMORPHONE HYDROCHLORIDE 1 MG: 1 INJECTION, SOLUTION INTRAMUSCULAR; INTRAVENOUS; SUBCUTANEOUS at 08:31

## 2021-11-29 RX ADMIN — POTASSIUM CHLORIDE 20 MEQ: 750 TABLET, FILM COATED, EXTENDED RELEASE ORAL at 16:04

## 2021-11-29 RX ADMIN — MENTHOL, METHYL SALICYLATE: 10; 15 CREAM TOPICAL at 08:21

## 2021-11-29 RX ADMIN — HYDRALAZINE HYDROCHLORIDE 50 MG: 50 TABLET, FILM COATED ORAL at 21:11

## 2021-11-29 RX ADMIN — BUMETANIDE 1 MG: 1 TABLET ORAL at 16:04

## 2021-11-29 RX ADMIN — Medication 10 ML: at 14:21

## 2021-11-29 RX ADMIN — MENTHOL, METHYL SALICYLATE: 10; 15 CREAM TOPICAL at 21:11

## 2021-11-29 RX ADMIN — MENTHOL, METHYL SALICYLATE: 10; 15 CREAM TOPICAL at 16:05

## 2021-11-29 RX ADMIN — LEVOTHYROXINE SODIUM 175 MCG: 0.03 TABLET ORAL at 07:15

## 2021-11-29 NOTE — ROUTINE PROCESS
Bedside and Verbal shift change report given to Good Islam (oncoming nurse) by Michael Serra (offgoing nurse). Report included the following information SBAR, Kardex, Intake/Output, MAR, Recent Results and Cardiac Rhythm A fib.

## 2021-11-29 NOTE — PROGRESS NOTES
[facility]  Hospitalist Group                                                                                          Hospitalist Progress Note  Lisa Epps MD  Answering service: [phone] OR [phone] from in house phone        Date of Service:  2021  NAME:  Susan BETTS:  1950  MRN:  [id number]      Admission Summary:     Patient presents with right lower extremity ulcer with cellulitis, patient is s/p surgical debridement , currently with wound VAC in place. Patient also with acute on chronic hyponatremia and metabolic encephalopathy. Interval history / Subjective:       Patient has no acute complaint, feeling better today.      Assessment & Plan:     Right lower extremity cellulitis associated with right leg ulcer  -Patient with recent trauma to her leg causing wound with subsequent hematoma  -CT of the right lower extremity shows 7 x 5 cm soft tissue wound medial to the distal tibia, with underlying cellulitis and ill-defined fluid, negative for osteomyelitis  -S/p surgical debridement , now with wound VAC  -Wound culture growing heavy Enterobacter, Serratia, Providencia, Stenotrophomonas, blood cultures negative  -General surgery previously evaluated the patient  -ID previously evaluated the patient, completed 10-day course of Bactrim  -Wound VAC changed  by general surgery, patient will need to go home with wound VAC with Tuesday/Friday wound vac changes at home    Acute metabolic encephalopathy  -Acute metabolic encephalopathy likely due to hyponatremia, as well as hospital induced delirium  -Work-up including CT head which shows no acute pathology, TSH, B12 and folate within normal limits, negative screening for UTI  -Seen improving mentally  -Previously evaluated by psychiatry    Acute on chronic hyponatremia  -Suspect SIADH, nephrology following  -Holding Bumex, Entresto and Zoloft  -Patient received tolvaptan , ,   -Continue monitor sodium level    JANESSA  -Likely due to hemodynamic/Bactrim effect  -JANESSA has now resolved  -Monitor intake and output, avoid nephrotoxin    Hypertension  -Continue hydralazine and metoprolol    Hypothyroidism  -Continue Synthroid    Chronic atrial fibrillation  -Metoprolol for rate control, on anticoagulation with Xarelto  -Monitor in telemetry    Chronic diastolic heart failure  -Continue metoprolol, Entresto on hold by nephrology    Code status: Full  DVT prophylaxis: On Xarelto    Care Plan discussed with: Patient/Family and Nurse  Anticipated Disposition: Home with home health  Anticipated Discharge: 24 hours to 48 hours     Hospital Problems  Date Reviewed: 10/14/2021          Codes Class Noted POA    Open wound of right lower leg ICD-10-CM: S81.801A  ICD-9-CM: 891.0  11/17/2021 Unknown        Hyponatremia ICD-10-CM: E87.1  ICD-9-CM: 276.1  8/2/2017 Unknown                Review of Systems:   A comprehensive review of systems was negative except for that written in the HPI. Vital Signs:    Last 24hrs VS reviewed since prior progress note. Most recent are:  Visit Vitals  /84 (BP 1 Location: Right upper arm, BP Patient Position: At rest)   Pulse 65   Temp 98.3 °F (36.8 °C)   Resp 19   Ht 5' 7\" (1.702 m)   Wt 104.6 kg (230 lb 9.6 oz)   SpO2 97%   BMI 36.12 kg/m²         Intake/Output Summary (Last 24 hours) at 11/29/2021 1136  Last data filed at 11/29/2021 6220  Gross per 24 hour   Intake 640 ml   Output 620 ml   Net 20 ml        Physical Examination:     I had a face to face encounter with this patient and independently examined them on 11/29/2021 as outlined below:          Constitutional:  No acute distress, cooperative, pleasant    ENT:  Oral mucosa moist, oropharynx benign. Resp:  CTA bilaterally. No wheezing/rhonchi/rales. No accessory muscle use   CV:  Regular rhythm, normal rate, no murmurs, gallops, rubs    GI:  Soft, non distended, non tender.  normoactive bowel sounds, no hepatosplenomegaly     Musculoskeletal:  No edema, warm, 2+ pulses throughout, right lower extremity wrapped in dressing, wound VAC is intact    Neurologic:  Moves all extremities. AAOx3, CN II-XII reviewed            Data Review:    Review and/or order of clinical lab test      Labs:     Recent Labs     11/29/21 0323 11/28/21 0151   WBC 7.3 5.8   HGB 10.6* 11.2*   HCT 31.4* 32.9*    279     Recent Labs     11/29/21 0323 11/28/21  1428 11/28/21 0151 11/27/21  0121 11/27/21  0121   * 130* 124*   < > 126*   K 3.8  --  4.1  --  3.9   CL 89*  --  87*  --  87*   CO2 30  --  30  --  33*   BUN 28*  --  32*  --  30*   CREA 0.94  --  1.23*  --  1.34*   GLU 92  --  114*  --  100   CA 9.1  --  9.3  --  9.0    < > = values in this interval not displayed. No results for input(s): ALT, AP, TBIL, TBILI, TP, ALB, GLOB, GGT, AML, LPSE in the last 72 hours. No lab exists for component: SGOT, GPT, AMYP, HLPSE  No results for input(s): INR, PTP, APTT, INREXT, INREXT in the last 72 hours. No results for input(s): FE, TIBC, PSAT, FERR in the last 72 hours. Lab Results   Component Value Date/Time    Folate 22.5 (H) 11/22/2021 02:14 AM      No results for input(s): PH, PCO2, PO2 in the last 72 hours. No results for input(s): CPK, CKNDX, TROIQ in the last 72 hours.     No lab exists for component: CPKMB  Lab Results   Component Value Date/Time    Cholesterol, total 233 (H) 06/15/2021 12:03 PM    HDL Cholesterol 114 06/15/2021 12:03 PM    LDL, calculated 108 (H) 06/15/2021 12:03 PM    LDL, calculated 102 (H) 02/07/2014 11:06 AM    Triglyceride 66 06/15/2021 12:03 PM     No results found for: [facility]  Lab Results   Component Value Date/Time    Color YELLOW/STRAW 11/22/2021 04:50 PM    Appearance CLEAR 11/22/2021 04:50 PM    Specific gravity 1.007 11/22/2021 04:50 PM    pH (UA) 7.5 11/22/2021 04:50 PM    Protein Negative 11/22/2021 04:50 PM    Glucose Negative 11/22/2021 04:50 PM    Ketone Negative 11/22/2021 04:50 PM    Bilirubin Negative 11/22/2021 04:50 PM    Urobilinogen 0.2 11/22/2021 04:50 PM    Nitrites Negative 11/22/2021 04:50 PM    Leukocyte Esterase Negative 11/22/2021 04:50 PM    Bacteria Few 02/07/2014 11:06 AM    WBC 11-30 (A) 02/07/2014 11:06 AM    RBC 0-3 02/07/2014 11:06 AM         Medications Reviewed:     Current Facility-Administered Medications   Medication Dose Route Frequency    rivaroxaban (XARELTO) tablet 20 mg  20 mg Oral DAILY WITH BREAKFAST    hydrALAZINE (APRESOLINE) tablet 50 mg  50 mg Oral TID    OLANZapine (ZyPREXA) 2.5 mg in sterile water (preservative free) 0.5 mL injection  2.5 mg IntraMUSCular Q8H PRN    fluticasone propionate (FLONASE) 50 mcg/actuation nasal spray 2 Spray  2 Spray Both Nostrils DAILY    hydrocortisone (ALA-MILTON) 1 % lotion   Topical BID PRN    sodium chloride (NS) flush 5-40 mL  5-40 mL IntraVENous Q8H    lidocaine (PF) (XYLOCAINE) 10 mg/mL (1 %) injection 0.1 mL  0.1 mL SubCUTAneous PRN    HYDROmorphone (DILAUDID) injection 1 mg  1 mg IntraVENous Q2H PRN    methyl salicylate-menthol (BENGAY) 15-10 % cream   Topical TID    diazePAM (VALIUM) tablet 2 mg  2 mg Oral QHS PRN    levothyroxine (SYNTHROID) tablet 175 mcg  175 mcg Oral ACB    metoprolol tartrate (LOPRESSOR) tablet 50 mg  50 mg Oral BID    [Held by provider] sertraline (ZOLOFT) tablet 50 mg  50 mg Oral DAILY    sodium chloride (NS) flush 5-40 mL  5-40 mL IntraVENous PRN    acetaminophen (TYLENOL) tablet 650 mg  650 mg Oral Q4H PRN    hydrALAZINE (APRESOLINE) 20 mg/mL injection 10 mg  10 mg IntraVENous Q6H PRN     ______________________________________________________________________  EXPECTED LENGTH OF STAY: 4d 19h  ACTUAL LENGTH OF STAY:          13                 Jabier Schulz MD

## 2021-11-30 LAB
ANION GAP SERPL CALC-SCNC: 5 MMOL/L (ref 5–15)
BASOPHILS # BLD: 0.1 K/UL (ref 0–0.1)
BASOPHILS NFR BLD: 1 % (ref 0–1)
BUN SERPL-MCNC: 27 MG/DL (ref 6–20)
BUN/CREAT SERPL: 28 (ref 12–20)
CALCIUM SERPL-MCNC: 9 MG/DL (ref 8.5–10.1)
CHLORIDE SERPL-SCNC: 92 MMOL/L (ref 97–108)
CO2 SERPL-SCNC: 33 MMOL/L (ref 21–32)
CREAT SERPL-MCNC: 0.97 MG/DL (ref 0.55–1.02)
DIFFERENTIAL METHOD BLD: ABNORMAL
EOSINOPHIL # BLD: 0.1 K/UL (ref 0–0.4)
EOSINOPHIL NFR BLD: 1 % (ref 0–7)
ERYTHROCYTE [DISTWIDTH] IN BLOOD BY AUTOMATED COUNT: 13.4 % (ref 11.5–14.5)
GLUCOSE SERPL-MCNC: 131 MG/DL (ref 65–100)
HCT VFR BLD AUTO: 33.7 % (ref 35–47)
HGB BLD-MCNC: 11.3 G/DL (ref 11.5–16)
IMM GRANULOCYTES # BLD AUTO: 0 K/UL (ref 0–0.04)
IMM GRANULOCYTES NFR BLD AUTO: 0 % (ref 0–0.5)
LYMPHOCYTES # BLD: 1.2 K/UL (ref 0.8–3.5)
LYMPHOCYTES NFR BLD: 19 % (ref 12–49)
MCH RBC QN AUTO: 32.8 PG (ref 26–34)
MCHC RBC AUTO-ENTMCNC: 33.5 G/DL (ref 30–36.5)
MCV RBC AUTO: 98 FL (ref 80–99)
MONOCYTES # BLD: 0.5 K/UL (ref 0–1)
MONOCYTES NFR BLD: 8 % (ref 5–13)
NEUTS SEG # BLD: 4.4 K/UL (ref 1.8–8)
NEUTS SEG NFR BLD: 71 % (ref 32–75)
NRBC # BLD: 0 K/UL (ref 0–0.01)
NRBC BLD-RTO: 0 PER 100 WBC
PLATELET # BLD AUTO: 281 K/UL (ref 150–400)
PMV BLD AUTO: 9.4 FL (ref 8.9–12.9)
POTASSIUM SERPL-SCNC: 3.7 MMOL/L (ref 3.5–5.1)
RBC # BLD AUTO: 3.44 M/UL (ref 3.8–5.2)
SODIUM SERPL-SCNC: 130 MMOL/L (ref 136–145)
WBC # BLD AUTO: 6.2 K/UL (ref 3.6–11)

## 2021-11-30 PROCEDURE — 36415 COLL VENOUS BLD VENIPUNCTURE: CPT

## 2021-11-30 PROCEDURE — 74011250637 HC RX REV CODE- 250/637: Performed by: STUDENT IN AN ORGANIZED HEALTH CARE EDUCATION/TRAINING PROGRAM

## 2021-11-30 PROCEDURE — 74011250637 HC RX REV CODE- 250/637: Performed by: SURGERY

## 2021-11-30 PROCEDURE — 74011250637 HC RX REV CODE- 250/637: Performed by: INTERNAL MEDICINE

## 2021-11-30 PROCEDURE — 99232 SBSQ HOSP IP/OBS MODERATE 35: CPT | Performed by: SURGERY

## 2021-11-30 PROCEDURE — 74011250637 HC RX REV CODE- 250/637: Performed by: FAMILY MEDICINE

## 2021-11-30 PROCEDURE — 74011250636 HC RX REV CODE- 250/636: Performed by: SURGERY

## 2021-11-30 PROCEDURE — 80048 BASIC METABOLIC PNL TOTAL CA: CPT

## 2021-11-30 PROCEDURE — 85025 COMPLETE CBC W/AUTO DIFF WBC: CPT

## 2021-11-30 PROCEDURE — 65660000000 HC RM CCU STEPDOWN

## 2021-11-30 RX ORDER — POTASSIUM CHLORIDE 1500 MG/1
20 TABLET, FILM COATED, EXTENDED RELEASE ORAL DAILY
Qty: 30 TABLET | Refills: 0 | Status: SHIPPED | OUTPATIENT
Start: 2021-12-01 | End: 2022-01-14

## 2021-11-30 RX ORDER — SODIUM CHLORIDE TAB 1 GM 1 G
2 TAB MISCELLANEOUS 2 TIMES DAILY WITH MEALS
Qty: 180 TABLET | Refills: 0 | Status: SHIPPED | OUTPATIENT
Start: 2021-11-30 | End: 2022-01-14

## 2021-11-30 RX ORDER — HYDRALAZINE HYDROCHLORIDE 50 MG/1
50 TABLET, FILM COATED ORAL 3 TIMES DAILY
Qty: 90 TABLET | Refills: 0 | Status: ON HOLD | OUTPATIENT
Start: 2021-11-30 | End: 2021-12-22 | Stop reason: SDUPTHER

## 2021-11-30 RX ORDER — BUMETANIDE 1 MG/1
1 TABLET ORAL DAILY
Qty: 30 TABLET | Refills: 0 | Status: ON HOLD | OUTPATIENT
Start: 2021-12-01 | End: 2021-12-22 | Stop reason: SDUPTHER

## 2021-11-30 RX ADMIN — HYDRALAZINE HYDROCHLORIDE 50 MG: 50 TABLET, FILM COATED ORAL at 21:52

## 2021-11-30 RX ADMIN — LEVOTHYROXINE SODIUM 175 MCG: 0.03 TABLET ORAL at 07:34

## 2021-11-30 RX ADMIN — Medication 2 G: at 17:01

## 2021-11-30 RX ADMIN — BUMETANIDE 1 MG: 1 TABLET ORAL at 09:23

## 2021-11-30 RX ADMIN — Medication 2 G: at 07:34

## 2021-11-30 RX ADMIN — RIVAROXABAN 20 MG: 20 TABLET, FILM COATED ORAL at 07:34

## 2021-11-30 RX ADMIN — HYDRALAZINE HYDROCHLORIDE 50 MG: 50 TABLET, FILM COATED ORAL at 15:36

## 2021-11-30 RX ADMIN — METOPROLOL TARTRATE 50 MG: 50 TABLET, FILM COATED ORAL at 09:23

## 2021-11-30 RX ADMIN — METOPROLOL TARTRATE 50 MG: 50 TABLET, FILM COATED ORAL at 21:52

## 2021-11-30 RX ADMIN — Medication 10 ML: at 07:34

## 2021-11-30 RX ADMIN — HYDROMORPHONE HYDROCHLORIDE 1 MG: 1 INJECTION, SOLUTION INTRAMUSCULAR; INTRAVENOUS; SUBCUTANEOUS at 22:50

## 2021-11-30 RX ADMIN — HYDROMORPHONE HYDROCHLORIDE 1 MG: 1 INJECTION, SOLUTION INTRAMUSCULAR; INTRAVENOUS; SUBCUTANEOUS at 04:19

## 2021-11-30 RX ADMIN — ACETAMINOPHEN 650 MG: 325 TABLET ORAL at 22:49

## 2021-11-30 RX ADMIN — POTASSIUM CHLORIDE 20 MEQ: 750 TABLET, FILM COATED, EXTENDED RELEASE ORAL at 09:23

## 2021-11-30 RX ADMIN — HYDROMORPHONE HYDROCHLORIDE 1 MG: 1 INJECTION, SOLUTION INTRAMUSCULAR; INTRAVENOUS; SUBCUTANEOUS at 09:22

## 2021-11-30 RX ADMIN — DIAZEPAM 2 MG: 2 TABLET ORAL at 22:49

## 2021-11-30 RX ADMIN — HYDRALAZINE HYDROCHLORIDE 50 MG: 50 TABLET, FILM COATED ORAL at 09:23

## 2021-11-30 RX ADMIN — Medication 10 ML: at 21:52

## 2021-11-30 RX ADMIN — ACETAMINOPHEN 650 MG: 325 TABLET ORAL at 09:35

## 2021-11-30 NOTE — PROGRESS NOTES
Pt continues to refuse lab draws. Pt states she is going home today and will not allow this RN to draw labs.

## 2021-11-30 NOTE — PROGRESS NOTES
Provided pastoral care visit to [facility] 5 patient. Did not include sacramental care.     Jeny Horne

## 2021-11-30 NOTE — DISCHARGE INSTRUCTIONS
Twice weekly vac changes with black sponge. If vac not approved in time okay for daily changes with wet to dry saline sponge until vac can be applied. Wrap leg after showering with gentle ace compression to improve healing. Follow up in 4 weeks with me for leg check and hernia eval.     Please call with any concerns.

## 2021-11-30 NOTE — PROGRESS NOTES
Pt refusing therapy at this time, anxious and requesting assistance with setup of food/opening coffee/tasks she is able to perform. Encouraged pt to perform as able to maintain independence. Pt states that she is discharging today and that she has been here for too long. Pt states that she will feel better at home.      Maurice Park, DPT, PT

## 2021-11-30 NOTE — PROGRESS NOTES
Name: Mine Huang   MRN: [id number]  : 1950      Assessment:  Hponatremia: Na 121-122 on admission.  Suspect  SIADH(also was on zoloft); POLYDPISIC + recent JANESSA contributed  JANESSA:could be henodynamics,Bactrim effects;now has urinary retention; TURNER .21>>RESOLVED  RLE wound: s/p surgical debridement and wound vac dressing 21   Hypokalemia: mild-> better s/p supplementation  Afib  AMS- resolved. Seems to be oriented x 3 on my eval    Pt got Tolvaptan x 3. Na down to 126 on . Started NACL tab + Bumex. She refused labs this am and adamant to go home. After counseling by Dr Santino Jernigan agreed for lab draw     Plan/Recommendations:  Await repeat labs  If Na is improving, then OK for d/c.  If Na is trending down, she cannot be d/c and if adamant, will have to be AMA  Ct FR even as OP  Should go home on NALC tabs + Bumex 1 mg every day + kcl 20 meq QD  ct to hold entresto-avoid on d/c  Encourage increase solute intake  If BP remains low nl, can cut back Hydralazine  Ct to hold Zoloft- avoid on d/c     Subjective:  \"I am going home no matter what\"  Frustrated with difficult lab draws    ROS:   No nausea, no vomiting    Exam:  Visit Vitals  /75 (BP 1 Location: Right upper arm, BP Patient Position: At rest)   Pulse 68   Temp 98.6 °F (37 °C)   Resp 20   Ht 5' 7\" (1.702 m)   Wt 104.3 kg (229 lb 15 oz)   SpO2 96%   BMI 36.01 kg/m²     NAD  No icterus, mmm  RLE wrapped  AOx3    Current Facility-Administered Medications   Medication Dose Route Frequency Last Admin    sodium chloride tablet 2 g  2 g Oral BID WITH MEALS 2 g at 21 0734    bumetanide (BUMEX) tablet 1 mg  1 mg Oral DAILY 1 mg at 21 0923    potassium chloride SR (KLOR-CON 10) tablet 20 mEq  20 mEq Oral DAILY 20 mEq at 21 9524    rivaroxaban (XARELTO) tablet 20 mg  20 mg Oral DAILY WITH BREAKFAST 20 mg at 11/30/21 0734    hydrALAZINE (APRESOLINE) tablet 50 mg  50 mg Oral TID 50 mg at 11/30/21 0923    OLANZapine (ZyPREXA) 2.5 mg in sterile water (preservative free) 0.5 mL injection  2.5 mg IntraMUSCular Q8H PRN 2.5 mg at 11/21/21 1928    fluticasone propionate (FLONASE) 50 mcg/actuation nasal spray 2 Spray  2 Spray Both Nostrils DAILY 2 [place] at 11/25/21 1040    hydrocortisone (ALA-MILTON) 1 % lotion   Topical BID PRN Given at 11/28/21 1011    sodium chloride (NS) flush 5-40 mL  5-40 mL IntraVENous Q8H 10 mL at 11/30/21 0734    lidocaine (PF) (XYLOCAINE) 10 mg/mL (1 %) injection 0.1 mL  0.1 mL SubCUTAneous PRN      HYDROmorphone (DILAUDID) injection 1 mg  1 mg IntraVENous Q2H PRN 1 mg at 11/30/21 4520    methyl salicylate-menthol (BENGAY) 15-10 % cream   Topical TID Given at 11/29/21 2111    diazePAM (VALIUM) tablet 2 mg  2 mg Oral QHS PRN 2 mg at 11/29/21 2121    levothyroxine (SYNTHROID) tablet 175 mcg  175 mcg Oral  mcg at 11/30/21 0734    metoprolol tartrate (LOPRESSOR) tablet 50 mg  50 mg Oral BID 50 mg at 11/30/21 0923    [Held by provider] sertraline (ZOLOFT) tablet 50 mg  50 mg Oral DAILY 50 mg at 11/19/21 0840    sodium chloride (NS) flush 5-40 mL  5-40 mL IntraVENous PRN      acetaminophen (TYLENOL) tablet 650 mg  650 mg Oral Q4H  mg at 11/30/21 0935    hydrALAZINE (APRESOLINE) 20 mg/mL injection 10 mg  10 mg IntraVENous Q6H PRN 10 mg at 11/19/21 1550       Labs/Data:    Lab Results   Component Value Date/Time    WBC 7.3 11/29/2021 03:23 AM    HGB (POC) 15.1 01/28/2020 09:43 AM    HGB 10.6 (L) 11/29/2021 03:23 AM    HCT (POC) 44.5 01/28/2020 09:43 AM    HCT 31.4 (L) 11/29/2021 03:23 AM    PLATELET 458 81/98/0518 03:23 AM    MCV 96.3 11/29/2021 03:23 AM       Lab Results   Component Value Date/Time    Sodium 126 (L) 11/29/2021 03:23 AM    Potassium 3.8 11/29/2021 03:23 AM    Chloride 89 (L) 11/29/2021 03:23 AM    CO2 30 11/29/2021 03:23 AM    Anion gap 7 11/29/2021 03:23 AM    Glucose 92 11/29/2021 03:23 AM    BUN 28 (H) 11/29/2021 03:23 AM    Creatinine 0.94 11/29/2021 03:23 AM    BUN/Creatinine ratio 30 (H) 11/29/2021 03:23 AM    GFR est AA >60 11/29/2021 03:23 AM    GFR est non-AA 59 (L) 11/29/2021 03:23 AM    Calcium 9.1 11/29/2021 03:23 AM       Wt Readings from Last 3 Encounters:   11/29/21 104.3 kg (229 lb 15 oz)   10/14/21 89.8 kg (198 lb)   09/10/21 86.2 kg (190 lb)         Intake/Output Summary (Last 24 hours) at 11/30/2021 1431  Last data filed at 11/29/2021 1936  Gross per 24 hour   Intake 1300 ml   Output 600 ml   Net 700 ml       Patient seen and examined. Chart reviewed. Labs, data and other pertinent notes reviewed in last 24 hrs.     PMH/SH/FH reviewed and unchanged compared to H&P    Discussed with pt/RN and Dr Ivana Lazaro MD

## 2021-11-30 NOTE — PROGRESS NOTES
Bedside and Verbal shift change report given to Citlali (oncoming nurse) by Pauline Ochoa (offgoing nurse). Report included the following information SBAR, Kardex, MAR and Recent Results.

## 2021-11-30 NOTE — WOUND CARE
WOCN Note:      Follow-up visit for NPWT dressing change with Dr. Norbert Izaguirre  Seen in room 202     Chart shows:  Patient admitted on 11/16/21 with hyponatremia. s/p excisional debridement of right leg ulcer and placement of wound vac on 11/19/21 by Dr. Norbert Izaguirre. Patient reports that she hit her right leg about a month ago and developed a hematoma since then it has been slow to heal.  She takes a blood thinner for her afib. 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 Mobility: no assistance with repositioning. Able to turn independently from side to side in bed   Continence: Continent of urine and stool   Last Tyrell Score: 22  Surface: Norwell mattress  Diet: regular, oral nutritional supplements being followed by dietician      1. POA Right anterior lower leg surgical wound   Etiology: hematoma  Full thickness   6.5 x 4.2 x 0.6 cm, has decreased in size since last assessment  Base is 40% slough and 60% pink/red granulating tissue   small amount serosang exudate. 50 ml exudate in vac canister  no odor   Well defined edges  Periwound intact & without erythema. 2 small open blood filled blisters to 11 o'clock and 2 o'clock   Treatment: Previous vac dressing removed. Removed 2 pieces black foam. Wound cleansed with saline. Skin prep to periwound, applied moist to dry dressing, covered with foam border dressing. Demonstrated and explained wound care in case dressing needs to be changed before home health nurse visits    Image from 11/30/21         PI Prevention:  Remind patient to turn/reposition approximately every 2 hours  Offload heels with pillows at all times in bed.     [employer] form is still in patient paper chart, signed by Dr. Sabino St on 11/23/21. Informed CM that form needs to be faxed and to call [employer] to see if home delivery can be expedited. Wound care portion was completed. CM portion still needs to be completed     Transition of Care: Patient insisting on being discharged today.  She will be staying with her sister     Giana Griffin MSN, RN, [address] [place] [address],[address], [facility]  Certified Wound and Ostomy Nurse  office [phone]  Can be reached through [address]  pager [phone] or call  to page

## 2021-11-30 NOTE — DISCHARGE SUMMARY
Discharge Summary       PATIENT ID: Bart Peterson  MRN: [id number]   YOB: 1950    DATE OF ADMISSION: 11/16/2021 12:24 PM    DATE OF DISCHARGE: 11/30/2021   PRIMARY CARE PROVIDER: Anaid Daniel MD     ATTENDING PHYSICIAN: Parish Whyte  DISCHARGING PROVIDER: Wesly Guillen MD    To contact this individual call [phone] and ask the  to page. If unavailable ask to be transferred the Adult Hospitalist Department. CONSULTATIONS: IP CONSULT TO NEPHROLOGY  IP CONSULT TO INFECTIOUS DISEASES  IP CONSULT TO PSYCHIATRY  IP CONSULT TO GENERAL SURGERY    PROCEDURES/SURGERIES: Procedure(s):  DEBRIDEMENT OF RIGHT LEG ULCER AND VAC PLACEMENT    ADMITTING [facility] COURSE:     HPI  Bart Peterson is a 66 y.o. female who presents with leg wound. History was primarily obtained from the patient, patient with history of atrial fibrillation, currently on Xarelto, reports that she had developed a hematoma of her right leg specifically the right shin, she had been treated for the same, she was diagnosed with a hematoma on 10/17/2021. Patient reports that she was told that the hematoma should resolve by itself and then she should seek plastic surgery consult for possibly a skin graft. Patient reports that the leg wound is getting worse, she feels foul-smelling discharge coming out of the wound, she has had poor appetite and weakness. Came to the ER today, was found to be hyponatremic and was requested to be admitted to the hospital service. Patient denies taking any new medications, patient takes spironolactone for diuresis at home. Patient denies any new injuries or falls    Hospital Course  1. Right lower extremity cellulitis associated with right leg ulcer  Patient with recent trauma to her right leg causing wound with subsequent hematoma.   CT of the right lower extremity shows 7 x 5 cm soft tissue wound medial to the distal tibia, with underlying cellulitis and ill-defined fluid, negative osteomyelitis. Patient went for surgical debridement 11/19 and subsequently wound VAC was placed. Wound culture growing heavy Enterobacter, Serratia, Providencia, Stenotrophomonas, blood cultures negative. ID evaluated the patient and completed antibiotics during this hospitalization. Paperwork for wound VAC for home, signed by general surgery. Case management submitting for approval.  General surgery is okay with patient going home with wet-to-dry dressing until wound VAC approved to be placed at home. Wound VAC change schedules at home will be Tuesday and Fridays. Patient followed by home health. 2.  Acute metabolic encephalopathy  Acute metabolic encephalopathy likely due to hyponatremia as well as hospital delirium. Work-up including CT head which shows no acute pathology, TSH, vitamin B12 and folate level within normal limits. Negative for UTI. Patient with improving mental status back to baseline. Patient was also evaluated by psychiatry during this hospitalization. 3.  Acute on chronic hyponatremia  Suspect SIADH. Nephrology follows the patient. Patient has received tolvaptan 11/24, 11/25, 11/28 during this hospitalization and later Bumex was restarted with salt tablet. Overall patient's sodium level has improved to 130. Patient will need labs rechecked in about a week with PCP for reevaluation. Nephrology recommending continuing holding Entresto and Celexa. DISCHARGE DIAGNOSES / PLAN:      1. Right lower extremity cellulitis  2. Right leg ulcer  3. Acute metabolic encephalopathy  4. Acute on chronic hyponatremia  5. JANESSA  6. Hypertension  7. Hypothyroidism     ADDITIONAL CARE RECOMMENDATIONS:     Follow-up with PCP in 1 week. Follow-up BMP in 1 week.     PENDING TEST RESULTS:   At the time of discharge the following test results are still pending: None    FOLLOW UP APPOINTMENTS:    Follow-up Information     Follow up With Specialties Details Why Contact Info    [facility] [address]  HHPT/OT/Skilled Nursing/Wound Vac support [address]  [facility] [address]  [phone]    Rosalind Eng MD General Surgery, Bariatrics In 4 weeks Please come the day before, on the morning before your next vac change so I can take it off and look at the wound [address]  [facility] [address] [place] [address]      Dinah Barrios MD Internal Medicine   [address]  [phone]               DIET: Cardiac Diet  Oral Nutritional Supplements: No Oral Supplement prescribed    ACTIVITY: Activity as tolerated    WOUND CARE: Instructions per general surgery    EQUIPMENT needed: Wound VAC      DISCHARGE MEDICATIONS:  Current Discharge Medication List      START taking these medications    Details   bumetanide (BUMEX) 1 mg tablet Take 1 Tablet by mouth daily. Qty: 30 Tablet, Refills: 0  Start date: 12/1/2021      hydrALAZINE (APRESOLINE) 50 mg tablet Take 1 Tablet by mouth three (3) times daily. Qty: 90 Tablet, Refills: 0  Start date: 11/30/2021      potassium chloride SR (K-TAB) 20 mEq tablet Take 1 Tablet by mouth daily. Qty: 30 Tablet, Refills: 0  Start date: 12/1/2021         CONTINUE these medications which have NOT CHANGED    Details   metoprolol tartrate (LOPRESSOR) 50 mg tablet Take 50 mg by mouth two (2) times a day. butalbital-acetaminophen-caffeine (FIORICET, ESGIC) -40 mg per tablet TAKE 1 TO 2 TABLETS BY MOUTH EVERY 6 HOURS AS NEEDED FOR HEADACHE  Qty: 30 Tablet, Refills: 1      ondansetron (ZOFRAN ODT) 4 mg disintegrating tablet Take 1 Tablet by mouth four (4) times daily as needed for Nausea or Vomiting.   Qty: 30 Tablet, Refills: 2    Associated Diagnoses: Nausea      levothyroxine (SYNTHROID) 175 mcg tablet TAKE 1 TABLET BY MOUTH EVERY DAY BEFORE BREAKFAST  Qty: 90 Tablet, Refills: 3    Associated Diagnoses: Acquired hypothyroidism      Xarelto 20 mg tab tablet TAKE 1 TABLET BY MOUTH EVERY DAY  Qty: 90 Tablet, Refills: 3      diazePAM (VALIUM) 2 mg tablet TAKE 1 TABLET BY MOUTH EVERY DAY AS NEEDED FOR ANXIETY  Qty: 30 Tablet, Refills: 1    Comments: Not to exceed 4 additional fills before 09/29/2021 DX Code Needed  . Associated Diagnoses: Anxiety         STOP taking these medications       Entresto 49-51 mg tab tablet Comments:   Reason for Stopping:         sertraline (ZOLOFT) 50 mg tablet Comments:   Reason for Stopping:         spironolactone (ALDACTONE) 25 mg tablet Comments:   Reason for Stopping:           Addendum to discharge med list  -Sodium chloride tablet 2 g orally twice daily with meals  -Dilaudid 1 mg p.o. every 6 hours as needed (15 tablets prescribed)      NOTIFY YOUR PHYSICIAN FOR ANY OF THE FOLLOWING:   Fever over 101 degrees for 24 hours. Chest pain, shortness of breath, fever, chills, nausea, vomiting, diarrhea, change in mentation, falling, weakness, bleeding. Severe pain or pain not relieved by medications. Or, any other signs or symptoms that you may have questions about. DISPOSITION:    Home With:   OT  PT  HH  RN       Long term SNF/Inpatient Rehab    Independent/assisted living    Hospice    Other:       PATIENT CONDITION AT DISCHARGE:     Functional status    Poor     Deconditioned     Independent      Cognition     Lucid     Forgetful     Dementia      Catheters/lines (plus indication)    Morrison     PICC     PEG     None      Code status     Full code     DNR      PHYSICAL EXAMINATION AT DISCHARGE:  General:          Alert, cooperative, no distress, appears stated age. HEENT:           Atraumatic, anicteric sclerae, pink conjunctivae                          No oral ulcers, mucosa moist, throat clear, dentition fair  Neck:               Supple, symmetrical  Lungs:             Clear to auscultation bilaterally. No Wheezing or Rhonchi. No rales. Chest wall:      No tenderness  No Accessory muscle use.   Heart:              Regular  rhythm,  No  murmur   No edema  Abdomen: Soft, non-tender. Not distended. Bowel sounds normal  Extremities:     No cyanosis. No clubbing,                            Skin turgor normal, Capillary refill normal  Skin:                 Right lower extremity covered in dressing  Psych:             Not anxious or agitated.   Neurologic:      Alert, moves all extremities, answers questions appropriately and responds to commands       CHRONIC MEDICAL DIAGNOSES:  Problem List as of 2021 Date Reviewed: 10/14/2021          Codes Class Noted - Resolved    Open wound of right lower leg ICD-10-CM: S19.152J  ICD-9-CM: 891.0  2021 - Present        Mild depression ([facility] ICD-10-CM: F32.0  ICD-9-CM: 698  2019 - Present        Chronic diastolic congestive heart failure ([facility] ICD-10-CM: I50.32  ICD-9-CM: 428.32, 428.0  2017 - Present        Encounter for long-term (current) drug use ICD-10-CM: Z79.899  ICD-9-CM: V58.69  2017 - Present        Hyponatremia ICD-10-CM: E87.1  ICD-9-CM: 276.1  2017 - Present        Chest pain ICD-10-CM: R07.9  ICD-9-CM: 786.50  2017 - Present        Acquired hypothyroidism ICD-10-CM: E03.9  ICD-9-CM: 244.9  2016 - Present        Atrial fibrillation (HCC) ICD-10-CM: I48.91  ICD-9-CM: 427.31  7/15/2011 - Present        HTN (hypertension) ICD-10-CM: I10  ICD-9-CM: 401.9  2011 - Present        RESOLVED: CHF (congestive heart failure) ([facility] ICD-10-CM: I50.9  ICD-9-CM: 428.0  2017 - 2017        RESOLVED: Encounter for long-term (current) use of other medications ICD-10-CM: Z79.899  ICD-9-CM: V58.69  7/15/2011 - 2016        RESOLVED: Unspecified hypothyroidism ICD-10-CM: E03.9  ICD-9-CM: 244.9  7/15/2011 - 2016              Greater than 60 minutes were spent with the patient on counseling and coordination of care    Signed:   Chika Silver MD  2021  3:39 PM

## 2021-11-30 NOTE — PROGRESS NOTES
Surgery Progress Note    11/30/2021    Admit Date: 11/16/2021    CC: Right leg pain    11/19 Debridement right leg ulcer and vac placement    Subjective: At bedside with friend. Wants to go home today. Also reports a hernia. Constitutional: No fever or chills  Neurologic: No headache  Eyes: No scleral icterus or irritated eyes  Nose: No nasal pain or drainage  Mouth: No oral lesions or sore throat  Cardiac: No palpations or chest pain  Pulmonary: No cough or shortness of breath  Gastrointestinal:No nausea, emesis, diarrhea, or constipation  Genitourinary: No dysuria  Musculoskeletal: Right leg pain  Skin: No rashes or lesions  Psychiatric: Calm    Objective:     Visit Vitals  /75 (BP 1 Location: Right upper arm, BP Patient Position: At rest)   Pulse 68   Temp 98.6 °F (37 °C)   Resp 20   Ht 5' 7\" (1.702 m)   Wt 229 lb 15 oz (104.3 kg)   SpO2 96%   BMI 36.01 kg/m²       General: No acute distress, conversant  Eyes: PERRLA, no scleral icterus  HENT: Normocephalic without oral lesions  Neck: Trachea midline without LAD  Cardiac: Normal pulse rate and rhythm  Pulmonary: Symmetric chest rise with normal effort  GI: Soft, NT, ND, 8.2VU reducible umbilical hernia, no splenomegaly  Skin: Warm without rash  Extremities: Right leg wrapped with vac in place. Psych: GCS 15    Labs, vital signs, and I/O reviewed. Assessment:     67 y/o F with traumatic right leg ulcer status post debridement    Plan:     PRN pain control  Reg diet  Vac changed today. Can be discharged with wound care when cleared by medical team with twice weekly vac changes. Paperwork signed on chart-apparently not submitted yet. Can go home with wet to dry until vac approved to be placed at home if patient insistent upon home today. Follow up with me in 3-4 weeks in office. Will address hernia as outpatient.     David Machado MD  Bariatric and General Surgeon  [facility]

## 2021-11-30 NOTE — PROGRESS NOTES
Occupational Therapy    Chart reviewed, attempted to see for OT treatment, patient in bed with breakfast tray still and declining therapy, \"this is just not the right time\", interrupting therapist when attempting to explain role and benefit. Patient wishing to discharge today. Will follow up as able. Leo Wooten.  MS Matthias OTR/L

## 2021-11-30 NOTE — PROGRESS NOTES
Transition of Care Plan   RUR- 11% Moderate Risk   DISPOSITION: The disposition plan is home with family assistance.  Home with home health - HHPT/OT/SN - [facility]  [phone] - [facility] patient accepted.  F/U with PCP/Specialist     Transport: Family    Patient has been recommended for HHPT/OT/Skilled Nursing and wound vac support. [facility]  [phone] - has accepted patient. Wound vac documentation was provided at bedside chart on 11/22/21 for attending and wound care nurse to sign. At 1:03pm - TW was given the [address] form by the Wound Care nurse to fax to [phone]. As TW was not informed that this documentation had been signed, at this time documentation is not completed. CM to follow up with patient. At 1:10pm - CM met with patient at bedside to confirm delivery address. CM confirmed demographics. At 1:19pm - CM faxed [address] to [phone]. At 3:10pm - CM contacted [facility]  to follow up regarding fax that was submitted for review. TW spoke with Katty Saucedo who reports that patients referral is being reviewed and awaiting confirmation from insurance. CM inquired how long it may take. It was reported, it just depends on the insurance. CM provided patient with this information. TW provided patient with patient advocate contact information. AVS has been updated. Medicare pt has received, reviewed, and signed 2nd IM letter informing them of their right to appeal the discharge. Signed copy has been placed on pt bedside chart. CM will continue to provide updates as they become available.  CM will continue to follow, provide support and assist with JAYDE needs as they arise     Mitch Hoang, MSW, CRM, LMHP-e

## 2021-11-30 NOTE — PROGRESS NOTES
Pt is refusing to be discharged today because \"It is so late now, my sister has gone home and we are both exhausted. \" Pt also states \"I will go home tomorrow at 12pm when I will know that everything, including the wound vacuum is set up. \" Dr. Pilar Garcia notified via Veronica. SBAR report given to HARIKA Granger.

## 2021-11-30 NOTE — PROGRESS NOTES
Bedside and Verbal shift change report given to Mark Hannah RN (oncoming nurse) by RN  (offgoing nurse). Report included the following information SBAR, Kardex, Intake/Output and MAR.

## 2021-12-01 VITALS
WEIGHT: 229.94 LBS | RESPIRATION RATE: 20 BRPM | BODY MASS INDEX: 36.09 KG/M2 | HEIGHT: 67 IN | TEMPERATURE: 98.1 F | OXYGEN SATURATION: 95 % | HEART RATE: 72 BPM | DIASTOLIC BLOOD PRESSURE: 84 MMHG | SYSTOLIC BLOOD PRESSURE: 156 MMHG

## 2021-12-01 PROCEDURE — 74011250637 HC RX REV CODE- 250/637: Performed by: STUDENT IN AN ORGANIZED HEALTH CARE EDUCATION/TRAINING PROGRAM

## 2021-12-01 PROCEDURE — 74011250637 HC RX REV CODE- 250/637: Performed by: INTERNAL MEDICINE

## 2021-12-01 PROCEDURE — 74011250637 HC RX REV CODE- 250/637: Performed by: FAMILY MEDICINE

## 2021-12-01 PROCEDURE — 74011250637 HC RX REV CODE- 250/637: Performed by: SURGERY

## 2021-12-01 PROCEDURE — 74011250636 HC RX REV CODE- 250/636: Performed by: SURGERY

## 2021-12-01 RX ORDER — HYDROMORPHONE HYDROCHLORIDE 2 MG/1
1 TABLET ORAL
Qty: 15 TABLET | Refills: 0 | Status: SHIPPED | OUTPATIENT
Start: 2021-12-01 | End: 2021-12-04

## 2021-12-01 RX ADMIN — Medication 10 ML: at 07:06

## 2021-12-01 RX ADMIN — HYDROMORPHONE HYDROCHLORIDE 1 MG: 1 INJECTION, SOLUTION INTRAMUSCULAR; INTRAVENOUS; SUBCUTANEOUS at 11:25

## 2021-12-01 RX ADMIN — POTASSIUM CHLORIDE 20 MEQ: 750 TABLET, FILM COATED, EXTENDED RELEASE ORAL at 08:28

## 2021-12-01 RX ADMIN — METOPROLOL TARTRATE 50 MG: 50 TABLET, FILM COATED ORAL at 08:28

## 2021-12-01 RX ADMIN — MENTHOL, METHYL SALICYLATE: 10; 15 CREAM TOPICAL at 08:33

## 2021-12-01 RX ADMIN — FLUTICASONE PROPIONATE 2 SPRAY: 50 SPRAY, METERED NASAL at 08:33

## 2021-12-01 RX ADMIN — Medication 2 G: at 07:06

## 2021-12-01 RX ADMIN — ACETAMINOPHEN 650 MG: 325 TABLET ORAL at 11:25

## 2021-12-01 RX ADMIN — BUMETANIDE 1 MG: 1 TABLET ORAL at 08:29

## 2021-12-01 RX ADMIN — RIVAROXABAN 20 MG: 20 TABLET, FILM COATED ORAL at 07:06

## 2021-12-01 RX ADMIN — HYDRALAZINE HYDROCHLORIDE 50 MG: 50 TABLET, FILM COATED ORAL at 08:29

## 2021-12-01 RX ADMIN — LEVOTHYROXINE SODIUM 175 MCG: 0.03 TABLET ORAL at 07:06

## 2021-12-01 NOTE — PROGRESS NOTES
Transition of Care Plan  RUR- 12% Moderate Risk  DISPOSITION: The disposition plan is home with family assistance. Home with home health - HHPT/OT/SN - [facility]  [phone] - [facility] - patient accepted. F/U with PCP/Specialist    Transport: Family    At 8:33am - CM contacted [facility] [phone] to follow up regarding [address], TW spoke with Adelita and it was reported that account was generated already, based on the notes in the account, trying to get in touch with patient. First attempt was made yesterday around 5:36pm. It is also reported that a voice message was left. The message was left on the home phone - [phone]. TW provided representative with patients mobile number - [phone]. Patient to likely discharge home. Patients sister to provide transportation. CM will continue to provide updates as they become available.  CM will continue to follow, provide support and assist with JAYDE needs as they arise     JOSE Merino, CRM, LMHP-e

## 2021-12-01 NOTE — PROGRESS NOTES
[facility] visit. Mrs. Miguel was excited to be leaving today. She declined communion today saying Fr. Jossy Barrios' visit was sufficient. Prayer offered.     DENISE Mercado, RN, ACSW, LCSW   Page:  [phone]

## 2021-12-01 NOTE — PROGRESS NOTES
I prayed with Georges Kyle and celebrated with her the [address].   Father Bladimir Fears

## 2021-12-01 NOTE — PROGRESS NOTES
Attempted to schedule hospital follow up PCP appointment. The office is requesting the patient to call the office to schedule their appointment upon arrival to home.  Rafa Rutledge, Care Management Specialist.

## 2021-12-01 NOTE — PROGRESS NOTES
I have reviewed discharge instructions with the patient and patient's sister. The patient and patient's sister verbalized understanding. Opportunity for questions and clarification provided. Patient discharged home with Home Health services.  Patient sent with extra wound care materials until [facility] is able to arrive to place wound vac

## 2021-12-01 NOTE — PROGRESS NOTES
0400 - pt refused labs. Pt states that her \"sodium is better, she feels better, and she will pass because she is leaving today at noon. \"

## 2021-12-17 ENCOUNTER — TELEPHONE (OUTPATIENT)
Dept: SURGERY | Age: 71
End: 2021-12-17

## 2021-12-17 ENCOUNTER — HOSPITAL ENCOUNTER (INPATIENT)
Age: 71
LOS: 5 days | Discharge: NURSING, REHAB OR LONG-TERM CARE | DRG: 644 | End: 2021-12-22
Attending: EMERGENCY MEDICINE | Admitting: STUDENT IN AN ORGANIZED HEALTH CARE EDUCATION/TRAINING PROGRAM
Payer: MEDICARE

## 2021-12-17 DIAGNOSIS — E87.1 HYPONATREMIA: Primary | ICD-10-CM

## 2021-12-17 DIAGNOSIS — N28.9 ACUTE RENAL INSUFFICIENCY: ICD-10-CM

## 2021-12-17 DIAGNOSIS — E86.0 DEHYDRATION: ICD-10-CM

## 2021-12-17 LAB
ALBUMIN SERPL-MCNC: 3.1 G/DL (ref 3.5–5)
ALBUMIN/GLOB SERPL: 0.9 {RATIO} (ref 1.1–2.2)
ALP SERPL-CCNC: 245 U/L (ref 45–117)
ALT SERPL-CCNC: 84 U/L (ref 12–78)
ANION GAP SERPL CALC-SCNC: 8 MMOL/L (ref 5–15)
AST SERPL-CCNC: 29 U/L (ref 15–37)
BASOPHILS # BLD: 0.1 K/UL (ref 0–0.1)
BASOPHILS NFR BLD: 1 % (ref 0–1)
BILIRUB SERPL-MCNC: 0.8 MG/DL (ref 0.2–1)
BUN SERPL-MCNC: 31 MG/DL (ref 6–20)
BUN/CREAT SERPL: 26 (ref 12–20)
CALCIUM SERPL-MCNC: 9.2 MG/DL (ref 8.5–10.1)
CHLORIDE SERPL-SCNC: 86 MMOL/L (ref 97–108)
CO2 SERPL-SCNC: 30 MMOL/L (ref 21–32)
COMMENT, HOLDF: NORMAL
CREAT SERPL-MCNC: 1.18 MG/DL (ref 0.55–1.02)
DIFFERENTIAL METHOD BLD: ABNORMAL
EOSINOPHIL # BLD: 0.1 K/UL (ref 0–0.4)
EOSINOPHIL NFR BLD: 1 % (ref 0–7)
ERYTHROCYTE [DISTWIDTH] IN BLOOD BY AUTOMATED COUNT: 13.3 % (ref 11.5–14.5)
GLOBULIN SER CALC-MCNC: 3.5 G/DL (ref 2–4)
GLUCOSE SERPL-MCNC: 102 MG/DL (ref 65–100)
HCT VFR BLD AUTO: 32.9 % (ref 35–47)
HGB BLD-MCNC: 11 G/DL (ref 11.5–16)
IMM GRANULOCYTES # BLD AUTO: 0 K/UL (ref 0–0.04)
IMM GRANULOCYTES NFR BLD AUTO: 0 % (ref 0–0.5)
LYMPHOCYTES # BLD: 1.2 K/UL (ref 0.8–3.5)
LYMPHOCYTES NFR BLD: 10 % (ref 12–49)
MCH RBC QN AUTO: 31.7 PG (ref 26–34)
MCHC RBC AUTO-ENTMCNC: 33.4 G/DL (ref 30–36.5)
MCV RBC AUTO: 94.8 FL (ref 80–99)
MONOCYTES # BLD: 0.7 K/UL (ref 0–1)
MONOCYTES NFR BLD: 6 % (ref 5–13)
NEUTS SEG # BLD: 9.3 K/UL (ref 1.8–8)
NEUTS SEG NFR BLD: 82 % (ref 32–75)
NRBC # BLD: 0 K/UL (ref 0–0.01)
NRBC BLD-RTO: 0 PER 100 WBC
PLATELET # BLD AUTO: 344 K/UL (ref 150–400)
PMV BLD AUTO: 9 FL (ref 8.9–12.9)
POTASSIUM SERPL-SCNC: 3.7 MMOL/L (ref 3.5–5.1)
PROT SERPL-MCNC: 6.6 G/DL (ref 6.4–8.2)
RBC # BLD AUTO: 3.47 M/UL (ref 3.8–5.2)
SAMPLES BEING HELD,HOLD: NORMAL
SODIUM SERPL-SCNC: 124 MMOL/L (ref 136–145)
WBC # BLD AUTO: 11.3 K/UL (ref 3.6–11)

## 2021-12-17 PROCEDURE — 36415 COLL VENOUS BLD VENIPUNCTURE: CPT

## 2021-12-17 PROCEDURE — 65270000029 HC RM PRIVATE

## 2021-12-17 PROCEDURE — 80053 COMPREHEN METABOLIC PANEL: CPT

## 2021-12-17 PROCEDURE — 65660000000 HC RM CCU STEPDOWN

## 2021-12-17 PROCEDURE — 85025 COMPLETE CBC W/AUTO DIFF WBC: CPT

## 2021-12-17 PROCEDURE — 99283 EMERGENCY DEPT VISIT LOW MDM: CPT

## 2021-12-17 RX ORDER — SODIUM CHLORIDE 0.9 % (FLUSH) 0.9 %
5-40 SYRINGE (ML) INJECTION AS NEEDED
Status: DISCONTINUED | OUTPATIENT
Start: 2021-12-17 | End: 2021-12-22 | Stop reason: HOSPADM

## 2021-12-17 RX ORDER — METOPROLOL TARTRATE 50 MG/1
50 TABLET ORAL 2 TIMES DAILY
Status: DISCONTINUED | OUTPATIENT
Start: 2021-12-18 | End: 2021-12-18

## 2021-12-17 RX ORDER — ACETAMINOPHEN 650 MG/1
650 SUPPOSITORY RECTAL
Status: DISCONTINUED | OUTPATIENT
Start: 2021-12-17 | End: 2021-12-20

## 2021-12-17 RX ORDER — ONDANSETRON 4 MG/1
4 TABLET, ORALLY DISINTEGRATING ORAL
Status: DISCONTINUED | OUTPATIENT
Start: 2021-12-17 | End: 2021-12-22 | Stop reason: HOSPADM

## 2021-12-17 RX ORDER — BUMETANIDE 0.25 MG/ML
2 INJECTION INTRAMUSCULAR; INTRAVENOUS EVERY 12 HOURS
Status: DISCONTINUED | OUTPATIENT
Start: 2021-12-18 | End: 2021-12-18

## 2021-12-17 RX ORDER — KETOROLAC TROMETHAMINE 30 MG/ML
10 INJECTION, SOLUTION INTRAMUSCULAR; INTRAVENOUS
Status: COMPLETED | OUTPATIENT
Start: 2021-12-17 | End: 2021-12-18

## 2021-12-17 RX ORDER — DIAZEPAM 2 MG/1
2 TABLET ORAL
Status: DISCONTINUED | OUTPATIENT
Start: 2021-12-17 | End: 2021-12-22 | Stop reason: HOSPADM

## 2021-12-17 RX ORDER — HYDRALAZINE HYDROCHLORIDE 50 MG/1
50 TABLET, FILM COATED ORAL 3 TIMES DAILY
Status: DISCONTINUED | OUTPATIENT
Start: 2021-12-18 | End: 2021-12-21

## 2021-12-17 RX ORDER — ENOXAPARIN SODIUM 100 MG/ML
40 INJECTION SUBCUTANEOUS DAILY
Status: DISCONTINUED | OUTPATIENT
Start: 2021-12-18 | End: 2021-12-18

## 2021-12-17 RX ORDER — ACETAMINOPHEN 325 MG/1
650 TABLET ORAL
Status: DISCONTINUED | OUTPATIENT
Start: 2021-12-17 | End: 2021-12-20

## 2021-12-17 RX ORDER — ONDANSETRON 2 MG/ML
4 INJECTION INTRAMUSCULAR; INTRAVENOUS
Status: DISCONTINUED | OUTPATIENT
Start: 2021-12-17 | End: 2021-12-22 | Stop reason: HOSPADM

## 2021-12-17 RX ORDER — SODIUM CHLORIDE 0.9 % (FLUSH) 0.9 %
5-40 SYRINGE (ML) INJECTION EVERY 8 HOURS
Status: DISCONTINUED | OUTPATIENT
Start: 2021-12-18 | End: 2021-12-22 | Stop reason: HOSPADM

## 2021-12-17 RX ORDER — POLYETHYLENE GLYCOL 3350 17 G/17G
17 POWDER, FOR SOLUTION ORAL DAILY PRN
Status: DISCONTINUED | OUTPATIENT
Start: 2021-12-17 | End: 2021-12-22 | Stop reason: HOSPADM

## 2021-12-17 NOTE — ED PROVIDER NOTES
Has drain in RLE to wound vac for hematoma. DC'd to her sister's house on 11/30. Pt reports that she has been getting weaker. BP was checked by PT and found to be low. 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 rep 10 yrs.  [place] THYROIDECTOMY  11/05         Family History:   Problem Relation Age of Onset    Cancer Mother         lung       Social History     Socioeconomic History    Marital status:      Spouse name: Not on file    Number of children: Not on file    Years of education: Not on file    Highest education level: Not on file   Occupational History    Not on file   Tobacco Use    Smoking status: Never Smoker    Smokeless tobacco: Never Used   Substance and Sexual Activity    Alcohol use: Yes     Comment: soc    Drug use: No    Sexual activity: Not on file   Other Topics Concern    Not on file   Social History Narrative    Not on file     Social Determinants of Health     Financial Resource Strain:     Difficulty of Paying Living Expenses: Not on file   Food Insecurity:     Worried About Running Out of Food in the Last Year: Not on file    Bianca of Food in the Last Year: Not on file   Transportation Needs:     Lack of Transportation (Medical): Not on file    Lack of Transportation (Non-Medical):  Not on file   Physical Activity:     Days of Exercise per Week: Not on file    Minutes of Exercise per Session: Not on file   Stress:     Feeling of Stress : Not on file   Social Connections:     Frequency of Communication with Friends and Family: Not on file    Frequency of Social Gatherings with Friends and Family: Not on file    Attends [employer] Services: Not on file    Active Member of Clubs or Organizations: Not on file    Attends Club or Organization Meetings: Not on file    Marital Status: Not on file   Intimate Partner Violence:     Fear of Current or Ex-Partner: Not on file    Emotionally Abused: Not on file    Physically Abused: Not on file    Sexually Abused: Not on file   Housing Stability:     Unable to Pay for Housing in the Last Year: Not on file    Number of [place] in the Last Year: Not on file    Unstable Housing in the Last Year: Not on file         ALLERGIES: Penicillins, Opioids - morphine analogues, and Percocet [oxycodone-acetaminophen]    Review of Systems   Constitutional: Negative for fever. HENT: Negative for facial swelling. Eyes: Negative for visual disturbance. Respiratory: Negative for chest tightness. Cardiovascular: Negative for chest pain. Gastrointestinal: Negative for abdominal pain. Genitourinary: Negative for difficulty urinating and dysuria. Musculoskeletal: Negative for arthralgias. Skin: Negative for rash. Neurological: Negative for headaches. Hematological: Negative for adenopathy. Psychiatric/Behavioral: Negative for suicidal ideas. Vitals:    12/17/21 1357   BP: 100/61   Pulse: 78   Resp: 18   Temp: 98.5 °F (36.9 °C)   SpO2: 98%   Height: 5' 7\" (1.702 m)            Physical Exam  Vitals and nursing note reviewed. Constitutional:       General: She is not in acute distress. Appearance: She is well-developed. HENT:      Head: Normocephalic and atraumatic. Eyes:      General: No scleral icterus. Conjunctiva/sclera: Conjunctivae normal.      Pupils: Pupils are equal, round, and reactive to light. Cardiovascular:      Rate and Rhythm: Normal rate. Heart sounds: No murmur heard. Pulmonary:      Effort: Pulmonary effort is normal. No respiratory distress. Abdominal:      General: There is no distension. Musculoskeletal:         General: Normal range of motion. Cervical back: Normal range of motion and neck supple. Right lower leg: Edema present. Left lower leg: Edema present. Comments: Drain in RLE   Skin:     General: Skin is warm and dry. Findings: No rash. Neurological:      Mental Status: She is alert and oriented to person, place, and time. MDM  Number of Diagnoses or Management Options     Amount and/or Complexity of Data Reviewed  Clinical lab tests: reviewed         Perfect Serve Consult for Admission  6:38 PM    ED Room Number: FT6/RRF  Patient Name and age:  James Andrade 66 y.o.  female  Working Diagnosis:   1. Hyponatremia    2. Acute renal insufficiency    3. Dehydration        COVID-19 Suspicion:  no  Sepsis present:  no  Reassessment needed: no  Code Status:  Full Code  Readmission: yes  Isolation Requirements:  no  Recommended Level of Care:  telemetry  Department:  [facility] Adult ED - 21     Other:  D/c'd on 11/30 with wound vac to RLE. Not doing well at home. Na down to 124, Creatinine elevated. I think needs rehab. Case management saw patient and said no options this weekend. Total critical care time spent exclusive of procedures:  0 minutes.     Procedures

## 2021-12-17 NOTE — TELEPHONE ENCOUNTER
Spoke with Shamar marti. She states patient was taken to [facility] via EMS. She will discuss referral to rehab with  and will let us know if MD needs to refer.

## 2021-12-17 NOTE — TELEPHONE ENCOUNTER
Patient's sister called stating that they are attempting to get the patient into [facility], but need approval from Dr. Reyes Quan or his nurse.  Please call the patient's sister back

## 2021-12-17 NOTE — TELEPHONE ENCOUNTER
Jennifer from [facility] called stating that the patient is refusing home health care. Stated the patient is progressing \"down hill\". Nurse is also requesting that the patient gets put into the [address] [place] [address].

## 2021-12-17 NOTE — ED TRIAGE NOTES
Pt reports LE edema, wound to R LE, and fatigue for the past month or more. Pt has a drain on the R lower leg.

## 2021-12-17 NOTE — PROGRESS NOTES
CM consult received and appreciated. Noted request for referral to [address] [place] [address]. Provider does not assess from the ED . Patient would need to make OP MD appointment for evaluation. Updates provided to Malika Tolliver. CM will follow for transitions of care needs.

## 2021-12-18 ENCOUNTER — APPOINTMENT (OUTPATIENT)
Dept: GENERAL RADIOLOGY | Age: 71
DRG: 644 | End: 2021-12-18
Attending: HOSPITALIST
Payer: MEDICARE

## 2021-12-18 LAB
ANION GAP SERPL CALC-SCNC: 7 MMOL/L (ref 5–15)
BASOPHILS # BLD: 0.1 K/UL (ref 0–0.1)
BASOPHILS NFR BLD: 1 % (ref 0–1)
BNP SERPL-MCNC: 4660 PG/ML
BUN SERPL-MCNC: 29 MG/DL (ref 6–20)
BUN/CREAT SERPL: 27 (ref 12–20)
CALCIUM SERPL-MCNC: 8.5 MG/DL (ref 8.5–10.1)
CHLORIDE SERPL-SCNC: 86 MMOL/L (ref 97–108)
CO2 SERPL-SCNC: 31 MMOL/L (ref 21–32)
CREAT SERPL-MCNC: 1.08 MG/DL (ref 0.55–1.02)
DIFFERENTIAL METHOD BLD: ABNORMAL
EOSINOPHIL # BLD: 0.1 K/UL (ref 0–0.4)
EOSINOPHIL NFR BLD: 1 % (ref 0–7)
ERYTHROCYTE [DISTWIDTH] IN BLOOD BY AUTOMATED COUNT: 13.2 % (ref 11.5–14.5)
GLUCOSE SERPL-MCNC: 100 MG/DL (ref 65–100)
HCT VFR BLD AUTO: 29.3 % (ref 35–47)
HGB BLD-MCNC: 9.8 G/DL (ref 11.5–16)
IMM GRANULOCYTES # BLD AUTO: 0 K/UL (ref 0–0.04)
IMM GRANULOCYTES NFR BLD AUTO: 0 % (ref 0–0.5)
LYMPHOCYTES # BLD: 1.1 K/UL (ref 0.8–3.5)
LYMPHOCYTES NFR BLD: 11 % (ref 12–49)
MCH RBC QN AUTO: 31.6 PG (ref 26–34)
MCHC RBC AUTO-ENTMCNC: 33.4 G/DL (ref 30–36.5)
MCV RBC AUTO: 94.5 FL (ref 80–99)
MONOCYTES # BLD: 0.8 K/UL (ref 0–1)
MONOCYTES NFR BLD: 8 % (ref 5–13)
NEUTS SEG # BLD: 8 K/UL (ref 1.8–8)
NEUTS SEG NFR BLD: 79 % (ref 32–75)
NRBC # BLD: 0 K/UL (ref 0–0.01)
NRBC BLD-RTO: 0 PER 100 WBC
PLATELET # BLD AUTO: 297 K/UL (ref 150–400)
PMV BLD AUTO: 9 FL (ref 8.9–12.9)
POTASSIUM SERPL-SCNC: 3.5 MMOL/L (ref 3.5–5.1)
RBC # BLD AUTO: 3.1 M/UL (ref 3.8–5.2)
SODIUM SERPL-SCNC: 124 MMOL/L (ref 136–145)
WBC # BLD AUTO: 10 K/UL (ref 3.6–11)

## 2021-12-18 PROCEDURE — 65270000029 HC RM PRIVATE

## 2021-12-18 PROCEDURE — 85025 COMPLETE CBC W/AUTO DIFF WBC: CPT

## 2021-12-18 PROCEDURE — 71045 X-RAY EXAM CHEST 1 VIEW: CPT

## 2021-12-18 PROCEDURE — 74011250637 HC RX REV CODE- 250/637: Performed by: STUDENT IN AN ORGANIZED HEALTH CARE EDUCATION/TRAINING PROGRAM

## 2021-12-18 PROCEDURE — 74011250637 HC RX REV CODE- 250/637: Performed by: INTERNAL MEDICINE

## 2021-12-18 PROCEDURE — 74011250636 HC RX REV CODE- 250/636: Performed by: STUDENT IN AN ORGANIZED HEALTH CARE EDUCATION/TRAINING PROGRAM

## 2021-12-18 PROCEDURE — 36415 COLL VENOUS BLD VENIPUNCTURE: CPT

## 2021-12-18 PROCEDURE — 74011000250 HC RX REV CODE- 250: Performed by: STUDENT IN AN ORGANIZED HEALTH CARE EDUCATION/TRAINING PROGRAM

## 2021-12-18 PROCEDURE — 65660000000 HC RM CCU STEPDOWN

## 2021-12-18 PROCEDURE — 74011250637 HC RX REV CODE- 250/637: Performed by: NURSE PRACTITIONER

## 2021-12-18 PROCEDURE — 80048 BASIC METABOLIC PNL TOTAL CA: CPT

## 2021-12-18 PROCEDURE — 83880 ASSAY OF NATRIURETIC PEPTIDE: CPT

## 2021-12-18 PROCEDURE — 74011000250 HC RX REV CODE- 250: Performed by: INTERNAL MEDICINE

## 2021-12-18 PROCEDURE — 74011250637 HC RX REV CODE- 250/637: Performed by: HOSPITALIST

## 2021-12-18 RX ORDER — BUMETANIDE 0.25 MG/ML
1 INJECTION INTRAMUSCULAR; INTRAVENOUS 2 TIMES DAILY
Status: DISCONTINUED | OUTPATIENT
Start: 2021-12-18 | End: 2021-12-21

## 2021-12-18 RX ORDER — POTASSIUM CHLORIDE 750 MG/1
40 TABLET, FILM COATED, EXTENDED RELEASE ORAL
Status: COMPLETED | OUTPATIENT
Start: 2021-12-18 | End: 2021-12-18

## 2021-12-18 RX ORDER — DIPHENHYDRAMINE HCL 25 MG
25 CAPSULE ORAL
Status: DISCONTINUED | OUTPATIENT
Start: 2021-12-18 | End: 2021-12-22 | Stop reason: HOSPADM

## 2021-12-18 RX ORDER — BUMETANIDE 0.25 MG/ML
1 INJECTION INTRAMUSCULAR; INTRAVENOUS EVERY 24 HOURS
Status: DISCONTINUED | OUTPATIENT
Start: 2021-12-19 | End: 2021-12-18

## 2021-12-18 RX ORDER — METOPROLOL TARTRATE 25 MG/1
25 TABLET, FILM COATED ORAL 2 TIMES DAILY
Status: DISCONTINUED | OUTPATIENT
Start: 2021-12-18 | End: 2021-12-22 | Stop reason: HOSPADM

## 2021-12-18 RX ADMIN — KETOROLAC TROMETHAMINE 10 MG: 30 INJECTION, SOLUTION INTRAMUSCULAR; INTRAVENOUS at 00:09

## 2021-12-18 RX ADMIN — BUMETANIDE 2 MG: 0.25 INJECTION INTRAMUSCULAR; INTRAVENOUS at 00:09

## 2021-12-18 RX ADMIN — METOPROLOL TARTRATE 25 MG: 25 TABLET, FILM COATED ORAL at 17:28

## 2021-12-18 RX ADMIN — RIVAROXABAN 20 MG: 20 TABLET, FILM COATED ORAL at 09:47

## 2021-12-18 RX ADMIN — Medication 10 ML: at 14:00

## 2021-12-18 RX ADMIN — DIPHENHYDRAMINE HYDROCHLORIDE 25 MG: 25 CAPSULE ORAL at 22:20

## 2021-12-18 RX ADMIN — ACETAMINOPHEN 650 MG: 325 TABLET ORAL at 09:48

## 2021-12-18 RX ADMIN — ACETAMINOPHEN 650 MG: 325 TABLET ORAL at 22:20

## 2021-12-18 RX ADMIN — DIAZEPAM 2 MG: 2 TABLET ORAL at 22:20

## 2021-12-18 RX ADMIN — BUMETANIDE 1 MG: 0.25 INJECTION INTRAMUSCULAR; INTRAVENOUS at 17:28

## 2021-12-18 RX ADMIN — METOPROLOL TARTRATE 25 MG: 25 TABLET, FILM COATED ORAL at 09:47

## 2021-12-18 RX ADMIN — Medication 10 ML: at 22:20

## 2021-12-18 RX ADMIN — POTASSIUM CHLORIDE 40 MEQ: 750 TABLET, FILM COATED, EXTENDED RELEASE ORAL at 17:27

## 2021-12-18 RX ADMIN — LEVOTHYROXINE SODIUM 175 MCG: 0.03 TABLET ORAL at 06:40

## 2021-12-18 RX ADMIN — Medication 10 ML: at 06:41

## 2021-12-18 RX ADMIN — DIAZEPAM 2 MG: 2 TABLET ORAL at 00:14

## 2021-12-18 RX ADMIN — Medication 10 ML: at 00:09

## 2021-12-18 NOTE — PROGRESS NOTES
Primary Nurse Elizabeth Davis RN and Esmer Stewart RN performed a dual skin assessment on this patient Impairment noted- see wound doc flow sheet  Tyrell score is 20. Small, circular areas of skin breakdown between left and right buttocks present upon admission to unit. Mepelex applied. Wound to right calf. Patient transferred to unit with wound vac.

## 2021-12-18 NOTE — ROUTINE PROCESS
Emergency Room Outgoing Transfer Nursing Note      Verbal and/or Written report given to Abraham Gann RN by Leonor Leon RN on Tea Farley a 66 y.o. female who was admitted on 12/17/2021  6:38 PM and being transferred for routine progression of care. Report consisted of the following information SBAR, Kardex, MAR and Recent Results and the information was reviewed with the receiving nurse.             Code Status: Full Code      Chief Complaint: Fatigue      Admit Diagnosis: Hyponatremia [E87.1]      Admitting Provider: Ofelia Reynoso MD      Surgery: * No surgery found *       Infections: No current active infections      Allergies: Penicillins, Opioids - morphine analogues, and Percocet [oxycodone-acetaminophen]      Current diet: ADULT DIET Regular      Lines:   Peripheral IV 12/17/21 Left Antecubital (Active)   Site Assessment Clean, dry, & intact 12/17/21 1520   Phlebitis Assessment 0 12/17/21 1520   Infiltration Assessment 0 12/17/21 1520   Dressing Status Clean, dry, & intact 12/17/21 1520   Dressing Type Transparent 12/17/21 1520   Hub Color/Line Status Pink 12/17/21 1520   Action Taken Blood drawn 12/17/21 1520   Alcohol Cap Used Yes 12/17/21 1520                Vital Signs:     Patient Vitals for the past 12 hrs:   Temp Pulse Resp BP SpO2   12/18/21 0100  86  112/69 95 %   12/18/21 0000  86  124/74    12/17/21 2330  80  114/61    12/17/21 2300  89  121/70    12/17/21 2230  87  110/67    12/17/21 2130  89  121/77 96 %   12/17/21 2100  87  124/78 98 %   12/17/21 2053 98.4 °F (36.9 °C) 86 18 (!) 118/53 98 %   12/17/21 1357 98.5 °F (36.9 °C) 78 18 100/61 98 %           Intake & Output:   No intake or output data in the 24 hours ending 12/18/21 0142       Laboratory Results:     Recent Results (from the past 12 hour(s))   CBC WITH AUTOMATED DIFF    Collection Time: 12/17/21  3:17 PM   Result Value Ref Range    WBC 11.3 (H) 3.6 - 11.0 K/uL    RBC 3.47 (L) 3.80 - 5.20 M/uL    HGB 11.0 (L) 11.5 - 16.0 g/dL    HCT 32.9 (L) 35.0 - 47.0 %    MCV 94.8 80.0 - 99.0 FL    MCH 31.7 26.0 - 34.0 PG    MCHC 33.4 30.0 - 36.5 g/dL    RDW 13.3 11.5 - 14.5 %    PLATELET 735 147 - 072 K/uL    MPV 9.0 8.9 - 12.9 FL    NRBC 0.0 0  WBC    ABSOLUTE NRBC 0.00 0.00 - 0.01 K/uL    NEUTROPHILS 82 (H) 32 - 75 %    LYMPHOCYTES 10 (L) 12 - 49 %    MONOCYTES 6 5 - 13 %    EOSINOPHILS 1 0 - 7 %    BASOPHILS 1 0 - 1 %    IMMATURE GRANULOCYTES 0 0.0 - 0.5 %    ABS. NEUTROPHILS 9.3 (H) 1.8 - 8.0 K/UL    ABS. LYMPHOCYTES 1.2 0.8 - 3.5 K/UL    ABS. MONOCYTES 0.7 0.0 - 1.0 K/UL    ABS. EOSINOPHILS 0.1 0.0 - 0.4 K/UL    ABS. BASOPHILS 0.1 0.0 - 0.1 K/UL    ABS. IMM. GRANS. 0.0 0.00 - 0.04 K/UL    DF AUTOMATED     METABOLIC PANEL, COMPREHENSIVE    Collection Time: 12/17/21  3:17 PM   Result Value Ref Range    Sodium 124 (L) 136 - 145 mmol/L    Potassium 3.7 3.5 - 5.1 mmol/L    Chloride 86 (L) 97 - 108 mmol/L    CO2 30 21 - 32 mmol/L    Anion gap 8 5 - 15 mmol/L    Glucose 102 (H) 65 - 100 mg/dL    BUN 31 (H) 6 - 20 MG/DL    Creatinine 1.18 (H) 0.55 - 1.02 MG/DL    BUN/Creatinine ratio 26 (H) 12 - 20      GFR est AA 55 (L) >60 ml/min/1.73m2    GFR est non-AA 45 (L) >60 ml/min/1.73m2    Calcium 9.2 8.5 - 10.1 MG/DL    Bilirubin, total 0.8 0.2 - 1.0 MG/DL    ALT (SGPT) 84 (H) 12 - 78 U/L    AST (SGOT) 29 15 - 37 U/L    Alk. phosphatase 245 (H) 45 - 117 U/L    Protein, total 6.6 6.4 - 8.2 g/dL    Albumin 3.1 (L) 3.5 - 5.0 g/dL    Globulin 3.5 2.0 - 4.0 g/dL    A-G Ratio 0.9 (L) 1.1 - 2.2     SAMPLES BEING HELD    Collection Time: 12/17/21  3:17 PM   Result Value Ref Range    SAMPLES BEING HELD 1RED     COMMENT        Add-on orders for these samples will be processed based on acceptable specimen integrity and analyte stability, which may vary by analyte. Patient transported with : Registered Nurse     Opportunity for questions and clarifications were provided.          Bam Sykes RN, CLEMENT, BSN, VIA [facility] 12/18/2021, 1:42 AM

## 2021-12-18 NOTE — PROGRESS NOTES
Transition of Care:  Anticipate discharge to inpatient rehab facility vs skilled nursing facility pending PT/OT evals  RUR 16%    Cm informed staff nurse Nadeem that the patient needs PT/OT evals- will be putting orders in today so therapy can evaluate patient. Cm met w/ patient introduced self, explained role and offered support. Patient was discharged to her sisters home with Home Health PT/OT with [facility] [phone] and [facility] wound vac therapy. Patient informed cm that she would like to go to a facility for rehab prior to going back to her sisters.     Her sister Galileo Moctezuma [phone] will be able to provide transportation    If patient qualifies for acute rehab pending therapy she would like a referral sent to [facility]     If the patient qualifies for SNF-Skilled Nursing Facility patient would like  1st choice- [facility]  2nd choice- [facility]      Reason for Admission:  Hyponatremia                     RUR Score: 16%                    Plan for utilizing home health:   Not at this time     PCP: First and Last name:  Evy White MD     Name of Practice:    Are you a current patient: Yes/No:    Approximate date of last visit:    Can you participate in a virtual visit with your PCP:                     Current Advanced Directive/Advance Care Plan: Full Code      Healthcare Decision Maker:   Click here to complete [address] including selection of the Healthcare Decision Maker Relationship (ie \"Primary\")

## 2021-12-18 NOTE — H&P
History & Physical    Primary Care Provider: Arash Herring MD  Source of Information: Patient and chart review    History of Presenting Illness:   Chava Cortes is a 66 y.o. female w/ hx of afib on xarelto, htn, hypothyroidism, mdd, chronic hyponatremia, who presents to ed with multiple complaints. Complains of increasing bilateral LE edema and resultant debility. She describes inability to ambulate and take care of herself. Worsened by chronic RLE wound treated with wound vac. Increased malaise and fatigue. Had recent admission for similar presentation and hyponatremia. The patient denies any fever, chills, chest or abdominal pain, nausea, vomiting, cough, congestion, recent illness, palpitations, or dysuria. Remarkable vitals on ER Presentations: unremarkable  Labs Remarkable for: Na 124, cr 1.18,   ER Images: none indicated  ER rx: none     Review of Systems:  A comprehensive review of systems was negative except for that written in the History of Present Illness. 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 rep 10 yrs.  [place] THYROIDECTOMY  11/05     Prior to Admission medications    Medication Sig Start Date End Date Taking? Authorizing Provider   ondansetron (ZOFRAN ODT) 4 mg disintegrating tablet TAKE 1 TABLET BY MOUTH FOUR (4) TIMES DAILY AS NEEDED FOR NAUSEA OR VOMITING. 12/15/21   Arash Herring MD   diazePAM (VALIUM) 2 mg tablet TAKE 1 TABLET BY MOUTH EVERY DAY AS NEEDED FOR ANXIETY 12/9/21   Arash Herring MD   bumetanide (BUMEX) 1 mg tablet Take 1 Tablet by mouth daily.  12/1/21   Augustine Lloyd MD   hydrALAZINE (APRESOLINE) 50 mg tablet Take 1 Tablet by mouth three (3) times daily. 11/30/21   Amina Munoz MD   potassium chloride SR (K-TAB) 20 mEq tablet Take 1 Tablet by mouth daily. 12/1/21   Amina Munoz MD   sodium chloride 1 gram tablet Take 2 Tablets by mouth two (2) times daily (with meals). 11/30/21   Amina Munoz MD   butalbital-acetaminophen-caffeine (FIORICET, ESGIC) -40 mg per tablet TAKE 1 TO 2 TABLETS BY MOUTH EVERY 6 HOURS AS NEEDED FOR HEADACHE 9/18/21   Inez Walter MD   levothyroxine (SYNTHROID) 175 mcg tablet TAKE 1 TABLET BY MOUTH EVERY DAY BEFORE BREAKFAST 8/16/21   Inez Walter MD   Xarelto 20 mg tab tablet TAKE 1 TABLET BY MOUTH EVERY DAY 6/28/21   Inez Walter MD   metoprolol tartrate (LOPRESSOR) 50 mg tablet Take 50 mg by mouth two (2) times a day. Provider, Historical     Allergies   Allergen Reactions    Penicillins Unknown (comments)     Patient reports allergy to penicillin with unknown reaction, but states she has tolerated amoxicillin    Opioids - Morphine Analogues Itching and Nausea and Vomiting    Percocet [Oxycodone-Acetaminophen] Rash     Per patient      Family History   Problem Relation Age of Onset    Cancer Mother         lung        SOCIAL HISTORY:  Patient resides:  Independently x   Assisted Living    SNF    With family care       Smoking history:   None x   Former    Chronic      Alcohol history:   None x   Social    Chronic      Ambulates: non-ambulatory  Independently    w/cane    w/walker    w/wc    CODE STATUS:  DNR    Full x   Other      Objective:     Physical Exam:     Visit Vitals  /61 (BP 1 Location: Left upper arm, BP Patient Position: At rest)   Pulse 78   Temp 98.5 °F (36.9 °C)   Resp 18   Ht 5' 7\" (1.702 m)   SpO2 98%   BMI 36.01 kg/m²      O2 Device: None (Room air)    General:  Alert, cooperative, no distress, appears stated age. Head:  Normocephalic, without obvious abnormality, atraumatic. Eyes:  Conjunctivae/corneas clear. PERRL, EOMs intact. Nose: Nares normal. Septum midline. Mucosa normal.        Neck: Supple, symmetrical, trachea midline, no carotid bruit and no JVD. Lungs:   Clear to auscultation bilaterally. Chest wall:  No tenderness or deformity. Heart:  Regular rate and rhythm, S1, S2 normal   Abdomen:   Soft, non-tender. Bowel sounds normal. No masses,  No organomegaly. Extremities: Extremities normal, atraumatic, no cyanosis. 2-3+ bilateral lower extremity pitting edema. Right lower extremity lateral wound VAC in place. Pulses: 2+ and symmetric all extremities. Skin: Skin color, texture, turgor normal. No rashes or lesions   Neurologic: CNII-XII intact. Data Review:     Recent Days:  Recent Labs     12/17/21  1517   WBC 11.3*   HGB 11.0*   HCT 32.9*        Recent Labs     12/17/21  1517   *   K 3.7   CL 86*   CO2 30   *   BUN 31*   CREA 1.18*   CA 9.2   ALB 3.1*   ALT 84*     No results for input(s): PH, PCO2, PO2, HCO3, FIO2 in the last 72 hours. 24 Hour Results:  Recent Results (from the past 24 hour(s))   CBC WITH AUTOMATED DIFF    Collection Time: 12/17/21  3:17 PM   Result Value Ref Range    WBC 11.3 (H) 3.6 - 11.0 K/uL    RBC 3.47 (L) 3.80 - 5.20 M/uL    HGB 11.0 (L) 11.5 - 16.0 g/dL    HCT 32.9 (L) 35.0 - 47.0 %    MCV 94.8 80.0 - 99.0 FL    MCH 31.7 26.0 - 34.0 PG    MCHC 33.4 30.0 - 36.5 g/dL    RDW 13.3 11.5 - 14.5 %    PLATELET 163 079 - 276 K/uL    MPV 9.0 8.9 - 12.9 FL    NRBC 0.0 0  WBC    ABSOLUTE NRBC 0.00 0.00 - 0.01 K/uL    NEUTROPHILS 82 (H) 32 - 75 %    LYMPHOCYTES 10 (L) 12 - 49 %    MONOCYTES 6 5 - 13 %    EOSINOPHILS 1 0 - 7 %    BASOPHILS 1 0 - 1 %    IMMATURE GRANULOCYTES 0 0.0 - 0.5 %    ABS. NEUTROPHILS 9.3 (H) 1.8 - 8.0 K/UL    ABS. LYMPHOCYTES 1.2 0.8 - 3.5 K/UL    ABS. MONOCYTES 0.7 0.0 - 1.0 K/UL    ABS. EOSINOPHILS 0.1 0.0 - 0.4 K/UL    ABS. BASOPHILS 0.1 0.0 - 0.1 K/UL    ABS. IMM.  GRANS. 0.0 0.00 - 0.04 K/UL    DF AUTOMATED METABOLIC PANEL, COMPREHENSIVE    Collection Time: 12/17/21  3:17 PM   Result Value Ref Range    Sodium 124 (L) 136 - 145 mmol/L    Potassium 3.7 3.5 - 5.1 mmol/L    Chloride 86 (L) 97 - 108 mmol/L    CO2 30 21 - 32 mmol/L    Anion gap 8 5 - 15 mmol/L    Glucose 102 (H) 65 - 100 mg/dL    BUN 31 (H) 6 - 20 MG/DL    Creatinine 1.18 (H) 0.55 - 1.02 MG/DL    BUN/Creatinine ratio 26 (H) 12 - 20      GFR est AA 55 (L) >60 ml/min/1.73m2    GFR est non-AA 45 (L) >60 ml/min/1.73m2    Calcium 9.2 8.5 - 10.1 MG/DL    Bilirubin, total 0.8 0.2 - 1.0 MG/DL    ALT (SGPT) 84 (H) 12 - 78 U/L    AST (SGOT) 29 15 - 37 U/L    Alk. phosphatase 245 (H) 45 - 117 U/L    Protein, total 6.6 6.4 - 8.2 g/dL    Albumin 3.1 (L) 3.5 - 5.0 g/dL    Globulin 3.5 2.0 - 4.0 g/dL    A-G Ratio 0.9 (L) 1.1 - 2.2     SAMPLES BEING HELD    Collection Time: 12/17/21  3:17 PM   Result Value Ref Range    SAMPLES BEING HELD 1RED     COMMENT        Add-on orders for these samples will be processed based on acceptable specimen integrity and analyte stability, which may vary by analyte. Imaging:     Assessment:     Michaela Nevarez is a 66 y.o. female w/ hx of afib on xarelto, htn, hypothyroidism, mdd, chronic hyponatremia, who is admitted for hyponatremia, janessa and debility. Plan:       Hyponatremia  -Chronic, hypervolemic hyponatremia  -Continue Bumex 2 mg IV twice daily  -Serial BMPs  -Salt tabs  -Consider nephro consult in a.m. Atrial fibrillation  -Continue home metoprolol and Xarelto    Hypothyroidism  -Home Synthroid    Volume overload /bilateral lower extremity edema  -Continue Bumex twice daily 2 mg  -Strict I's and O's  -? Echo in a.m. At risk JANESSA  -Monitor creatinine with daily BMP    Right lower extremity wound  -Continue wound VAC.   Wound care consult    Debility and failure to thrive  -PT OT, case management for SNF placement              FEN/GI -  PO hydration  Activity - as tolerated  DVT prophylaxis - SCDs  GI prophylaxis -  NI  Disposition - SNF    CODE STATUS:  Full code       Signed By: Katie Colin MD     December 17, 2021

## 2021-12-18 NOTE — PROGRESS NOTES
[facility]  Hospitalist Group                                                                                          Hospitalist Progress Note  Mihir Milner MD  Answering service: [phone] OR [phone] from in house phone        Date of Service:  2021  NAME:  Vickey El  :  1950  MRN:  [id number]      Admission Summary:   Per H and P \" 66 y.o. female w/ hx of afib on xarelto, htn, hypothyroidism, mdd, chronic hyponatremia, who presents to ed with multiple complaints. Complains of increasing bilateral LE edema and resultant debility. She describes inability to ambulate and take care of herself. Worsened by chronic RLE wound treated with wound vac. Increased malaise and fatigue\". Interval history / Subjective:   Patient seen and examined    She states that she has LE edema, feels weak overall. Asked about wound vac change     Assessment & Plan:     # B/L LE edema  -echo in 2021 showed moderate to severe MR and TR.  -IV bumex 1 mg daily  -repeat echo pending    #Acute on chronic hyponatremia:  -had hyponatremia during last admission,received tolvaptan, discharged on sodium tablets,lasix and potassium  -repeat sodium 124 again  -Nephrology consulted.  -check urine sodium,osmolality    # RLE hematoma s/p surgery last admission and has a wound vac    #HTN:  -BP soft,hold hydralazine, decreased dose of metoprolol    #Paroxsymal atrial fib:  -rate controlled,on xarelto    # Hypothyroidism:  synthroid        Code status: full  DVT prophylaxis: anticoagulated    Care Plan discussed with: Patient/Family and Nurse  Anticipated Disposition: SNF/LTC  Anticipated Discharge: Greater than 48 hours     Hospital Problems  Date Reviewed: 10/14/2021          Codes Class Noted POA    Hyponatremia ICD-10-CM: E87.1  ICD-9-CM: 276.1  2017 Unknown                Review of Systems:   Pertinent items are noted in HPI.        Vital Signs:    Last 24hrs VS reviewed since prior progress note. Most recent are:  Visit Vitals  /71   Pulse 76   Temp 98.2 °F (36.8 °C)   Resp 16   Ht 5' 7\" (1.702 m)   Wt 102.1 kg (225 lb)   SpO2 95%   BMI 35.24 kg/m²       No intake or output data in the 24 hours ending 12/18/21 1523     Physical Examination:     I had a face to face encounter with this patient and independently examined them on 12/18/2021 as outlined below:          Constitutional:  elderly patient, no acute distress    ENT:  Oral mucosa moist,EOMI,anicteric sclera   Resp:  decreased at bases,no wheezing/rhonchi/rales. No accessory muscle use   CV:  Regular rhythm, normal SAVK,W3,B7 wnl,systolic murmur    GI:  Soft, non distended, non tender, normoactive bowel sounds    Musculoskeletal:  2+ b/l LE edema    Neurologic:  Moves all extremities. AAOx3  Skin: wound vac RLE            Data Review:    Review and/or order of clinical lab test  Review and/or order of tests in the radiology section of CPT  Review and/or order of tests in the medicine section of CPT      Labs:     Recent Labs     12/18/21  0953 12/17/21  1517   WBC 10.0 11.3*   HGB 9.8* 11.0*   HCT 29.3* 32.9*    344     Recent Labs     12/18/21  0953 12/17/21  1517   * 124*   K 3.5 3.7   CL 86* 86*   CO2 31 30   BUN 29* 31*   CREA 1.08* 1.18*    102*   CA 8.5 9.2     Recent Labs     12/17/21  1517   ALT 84*   *   TBILI 0.8   TP 6.6   ALB 3.1*   GLOB 3.5     No results for input(s): INR, PTP, APTT, INREXT in the last 72 hours. No results for input(s): FE, TIBC, PSAT, FERR in the last 72 hours. Lab Results   Component Value Date/Time    Folate 22.5 (H) 11/22/2021 02:14 AM      No results for input(s): PH, PCO2, PO2 in the last 72 hours. No results for input(s): CPK, CKNDX, TROIQ in the last 72 hours.     No lab exists for component: CPB  Lab Results   Component Value Date/Time    Cholesterol, total 233 (H) 06/15/2021 12:03 PM    HDL Cholesterol 114 06/15/2021 12:03 PM    LDL, calculated 108 (H) 06/15/2021 12:03 PM    LDL, calculated 102 (H) 02/07/2014 11:06 AM    Triglyceride 66 06/15/2021 12:03 PM     No results found for: [facility]  Lab Results   Component Value Date/Time    Color YELLOW/STRAW 11/22/2021 04:50 PM    Appearance CLEAR 11/22/2021 04:50 PM    Specific gravity 1.007 11/22/2021 04:50 PM    pH (UA) 7.5 11/22/2021 04:50 PM    Protein Negative 11/22/2021 04:50 PM    Glucose Negative 11/22/2021 04:50 PM    Ketone Negative 11/22/2021 04:50 PM    Bilirubin Negative 11/22/2021 04:50 PM    Urobilinogen 0.2 11/22/2021 04:50 PM    Nitrites Negative 11/22/2021 04:50 PM    Leukocyte Esterase Negative 11/22/2021 04:50 PM    Bacteria Few 02/07/2014 11:06 AM    WBC 11-30 (A) 02/07/2014 11:06 AM    RBC 0-3 02/07/2014 11:06 AM         Medications Reviewed:     Current Facility-Administered Medications   Medication Dose Route Frequency    rivaroxaban (XARELTO) tablet 20 mg  20 mg Oral DAILY    [START ON 12/19/2021] bumetanide (BUMEX) injection 1 mg  1 mg IntraVENous Q24H    metoprolol tartrate (LOPRESSOR) tablet 25 mg  25 mg Oral BID    diazePAM (VALIUM) tablet 2 mg  2 mg Oral DAILY PRN    levothyroxine (SYNTHROID) tablet 175 mcg  175 mcg Oral ACB    [Held by provider] hydrALAZINE (APRESOLINE) tablet 50 mg  50 mg Oral TID    sodium chloride (NS) flush 5-40 mL  5-40 mL IntraVENous Q8H    sodium chloride (NS) flush 5-40 mL  5-40 mL IntraVENous PRN    acetaminophen (TYLENOL) tablet 650 mg  650 mg Oral Q6H PRN    Or    acetaminophen (TYLENOL) suppository 650 mg  650 mg Rectal Q6H PRN    polyethylene glycol (MIRALAX) packet 17 g  17 g Oral DAILY PRN    ondansetron (ZOFRAN ODT) tablet 4 mg  4 mg Oral Q8H PRN    Or    ondansetron (ZOFRAN) injection 4 mg  4 mg IntraVENous Q6H PRN     ______________________________________________________________________  EXPECTED LENGTH OF STAY: - - -  ACTUAL LENGTH OF STAY:          1                 Meagan Santos MD

## 2021-12-18 NOTE — CONSULTS
Assessment:  Hyponatremia: acute on chronic-> Na 124. +Hypervolemia/underlying CHF likely culprit but patient also has chronic SIADH. Serum Na fluctuates from 124 to 130. Hypokalemia    CHF: repeat echo. On Entresto as outpatient    Anemia    RLE wound: wound vac in place. Plan/Recommendations:  Increase IV Bumex to 1mg BID  Oral KCl 40meq x1 dose  Echo  Trend Serum Na levels  Fluid restriction 1.2L/day  Hold Entresto  Strict I/Os  Avoid nephrotoxins  Am labs     Discussed with patient    Thanks for the consultation. Renal service will follow patient with you. Please contact me with any questions or concerns. Initial Consult note         Patient name: Alonso Prasad  MR no: [id number]  Date of admission: 12/17/2021  Date of consultation: 12/18/2021  Requested by: Dr. Neena Mart  Reason for consult: Hyponatremia    Patient seen and examined. History obtained from patient and chart review. Relevant labs, data and notes reviewed. HPI: Alonso Prasad is a 66 y.o. female with PMH significant for CHF,chronic hyponatremia, HTN, Afib, Hx of Thyroid CA presented yesterday with worsening LE edema/fatigue. ED lab work notable for low serum Na 124. Nephrology consulted to evaluate and manage hyponatremia. Patient recently seen by me during her last hospitalization here in November. Needed Tolvaptan during last admission. Patient reports worsening edema since her last discharge. Admits to drinking more water due to fear of getting dehydrated. PMH:  Past Medical History:   Diagnosis Date    Atrial fibrillation ([address] 7/15/2011    Depression     HTN (hypertension) 7/14/2011    Paroxysmal atrial fibrillation ([address]     Thyroid ca ([address] 2005    Papillary    Unspecified hypothyroidism 7/15/2011     PSH:  Past Surgical History:   Procedure Laterality Date    ENDOSCOPY, COLON, DIAGNOSTIC  2003    neg. rep 10 yrs.     ID THYROIDECTOMY  11/05       Social history: Social History     Tobacco Use    Smoking status: Never Smoker    Smokeless tobacco: Never Used   Substance Use Topics    Alcohol use: Yes     Comment: soc    Drug use: No       Family history:  Not contributory    Allergies   Allergen Reactions    Penicillins Unknown (comments)     Patient reports allergy to penicillin with unknown reaction, but states she has tolerated amoxicillin    Opioids - Morphine Analogues Itching and Nausea and Vomiting    Percocet [Oxycodone-Acetaminophen] Rash     Per patient       Current Facility-Administered Medications   Medication Dose Route Frequency Last Admin    rivaroxaban (XARELTO) tablet 20 mg  20 mg Oral DAILY 20 mg at 12/18/21 0947    [START ON 12/19/2021] bumetanide (BUMEX) injection 1 mg  1 mg IntraVENous Q24H      metoprolol tartrate (LOPRESSOR) tablet 25 mg  25 mg Oral BID 25 mg at 12/18/21 0947    diazePAM (VALIUM) tablet 2 mg  2 mg Oral DAILY PRN 2 mg at 12/18/21 0014    levothyroxine (SYNTHROID) tablet 175 mcg  175 mcg Oral  mcg at 12/18/21 0640    [Held by provider] hydrALAZINE (APRESOLINE) tablet 50 mg  50 mg Oral TID      sodium chloride (NS) flush 5-40 mL  5-40 mL IntraVENous Q8H 10 mL at 12/18/21 0641    sodium chloride (NS) flush 5-40 mL  5-40 mL IntraVENous PRN      acetaminophen (TYLENOL) tablet 650 mg  650 mg Oral Q6H  mg at 12/18/21 0948    Or    acetaminophen (TYLENOL) suppository 650 mg  650 mg Rectal Q6H PRN      polyethylene glycol (MIRALAX) packet 17 g  17 g Oral DAILY PRN      ondansetron (ZOFRAN ODT) tablet 4 mg  4 mg Oral Q8H PRN      Or    ondansetron (ZOFRAN) injection 4 mg  4 mg IntraVENous Q6H PRN         ROS (besides HPI):    General: No fever. +Fatigue. No weight changes  ENT: No hearing loss or visual changes  Cardiovascular: No Chest pain. +edema  Pulmonary: No SOB  GI: No abdominal pain. No Nausea/Vomiting/Diarrhea. No blood in stool  : No blood in urine.  No foamy or cloudy urine  Musculoskeletal: No joint swelling or redness. No morning stiffness  Endocrine: no cold or heat intolerance  Psych: denies anxiety or depression  Neuro: No light headedness or dizziness    Objective   Visit Vitals  BP (!) 95/58   Pulse 75   Temp 98.6 °F (37 °C)   Resp 18   Ht 5' 7\" (1.702 m)   Wt 102.1 kg (225 lb)   SpO2 95%   BMI 35.24 kg/m²       Physical Exam:    Gen: NAD    HEENT: AT/NC, EOMI, moist mucous membrane, no scleral icterus    Neck: no JVD, no cervical lymphadenopathy, no carotid bruit    Lungs/Chest wall: Breath sounds normal. Symmetrical chest wall expansion. No accessory muscle use. Clear to auscultation    Cardiovascular: Normal S1/S2, normal rate, regular rhythm. Abdomen: soft, NT, ND, BS+, no HSM    Ext: +RLE wound vac in place. ++ LE edema extending to thighs    : +Purewick    CNS: alert awake. Answers appropriately.      Labs/Data:    Lab Results   Component Value Date/Time    Sodium 124 (L) 12/18/2021 09:53 AM    Potassium 3.5 12/18/2021 09:53 AM    Chloride 86 (L) 12/18/2021 09:53 AM    CO2 31 12/18/2021 09:53 AM    Anion gap 7 12/18/2021 09:53 AM    Glucose 100 12/18/2021 09:53 AM    BUN 29 (H) 12/18/2021 09:53 AM    Creatinine 1.08 (H) 12/18/2021 09:53 AM    BUN/Creatinine ratio 27 (H) 12/18/2021 09:53 AM    GFR est AA >60 12/18/2021 09:53 AM    GFR est non-AA 50 (L) 12/18/2021 09:53 AM    Calcium 8.5 12/18/2021 09:53 AM       Lab Results   Component Value Date/Time    WBC 10.0 12/18/2021 09:53 AM    HGB (POC) 15.1 01/28/2020 09:43 AM    HGB 9.8 (L) 12/18/2021 09:53 AM    HCT (POC) 44.5 01/28/2020 09:43 AM    HCT 29.3 (L) 12/18/2021 09:53 AM    PLATELET 286 10/68/5173 09:53 AM    MCV 94.5 12/18/2021 09:53 AM       Urine analysis:   Results for orders placed or performed in visit on 02/22/21   AMB POC URINALYSIS DIP STICK AUTO W/O MICRO     Status: None   Result Value Ref Range Status    Color (UA POC) Yellow  Final    Clarity (UA POC) Clear  Final    Glucose (UA POC) Negative Negative Final    Bilirubin (UA POC) Negative Negative Final    Ketones (UA POC) Negative Negative Final    Specific gravity (UA POC) 1.025 1.001 - 1.035 Final    Blood (UA POC) Negative Negative Final    pH (UA POC) 6.0 4.6 - 8.0 Final    Protein (UA POC) 1+ Negative Final    Urobilinogen (UA POC) 0.2 mg/dL 0.2 - 1 Final    Nitrites (UA POC) Negative Negative Final    Leukocyte esterase (UA POC) Negative Negative Final         Urine sodium: ordered  Urine Osm: ordered    No components found for: SPEP, UPEP  No results found for: PUQ, PROTU2, PROTU1, BJP1, CPE1, IMEL1, MET2  No results found for: MCACR, MCA1, MCA2, MCA3, MCAU, MCAU2, MCALPOCT    No intake or output data in the 24 hours ending 12/18/21 1230    Wt Readings from Last 3 Encounters:   12/18/21 102.1 kg (225 lb)   11/29/21 104.3 kg (229 lb 15 oz)   10/14/21 89.8 kg (198 lb)       Signed by:  Austyn Marroquin MD  Nephrology and Hypertension  [facility]

## 2021-12-19 ENCOUNTER — APPOINTMENT (OUTPATIENT)
Dept: NON INVASIVE DIAGNOSTICS | Age: 71
DRG: 644 | End: 2021-12-19
Attending: STUDENT IN AN ORGANIZED HEALTH CARE EDUCATION/TRAINING PROGRAM
Payer: MEDICARE

## 2021-12-19 LAB
ANION GAP SERPL CALC-SCNC: 6 MMOL/L (ref 5–15)
BUN SERPL-MCNC: 27 MG/DL (ref 6–20)
BUN/CREAT SERPL: 24 (ref 12–20)
CALCIUM SERPL-MCNC: 8.2 MG/DL (ref 8.5–10.1)
CHLORIDE SERPL-SCNC: 87 MMOL/L (ref 97–108)
CO2 SERPL-SCNC: 31 MMOL/L (ref 21–32)
CREAT SERPL-MCNC: 1.12 MG/DL (ref 0.55–1.02)
ECHO AO ROOT DIAM: 3.4 CM
ECHO AO ROOT INDEX: 1.6 CM/M2
ECHO AR MAX VEL PISA: 4.2 M/S
ECHO AV PEAK GRADIENT: 8 MMHG
ECHO AV PEAK VELOCITY: 1.5 M/S
ECHO AV REGURGITANT PHT: 652.8 MILLISECOND
ECHO AV VELOCITY RATIO: 0.67
ECHO LA DIAMETER INDEX: 2.54 CM/M2
ECHO LA DIAMETER: 5.4 CM
ECHO LA TO AORTIC ROOT RATIO: 1.59
ECHO LV E' LATERAL VELOCITY: 12 CM/S
ECHO LV E' SEPTAL VELOCITY: 10 CM/S
ECHO LV FRACTIONAL SHORTENING: 31 % (ref 28–44)
ECHO LV INTERNAL DIMENSION DIASTOLE INDEX: 2.11 CM/M2
ECHO LV INTERNAL DIMENSION DIASTOLIC: 4.5 CM (ref 3.9–5.3)
ECHO LV INTERNAL DIMENSION SYSTOLIC INDEX: 1.46 CM/M2
ECHO LV INTERNAL DIMENSION SYSTOLIC: 3.1 CM
ECHO LV IVSD: 1.5 CM (ref 0.6–0.9)
ECHO LV MASS 2D: 261.9 G (ref 67–162)
ECHO LV MASS INDEX 2D: 123 G/M2 (ref 43–95)
ECHO LV POSTERIOR WALL DIASTOLIC: 1.4 CM (ref 0.6–0.9)
ECHO LV RELATIVE WALL THICKNESS RATIO: 0.62
ECHO LVOT PEAK GRADIENT: 4 MMHG
ECHO LVOT PEAK VELOCITY: 1 M/S
ECHO MV E VELOCITY: 1.13 M/S
ECHO MV E/E' LATERAL: 9.42
ECHO MV E/E' RATIO (AVERAGED): 10.36
ECHO MV E/E' SEPTAL: 11.3
ECHO PV MAX VELOCITY: 0.7 M/S
ECHO PV PEAK GRADIENT: 2 MMHG
ECHO RV FREE WALL PEAK S': 11 CM/S
ECHO RV TAPSE: 1.5 CM (ref 1.5–2)
ECHO TV REGURGITANT MAX VELOCITY: 2.55 M/S
ECHO TV REGURGITANT PEAK GRADIENT: 26 MMHG
GLUCOSE SERPL-MCNC: 93 MG/DL (ref 65–100)
POTASSIUM SERPL-SCNC: 3.8 MMOL/L (ref 3.5–5.1)
SODIUM SERPL-SCNC: 124 MMOL/L (ref 136–145)

## 2021-12-19 PROCEDURE — 97161 PT EVAL LOW COMPLEX 20 MIN: CPT

## 2021-12-19 PROCEDURE — 74011250637 HC RX REV CODE- 250/637: Performed by: HOSPITALIST

## 2021-12-19 PROCEDURE — 97535 SELF CARE MNGMENT TRAINING: CPT

## 2021-12-19 PROCEDURE — 74011250637 HC RX REV CODE- 250/637: Performed by: STUDENT IN AN ORGANIZED HEALTH CARE EDUCATION/TRAINING PROGRAM

## 2021-12-19 PROCEDURE — 97116 GAIT TRAINING THERAPY: CPT

## 2021-12-19 PROCEDURE — 65660000000 HC RM CCU STEPDOWN

## 2021-12-19 PROCEDURE — 97166 OT EVAL MOD COMPLEX 45 MIN: CPT

## 2021-12-19 PROCEDURE — 74011000250 HC RX REV CODE- 250: Performed by: INTERNAL MEDICINE

## 2021-12-19 PROCEDURE — 80048 BASIC METABOLIC PNL TOTAL CA: CPT

## 2021-12-19 PROCEDURE — 77030038269 HC DRN EXT URIN PURWCK BARD -A

## 2021-12-19 PROCEDURE — 36415 COLL VENOUS BLD VENIPUNCTURE: CPT

## 2021-12-19 PROCEDURE — 93306 TTE W/DOPPLER COMPLETE: CPT | Performed by: SPECIALIST

## 2021-12-19 PROCEDURE — 93306 TTE W/DOPPLER COMPLETE: CPT

## 2021-12-19 RX ADMIN — RIVAROXABAN 20 MG: 20 TABLET, FILM COATED ORAL at 09:07

## 2021-12-19 RX ADMIN — ACETAMINOPHEN 650 MG: 325 TABLET ORAL at 21:42

## 2021-12-19 RX ADMIN — METOPROLOL TARTRATE 25 MG: 25 TABLET, FILM COATED ORAL at 09:07

## 2021-12-19 RX ADMIN — DIAZEPAM 2 MG: 2 TABLET ORAL at 14:31

## 2021-12-19 RX ADMIN — METOPROLOL TARTRATE 25 MG: 25 TABLET, FILM COATED ORAL at 19:08

## 2021-12-19 RX ADMIN — BUMETANIDE 1 MG: 0.25 INJECTION INTRAMUSCULAR; INTRAVENOUS at 19:08

## 2021-12-19 RX ADMIN — Medication 10 ML: at 21:29

## 2021-12-19 RX ADMIN — ACETAMINOPHEN 650 MG: 325 TABLET ORAL at 14:32

## 2021-12-19 RX ADMIN — LEVOTHYROXINE SODIUM 175 MCG: 0.03 TABLET ORAL at 07:15

## 2021-12-19 RX ADMIN — BUMETANIDE 1 MG: 0.25 INJECTION INTRAMUSCULAR; INTRAVENOUS at 09:07

## 2021-12-19 RX ADMIN — Medication 10 ML: at 07:15

## 2021-12-19 RX ADMIN — Medication 20 ML: at 19:25

## 2021-12-19 NOTE — PROGRESS NOTES
Patient name: Renetta Bolden  MRN: [id number]    Nephrology Progress note:    Assessment:  Hyponatremia: acute on chronic-> Na 124. +Hypervolemia/underlying CHF likely culprit but patient also has chronic SIADH. Serum Na fluctuates from 124 to 130.      Hypokalemia     CHF: repeat echo EF 55-60. On Entresto as outpatient     Anemia     RLE wound: wound vac in place. Plan/Recommendations:   IV Bumex 1mg BID  Oral KCl 20meq x1 dose  Trend Serum Na levels  Fluid restriction 1.2L/day  Holding Entresto  Strict I/Os  Avoid nephrotoxins  Am labs         Subjective:  \" My legs look better but I am worried about dehydration\". Admits to drinking \" a lot of water\" yesterday. ROS:   No nausea, no vomiting  No chest pain, no shortness of breath    Exam:  Visit Vitals  /86   Pulse 72   Temp 98.5 °F (36.9 °C)   Resp 18   Ht 5' 7\" (1.702 m)   Wt 102 kg (224 lb 13.9 oz)   SpO2 97%   BMI 35.22 kg/m²     Wt Readings from Last 3 Encounters:   12/19/21 102 kg (224 lb 13.9 oz)   11/29/21 104.3 kg (229 lb 15 oz)   10/14/21 89.8 kg (198 lb)       Intake/Output Summary (Last 24 hours) at 12/19/2021 1324  Last data filed at 12/18/2021 2224  Gross per 24 hour   Intake    Output 650 ml   Net -650 ml       Gen: NAD  HEENT:  AT/NC  Lungs/Chest wall: Clear. No accessory muscle use. Cardiovascular: Regular rate, normal rhythm. Abdomen/: Soft, NT, ND, BS+. Ext: . Improving peripheral edema  Skin: RLE wound vac  CNS: alert and awake.  Answers appropriately      Current Facility-Administered Medications   Medication Dose Route Frequency Last Admin    rivaroxaban (XARELTO) tablet 20 mg  20 mg Oral DAILY 20 mg at 12/19/21 0907    metoprolol tartrate (LOPRESSOR) tablet 25 mg  25 mg Oral BID 25 mg at 12/19/21 0907    bumetanide (BUMEX) injection 1 mg  1 mg IntraVENous BID 1 mg at 12/19/21 8272    diphenhydrAMINE (BENADRYL) capsule 25 mg  25 mg Oral Q6H PRN 25 mg at 12/18/21 2220    diphenhydrAMINE-zinc acetate 1%-0.1% (BENADRYL) cream   Topical TID PRN      sodium chloride (OCEAN) 0.65 % nasal squeeze bottle 2 Spray  2 Spray Both Nostrils Q2H PRN      diazePAM (VALIUM) tablet 2 mg  2 mg Oral DAILY PRN 2 mg at 12/18/21 2220    levothyroxine (SYNTHROID) tablet 175 mcg  175 mcg Oral  mcg at 12/19/21 0715    [Held by provider] hydrALAZINE (APRESOLINE) tablet 50 mg  50 mg Oral TID      sodium chloride (NS) flush 5-40 mL  5-40 mL IntraVENous Q8H 10 mL at 12/19/21 0715    sodium chloride (NS) flush 5-40 mL  5-40 mL IntraVENous PRN      acetaminophen (TYLENOL) tablet 650 mg  650 mg Oral Q6H  mg at 12/18/21 2220    Or    acetaminophen (TYLENOL) suppository 650 mg  650 mg Rectal Q6H PRN      polyethylene glycol (MIRALAX) packet 17 g  17 g Oral DAILY PRN      ondansetron (ZOFRAN ODT) tablet 4 mg  4 mg Oral Q8H PRN      Or    ondansetron (ZOFRAN) injection 4 mg  4 mg IntraVENous Q6H PRN         Labs/Data:    Lab Results   Component Value Date/Time    WBC 10.0 12/18/2021 09:53 AM    HGB (POC) 15.1 01/28/2020 09:43 AM    HGB 9.8 (L) 12/18/2021 09:53 AM    HCT (POC) 44.5 01/28/2020 09:43 AM    HCT 29.3 (L) 12/18/2021 09:53 AM    PLATELET 366 97/04/4593 09:53 AM    MCV 94.5 12/18/2021 09:53 AM       Lab Results   Component Value Date/Time    Sodium 124 (L) 12/19/2021 12:00 PM    Potassium 3.8 12/19/2021 12:00 PM    Chloride 87 (L) 12/19/2021 12:00 PM    CO2 31 12/19/2021 12:00 PM    Anion gap 6 12/19/2021 12:00 PM    Glucose 93 12/19/2021 12:00 PM    BUN 27 (H) 12/19/2021 12:00 PM Creatinine 1.12 (H) 12/19/2021 12:00 PM    BUN/Creatinine ratio 24 (H) 12/19/2021 12:00 PM    GFR est AA 58 (L) 12/19/2021 12:00 PM    GFR est non-AA 48 (L) 12/19/2021 12:00 PM    Calcium 8.2 (L) 12/19/2021 12:00 PM       Patient seen and examined. Chart reviewed. Labs, data and other pertinent notes reviewed in last 24 hrs.     Discussed with patient and RN    Signed by:  Jolene Cha MD  [facility]

## 2021-12-19 NOTE — PROGRESS NOTES
[facility]  Hospitalist Group                                                                                          Hospitalist Progress Note  Ed MD Joseph  Answering service: [phone] OR [phone] from in house phone        Date of Service:  2021  NAME:  Bogdan Phillips  :  1950  MRN:  [id number]      Admission Summary:   Per H and P \" 66 y.o. female w/ hx of afib on xarelto, htn, hypothyroidism, mdd, chronic hyponatremia, who presents to ed with multiple complaints. Complains of increasing bilateral LE edema and resultant debility. She describes inability to ambulate and take care of herself. Worsened by chronic RLE wound treated with wound vac. Increased malaise and fatigue\". Interval history / Subjective:   Patient seen and examined    She states that she has LE edema is better     Assessment & Plan:     # B/L LE edema  -echo in 2021 showed moderate to severe MR and TR.  -IV bumex 1 mg BID  -repeat echo pending    #Acute on chronic hyponatremia:  -had hyponatremia during last admission,received tolvaptan, discharged on sodium tablets,lasix and potassium  -due to hypervolemia,SIADH  -on IV lasix  Sodium 124 today        # RLE hematoma s/p surgery last admission and has a wound vac    #HTN:  -BP soft,hold hydralazine, decreased dose of metoprolol-continue current dose  Hold entresto    #Paroxsymal atrial fib:  -rate controlled,on xarelto    # Hypothyroidism:  synthroid    Hypokalemia-repleted      Code status: full  DVT prophylaxis: anticoagulated    Care Plan discussed with: Patient/Family and Nurse  Anticipated Disposition: SNF/LTC  Anticipated Discharge: Greater than 48 hours     Hospital Problems  Date Reviewed: 10/14/2021          Codes Class Noted POA    Hyponatremia ICD-10-CM: E87.1  ICD-9-CM: 276.1  2017 Unknown                Review of Systems:   Pertinent items are noted in HPI.        Vital Signs:    Last 24hrs VS reviewed since prior progress note. Most recent are:  Visit Vitals  /86   Pulse 72   Temp 98.5 °F (36.9 °C)   Resp 18   Ht 5' 7\" (1.702 m)   Wt 102 kg (224 lb 13.9 oz)   SpO2 97%   BMI 35.22 kg/m²         Intake/Output Summary (Last 24 hours) at 12/19/2021 1356  Last data filed at 12/18/2021 2224  Gross per 24 hour   Intake    Output 650 ml   Net -650 ml        Physical Examination:     I had a face to face encounter with this patient and independently examined them on 12/19/2021 as outlined below:          Constitutional:  elderly patient, no acute distress    ENT:  Oral mucosa moist,EOMI,anicteric sclera   Resp:  decreased at bases,no wheezing/rhonchi/rales. No accessory muscle use   CV:  Regular rhythm, normal ZFUQ,D4,Y0 wnl,systolic murmur    GI:  Soft, non distended, non tender, normoactive bowel sounds    Musculoskeletal:  2+ b/l LE edema-some wrinkles today    Neurologic:  Moves all extremities. AAOx3  Skin: wound vac RLE            Data Review:    Review and/or order of clinical lab test  Review and/or order of tests in the medicine section of [facility]      Labs:     Recent Labs     12/18/21  0953 12/17/21  1517   WBC 10.0 11.3*   HGB 9.8* 11.0*   HCT 29.3* 32.9*    344     Recent Labs     12/19/21  1200 12/18/21  0953 12/17/21  1517   * 124* 124*   K 3.8 3.5 3.7   CL 87* 86* 86*   CO2 31 31 30   BUN 27* 29* 31*   CREA 1.12* 1.08* 1.18*   GLU 93 100 102*   CA 8.2* 8.5 9.2     Recent Labs     12/17/21  1517   ALT 84*   *   TBILI 0.8   TP 6.6   ALB 3.1*   GLOB 3.5     No results for input(s): INR, PTP, APTT, INREXT, INREXT in the last 72 hours. No results for input(s): FE, TIBC, PSAT, FERR in the last 72 hours. Lab Results   Component Value Date/Time    Folate 22.5 (H) 11/22/2021 02:14 AM      No results for input(s): PH, PCO2, PO2 in the last 72 hours. No results for input(s): CPK, CKNDX, TROIQ in the last 72 hours.     No lab exists for component: CPKMB  Lab Results   Component Value Date/Time    Cholesterol, total 233 (H) 06/15/2021 12:03 PM    HDL Cholesterol 114 06/15/2021 12:03 PM    LDL, calculated 108 (H) 06/15/2021 12:03 PM    LDL, calculated 102 (H) 02/07/2014 11:06 AM    Triglyceride 66 06/15/2021 12:03 PM     No results found for: [facility]  Lab Results   Component Value Date/Time    Color YELLOW/STRAW 11/22/2021 04:50 PM    Appearance CLEAR 11/22/2021 04:50 PM    Specific gravity 1.007 11/22/2021 04:50 PM    pH (UA) 7.5 11/22/2021 04:50 PM    Protein Negative 11/22/2021 04:50 PM    Glucose Negative 11/22/2021 04:50 PM    Ketone Negative 11/22/2021 04:50 PM    Bilirubin Negative 11/22/2021 04:50 PM    Urobilinogen 0.2 11/22/2021 04:50 PM    Nitrites Negative 11/22/2021 04:50 PM    Leukocyte Esterase Negative 11/22/2021 04:50 PM    Bacteria Few 02/07/2014 11:06 AM    WBC 11-30 (A) 02/07/2014 11:06 AM    RBC 0-3 02/07/2014 11:06 AM         Medications Reviewed:     Current Facility-Administered Medications   Medication Dose Route Frequency    rivaroxaban (XARELTO) tablet 20 mg  20 mg Oral DAILY    metoprolol tartrate (LOPRESSOR) tablet 25 mg  25 mg Oral BID    bumetanide (BUMEX) injection 1 mg  1 mg IntraVENous BID    diphenhydrAMINE (BENADRYL) capsule 25 mg  25 mg Oral Q6H PRN    diphenhydrAMINE-zinc acetate 1%-0.1% (BENADRYL) cream   Topical TID PRN    sodium chloride (OCEAN) 0.65 % nasal squeeze bottle 2 Spray  2 Spray Both Nostrils Q2H PRN    diazePAM (VALIUM) tablet 2 mg  2 mg Oral DAILY PRN    levothyroxine (SYNTHROID) tablet 175 mcg  175 mcg Oral ACB    [Held by provider] hydrALAZINE (APRESOLINE) tablet 50 mg  50 mg Oral TID    sodium chloride (NS) flush 5-40 mL  5-40 mL IntraVENous Q8H    sodium chloride (NS) flush 5-40 mL  5-40 mL IntraVENous PRN    acetaminophen (TYLENOL) tablet 650 mg  650 mg Oral Q6H PRN    Or    acetaminophen (TYLENOL) suppository 650 mg  650 mg Rectal Q6H PRN    polyethylene glycol (MIRALAX) packet 17 g  17 g Oral DAILY PRN    ondansetron (ZOFRAN ODT) tablet 4 mg  4 mg Oral Q8H PRN    Or    ondansetron (ZOFRAN) injection 4 mg  4 mg IntraVENous Q6H PRN     ______________________________________________________________________  EXPECTED LENGTH OF STAY: - - -  ACTUAL LENGTH OF STAY:          2                 Sapna Ramirez MD

## 2021-12-19 NOTE — PROGRESS NOTES
Problem: Mobility Impaired (Adult and Pediatric)  Goal: *Acute Goals and Plan of Care (Insert Text)  Description: FUNCTIONAL STATUS PRIOR TO ADMISSION: Patient was independent and active without use of DME.    HOME SUPPORT PRIOR TO ADMISSION: Patient was dc to sister's home after last admission 11/30, was receiving HHPT? Physical Therapy Goals  Initiated 12/19/2021  1. Patient will move from supine to sit and sit to supine  in bed with modified independence within 7 day(s). 2.  Patient will transfer from bed to chair and chair to bed with modified independence using the least restrictive device within 7 day(s). 3.  Patient will perform sit to stand with modified independence within 7 day(s). 4.  Patient will ambulate with supervision/set-up for 100 feet with the least restrictive device within 7 day(s). 5.  Patient will ascend/descend 1 stairs with 1 handrail(s) with minimal assistance/contact guard assist within 7 day(s). Outcome: Progressing Towards Goal     PHYSICAL THERAPY EVALUATION  Patient: Tea Farley (18 y.o. female)  Date: 12/19/2021  Primary Diagnosis: Hyponatremia [E87.1]        Precautions:   Fall      ASSESSMENT  Based on the objective data described below, the patient presents with generalized weakness, impaired balance, decreased endurance and decreased safety awareness d/t recent increased swelling and redness in RLE for which she has a wound vac, and general malaise at home. She will benefit from skilled PT while IP and SNF rehab at d/c. Current Level of Function Impacting Discharge (mobility/balance): sup-mod A    Functional Outcome Measure: The patient scored Total: 50/100 on the Barthel Index outcome measure which is indicative of being 50 in basic self-care. Other factors to consider for discharge: wound vac     Patient will benefit from skilled therapy intervention to address the above noted impairments.        PLAN :  Recommendations and Planned Interventions: bed mobility training, transfer training, gait training, therapeutic exercises, patient and family training/education, and therapeutic activities      Frequency/Duration: Patient will be followed by physical therapy:  5 times a week to address goals. Recommendation for discharge: (in order for the patient to meet his/her long term goals)  Therapy up to 5 days/week in SNF setting    This discharge recommendation:  Has not yet been discussed the attending provider and/or case management    IF patient discharges home will need the following DME: to be determined (TBD)         SUBJECTIVE:   Patient stated Can I just walk to the bathroom, I want to see what it looks like.     OBJECTIVE DATA SUMMARY:   HISTORY:    Past Medical History:   Diagnosis Date    Atrial fibrillation ([facility] [address] 7/15/2011    Depression     HTN (hypertension) 7/14/2011    Paroxysmal atrial fibrillation ([facility]     Thyroid ca ([facility] 2005    Papillary    Unspecified hypothyroidism 7/15/2011     Past Surgical History:   Procedure Laterality Date    ENDOSCOPY, COLON, DIAGNOSTIC  2003    neg. rep 10 yrs. [place] THYROIDECTOMY  11/05       Personal factors and/or comorbidities impacting plan of care: RLE wound    Home Situation  Home Environment: Apartment (condo on 3rd floor, with elevator)  # Steps to Enter: 0  Support Systems: Other Family Member(s)  Patient Expects to be Discharged to[de-identified] Skilled nursing facility  Current DME Used/Available at Home: Walker, rolling    EXAMINATION/PRESENTATION/DECISION MAKING:   Critical Behavior:  Neurologic State: Alert  Orientation Level: Oriented X4  Cognition: Decreased attention/concentration,Impaired decision making  Safety/Judgement: Awareness of environment,Decreased insight into deficits,Decreased awareness of need for safety  Hearing:   Auditory  Auditory Impairment: None  Skin:  RLE bandaged and wound vac in place  Edema: moderate RLE  Range Of Motion:  AROM: Generally decreased, functional Strength:    Strength: Generally decreased, functional                    Tone & Sensation:   Tone: Normal                              Coordination:  Coordination: Within functional limits  Vision:   Acuity: Impaired near vision  Corrective Lenses: Reading glasses  Functional Mobility:  Bed Mobility:  Rolling: Supervision; Additional time  Supine to Sit: Supervision; Additional time (hob elevated)     Scooting: Supervision  Transfers:  Sit to Stand: Moderate assistance  Stand to Sit: Moderate assistance                       Balance:   Sitting: Intact; Without support  Standing: Impaired; Without support  Standing - Static: Fair  Standing - Dynamic : Poor  Ambulation/Gait Training:  Distance (ft): 20 Feet (ft)  Assistive Device: Gait belt (B HHA)  Ambulation - Level of Assistance: Moderate assistance;Assist x2; Additional time        Gait Abnormalities: Decreased step clearance;Shuffling gait        Base of Support: Widened     Speed/Margarita: Shuffled; Slow  Step Length: Right shortened;Left shortened            Functional Measure:  Barthel Index:    Bathin  Bladder: 5  Bowels: 10  Groomin  Dressin  Feeding: 10  Mobility: 0  Stairs: 0  Toilet Use: 5  Transfer (Bed to Chair and Back): 10  Total: 50/100       The Barthel ADL Index: Guidelines  1. The index should be used as a record of what a patient does, not as a record of what a patient could do. 2. The main aim is to establish degree of independence from any help, physical or verbal, however minor and for whatever reason. 3. The need for supervision renders the patient not independent. 4. A patient's performance should be established using the best available evidence. Asking the patient, friends/relatives and nurses are the usual sources, but direct observation and common sense are also important. However direct testing is not needed.   5. Usually the patient's performance over the preceding 24-48 hours is important, but occasionally longer periods will be relevant. 6. Middle categories imply that the patient supplies over 50 per cent of the effort. 7. Use of aids to be independent is allowed. Score Interpretation (from [address])    Independent   60-79 Minimally independent   40-59 Partially dependent   20-39 Very dependent   <20 Totally dependent     -Adis Nuñez, Barthel, D.W. (1965). Functional evaluation: the Barthel Index. [address] ([address] (1997). The Barthel activities of daily living index: self-reporting versus actual performance in the old (> or = 75 years). Journal of [address] [place] [address] 45(7), 14 [facility], FRANCES, Chasidy Black., Natty Rosado (1999). Measuring the change in disability after inpatient rehabilitation; comparison of the responsiveness of the Barthel Index and Functional [place] Measure. Journal of Neurology, Neurosurgery, and Psychiatry, 66(4), 205-192. Hailee Jacobson, N.J.A, NELLY Bejarano, & Vicente Barriga M.A. (2004) Assessment of post-stroke quality of life in cost-effectiveness studies: The usefulness of the Barthel Index and the EuroQoL-5D.  Quality of Life Research, 15, 505-42        Physical Therapy Evaluation Charge Determination   History Examination Presentation Decision-Making   HIGH Complexity :3+ comorbidities / personal factors will impact the outcome/ POC  MEDIUM Complexity : 3 Standardized tests and measures addressing body structure, function, activity limitation and / or participation in recreation  LOW Complexity : Stable, uncomplicated  MEDIUM Complexity : FOTO score of 26-74      Based on the above components, the patient evaluation is determined to be of the following complexity level: LOW     Pain Rating:  Mild in RLE    Activity Tolerance:   Good    After treatment patient left in no apparent distress:   Supine in bed, Call bell within reach, and Bed / chair alarm activated    COMMUNICATION/EDUCATION:   The patients plan of care was discussed with: Occupational therapist and Registered nurse. Fall prevention education was provided and the patient/caregiver indicated understanding., Patient/family have participated as able in goal setting and plan of care. , and Patient/family agree to work toward stated goals and plan of care.     Thank you for this referral.  Boo Mulligan, PT   Time Calculation: 30 mins

## 2021-12-19 NOTE — PROGRESS NOTES
Problem: Self Care Deficits Care Plan (Adult)  Goal: *Acute Goals and Plan of Care (Insert Text)  Description: PLOF: independent. 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 However, after fall and damaged R LE patient then discharged to sister's home. Support: sister lives nearby. Occupational Therapy Goals  Initiated 12/19/2021  1. Patient will perform lower body dressing with AE PRN moderate assistance  within 7 day(s). 2.  Patient will perform bathing with moderate assistance  within 7 day(s). 3.  Patient will perform standing ADL 2 mins with RW with minimal assistance/contact guard assist within 7 day(s). 4.  Patient will perform toilet transfers with RW and toilet seat riser supervision/set-up within 7 day(s). 5.  Patient will perform all aspects of toileting with supervision/set-up within 7 day(s). 6.  Patient will participate in upper extremity therapeutic exercise/activities with supervision/set-up within 7 day(s). 7.  Patient will utilize energy conservation techniques during functional activities with verbal cues within 7 day(s). Outcome: Progressing Towards Goal   OCCUPATIONAL THERAPY EVALUATION  Patient: Kerry Moe (45 y.o. female)  Date: 12/19/2021  Primary Diagnosis: Hyponatremia [E87.1]        Precautions:   Fall    ASSESSMENT  Based on the objective data described below, the patient presents with ADL independence impaired by functional reach, edema B LEs, wound vac R LE, standing tolerance and balance, and insight into deficits, therefore application of safety techniques. Current Level of Function Impacting Discharge (ADLs/self-care): setup upper body ADLs; max assistance lower body ADLs, sitting to complete ADLs    Functional Outcome Measure: The patient scored Total: 50/100 on the Barthel Index outcome measure which is indicative of being partially dependent in basic self-care.        Other factors to consider for discharge: sister can A PRN     Patient will benefit from skilled therapy intervention to address the above noted impairments. PLAN :  Recommendations and Planned Interventions: self care training, functional mobility training, therapeutic exercise, balance training, therapeutic activities, endurance activities, patient education, home safety training, and family training/education    Frequency/Duration: Patient will be followed by occupational therapy 3 times a week to address goals. Recommendation for discharge: (in order for the patient to meet his/her long term goals)  Therapy 3 hours per day 5-7 days per week    This discharge recommendation:  Has not yet been discussed the attending provider and/or case management    IF patient discharges home will need the following DME: AE: long handled bathing, AE: long handled dressing       SUBJECTIVE:   Patient stated I need to just be quiet so I can learn.  (patient with great sense of humor and talkative nature)    OBJECTIVE DATA SUMMARY:   HISTORY:   Past Medical History:   Diagnosis Date    Atrial fibrillation ([facility] [address] 7/15/2011    Depression     HTN (hypertension) 7/14/2011    Paroxysmal atrial fibrillation ([facility] [address]     Thyroid ca ([facility] [address] 2005    Papillary    Unspecified hypothyroidism 7/15/2011     Past Surgical History:   Procedure Laterality Date    ENDOSCOPY, COLON, DIAGNOSTIC  2003    neg. rep 10 yrs. [place] THYROIDECTOMY  11/05       Expanded or extensive additional review of patient history:     Home Situation  Home Environment: Apartment (condo on 3rd floor, with elevator)  # Steps to Enter: 0  Support Systems: Other Family Member(s)  Patient Expects to be Discharged to[de-identified] Skilled nursing facility  Current DME Used/Available at Home: rosa elena Guzman    Hand dominance: Right    EXAMINATION OF PERFORMANCE DEFICITS:  Cognitive/Behavioral Status:  Neurologic State: Alert  Orientation Level: Oriented X4  Cognition: Decreased attention/concentration; Impaired decision making  Perception: Appears intact  Perseveration: No perseveration noted  Safety/Judgement: Awareness of environment;Decreased insight into deficits; Decreased awareness of need for safety    Skin: red B LEs, wound vac right and ace wrap    Edema: increased B LEs non pitting    Hearing: Auditory  Auditory Impairment: None    Vision/Perceptual:                           Acuity: Impaired near vision    Corrective Lenses: Reading glasses    Range of Motion:  B UEs  AROM: Generally decreased, functional                         Strength:  B UEs  Strength: Generally decreased, functional                Coordination:  Coordination: Within functional limits  Fine Motor Skills-Upper: Left Intact; Right Intact    Gross Motor Skills-Upper: Left Intact; Right Intact    Tone & Sensation:    Tone: Normal                         Balance:  Sitting: Intact; Without support  Standing: Impaired; Without support  Standing - Static: Fair  Standing - Dynamic : Poor    Functional Mobility and Transfers for ADLs:  Bed Mobility:  Rolling: Supervision; Additional time  Supine to Sit: Supervision; Additional time (hob elevated)  Scooting: Supervision    Transfers:  Sit to Stand: Moderate assistance  Stand to Sit: Moderate assistance  Bathroom Mobility: Moderate assistance - B hand held assistance, line management; declining RW; gait belt; physical assistance and cues to hold onto staff and to not reach for rolling objects for stability. After session patient agreed would be best.   Toilet Transfer : Total assistance    ADL Assessment:  Feeding: Independent    Oral Facial Hygiene/Grooming: Setup sitting EOB, setup    Bathing: Maximum assistance infer assist 2* decreased reach to lower body    Upper Body Dressing: Setup    Lower Body Dressing: Maximum assistance poor standing dynamic balance    Toileting: Maximum assistance PureWick 2* diuretic and poor mobility, poor dynamic standing balance, good functional reach         Patient received sitting  up in bed on phone, declining therapy today.  Informed of benefits, to sit EOB to groom and then back to bed. RN then arrived to draw blood. Upon return patient stating who knows that once sitting EOB it may encourage to stand, then walk. PT needing to see patient as well and joined the session. Patient required two skilled therapist 2* patient safety awareness and application. ADL Intervention and task modifications:   Patient instructed and indicated understanding the benefits of maintaining activity tolerance, functional mobility, and independence with self care tasks during acute stay  to ensure safe return home and to baseline. Encouraged patient to increase frequency and duration OOB, be out of bed for all meals, perform daily ADLs (as approved by RN/MD regarding bathing etc), and performing functional mobility to/from [facility]. Cognitive Retraining  Safety/Judgement: Awareness of environment;Decreased insight into deficits; Decreased awareness of need for safety    Therapeutic Exercise:     Functional Measure:    Barthel Index:  Bathin  Bladder: 5  Bowels: 10  Groomin  Dressin  Feeding: 10  Mobility: 0  Stairs: 0  Toilet Use: 5  Transfer (Bed to Chair and Back): 10  Total: 50/100      The Barthel ADL Index: Guidelines  1. The index should be used as a record of what a patient does, not as a record of what a patient could do. 2. The main aim is to establish degree of independence from any help, physical or verbal, however minor and for whatever reason. 3. The need for supervision renders the patient not independent. 4. A patient's performance should be established using the best available evidence. Asking the patient, friends/relatives and nurses are the usual sources, but direct observation and common sense are also important. However direct testing is not needed. 5. Usually the patient's performance over the preceding 24-48 hours is important, but occasionally longer periods will be relevant.   6. Middle categories imply that the patient supplies over 50 per cent of the effort. 7. Use of aids to be independent is allowed. Score Interpretation (from [address] [facility])    Independent   60-79 Minimally independent   40-59 Partially dependent   20-39 Very dependent   <20 Totally dependent     -Irwin Nuñez., Barthel, D.W. (1965). Functional evaluation: the Barthel Index. [address] ([address] (1997). The Barthel activities of daily living index: self-reporting versus actual performance in the old (> or = 75 years). Journal of [address] [place] [address] 45(7), 14 [facility], FRANCES, Sunitha White., [facility]. (1999). Measuring the change in disability after inpatient rehabilitation; comparison of the responsiveness of the Barthel Index and Functional [place] Measure. Journal of Neurology, Neurosurgery, and Psychiatry, 66(4), 714-671. Gina Dorantes, NBRAD.JENNIFER, NELLY Bejarano, & Rachael Walker MMadisonA. (2004) Assessment of post-stroke quality of life in cost-effectiveness studies: The usefulness of the Barthel Index and the EuroQoL-5D. Quality of Life Research, 15, 212-18     Occupational Therapy Evaluation Charge Determination   History Examination Decision-Making           Based on the above components, the patient evaluation is determined to be of the following complexity level:   Pain Rating:  R LE    Activity Tolerance:   Good    After treatment patient left in no apparent distress:    Supine in bed, Call bell within reach, and Side rails x 3    COMMUNICATION/EDUCATION:   The patients plan of care was discussed with: Physical therapist and Registered nurse. Home safety education was provided and the patient/caregiver indicated understanding., Patient/family have participated as able in goal setting and plan of care. , and Patient/family agree to work toward stated goals and plan of care.     This patients plan of care is appropriate for delegation to JEISON.     Thank you for this referral.  Santiago Dock  Time Calculation: 46 mins

## 2021-12-20 LAB
ANION GAP SERPL CALC-SCNC: 6 MMOL/L (ref 5–15)
BUN SERPL-MCNC: 27 MG/DL (ref 6–20)
BUN/CREAT SERPL: 25 (ref 12–20)
CALCIUM SERPL-MCNC: 8.2 MG/DL (ref 8.5–10.1)
CHLORIDE SERPL-SCNC: 87 MMOL/L (ref 97–108)
CO2 SERPL-SCNC: 34 MMOL/L (ref 21–32)
CREAT SERPL-MCNC: 1.1 MG/DL (ref 0.55–1.02)
ERYTHROCYTE [DISTWIDTH] IN BLOOD BY AUTOMATED COUNT: 12.9 % (ref 11.5–14.5)
GLUCOSE SERPL-MCNC: 92 MG/DL (ref 65–100)
HCT VFR BLD AUTO: 28.6 % (ref 35–47)
HGB BLD-MCNC: 9.5 G/DL (ref 11.5–16)
MAGNESIUM SERPL-MCNC: 1.8 MG/DL (ref 1.6–2.4)
MCH RBC QN AUTO: 30.9 PG (ref 26–34)
MCHC RBC AUTO-ENTMCNC: 33.2 G/DL (ref 30–36.5)
MCV RBC AUTO: 93.2 FL (ref 80–99)
NRBC # BLD: 0 K/UL (ref 0–0.01)
NRBC BLD-RTO: 0 PER 100 WBC
PHOSPHATE SERPL-MCNC: 3.2 MG/DL (ref 2.6–4.7)
PLATELET # BLD AUTO: 305 K/UL (ref 150–400)
PMV BLD AUTO: 9 FL (ref 8.9–12.9)
POTASSIUM SERPL-SCNC: 3.5 MMOL/L (ref 3.5–5.1)
RBC # BLD AUTO: 3.07 M/UL (ref 3.8–5.2)
SODIUM SERPL-SCNC: 127 MMOL/L (ref 136–145)
WBC # BLD AUTO: 6.2 K/UL (ref 3.6–11)

## 2021-12-20 PROCEDURE — 83735 ASSAY OF MAGNESIUM: CPT

## 2021-12-20 PROCEDURE — 85027 COMPLETE CBC AUTOMATED: CPT

## 2021-12-20 PROCEDURE — 97605 NEG PRS WND THER DME<=50SQCM: CPT

## 2021-12-20 PROCEDURE — 74011000250 HC RX REV CODE- 250: Performed by: INTERNAL MEDICINE

## 2021-12-20 PROCEDURE — 77030018717 HC DRSG GRNUFM KCON -B

## 2021-12-20 PROCEDURE — 74011250637 HC RX REV CODE- 250/637: Performed by: STUDENT IN AN ORGANIZED HEALTH CARE EDUCATION/TRAINING PROGRAM

## 2021-12-20 PROCEDURE — 74011250637 HC RX REV CODE- 250/637: Performed by: HOSPITALIST

## 2021-12-20 PROCEDURE — 65270000029 HC RM PRIVATE

## 2021-12-20 PROCEDURE — 80048 BASIC METABOLIC PNL TOTAL CA: CPT

## 2021-12-20 PROCEDURE — 2709999900 HC NON-CHARGEABLE SUPPLY

## 2021-12-20 PROCEDURE — 2W0QX6Z CHANGE PRESSURE DRESSING ON RIGHT LOWER LEG: ICD-10-PCS | Performed by: HOSPITALIST

## 2021-12-20 PROCEDURE — 74011250637 HC RX REV CODE- 250/637: Performed by: NURSE PRACTITIONER

## 2021-12-20 PROCEDURE — 84100 ASSAY OF PHOSPHORUS: CPT

## 2021-12-20 PROCEDURE — 36415 COLL VENOUS BLD VENIPUNCTURE: CPT

## 2021-12-20 PROCEDURE — 74011250637 HC RX REV CODE- 250/637: Performed by: INTERNAL MEDICINE

## 2021-12-20 RX ORDER — BUTALBITAL, ACETAMINOPHEN AND CAFFEINE 50; 325; 40 MG/1; MG/1; MG/1
1 TABLET ORAL
Status: DISCONTINUED | OUTPATIENT
Start: 2021-12-20 | End: 2021-12-22 | Stop reason: HOSPADM

## 2021-12-20 RX ORDER — POTASSIUM CHLORIDE 750 MG/1
20 TABLET, FILM COATED, EXTENDED RELEASE ORAL DAILY
Status: DISCONTINUED | OUTPATIENT
Start: 2021-12-20 | End: 2021-12-22

## 2021-12-20 RX ORDER — BUTALBITAL, ACETAMINOPHEN AND CAFFEINE 50; 325; 40 MG/1; MG/1; MG/1
1 TABLET ORAL
Status: CANCELLED | OUTPATIENT
Start: 2021-12-20

## 2021-12-20 RX ADMIN — Medication 10 ML: at 07:50

## 2021-12-20 RX ADMIN — METOPROLOL TARTRATE 25 MG: 25 TABLET, FILM COATED ORAL at 10:08

## 2021-12-20 RX ADMIN — Medication 10 ML: at 14:00

## 2021-12-20 RX ADMIN — BUTALBITAL, ACETAMINOPHEN, AND CAFFEINE 1 TABLET: 50; 325; 40 TABLET ORAL at 11:17

## 2021-12-20 RX ADMIN — DIPHENHYDRAMINE HYDROCHLORIDE 25 MG: 25 CAPSULE ORAL at 21:11

## 2021-12-20 RX ADMIN — LEVOTHYROXINE SODIUM 175 MCG: 0.03 TABLET ORAL at 07:49

## 2021-12-20 RX ADMIN — RIVAROXABAN 20 MG: 20 TABLET, FILM COATED ORAL at 10:08

## 2021-12-20 RX ADMIN — BUMETANIDE 1 MG: 0.25 INJECTION INTRAMUSCULAR; INTRAVENOUS at 18:11

## 2021-12-20 RX ADMIN — Medication 10 ML: at 21:13

## 2021-12-20 RX ADMIN — BUMETANIDE 1 MG: 0.25 INJECTION INTRAMUSCULAR; INTRAVENOUS at 10:08

## 2021-12-20 RX ADMIN — METOPROLOL TARTRATE 25 MG: 25 TABLET, FILM COATED ORAL at 18:11

## 2021-12-20 RX ADMIN — DIAZEPAM 2 MG: 2 TABLET ORAL at 21:11

## 2021-12-20 RX ADMIN — POTASSIUM CHLORIDE 20 MEQ: 750 TABLET, FILM COATED, EXTENDED RELEASE ORAL at 14:40

## 2021-12-20 NOTE — PROGRESS NOTES
Physical Therapy  Chart reviewed, came to see pt however pt politely declined and stated she is waiting for wound care and wound vac to be replaced, pt clarified that she is exhausted and would not be able to participate later today, will follow up tomorrow, RN made aware  Narciso Adkins PT

## 2021-12-20 NOTE — PROGRESS NOTES
The patient's wound vac alarm would not stop beeping. The pt was not familiar with the equipment. The beeping was annoying to the patient and she requested that it be unplugged from the wall outlet. At 0241 the unit was unplugged by the scribe. The power cord to the wound vac was disconnected by the pt. The wound vac alarm continued to sound. The \"on/off\" selector was depressed to turn the unit off. The alarm continued to sound.

## 2021-12-20 NOTE — PROGRESS NOTES
Chart reviewed and patient approached for OT services. Patient declines therapy services at this time as she is awaiting wound care.  Will follow up later as able  MORAIMA Murphy/COLLIN

## 2021-12-20 NOTE — WOUND CARE
WOCN Note:     New consult for NPWT to leg. Chart shows:  Admitted for debility, hyponatremia, leg edema  History of hematoma on leg with NPWT  WBC = 6.2    Assessment:   A&O x 4 and tolerates wound care well. Mobile and continent. Surface: ONESIMO mattress    No edema or erythema to leg or foot. 1. POA surgical hematoma evacuation site from previous admission to right lower anterior leg  9 x 8 x 1 cm  100% granular red  Scant bleeding  Tx: cleaned with saline, 125 mmHg suction achieved using 1 piece of white foam    Wound Recommendations:    RLL: maintain VAC @ 125 mmHg suction with twice weekly dressing changes     Discussed with RN. Physician aware of ongoing NPWT. Transition of Care: Plan to follow as needed while admitted to hospital with next [facility] dressing change on Thursday.   Chart indicates plan is to discharge to SNF    CATALINA CastelanN, RN, [facility]  Certified Wound, Ostomy, Continence Nurse  office [phone]  Available via [employer]

## 2021-12-20 NOTE — PROGRESS NOTES
[address] [facility] Adult  Hospitalist Group                                                                                          Hospitalist Progress Note  Russell De La Torre MD  Answering service: [phone] from in house phone        Date of Service:  2021  NAME:  Sophia Lepe  :  1950  MRN:  [id number]      Admission Summary:   Per H and P \" 66 y.o. female w/ hx of afib on xarelto, htn, hypothyroidism, mdd, chronic hyponatremia, who presents to ed with multiple complaints. Complains of increasing bilateral LE edema and resultant debility. She describes inability to ambulate and take care of herself. Worsened by chronic RLE wound treated with wound vac. Increased malaise and fatigue\". Interval history / Subjective:   Patient seen and examined    She was asking if the wound VAC will be changed and wound care will follow her today she is awaiting SNF placement      Assessment & Plan:     # B/L LE edema  -echo in 2021 showed moderate to severe MR and TR.  - IV bumex 1 mg BID  -- repeat echo - Left Ventricle: Left ventricle size is normal. Mildly increased wall thickness. Normal wall motion. Normal left ventricular systolic function with a visually estimated EF of 55 - 60%.       #Acute on chronic hyponatremia:  -had hyponatremia during last admission,received tolvaptan, discharged on sodium tablets,lasix and potassium  -due to hypervolemia,SIADH  -on IV lasix  Sodium 127 today      # RLE hematoma s/p surgery last admission and has a wound vac    #HTN:  -BP soft,hold hydralazine, decreased dose of metoprolol-continue current dose  Hold entresto    #Paroxsymal atrial fib:  -rate controlled,on xarelto    # Hypothyroidism:  synthroid    Hypokalemia-repleted      Code status: full  DVT prophylaxis: anticoagulated    Care Plan discussed with: Patient/Family and Nurse  Anticipated Disposition: SNF/LTC  Anticipated Discharge: Greater than 48 hours     Hospital Problems  Date Reviewed: 10/14/2021          Codes Class Noted POA    Hyponatremia ICD-10-CM: E87.1  ICD-9-CM: 276.1  8/2/2017 Unknown                Review of Systems:   Pertinent items are noted in HPI. Vital Signs:    Last 24hrs VS reviewed since prior progress note. Most recent are:  Visit Vitals  BP (!) 146/81 (BP 1 Location: Left arm, BP Patient Position: At rest)   Pulse 85   Temp 97.6 °F (36.4 °C)   Resp 16   Ht 5' 7\" (1.702 m)   Wt 102 kg (224 lb 13.9 oz)   SpO2 96%   BMI 35.22 kg/m²         Intake/Output Summary (Last 24 hours) at 12/20/2021 1135  Last data filed at 12/19/2021 2130  Gross per 24 hour   Intake    Output 850 ml   Net -850 ml        Physical Examination:     I had a face to face encounter with this patient and independently examined them on 12/20/2021 as outlined below:          Constitutional:  elderly patient, no acute distress    ENT:  Oral mucosa moist,EOMI,anicteric sclera   Resp:  decreased at bases,no wheezing/rhonchi/rales. No accessory muscle use   CV:  Regular rhythm, normal DPOF,Y9,I0 wnl,systolic murmur    GI:  Soft, non distended, non tender, normoactive bowel sounds    Musculoskeletal:  2+ b/l LE edema-some wrinkles today    Neurologic:  Moves all extremities. AAOx3  Skin: wound vac RLE            Data Review:    Review and/or order of clinical lab test  Review and/or order of tests in the medicine section of CPT      Labs:     Recent Labs     12/20/21  0300 12/18/21  0953   WBC 6.2 10.0   HGB 9.5* 9.8*   HCT 28.6* 29.3*    297     Recent Labs     12/20/21  0300 12/19/21  1200 12/18/21  0953   * 124* 124*   K 3.5 3.8 3.5   CL 87* 87* 86*   CO2 34* 31 31   BUN 27* 27* 29*   CREA 1.10* 1.12* 1.08*   GLU 92 93 100   CA 8.2* 8.2* 8.5   MG 1.8  --   --    PHOS 3.2  --   --      Recent Labs     12/17/21  1517   ALT 84*   *   TBILI 0.8   TP 6.6   ALB 3.1*   GLOB 3.5     No results for input(s): INR, PTP, APTT, INREXT, INREXT in the last 72 hours.    No results for input(s): FE, TIBC, PSAT, FERR in the last 72 hours. Lab Results   Component Value Date/Time    Folate 22.5 (H) 11/22/2021 02:14 AM      No results for input(s): PH, PCO2, PO2 in the last 72 hours. No results for input(s): CPK, CKNDX, TROIQ in the last 72 hours.     No lab exists for component: CPKMB  Lab Results   Component Value Date/Time    Cholesterol, total 233 (H) 06/15/2021 12:03 PM    HDL Cholesterol 114 06/15/2021 12:03 PM    LDL, calculated 108 (H) 06/15/2021 12:03 PM    LDL, calculated 102 (H) 02/07/2014 11:06 AM    Triglyceride 66 06/15/2021 12:03 PM     No results found for: [facility]  Lab Results   Component Value Date/Time    Color YELLOW/STRAW 11/22/2021 04:50 PM    Appearance CLEAR 11/22/2021 04:50 PM    Specific gravity 1.007 11/22/2021 04:50 PM    pH (UA) 7.5 11/22/2021 04:50 PM    Protein Negative 11/22/2021 04:50 PM    Glucose Negative 11/22/2021 04:50 PM    Ketone Negative 11/22/2021 04:50 PM    Bilirubin Negative 11/22/2021 04:50 PM    Urobilinogen 0.2 11/22/2021 04:50 PM    Nitrites Negative 11/22/2021 04:50 PM    Leukocyte Esterase Negative 11/22/2021 04:50 PM    Bacteria Few 02/07/2014 11:06 AM    WBC 11-30 (A) 02/07/2014 11:06 AM    RBC 0-3 02/07/2014 11:06 AM         Medications Reviewed:     Current Facility-Administered Medications   Medication Dose Route Frequency    butalbital-acetaminophen-caffeine (FIORICET, ESGIC) -40 mg per tablet 1 Tablet  1 Tablet Oral Q6H PRN    rivaroxaban (XARELTO) tablet 20 mg  20 mg Oral DAILY    metoprolol tartrate (LOPRESSOR) tablet 25 mg  25 mg Oral BID    bumetanide (BUMEX) injection 1 mg  1 mg IntraVENous BID    diphenhydrAMINE (BENADRYL) capsule 25 mg  25 mg Oral Q6H PRN    diphenhydrAMINE-zinc acetate 1%-0.1% (BENADRYL) cream   Topical TID PRN    sodium chloride (OCEAN) 0.65 % nasal squeeze bottle 2 Spray  2 Spray Both Nostrils Q2H PRN    diazePAM (VALIUM) tablet 2 mg  2 mg Oral DAILY PRN    levothyroxine (SYNTHROID) tablet 175 mcg  175 mcg Oral ACB    [Held by provider] hydrALAZINE (APRESOLINE) tablet 50 mg  50 mg Oral TID    sodium chloride (NS) flush 5-40 mL  5-40 mL IntraVENous Q8H    sodium chloride (NS) flush 5-40 mL  5-40 mL IntraVENous PRN    polyethylene glycol (MIRALAX) packet 17 g  17 g Oral DAILY PRN    ondansetron (ZOFRAN ODT) tablet 4 mg  4 mg Oral Q8H PRN    Or    ondansetron (ZOFRAN) injection 4 mg  4 mg IntraVENous Q6H PRN     ______________________________________________________________________  EXPECTED LENGTH OF STAY: - - -  ACTUAL LENGTH OF STAY:          3                 Yelena Mohan MD

## 2021-12-20 NOTE — PROGRESS NOTES
Name: Jonnathan Combs   MRN: [id number]  : 1950      Assessment:  Hyponatremia: acute on chronic-> Na 124>>127 today. +Hypervolemia/underlying CHF likely culprit but patient also has chronic SIADH. Serum Na fluctuates from 124 to 130.      Hypokalemia  CHF: repeat echo shows nl EF, mild LVF, Mod MR/TR and sev dilated LA/RA. Entresto on hold  Mod MR/TR  Anemia  RLE wound: wound vac in place.      Plan/Recommendations:  Ct  IV Bumex to 1mg BID  Add oral KCL 20 meq every day  Fluid restriction 1.2L/day  Hold Entresto  Strict I/Os  Avoid nephrotoxins  Am labs     Subjective:  Feels better.  \"My sodium is better\"    ROS:   No nausea, no vomiting  No chest pain, no shortness of breath    Exam:  Visit Vitals  BP (!) 146/81 (BP 1 Location: Left arm, BP Patient Position: At rest)   Pulse 85   Temp 97.6 °F (36.4 °C)   Resp 16   Ht 5' 7\" (1.702 m)   Wt 102 kg (224 lb 13.9 oz)   SpO2 96%   BMI 35.22 kg/m²     NAD  No icterus, mmm  Clear  RRR  No edema on L  R LE- wound vac  AOx3    Current Facility-Administered Medications   Medication Dose Route Frequency Last Admin    butalbital-acetaminophen-caffeine (FIORICET, ESGIC) -40 mg per tablet 1 Tablet  1 Tablet Oral Q6H PRN 1 Tablet at 21 1117    potassium chloride SR (KLOR-CON 10) tablet 20 mEq  20 mEq Oral DAILY      rivaroxaban (XARELTO) tablet 20 mg  20 mg Oral DAILY 20 mg at 21 1008    metoprolol tartrate (LOPRESSOR) tablet 25 mg  25 mg Oral BID 25 mg at 21 1008    bumetanide (BUMEX) injection 1 mg  1 mg IntraVENous BID 1 mg at 21 1008    diphenhydrAMINE (BENADRYL) capsule 25 mg  25 mg Oral Q6H PRN 25 mg at 21 2220    diphenhydrAMINE-zinc acetate 1%-0.1% (BENADRYL) cream   Topical TID PRN      sodium chloride (OCEAN) 0.65 % nasal squeeze bottle 2 Spray  2 Spray Both Nostrils Q2H PRN  diazePAM (VALIUM) tablet 2 mg  2 mg Oral DAILY PRN 2 mg at 12/19/21 1431    levothyroxine (SYNTHROID) tablet 175 mcg  175 mcg Oral  mcg at 12/20/21 0749    [Held by provider] hydrALAZINE (APRESOLINE) tablet 50 mg  50 mg Oral TID      sodium chloride (NS) flush 5-40 mL  5-40 mL IntraVENous Q8H 10 mL at 12/20/21 0750    sodium chloride (NS) flush 5-40 mL  5-40 mL IntraVENous PRN      polyethylene glycol (MIRALAX) packet 17 g  17 g Oral DAILY PRN      ondansetron (ZOFRAN ODT) tablet 4 mg  4 mg Oral Q8H PRN      Or    ondansetron (ZOFRAN) injection 4 mg  4 mg IntraVENous Q6H PRN         Labs/Data:    Lab Results   Component Value Date/Time    WBC 6.2 12/20/2021 03:00 AM    HGB (POC) 15.1 01/28/2020 09:43 AM    HGB 9.5 (L) 12/20/2021 03:00 AM    HCT (POC) 44.5 01/28/2020 09:43 AM    HCT 28.6 (L) 12/20/2021 03:00 AM    PLATELET 745 22/35/9334 03:00 AM    MCV 93.2 12/20/2021 03:00 AM       Lab Results   Component Value Date/Time    Sodium 127 (L) 12/20/2021 03:00 AM    Potassium 3.5 12/20/2021 03:00 AM    Chloride 87 (L) 12/20/2021 03:00 AM    CO2 34 (H) 12/20/2021 03:00 AM    Anion gap 6 12/20/2021 03:00 AM    Glucose 92 12/20/2021 03:00 AM    BUN 27 (H) 12/20/2021 03:00 AM    Creatinine 1.10 (H) 12/20/2021 03:00 AM    BUN/Creatinine ratio 25 (H) 12/20/2021 03:00 AM    GFR est AA 59 (L) 12/20/2021 03:00 AM    GFR est non-AA 49 (L) 12/20/2021 03:00 AM    Calcium 8.2 (L) 12/20/2021 03:00 AM       Wt Readings from Last 3 Encounters:   12/19/21 102 kg (224 lb 13.9 oz)   11/29/21 104.3 kg (229 lb 15 oz)   10/14/21 89.8 kg (198 lb)         Intake/Output Summary (Last 24 hours) at 12/20/2021 1220  Last data filed at 12/19/2021 2130  Gross per 24 hour   Intake    Output 850 ml   Net -850 ml       Patient seen and examined. Chart reviewed. Labs, data and other pertinent notes reviewed in last 24 hrs.     PMH/SH/FH reviewed and unchanged compared to H&P    Discussed with pt      Lotus Shah MD

## 2021-12-20 NOTE — PROGRESS NOTES
Transition of Care Plan   RUR- 17%    DISPOSITION: The disposition plan is IPR/SNF  o IPR: [facility]; awaiting acceptance  o SNF: 1st: [facility] and 2nd: [facility]; Awaiting acceptance  o The pt has a woundvac if she transitions to a facility they will provide the pt's Woundvac     F/U with PCP/Specialist     Transport: [facility]     Transition of Care Plan:     The Plan for Transition of Care is related to the following treatment goals: IPR/SNF    The Patient was provided with a choice of provider and agrees  with the discharge plan. Yes [x] No []    A Freedom of choice list was provided with basic dialogue that supports the patient's individualized plan of care/goals and shares the quality data associated with the providers.        Yes [x] No []    CM: [address] [place],   [phone]

## 2021-12-21 LAB
ANION GAP SERPL CALC-SCNC: 5 MMOL/L (ref 5–15)
BUN SERPL-MCNC: 19 MG/DL (ref 6–20)
BUN/CREAT SERPL: 24 (ref 12–20)
CALCIUM SERPL-MCNC: 8.5 MG/DL (ref 8.5–10.1)
CHLORIDE SERPL-SCNC: 90 MMOL/L (ref 97–108)
CO2 SERPL-SCNC: 33 MMOL/L (ref 21–32)
CREAT SERPL-MCNC: 0.8 MG/DL (ref 0.55–1.02)
GLUCOSE SERPL-MCNC: 86 MG/DL (ref 65–100)
POTASSIUM SERPL-SCNC: 3.5 MMOL/L (ref 3.5–5.1)
SODIUM SERPL-SCNC: 128 MMOL/L (ref 136–145)

## 2021-12-21 PROCEDURE — 74011250637 HC RX REV CODE- 250/637: Performed by: HOSPITALIST

## 2021-12-21 PROCEDURE — 80048 BASIC METABOLIC PNL TOTAL CA: CPT

## 2021-12-21 PROCEDURE — 97530 THERAPEUTIC ACTIVITIES: CPT

## 2021-12-21 PROCEDURE — 74011000250 HC RX REV CODE- 250: Performed by: INTERNAL MEDICINE

## 2021-12-21 PROCEDURE — 36415 COLL VENOUS BLD VENIPUNCTURE: CPT

## 2021-12-21 PROCEDURE — 74011250637 HC RX REV CODE- 250/637: Performed by: STUDENT IN AN ORGANIZED HEALTH CARE EDUCATION/TRAINING PROGRAM

## 2021-12-21 PROCEDURE — 65270000029 HC RM PRIVATE

## 2021-12-21 PROCEDURE — 74011250637 HC RX REV CODE- 250/637: Performed by: INTERNAL MEDICINE

## 2021-12-21 PROCEDURE — 74011250637 HC RX REV CODE- 250/637: Performed by: NURSE PRACTITIONER

## 2021-12-21 RX ORDER — BUMETANIDE 1 MG/1
2 TABLET ORAL DAILY
Status: DISCONTINUED | OUTPATIENT
Start: 2021-12-22 | End: 2021-12-22 | Stop reason: HOSPADM

## 2021-12-21 RX ORDER — HYDRALAZINE HYDROCHLORIDE 25 MG/1
25 TABLET, FILM COATED ORAL 3 TIMES DAILY
Status: DISCONTINUED | OUTPATIENT
Start: 2021-12-21 | End: 2021-12-22 | Stop reason: HOSPADM

## 2021-12-21 RX ADMIN — Medication 10 ML: at 08:25

## 2021-12-21 RX ADMIN — HYDRALAZINE HYDROCHLORIDE 25 MG: 25 TABLET, FILM COATED ORAL at 22:05

## 2021-12-21 RX ADMIN — RIVAROXABAN 20 MG: 20 TABLET, FILM COATED ORAL at 09:59

## 2021-12-21 RX ADMIN — Medication 10 ML: at 14:00

## 2021-12-21 RX ADMIN — Medication 10 ML: at 22:06

## 2021-12-21 RX ADMIN — POTASSIUM CHLORIDE 20 MEQ: 750 TABLET, FILM COATED, EXTENDED RELEASE ORAL at 09:59

## 2021-12-21 RX ADMIN — METOPROLOL TARTRATE 25 MG: 25 TABLET, FILM COATED ORAL at 09:59

## 2021-12-21 RX ADMIN — DIPHENHYDRAMINE HYDROCHLORIDE 25 MG: 25 CAPSULE ORAL at 22:04

## 2021-12-21 RX ADMIN — BUMETANIDE 1 MG: 0.25 INJECTION INTRAMUSCULAR; INTRAVENOUS at 10:01

## 2021-12-21 RX ADMIN — DIAZEPAM 2 MG: 2 TABLET ORAL at 22:04

## 2021-12-21 RX ADMIN — LEVOTHYROXINE SODIUM 175 MCG: 0.03 TABLET ORAL at 08:24

## 2021-12-21 RX ADMIN — HYDRALAZINE HYDROCHLORIDE 25 MG: 25 TABLET, FILM COATED ORAL at 17:26

## 2021-12-21 NOTE — PROGRESS NOTES
Name: Raynard Angelucci   MRN: [id number]  : 1950      Assessment:  Hyponatremia: acute on chronic-> Na 124>>127>>128 today. +Hypervolemia/underlying CHF likely culprit but patient also has chronic SIADH. Serum Na fluctuates from 124 to 130.      Hypokalemia  HTN- BP low nl. Hydralazine held. Now BP higher  CHF: repeat echo shows nl EF, mild LVF, Mod MR/TR and sev dilated LA/RA. Entresto on hold  Mod MR/TR  Anemia  RLE wound: wound vac in place.      Plan/Recommendations:  Switch IV to PO Bumex 2 mg every day from tom (home dose was 1 mg every day and she had drop in Na on that dose)  Oral KCL 20 meq every day  Fluid restriction 1.2L/day  Hold Entresto>>on Hydralazine- was ON HOLD; RESUME AT LOWER DOSE with HOLD PARAMETERS  Strict I/Os  Avoid nephrotoxins  Am labs     Subjective:  Feels better. \"I think Bumex is doing the job\"  Reluctant to switch to oral Bumex.      ROS:   No nausea, no vomiting  No chest pain, no shortness of breath    Exam:  Visit Vitals  BP (!) 150/90 (BP 1 Location: Left arm, BP Patient Position: At rest)   Pulse 79   Temp 97.7 °F (36.5 °C)   Resp 18   Ht 5' 7\" (1.702 m)   Wt 102 kg (224 lb 13.9 oz)   SpO2 95%   BMI 35.22 kg/m²     NAD  No edema on L  R LE- wound vac  AOx3    Current Facility-Administered Medications   Medication Dose Route Frequency Last Admin    hydrALAZINE (APRESOLINE) tablet 25 mg  25 mg Oral TID      butalbital-acetaminophen-caffeine (FIORICET, ESGIC) -40 mg per tablet 1 Tablet  1 Tablet Oral Q6H PRN 1 Tablet at 21 1117    potassium chloride SR (KLOR-CON 10) tablet 20 mEq  20 mEq Oral DAILY 20 mEq at 21 0959    rivaroxaban (XARELTO) tablet 20 mg  20 mg Oral DAILY 20 mg at 21 0959    metoprolol tartrate (LOPRESSOR) tablet 25 mg  25 mg Oral BID 25 mg at 21 0959    bumetanide (BUMEX) injection 1 mg  1 mg IntraVENous BID 1 mg at 12/21/21 1001    diphenhydrAMINE (BENADRYL) capsule 25 mg  25 mg Oral Q6H PRN 25 mg at 12/20/21 2111    diphenhydrAMINE-zinc acetate 1%-0.1% (BENADRYL) cream   Topical TID PRN      sodium chloride (OCEAN) 0.65 % nasal squeeze bottle 2 Spray  2 Spray Both Nostrils Q2H PRN      diazePAM (VALIUM) tablet 2 mg  2 mg Oral DAILY PRN 2 mg at 12/20/21 2111    levothyroxine (SYNTHROID) tablet 175 mcg  175 mcg Oral  mcg at 12/21/21 0824    sodium chloride (NS) flush 5-40 mL  5-40 mL IntraVENous Q8H 10 mL at 12/21/21 0825    sodium chloride (NS) flush 5-40 mL  5-40 mL IntraVENous PRN      polyethylene glycol (MIRALAX) packet 17 g  17 g Oral DAILY PRN      ondansetron (ZOFRAN ODT) tablet 4 mg  4 mg Oral Q8H PRN      Or    ondansetron (ZOFRAN) injection 4 mg  4 mg IntraVENous Q6H PRN         Labs/Data:    Lab Results   Component Value Date/Time    WBC 6.2 12/20/2021 03:00 AM    HGB (POC) 15.1 01/28/2020 09:43 AM    HGB 9.5 (L) 12/20/2021 03:00 AM    HCT (POC) 44.5 01/28/2020 09:43 AM    HCT 28.6 (L) 12/20/2021 03:00 AM    PLATELET 253 56/94/8851 03:00 AM    MCV 93.2 12/20/2021 03:00 AM       Lab Results   Component Value Date/Time    Sodium 128 (L) 12/21/2021 06:02 AM    Potassium 3.5 12/21/2021 06:02 AM    Chloride 90 (L) 12/21/2021 06:02 AM    CO2 33 (H) 12/21/2021 06:02 AM    Anion gap 5 12/21/2021 06:02 AM    Glucose 86 12/21/2021 06:02 AM    BUN 19 12/21/2021 06:02 AM    Creatinine 0.80 12/21/2021 06:02 AM    BUN/Creatinine ratio 24 (H) 12/21/2021 06:02 AM    GFR est AA >60 12/21/2021 06:02 AM    GFR est non-AA >60 12/21/2021 06:02 AM    Calcium 8.5 12/21/2021 06:02 AM       Wt Readings from Last 3 Encounters:   12/19/21 102 kg (224 lb 13.9 oz)   11/29/21 104.3 kg (229 lb 15 oz)   10/14/21 89.8 kg (198 lb)         Intake/Output Summary (Last 24 hours) at 12/21/2021 1028  Last data filed at 12/21/2021 0840  Gross per 24 hour   Intake    Output 1750 ml   Net -1750 ml       Patient seen and examined. Chart reviewed. Labs, data and other pertinent notes reviewed in last 24 hrs.     PMH/SH/FH reviewed and unchanged compared to H&P    Discussed with pt/RN    Heather Christie MD

## 2021-12-21 NOTE — PROGRESS NOTES
Transition of Care Plan   RUR- 17%    DISPOSITION: The disposition plan is IPR vs. SNF  o  [facility]; Accepted   o SNF: [facility]; Accepted  - [facility]; Pending   o This cm will f/u with the pt to determine if she wants to go to Rehab or SNF     F/U with PCP/Specialist     Transport:  [facility]    CM: [address] [place],   [phone]

## 2021-12-21 NOTE — PROGRESS NOTES
[facility] Adult  Hospitalist Group                                                                                          Hospitalist Progress Note  Angelica Bowie MD  Answering service: [phone] OR [phone] from in house phone        Date of Service:  2021  NAME:  Susan Persaud  :  1950  MRN:  [id number]      Admission Summary:   Per H and P \" 66 y.o. female w/ hx of afib on xarelto, htn, hypothyroidism, mdd, chronic hyponatremia, who presents to ed with multiple complaints. Complains of increasing bilateral LE edema and resultant debility. She describes inability to ambulate and take care of herself. Worsened by chronic RLE wound treated with wound vac. Increased malaise and fatigue\". Interval history / Subjective:   Patient seen and examined  Wound vac changed appreciate nephrology  f/u and recomendation     Assessment & Plan:     # B/L LE edema  -echo in 2021 showed moderate to severe MR and TR.  - IV bumex 1 mg BID switch to oral   --repeat echo - Left Ventricle: Left ventricle size is normal. Mildly increased wall thickness. Normal wall motion. Normal left ventricular systolic function with a visually estimated EF of 55 - 60%. #Acute on chronic hyponatremia:  -had hyponatremia during last admission,received tolvaptan, discharged on sodium tablets,lasix and potassium  -due to hypervolemia,SIADH fluid restriction  -on Ioral diuretic now   -Sodium 128 today      # RLE hematoma s/p surgery last admission and has a wound vac- wound vac changed by wound care     #HTN:  -BP soft, hold hydralazine, decreased dose of metoprolol-continue current dose  --Hold entresto    #Paroxsymal atrial fib:  --rate controlled,on xarelto    # Hypothyroidism:  synthroid    # Hypokalemia-repleted      Code status: full  DVT prophylaxis: anticoagulated    Care Plan discussed with: Patient/Family and Nurse  Anticipated Disposition: SNF/LTC  Anticipated Discharge:  Tomorrow to [facility] d/w [facility] Problems  Date Reviewed: 10/14/2021          Codes Class Noted POA    Hyponatremia ICD-10-CM: E87.1  ICD-9-CM: 276.1  8/2/2017 Unknown                Review of Systems:   Pertinent items are noted in HPI. Vital Signs:    Last 24hrs VS reviewed since prior progress note. Most recent are:  Visit Vitals  BP (!) 150/90 (BP 1 Location: Left arm, BP Patient Position: At rest)   Pulse 79   Temp 97.7 °F (36.5 °C)   Resp 18   Ht 5' 7\" (1.702 m)   Wt 102 kg (224 lb 13.9 oz)   SpO2 95%   BMI 35.22 kg/m²         Intake/Output Summary (Last 24 hours) at 12/21/2021 1119  Last data filed at 12/21/2021 1117  Gross per 24 hour   Intake    Output 2450 ml   Net -2450 ml        Physical Examination:     I had a face to face encounter with this patient and independently examined them on 12/21/2021 as outlined below:          Constitutional:  elderly patient, no acute distress    ENT:  Oral mucosa moist,EOMI,anicteric sclera   Resp:  decreased at bases,no wheezing/rhonchi/rales. No accessory muscle use   CV:  Regular rhythm, normal ZFGX,L9,V5 wnl,systolic murmur    GI:  Soft, non distended, non tender, normoactive bowel sounds    Musculoskeletal:  2+ b/l LE edema-some wrinkles today    Neurologic:  Moves all extremities. AAOx3  Skin: wound vac RLE            Data Review:    Review and/or order of clinical lab test  Review and/or order of tests in the medicine section of CPT      Labs:     Recent Labs     12/20/21  0300   WBC 6.2   HGB 9.5*   HCT 28.6*        Recent Labs     12/21/21  0602 12/20/21  0300 12/19/21  1200   * 127* 124*   K 3.5 3.5 3.8   CL 90* 87* 87*   CO2 33* 34* 31   BUN 19 27* 27*   CREA 0.80 1.10* 1.12*   GLU 86 92 93   CA 8.5 8.2* 8.2*   MG  --  1.8  --    PHOS  --  3.2  --      No results for input(s): ALT, AP, TBIL, TBILI, TP, ALB, GLOB, GGT, AML, LPSE in the last 72 hours.     No lab exists for component: SGOT, GPT, AMYP, HLPSE  No results for input(s): INR, PTP, APTT, INREXT, INREXT in the last 72 hours. No results for input(s): FE, TIBC, PSAT, FERR in the last 72 hours. Lab Results   Component Value Date/Time    Folate 22.5 (H) 11/22/2021 02:14 AM      No results for input(s): PH, PCO2, PO2 in the last 72 hours. No results for input(s): CPK, CKNDX, TROIQ in the last 72 hours.     No lab exists for component: CPKMB  Lab Results   Component Value Date/Time    Cholesterol, total 233 (H) 06/15/2021 12:03 PM    HDL Cholesterol 114 06/15/2021 12:03 PM    LDL, calculated 108 (H) 06/15/2021 12:03 PM    LDL, calculated 102 (H) 02/07/2014 11:06 AM    Triglyceride 66 06/15/2021 12:03 PM     No results found for: [facility]  Lab Results   Component Value Date/Time    Color YELLOW/STRAW 11/22/2021 04:50 PM    Appearance CLEAR 11/22/2021 04:50 PM    Specific gravity 1.007 11/22/2021 04:50 PM    pH (UA) 7.5 11/22/2021 04:50 PM    Protein Negative 11/22/2021 04:50 PM    Glucose Negative 11/22/2021 04:50 PM    Ketone Negative 11/22/2021 04:50 PM    Bilirubin Negative 11/22/2021 04:50 PM    Urobilinogen 0.2 11/22/2021 04:50 PM    Nitrites Negative 11/22/2021 04:50 PM    Leukocyte Esterase Negative 11/22/2021 04:50 PM    Bacteria Few 02/07/2014 11:06 AM    WBC 11-30 (A) 02/07/2014 11:06 AM    RBC 0-3 02/07/2014 11:06 AM         Medications Reviewed:     Current Facility-Administered Medications   Medication Dose Route Frequency    hydrALAZINE (APRESOLINE) tablet 25 mg  25 mg Oral TID    [START ON 12/22/2021] bumetanide (BUMEX) tablet 2 mg  2 mg Oral DAILY    butalbital-acetaminophen-caffeine (FIORICET, ESGIC) -40 mg per tablet 1 Tablet  1 Tablet Oral Q6H PRN    potassium chloride SR (KLOR-CON 10) tablet 20 mEq  20 mEq Oral DAILY    rivaroxaban (XARELTO) tablet 20 mg  20 mg Oral DAILY    metoprolol tartrate (LOPRESSOR) tablet 25 mg  25 mg Oral BID    diphenhydrAMINE (BENADRYL) capsule 25 mg  25 mg Oral Q6H PRN    diphenhydrAMINE-zinc acetate 1%-0.1% (BENADRYL) cream   Topical TID PRN    sodium chloride (OCEAN) 0.65 % nasal squeeze bottle 2 Spray  2 Spray Both Nostrils Q2H PRN    diazePAM (VALIUM) tablet 2 mg  2 mg Oral DAILY PRN    levothyroxine (SYNTHROID) tablet 175 mcg  175 mcg Oral ACB    sodium chloride (NS) flush 5-40 mL  5-40 mL IntraVENous Q8H    sodium chloride (NS) flush 5-40 mL  5-40 mL IntraVENous PRN    polyethylene glycol (MIRALAX) packet 17 g  17 g Oral DAILY PRN    ondansetron (ZOFRAN ODT) tablet 4 mg  4 mg Oral Q8H PRN    Or    ondansetron (ZOFRAN) injection 4 mg  4 mg IntraVENous Q6H PRN     ______________________________________________________________________  EXPECTED LENGTH OF STAY: - - -  ACTUAL LENGTH OF STAY:          4                 Katherin Ramos MD

## 2021-12-21 NOTE — PROGRESS NOTES
Physician Progress Note      PATIENTRainelle Riedel  CSN #:                  [id number]  :                       1950  ADMIT DATE:       2021 6:38 PM  [address] [place] DATE:  RESPONDING  PROVIDER #:        Charla Sever MD Geri Riles MD          QUERY TEXT:    Pt admitted with SIADH. Noted documentation of CHF on  by ordered Renal consultant. If possible, please document in progress notes and discharge summary:    The medical record reflects the following:  Risk Factors: hx CHF    Clinical Indicators:    pBNP 4,660  2+ BLE edema  CXR- Unchanged cardiomegaly    Ro OV Note -  Diastolic heart failure, probably associated with HTN and afib. H&P-  Volume overload /bilateral lower extremity edema  -Continue Bumex twice daily 2 mg  -Strict I's and O's  -? [facility] -  +Hypervolemia/underlying CHF likely culprit but patient also has chronic SIADH. CHF: repeat echo. On Entresto as outpatient    [facility] -  CHF: repeat echo shows nl EF, mild LVF, Mod MR/TR and sev dilated LA/RA. Entresto on hold  Mod MR/TR    Echo -  -  Left Ventricle: Left ventricle size is normal. Mildly increased wall thickness. Normal wall motion. Normal left ventricular systolic function with a visually estimated EF of 55 - 60%. -  Right Ventricle: Mildly reduced systolic function.  -  Aortic Valve: Mildly sclerosed cusps. Mild transvalvular regurgitation.  -  Mitral Valve: Mildly thickened leaflets. Mild to moderate transvalvular regurgitation.  -  Tricuspid Valve: Moderate transvalvular regurgitation.  -  Left Atrium: Left atrium is severely dilated. -  Right Atrium: Right atrium is severely dilated. Treatment: Bumex 1mg IV BID; Bumex 2mg IV; Lopressor 25mg PO BID; strict I/Os;  Echo    Thank you,  Billie Ames RN, BSN, CCDS, [place], [place]  Clinical Documentation  [phone] or [phone]  Options provided:  -- Acute on Chronic Diastolic CHF/HFpEF  -- Chronic Diastolic CHF  -- Other - I will add my own diagnosis  -- Disagree - Not applicable / Not valid  -- Disagree - Clinically unable to determine / Unknown  -- Refer to Clinical Documentation Reviewer    PROVIDER RESPONSE TEXT:    This patient is in chronic diastolic CHF. Query created by: Elmo Belle on 12/21/2021 2:08 PM      QUERY TEXT:    Patient admitted with [facility], noted to have atrial fibrillation and is maintained on Xarelto. If possible, please document in progress notes and discharge summary if you are evaluating and/or treating any of the following: The medical record reflects the following:  Risk Factors: hx AFib    Clinical Indicators:    [facility] PN 12/18-  Paroxsymal atrial fib:  -rate controlled,on xarelto    Treatment: Xarelto 20mg PO daily    Thank you,  Jose Franks RN, BSN, CCDS, [place], [place]  Clinical Documentation  [phone] or [phone]  Options provided:  -- Secondary hypercoagulable state in a patient with atrial fibrillation  -- Other - I will add my own diagnosis  -- Disagree - Not applicable / Not valid  -- Disagree - Clinically unable to determine / Unknown  -- Refer to Clinical Documentation Reviewer    PROVIDER RESPONSE TEXT:    Provider disagreed with this query.   i never heard this befor in my life time    Query created by: Elmo Belle on 12/21/2021 2:13 PM      Electronically signed by:  Chandler Baller MD Eileen Canavan MD 12/21/2021 2:21 PM

## 2021-12-21 NOTE — PROGRESS NOTES
Rounded on Scientologist patients and provided Anointing of the Sick at request of patient.     Mindi Leon

## 2021-12-21 NOTE — PROGRESS NOTES
Problem: Mobility Impaired (Adult and Pediatric)  Goal: *Acute Goals and Plan of Care (Insert Text)  Description: FUNCTIONAL STATUS PRIOR TO ADMISSION: Patient was independent and active without use of DME.    HOME SUPPORT PRIOR TO ADMISSION: Patient was dc to sister's home after last admission 11/30, was receiving HHPT? Physical Therapy Goals  Initiated 12/19/2021  1. Patient will move from supine to sit and sit to supine  in bed with modified independence within 7 day(s). 2.  Patient will transfer from bed to chair and chair to bed with modified independence using the least restrictive device within 7 day(s). 3.  Patient will perform sit to stand with modified independence within 7 day(s). 4.  Patient will ambulate with supervision/set-up for 100 feet with the least restrictive device within 7 day(s). 5.  Patient will ascend/descend 1 stairs with 1 handrail(s) with minimal assistance/contact guard assist within 7 day(s). Outcome: Not Progressing Towards Goal   PHYSICAL THERAPY TREATMENT  Patient: Merlyn Dietz (35 y.o. female)  Date: 12/21/2021  Diagnosis: Hyponatremia [E87.1] <principal problem not specified>       Precautions: Fall  Chart, physical therapy assessment, plan of care and goals were reviewed. ASSESSMENT  Patient continues with skilled PT services and is not progressing towards goals. Pt initially reluctant to participate however agreeable to transfer to bedside chair with encouragement. Pt required extra time and redirecting as she is distracted easily and has a visitor in room. Pt required moderate assistance for bed mobility and sit to stand. She leans posteriorly upon standing with fair improvement once side stepping to chair with minimal assist x 2. Pt denies pain with mobility and remained up in chair with BLE elevated on step stool and pillows.      Current Level of Function Impacting Discharge (mobility/balance): bed mobility moderate assist, sit to stand moderate assist x 2, bed to chair with rolling walker with minimal assist x 2    Other factors to consider for discharge: fall risk, below her baseline, decreased activity tolerance needing encouragement          PLAN :  Patient continues to benefit from skilled intervention to address the above impairments. Continue treatment per established plan of care. to address goals. Recommendation for discharge: (in order for the patient to meet his/her long term goals)  Therapy 3 hours per day 5-7 days per week, if not then SNF    This discharge recommendation:  A follow-up discussion with the attending provider and/or case management is planned    IF patient discharges home will need the following DME: rolling walker       SUBJECTIVE:   Patient agreeable to participate with encouragement. OBJECTIVE DATA SUMMARY:   Critical Behavior:  Neurologic State: Alert  Orientation Level: Oriented X4  Cognition: decreased attention/concentration  Safety/Judgement: Awareness of environment,Decreased insight into deficits,Decreased awareness of need for safety  Functional Mobility Training:  Bed Mobility:     Supine to Sit: Moderate assistance;Assist x1;Additional time; Adaptive equipment     Scooting: Supervision; Additional time        Transfers:  Sit to Stand: Moderate assistance;Assist x2; Additional time; Adaptive equipment  Stand to Sit: Minimum assistance;Assist x2; Additional time; Adaptive equipment        Bed to Chair: Minimum assistance;Assist x2; Additional time; Adaptive equipment                    Balance:  Sitting: Intact  Standing: Impaired  Standing - Static: Constant support; Fair  Standing - Dynamic : Constant support; Fair           Pain Rating:  Denies pain     Activity Tolerance:   Fair    After treatment patient left in no apparent distress:   Sitting in chair and Call bell within reach    COMMUNICATION/COLLABORATION:   The patients plan of care was discussed with: Registered nurse.      Mora Vasquez   Time Calculation: 14 mins

## 2021-12-22 VITALS
HEART RATE: 77 BPM | OXYGEN SATURATION: 98 % | BODY MASS INDEX: 35.29 KG/M2 | DIASTOLIC BLOOD PRESSURE: 71 MMHG | WEIGHT: 224.87 LBS | SYSTOLIC BLOOD PRESSURE: 119 MMHG | HEIGHT: 67 IN | RESPIRATION RATE: 16 BRPM | TEMPERATURE: 98.2 F

## 2021-12-22 PROBLEM — E87.1 HYPONATREMIA: Status: RESOLVED | Noted: 2017-08-02 | Resolved: 2021-12-22

## 2021-12-22 LAB
ANION GAP SERPL CALC-SCNC: 5 MMOL/L (ref 5–15)
BUN SERPL-MCNC: 19 MG/DL (ref 6–20)
BUN/CREAT SERPL: 21 (ref 12–20)
CALCIUM SERPL-MCNC: 8.7 MG/DL (ref 8.5–10.1)
CHLORIDE SERPL-SCNC: 91 MMOL/L (ref 97–108)
CO2 SERPL-SCNC: 31 MMOL/L (ref 21–32)
CREAT SERPL-MCNC: 0.91 MG/DL (ref 0.55–1.02)
GLUCOSE SERPL-MCNC: 94 MG/DL (ref 65–100)
POTASSIUM SERPL-SCNC: 3.2 MMOL/L (ref 3.5–5.1)
SODIUM SERPL-SCNC: 127 MMOL/L (ref 136–145)

## 2021-12-22 PROCEDURE — 74011250637 HC RX REV CODE- 250/637: Performed by: NURSE PRACTITIONER

## 2021-12-22 PROCEDURE — 74011250637 HC RX REV CODE- 250/637: Performed by: INTERNAL MEDICINE

## 2021-12-22 PROCEDURE — 74011250637 HC RX REV CODE- 250/637: Performed by: HOSPITALIST

## 2021-12-22 PROCEDURE — 74011250637 HC RX REV CODE- 250/637: Performed by: STUDENT IN AN ORGANIZED HEALTH CARE EDUCATION/TRAINING PROGRAM

## 2021-12-22 PROCEDURE — 36415 COLL VENOUS BLD VENIPUNCTURE: CPT

## 2021-12-22 PROCEDURE — 80048 BASIC METABOLIC PNL TOTAL CA: CPT

## 2021-12-22 RX ORDER — POTASSIUM CHLORIDE 750 MG/1
20 TABLET, FILM COATED, EXTENDED RELEASE ORAL
Status: COMPLETED | OUTPATIENT
Start: 2021-12-22 | End: 2021-12-22

## 2021-12-22 RX ORDER — HYDRALAZINE HYDROCHLORIDE 50 MG/1
25 TABLET, FILM COATED ORAL 3 TIMES DAILY
Qty: 90 TABLET | Refills: 0 | Status: SHIPPED | OUTPATIENT
Start: 2021-12-22 | End: 2022-01-14

## 2021-12-22 RX ORDER — POTASSIUM CHLORIDE 750 MG/1
30 TABLET, FILM COATED, EXTENDED RELEASE ORAL DAILY
Status: DISCONTINUED | OUTPATIENT
Start: 2021-12-23 | End: 2021-12-22 | Stop reason: HOSPADM

## 2021-12-22 RX ORDER — BUMETANIDE 1 MG/1
2 TABLET ORAL DAILY
Qty: 30 TABLET | Refills: 0 | Status: SHIPPED | OUTPATIENT
Start: 2021-12-22 | End: 2022-01-14

## 2021-12-22 RX ORDER — METOPROLOL TARTRATE 50 MG/1
25 TABLET ORAL 2 TIMES DAILY
Qty: 30 TABLET | Refills: 0 | Status: SHIPPED | OUTPATIENT
Start: 2021-12-22 | End: 2022-01-14

## 2021-12-22 RX ADMIN — POTASSIUM CHLORIDE 20 MEQ: 750 TABLET, FILM COATED, EXTENDED RELEASE ORAL at 09:54

## 2021-12-22 RX ADMIN — BUMETANIDE 2 MG: 1 TABLET ORAL at 10:01

## 2021-12-22 RX ADMIN — HYDRALAZINE HYDROCHLORIDE 25 MG: 25 TABLET, FILM COATED ORAL at 09:54

## 2021-12-22 RX ADMIN — METOPROLOL TARTRATE 25 MG: 25 TABLET, FILM COATED ORAL at 09:54

## 2021-12-22 RX ADMIN — RIVAROXABAN 20 MG: 20 TABLET, FILM COATED ORAL at 09:54

## 2021-12-22 RX ADMIN — DIPHENHYDRAMINE HYDROCHLORIDE 25 MG: 25 CAPSULE ORAL at 06:50

## 2021-12-22 RX ADMIN — Medication 10 ML: at 06:46

## 2021-12-22 RX ADMIN — LEVOTHYROXINE SODIUM 175 MCG: 0.03 TABLET ORAL at 06:45

## 2021-12-22 RX ADMIN — BUTALBITAL, ACETAMINOPHEN, AND CAFFEINE 1 TABLET: 50; 325; 40 TABLET ORAL at 12:53

## 2021-12-22 NOTE — DISCHARGE INSTRUCTIONS
Discharge Instructions       PATIENT ID: Bart Peterson  MRN: [id number]   YOB: 1950    DATE OF ADMISSION: 12/17/2021  6:38 PM    DATE OF DISCHARGE: 12/22/2021    PRIMARY CARE PROVIDER: Anaid Daniel MD     ATTENDING PHYSICIAN: Yuliana Oswald MD  DISCHARGING PROVIDER: Siobhan Teresa MD    To contact this individual call [phone] and ask the  to page. If unavailable ask to be transferred the Adult Hospitalist Department. DISCHARGE DIAGNOSES HFpEF LE edema     CONSULTATIONS: IP CONSULT TO HOSPITALIST  IP CONSULT TO NEPHROLOGY    PROCEDURES/SURGERIES: * No surgery found *    PENDING TEST RESULTS:   At the time of discharge the following test results are still pending:     FOLLOW UP APPOINTMENTS:   Follow-up Information     Follow up With Specialties Details Why Contact Info    Anaid Daniel MD Internal Medicine In 1 week  [address] [phone]             ADDITIONAL CARE RECOMMENDATIONS:     DIET: Cardiac Diet    ACTIVITY: Activity as tolerated    WOUND CARE: wound vac    EQUIPMENT needed:       Radiology      DISCHARGE MEDICATIONS:   See Medication Reconciliation Form    · It is important that you take the medication exactly as they are prescribed. · Keep your medication in the bottles provided by the pharmacist and keep a list of the medication names, dosages, and times to be taken in your wallet. · Do not take other medications without consulting your doctor. NOTIFY YOUR PHYSICIAN FOR ANY OF THE FOLLOWING:   Fever over 101 degrees for 24 hours. Chest pain, shortness of breath, fever, chills, nausea, vomiting, diarrhea, change in mentation, falling, weakness, bleeding. Severe pain or pain not relieved by medications. Or, any other signs or symptoms that you may have questions about.       DISPOSITION:    Home With:   OT  PT  HH  RN      x SNF/Inpatient Rehab/LTAC    Independent/assisted living    Hospice    Other: CDMP Checked:   Yes x     PROBLEM LIST Updated:  Yes x       Signed:   Katharina Mckinney MD  12/22/2021  10:50 AM

## 2021-12-22 NOTE — PROGRESS NOTES
Transition of Care Plan   RUR- Low 14%    DISPOSITION: The disposition plan is [facility]  o Call Report: [phone]   F/U with PCP/Specialist     Transport: [facility] 6pm   o [place]: next available 8pm   o [facility] No available times for today      Inpatient Rehab/ Hospital to Hospital Transition of Care Note /Discharge Note       EMTALA filed and ready for MD to sign at bedside chart. Accepting Physician: Dr. Arby Meigs     Discharging Physician: Dr. Dale Coker     Accepting Representative: Derik Gurrola: [facility]    RN call to report: [phone]    [facility] phone [phone]       time: 6pm    Ambulance packet at bedside chart. The attending physician and the primary nurse were notified of the plan. Medicare pt has received, reviewed, and signed 2nd IM letter informing them of their right to appeal the discharge. Signed copy has been placed on pt bedside chart.     CM: [address]. MSW,   [phone]

## 2021-12-22 NOTE — PROGRESS NOTES
Problem: Discharge Planning  Goal: *Discharge to safe environment  Outcome: Resolved/Met     Problem: Falls - Risk of  Goal: *Absence of Falls  Description: Document Jania Bush Fall Risk and appropriate interventions in the flowsheet. Outcome: Resolved/Met  Note: Fall Risk Interventions:  Mobility Interventions: Bed/chair exit alarm,Communicate number of staff needed for ambulation/transfer         Medication Interventions: Bed/chair exit alarm    Elimination Interventions: Bed/chair exit alarm              Problem: Patient Education: Go to Patient Education Activity  Goal: Patient/Family Education  Outcome: Resolved/Met     Problem: Patient Education: Go to Patient Education Activity  Goal: Patient/Family Education  Outcome: Resolved/Met     Problem: Patient Education: Go to Patient Education Activity  Goal: Patient/Family Education  Outcome: Resolved/Met     Problem: Pressure Injury - Risk of  Goal: *Prevention of pressure injury  Description: Document Tyrell Scale and appropriate interventions in the flowsheet.   Outcome: Resolved/Met  Note: Pressure Injury Interventions:       Moisture Interventions: Absorbent underpads    Activity Interventions: Assess need for specialty bed    Mobility Interventions: PT/OT evaluation    Nutrition Interventions: Document food/fluid/supplement intake                     Problem: Patient Education: Go to Patient Education Activity  Goal: Patient/Family Education  Outcome: Resolved/Met

## 2021-12-22 NOTE — PROGRESS NOTES
Name: Nesha Sol   MRN: [id number]  : 1950      Assessment:  Hyponatremia: acute on chronic-> Na 124>>127>>128 today. +Hypervolemia/underlying CHF likely culprit but patient also has chronic SIADH. Serum Na fluctuates from 124 to 130.      Hypokalemia  HTN- BP low nl. Hydralazine held. Now BP higher  CHF: repeat echo shows nl EF, mild LVF, Mod MR/TR and sev dilated LA/RA. Entresto on hold  Mod MR/TR  Anemia  RLE wound: wound vac in place. Na down by 1 point. Stable/aysmptomatic. K is more lower.  Replace extra KCL 20 meq x 1 (total 40 meq )     Plan/Recommendations:  Ok for d/c from renal standpoint  Oral Bumex 2 mg every day + oral kcl 20 meq QD  Ct Fluid restriction 1.2L/day  Hold Entresto>>on Hydralazine- was ON HOLD; RESUME AT LOWER DOSE with HOLD PARAMETERS  Strict I/Os  Avoid nephrotoxins  Am labs     Subjective:  \"I am going today\"    ROS:   No nausea, no vomiting  No chest pain, no shortness of breath    Exam:  Visit Vitals  /71   Pulse 77   Temp 98 °F (36.7 °C)   Resp 18   Ht 5' 7\" (1.702 m)   Wt 102 kg (224 lb 13.9 oz)   SpO2 95%   BMI 35.22 kg/m²     NAD  No edema on L- mild echymosis  R LE- wound vac  AOx3    Current Facility-Administered Medications   Medication Dose Route Frequency Last Admin    [START ON 2021] potassium chloride SR (KLOR-CON 10) tablet 30 mEq  30 mEq Oral DAILY      hydrALAZINE (APRESOLINE) tablet 25 mg  25 mg Oral TID 25 mg at 21 0954    bumetanide (BUMEX) tablet 2 mg  2 mg Oral DAILY 2 mg at 21 1001    butalbital-acetaminophen-caffeine (FIORICET, ESGIC) -40 mg per tablet 1 Tablet  1 Tablet Oral Q6H PRN 1 Tablet at 21 1117    rivaroxaban (XARELTO) tablet 20 mg  20 mg Oral DAILY 20 mg at 21 0954    metoprolol tartrate (LOPRESSOR) tablet 25 mg  25 mg Oral BID 25 mg at 21 6046    diphenhydrAMINE (BENADRYL) capsule 25 mg  25 mg Oral Q6H PRN 25 mg at 12/22/21 0650    diphenhydrAMINE-zinc acetate 1%-0.1% (BENADRYL) cream   Topical TID PRN      sodium chloride (OCEAN) 0.65 % nasal squeeze bottle 2 Spray  2 Spray Both Nostrils Q2H PRN      diazePAM (VALIUM) tablet 2 mg  2 mg Oral DAILY PRN 2 mg at 12/21/21 2204    levothyroxine (SYNTHROID) tablet 175 mcg  175 mcg Oral  mcg at 12/22/21 0645    sodium chloride (NS) flush 5-40 mL  5-40 mL IntraVENous Q8H 10 mL at 12/22/21 0646    sodium chloride (NS) flush 5-40 mL  5-40 mL IntraVENous PRN      polyethylene glycol (MIRALAX) packet 17 g  17 g Oral DAILY PRN      ondansetron (ZOFRAN ODT) tablet 4 mg  4 mg Oral Q8H PRN      Or    ondansetron (ZOFRAN) injection 4 mg  4 mg IntraVENous Q6H PRN         Labs/Data:    Lab Results   Component Value Date/Time    WBC 6.2 12/20/2021 03:00 AM    HGB (POC) 15.1 01/28/2020 09:43 AM    HGB 9.5 (L) 12/20/2021 03:00 AM    HCT (POC) 44.5 01/28/2020 09:43 AM    HCT 28.6 (L) 12/20/2021 03:00 AM    PLATELET 573 76/49/8172 03:00 AM    MCV 93.2 12/20/2021 03:00 AM       Lab Results   Component Value Date/Time    Sodium 127 (L) 12/22/2021 03:40 AM    Potassium 3.2 (L) 12/22/2021 03:40 AM    Chloride 91 (L) 12/22/2021 03:40 AM    CO2 31 12/22/2021 03:40 AM    Anion gap 5 12/22/2021 03:40 AM    Glucose 94 12/22/2021 03:40 AM    BUN 19 12/22/2021 03:40 AM    Creatinine 0.91 12/22/2021 03:40 AM    BUN/Creatinine ratio 21 (H) 12/22/2021 03:40 AM    GFR est AA >60 12/22/2021 03:40 AM    GFR est non-AA >60 12/22/2021 03:40 AM    Calcium 8.7 12/22/2021 03:40 AM       Wt Readings from Last 3 Encounters:   12/19/21 102 kg (224 lb 13.9 oz)   11/29/21 104.3 kg (229 lb 15 oz)   10/14/21 89.8 kg (198 lb)         Intake/Output Summary (Last 24 hours) at 12/22/2021 1151  Last data filed at 12/21/2021 2020  Gross per 24 hour   Intake 100 ml   Output 700 ml   Net -600 ml       Patient seen and examined. Chart reviewed. Labs, data and other pertinent notes reviewed in last 24 hrs.     PMH/SH/FH reviewed and unchanged compared to H&P    Discussed with pt    Uday Smith MD

## 2021-12-22 NOTE — DISCHARGE SUMMARY
Discharge Summary       PATIENT ID: Vickey El  MRN: [id number]   YOB: 1950    DATE OF ADMISSION: 12/17/2021  6:38 PM    DATE OF DISCHARGE: 12/22/21   PRIMARY CARE PROVIDER: Tressa Cerna MD     ATTENDING PHYSICIAN: Rita Rosas  DISCHARGING PROVIDER: Roxanna Wells MD    To contact this individual call [phone] and ask the  to page. If unavailable ask to be transferred the Adult Hospitalist Department. CONSULTATIONS: IP CONSULT TO HOSPITALIST  IP CONSULT TO NEPHROLOGY    PROCEDURES/SURGERIES: * No surgery found *    ADMITTING DIAGNOSES & HOSPITAL COURSE:   Per H and P \" 71 y. o. female w/ hx of afib on xarelto, htn, hypothyroidism, mdd, chronic hyponatremia, who presents to ed with multiple complaints. Complains of increasing bilateral LE edema and resultant debility. She describes inability to ambulate and take care of herself. Worsened by chronic RLE wound treated with wound vac. Increased malaise and fatigue\".        Assessment & Plan:      # B/L LE edema  - echo in 2/2021 showed moderate to severe MR and TR. HFpEF  -d/c on bumex 2 mg daily   --repeat echo - Left Ventricle: Left ventricle size is normal. Mildly increased wall thickness. Normal wall motion. Normal left ventricular systolic function with a visually estimated EF of 55 - 60%.         #Acute on chronic hyponatremia:  -had hyponatremia during last admission,received tolvaptan, discharged on sodium tablets,l asix and potassium  -SIADH fluid restriction     # RLE hematoma s/p surgery last admission and has a wound vac     #HTN:  -BP soft,decreased all home meds      #Paroxsymal atrial fib:  --rate controlled,on xarelto     # Hypothyroidism:  synthroid     # Hypokalemia-repleted                DISCHARGE DIAGNOSES / PLAN:      1. [facility]     ADDITIONAL CARE RECOMMENDATIONS:        PENDING TEST RESULTS:   At the time of discharge the following test results are still pending:     FOLLOW UP APPOINTMENTS:    Follow-up Information     Follow up With Specialties Details Why Contact Info    Inez Walter MD Internal Medicine In 1 week  [address] [phone]               DIET: Cardiac Diet    ACTIVITY: Activity as tolerated    WOUND CARE: wound vac    EQUIPMENT needed:       DISCHARGE MEDICATIONS:  Current Discharge Medication List      CONTINUE these medications which have CHANGED    Details   bumetanide (BUMEX) 1 mg tablet Take 2 Tablets by mouth daily. Qty: 30 Tablet, Refills: 0  Start date: 12/22/2021      hydrALAZINE (APRESOLINE) 50 mg tablet Take 0.5 Tablets by mouth three (3) times daily. Qty: 90 Tablet, Refills: 0  Start date: 12/22/2021      metoprolol tartrate (LOPRESSOR) 50 mg tablet Take 0.5 Tablets by mouth two (2) times a day for 30 days. Qty: 30 Tablet, Refills: 0  Start date: 12/22/2021, End date: 1/21/2022         CONTINUE these medications which have NOT CHANGED    Details   ondansetron (ZOFRAN ODT) 4 mg disintegrating tablet TAKE 1 TABLET BY MOUTH FOUR (4) TIMES DAILY AS NEEDED FOR NAUSEA OR VOMITING. Qty: 30 Tablet, Refills: 2    Comments: DX Code Needed  . Associated Diagnoses: Nausea      diazePAM (VALIUM) 2 mg tablet TAKE 1 TABLET BY MOUTH EVERY DAY AS NEEDED FOR ANXIETY  Qty: 30 Tablet, Refills: 2    Comments: Not to exceed 4 additional fills before 12/25/2021 DX Code Needed  . Associated Diagnoses: Anxiety      potassium chloride SR (K-TAB) 20 mEq tablet Take 1 Tablet by mouth daily. Qty: 30 Tablet, Refills: 0      sodium chloride 1 gram tablet Take 2 Tablets by mouth two (2) times daily (with meals).   Qty: 180 Tablet, Refills: 0      levothyroxine (SYNTHROID) 175 mcg tablet TAKE 1 TABLET BY MOUTH EVERY DAY BEFORE BREAKFAST  Qty: 90 Tablet, Refills: 3    Associated Diagnoses: Acquired hypothyroidism      Xarelto 20 mg tab tablet TAKE 1 TABLET BY MOUTH EVERY DAY  Qty: 90 Tablet, Refills: 3      butalbital-acetaminophen-caffeine (FIORICET, ESGIC) -40 mg per tablet TAKE 1 TO 2 TABLETS BY MOUTH EVERY 6 HOURS AS NEEDED FOR HEADACHE  Qty: 30 Tablet, Refills: 1               NOTIFY YOUR PHYSICIAN FOR ANY OF THE FOLLOWING:   Fever over 101 degrees for 24 hours. Chest pain, shortness of breath, fever, chills, nausea, vomiting, diarrhea, change in mentation, falling, weakness, bleeding. Severe pain or pain not relieved by medications. Or, any other signs or symptoms that you may have questions about. DISPOSITION:    Home With:   OT  PT  HH  RN      x Long term SNF/Inpatient Rehab    Independent/assisted living    Hospice    Other:       PATIENT CONDITION AT DISCHARGE:     Functional status   x Poor     Deconditioned     Independent      Cognition   x  Lucid     Forgetful     Dementia      Catheters/lines (plus indication)    Morrison     PICC     PEG    x None      Code status    x Full code     DNR      PHYSICAL EXAMINATION AT DISCHARGE:  General:          Alert, cooperative, no distress, appears stated age. HEENT:           Atraumatic, anicteric sclerae, pink conjunctivae                          No oral ulcers, mucosa moist, throat clear, dentition fair  Neck:               Supple, symmetrical  Lungs:             Clear to auscultation bilaterally. No Wheezing or Rhonchi. No rales. Chest wall:      No tenderness  No Accessory muscle use. Heart:              Regular  rhythm,  No  murmur   No edema  Abdomen:        Soft, non-tender. Not distended. Bowel sounds normal  Extremities:     No cyanosis. No clubbing,                            Skin turgor normal, Capillary refill normal  Skin:                Not pale. Not Jaundiced  No rashes   Psych:             Not anxious or agitated.   Neurologic:      Alert, moves all extremities,       CHRONIC MEDICAL DIAGNOSES:  Problem List as of 12/22/2021 Date Reviewed: 10/14/2021          Codes Class Noted - Resolved    Open wound of right lower leg ICD-10-CM: S81.801A  ICD-9-CM: 891.0 11/17/2021 - Present        Mild depression ([facility] ICD-10-CM: F32.0  ICD-9-CM: 725  1/25/2019 - Present        Chronic diastolic congestive heart failure (HCC) ICD-10-CM: I50.32  ICD-9-CM: 428.32, 428.0  8/22/2017 - Present        Encounter for long-term (current) drug use ICD-10-CM: Z79.899  ICD-9-CM: V58.69  8/22/2017 - Present        Chest pain ICD-10-CM: R07.9  ICD-9-CM: 786.50  8/1/2017 - Present        Acquired hypothyroidism ICD-10-CM: E03.9  ICD-9-CM: 244.9  4/8/2016 - Present        Atrial fibrillation ([facility] ICD-10-CM: I48.91  ICD-9-CM: 427.31  7/15/2011 - Present        HTN (hypertension) ICD-10-CM: I10  ICD-9-CM: 401.9  7/14/2011 - Present        RESOLVED: Hyponatremia ICD-10-CM: E87.1  ICD-9-CM: 276.1  8/2/2017 - 12/22/2021        RESOLVED: CHF (congestive heart failure) ([facility] ICD-10-CM: I50.9  ICD-9-CM: 428.0  8/1/2017 - 8/22/2017        RESOLVED: Encounter for long-term (current) use of other medications ICD-10-CM: Z79.899  ICD-9-CM: V58.69  7/15/2011 - 4/8/2016        RESOLVED: Unspecified hypothyroidism ICD-10-CM: E03.9  ICD-9-CM: 244.9  7/15/2011 - 4/8/2016              Greater than 30  minutes were spent with the patient on counseling and coordination of care    Signed:   Miguel Childress MD  12/22/2021  10:52 AM

## 2021-12-30 ENCOUNTER — TELEPHONE (OUTPATIENT)
Dept: SURGERY | Age: 71
End: 2021-12-30

## 2021-12-30 NOTE — TELEPHONE ENCOUNTER
Spoke with Dr. James Donnelly with [facility] regarding patient wound vac.patient is having a lot of bloody drainage in wound vac.  and  Home Health nurse wants to continue without  wound vac. Home health nurse recommends Op cell Ag.dressing change daily. Informed her I will make Dr. Melania Langley aware. She was transferred to  to make follow up appointment for patient to be seen in office.

## 2022-01-05 NOTE — TELEPHONE ENCOUNTER
Left message for patient regarding wound vac per Dr. Chelsie Quintana. Okay to stop wound vac. Do not return wound vac or supplies. okay to do dressing changes daily per Home Health Nurse.

## 2022-01-05 NOTE — TELEPHONE ENCOUNTER
Spoke with Nereyda Love with [facility]. she will relay message to Dr. Susie Marcano to stop wound vac per Dr. Coreen Griffiths. Keep vac and supplies.

## 2022-01-07 ENCOUNTER — TELEPHONE (OUTPATIENT)
Dept: SURGERY | Age: 72
End: 2022-01-07

## 2022-01-10 ENCOUNTER — OFFICE VISIT (OUTPATIENT)
Dept: SURGERY | Age: 72
End: 2022-01-10
Payer: MEDICARE

## 2022-01-10 VITALS
BODY MASS INDEX: 27.78 KG/M2 | DIASTOLIC BLOOD PRESSURE: 79 MMHG | OXYGEN SATURATION: 98 % | HEIGHT: 67 IN | HEART RATE: 84 BPM | WEIGHT: 177 LBS | SYSTOLIC BLOOD PRESSURE: 116 MMHG

## 2022-01-10 DIAGNOSIS — S81.801A WOUND OF RIGHT LOWER EXTREMITY, INITIAL ENCOUNTER: Primary | ICD-10-CM

## 2022-01-10 PROCEDURE — G8400 PT W/DXA NO RESULTS DOC: HCPCS | Performed by: SURGERY

## 2022-01-10 PROCEDURE — G8427 DOCREV CUR MEDS BY ELIG CLIN: HCPCS | Performed by: SURGERY

## 2022-01-10 PROCEDURE — G8536 NO DOC ELDER MAL SCRN: HCPCS | Performed by: SURGERY

## 2022-01-10 PROCEDURE — 99213 OFFICE O/P EST LOW 20 MIN: CPT | Performed by: SURGERY

## 2022-01-10 PROCEDURE — 3017F COLORECTAL CA SCREEN DOC REV: CPT | Performed by: SURGERY

## 2022-01-10 PROCEDURE — G9717 DOC PT DX DEP/BP F/U NT REQ: HCPCS | Performed by: SURGERY

## 2022-01-10 PROCEDURE — G8419 CALC BMI OUT NRM PARAM NOF/U: HCPCS | Performed by: SURGERY

## 2022-01-10 PROCEDURE — 1101F PT FALLS ASSESS-DOCD LE1/YR: CPT | Performed by: SURGERY

## 2022-01-10 PROCEDURE — 1111F DSCHRG MED/CURRENT MED MERGE: CPT | Performed by: SURGERY

## 2022-01-10 PROCEDURE — G8752 SYS BP LESS 140: HCPCS | Performed by: SURGERY

## 2022-01-10 PROCEDURE — 1090F PRES/ABSN URINE INCON ASSESS: CPT | Performed by: SURGERY

## 2022-01-10 PROCEDURE — G8754 DIAS BP LESS 90: HCPCS | Performed by: SURGERY

## 2022-01-10 NOTE — PROGRESS NOTES
Surgery Progress Note    1/10/2022    CC: Right leg wound    Subjective:     Patient is here today for follow-up. She is now back home with her sister. Wound VAC is off. There was confusion with home health over the weekend and she did her own dressing change on Saturday. She is concerned that the wound is not getting the appropriate care. Here today to discuss the options. Constitutional: No fever or chills  Neurologic: No headache  Eyes: No scleral icterus or irritated eyes  Nose: No nasal pain or drainage  Mouth: No oral lesions or sore throat  Cardiac: No palpations or chest pain  Pulmonary: No cough or shortness of breath  Gastrointestinal: No nausea, emesis, diarrhea, or constipation  Genitourinary: No dysuria  Musculoskeletal: No muscle or joint tenderness  Skin: Right leg wound  Psychiatric: No anxiety or depressed mood    Objective:     Visit Vitals  /79 (BP 1 Location: Left upper arm, BP Patient Position: Sitting, BP Cuff Size: Large adult)   Pulse 84   Ht 5' 7\" (1.702 m)   Wt 177 lb (80.3 kg)   SpO2 98%   BMI 27.72 kg/m²       General: No acute distress, conversant  Eyes: PERRLA, no scleral icterus  HENT: Normocephalic without oral lesions  Neck: Trachea midline without LAD  Cardiac: Normal pulse rate and rhythm  Pulmonary: Symmetric chest rise with normal effort  GI: Soft, NT, ND, no hernia, no splenomegaly  Skin: 5 x 5 cm superficial right lower leg ulcer with minimal fibrinous tissue at the base  Extremities: No edema or joint stiffness  Psych: Appropriate mood and affect    Assessment:     79-year-old female with right leg ulcer status post debridement and VAC therapy here for follow-up    Plan:     Okay to shower, recommend Xeroform gauze followed by dry gauze followed by Ace or Coban dressing to decrease the edema in the area. Recommend seeing her PCP to resume Lasix and elevation of her legs.   The patient has elected to not undergo split thickness skin grafting to the site and would rather wait for the wound to heal over the next 3 to 4 months hopefully. I think this is a fair choice given her various medical issues that have rendered her in the hospital repeatedly over the last few months.     Todd Borden MD  Bariatric and General Surgeon  [facility]

## 2022-01-10 NOTE — PROGRESS NOTES
1. Have you been to the ER, urgent care clinic since your last visit? Hospitalized since your last visit? No    2. Have you seen or consulted any other health care providers outside of the [address] [place] [address] since your last visit? Include any pap smears or colon screening.  No

## 2022-01-14 PROBLEM — R07.9 CHEST PAIN: Status: RESOLVED | Noted: 2017-08-01 | Resolved: 2022-01-14

## 2022-01-14 PROBLEM — E22.2 SIADH (SYNDROME OF INAPPROPRIATE ADH PRODUCTION) (HCC): Status: ACTIVE | Noted: 2022-01-14

## 2022-02-07 ENCOUNTER — OFFICE VISIT (OUTPATIENT)
Dept: SURGERY | Age: 72
End: 2022-02-07
Payer: MEDICARE

## 2022-02-07 VITALS
SYSTOLIC BLOOD PRESSURE: 121 MMHG | WEIGHT: 172 LBS | HEART RATE: 82 BPM | OXYGEN SATURATION: 98 % | HEIGHT: 67 IN | DIASTOLIC BLOOD PRESSURE: 81 MMHG | BODY MASS INDEX: 27 KG/M2

## 2022-02-07 DIAGNOSIS — Z51.89 VISIT FOR WOUND CHECK: Primary | ICD-10-CM

## 2022-02-07 PROCEDURE — 99024 POSTOP FOLLOW-UP VISIT: CPT | Performed by: SURGERY

## 2022-02-07 NOTE — PROGRESS NOTES
1. Have you been to the ER, urgent care clinic since your last visit? Hospitalized since your last visit? No    2. Have you seen or consulted any other health care providers outside of the [address] [place] [address] since your last visit? Include any pap smears or colon screening.  No

## 2022-02-07 NOTE — PROGRESS NOTES
Surgery Progress Note    2/7/2022    CC: Wound check    Subjective:     Patient doing well with home wound care. No acute issues. Doing Xeroform to the wound along with compressive ace wrap. Constitutional: No fever or chills  Neurologic: No headache  Eyes: No scleral icterus or irritated eyes  Nose: No nasal pain or drainage  Mouth: No oral lesions or sore throat  Cardiac: No palpations or chest pain  Pulmonary: No cough or shortness of breath  Gastrointestinal: No nausea, emesis, diarrhea, or constipation  Genitourinary: No dysuria  Musculoskeletal: No muscle or joint tenderness  Skin: No rashes or lesions  Psychiatric: No anxiety or depressed mood    Objective:     Visit Vitals  /81 (BP 1 Location: Left upper arm, BP Patient Position: Sitting, BP Cuff Size: Adult)   Pulse 82   Ht 5' 7\" (1.702 m)   Wt 172 lb (78 kg)   SpO2 98%   BMI 26.94 kg/m²       General: No acute distress, conversant  Eyes: PERRLA, no scleral icterus  HENT: Normocephalic without oral lesions  Neck: Trachea midline without LAD  Cardiac: Normal pulse rate and rhythm  Pulmonary: Symmetric chest rise with normal effort  GI: Soft, NT, ND, no hernia, no splenomegaly  Skin: Nice contraction of the wound. Now about 3 x 5 cm. Superficial  Extremities: No edema or joint stiffness  Psych: Appropriate mood and affect    Assessment:     74-year-old female doing well with long-term wound care after traumatic wound to right shin    Plan:     Continue Xeroform, gauze, compressive Ace wrap. We compare pictures of November to now with significant progress. I placed a new picture in the chart. I will see her back next month.       Aime Lu MD  Bariatric and General Surgeon  [facility]

## 2022-03-07 ENCOUNTER — OFFICE VISIT (OUTPATIENT)
Dept: SURGERY | Age: 72
End: 2022-03-07
Payer: MEDICARE

## 2022-03-07 VITALS
OXYGEN SATURATION: 97 % | TEMPERATURE: 83 F | SYSTOLIC BLOOD PRESSURE: 120 MMHG | WEIGHT: 153 LBS | BODY MASS INDEX: 24.01 KG/M2 | HEIGHT: 67 IN | DIASTOLIC BLOOD PRESSURE: 83 MMHG

## 2022-03-07 DIAGNOSIS — Z51.89 VISIT FOR WOUND CARE: Primary | ICD-10-CM

## 2022-03-07 PROCEDURE — 99024 POSTOP FOLLOW-UP VISIT: CPT | Performed by: SURGERY

## 2022-03-07 NOTE — PROGRESS NOTES
1. Have you been to the ER, urgent care clinic since your last visit? Hospitalized since your last visit? No    2. Have you seen or consulted any other health care providers outside of the [address] [place] [address] since your last visit? Include any pap smears or colon screening.  No

## 2022-03-07 NOTE — PROGRESS NOTES
Surgery Progress Note    3/7/2022    CC: Right leg wound    Subjective:     Patient has been afraid to get in the shower with the wound. She is doing Xeroform daily. Generally doing well. Constitutional: No fever or chills  Neurologic: No headache  Eyes: No scleral icterus or irritated eyes  Nose: No nasal pain or drainage  Mouth: No oral lesions or sore throat  Cardiac: No palpations or chest pain  Pulmonary: No cough or shortness of breath  Gastrointestinal: No nausea, emesis, diarrhea, or constipation  Genitourinary: No dysuria  Musculoskeletal: No muscle or joint tenderness  Skin: Leg wound  Psychiatric: No anxiety or depressed mood    Objective:     Visit Vitals  /83 (BP 1 Location: Left upper arm, BP Patient Position: Sitting, BP Cuff Size: Adult)   Temp (!) 83 °F (28.3 °C)   Ht 5' 7\" (1.702 m)   Wt 153 lb (69.4 kg)   SpO2 97%   BMI 23.96 kg/m²       General: No acute distress, conversant  Eyes: PERRLA, no scleral icterus  HENT: Normocephalic without oral lesions  Neck: Trachea midline without LAD  Cardiac: Normal pulse rate and rhythm  Pulmonary: Symmetric chest rise with normal effort  GI: Soft, NT, ND, no hernia, no splenomegaly  Skin: Warm without rash  Extremities: Right leg wound superficial with contraction left to right, some skin breakdown  Psych: Appropriate mood and affect    Assessment:     69-year-old female with slowly healing right leg ulcer    Plan:     Change from Xeroform to Adaptic dressing daily. Trying to decrease moisture. New picture placed in chart. Continue gentle compression  Follow-up with me in 6 to 8 weeks.      Jessy Rodgers MD  Bariatric and General Surgeon  [facility]

## 2022-03-18 PROBLEM — F32.A MILD DEPRESSION: Status: ACTIVE | Noted: 2019-01-25

## 2022-03-18 PROBLEM — E22.2 SIADH (SYNDROME OF INAPPROPRIATE ADH PRODUCTION) (HCC): Status: ACTIVE | Noted: 2022-01-14

## 2022-03-18 PROBLEM — Z79.899 ENCOUNTER FOR LONG-TERM (CURRENT) DRUG USE: Status: ACTIVE | Noted: 2017-08-22

## 2022-03-19 PROBLEM — S81.801A OPEN WOUND OF RIGHT LOWER LEG: Status: ACTIVE | Noted: 2021-11-17

## 2022-03-19 PROBLEM — I50.32 CHRONIC DIASTOLIC CONGESTIVE HEART FAILURE (HCC): Status: ACTIVE | Noted: 2017-08-22

## 2022-04-14 NOTE — ANESTHESIA POSTPROCEDURE EVALUATION
Post-Anesthesia Evaluation and Assessment    Patient: Carl Rice MRN: [id number]  SSN: [id number]    YOB: 1950  Age: 66 y.o. Sex: female      I have evaluated the patient and they are stable and ready for discharge from the PACU. Cardiovascular Function/Vital Signs  Visit Vitals  BP (!) 163/94 (BP 1 Location: Left upper arm, BP Patient Position: At rest)   Pulse 84   Temp 36.6 °C (97.9 °F)   Resp 20   Ht 5' 7\" (1.702 m)   Wt 100 kg (220 lb 7.4 oz)   SpO2 96%   BMI 34.53 kg/m²       Patient is status post MAC anesthesia for Procedure(s):  DEBRIDEMENT OF RIGHT LEG ULCER AND VAC PLACEMENT. Nausea/Vomiting: None    Postoperative hydration reviewed and adequate. Pain:  Pain Scale 1: Numeric (0 - 10) (11/22/21 0327)  Pain Intensity 1: 0 (11/22/21 0327)   Managed    Neurological Status:   Neuro (WDL): Within Defined Limits (11/19/21 1425)  Neuro  Neurologic State: Agitated; Alert; Confused (11/21/21 0815)  Orientation Level: Disoriented X4 (11/21/21 0815)  Cognition: Impulsive; Poor safety awareness (11/21/21 0815)  LUE Motor Response: Purposeful (11/21/21 0815)  LLE Motor Response: Purposeful (11/21/21 0815)  RUE Motor Response: Purposeful (11/21/21 0815)  RLE Motor Response: Purposeful (11/21/21 0815)   At baseline    Mental Status, Level of Consciousness: Alert and  oriented to person, place, and time    Pulmonary Status:   O2 Device: None (Room air) (11/22/21 0803)   Adequate oxygenation and airway patent    Complications related to anesthesia: None    Post-anesthesia assessment completed. No concerns    Signed By: Ingrid Aguillon MD     November 22, 2021              Procedure(s):  DEBRIDEMENT OF RIGHT LEG ULCER AND VAC PLACEMENT.     general    <BSHSIANPOST>    INITIAL Post-op Vital signs:   Vitals Value Taken Time   /80 11/19/21 1615   Temp 36.6 °C (97.9 °F) 11/19/21 1600   Pulse 74 11/19/21 1620   Resp 23 11/19/21 1620   SpO2 98 % [facility]19/21 [facility]

## 2022-04-18 ENCOUNTER — OFFICE VISIT (OUTPATIENT)
Dept: SURGERY | Age: 72
End: 2022-04-18
Payer: MEDICARE

## 2022-04-18 VITALS
HEIGHT: 67 IN | SYSTOLIC BLOOD PRESSURE: 148 MMHG | DIASTOLIC BLOOD PRESSURE: 88 MMHG | HEART RATE: 74 BPM | WEIGHT: 163 LBS | BODY MASS INDEX: 25.58 KG/M2 | OXYGEN SATURATION: 96 %

## 2022-04-18 DIAGNOSIS — S81.801D WOUND OF RIGHT LOWER EXTREMITY, SUBSEQUENT ENCOUNTER: Primary | ICD-10-CM

## 2022-04-18 PROCEDURE — 99024 POSTOP FOLLOW-UP VISIT: CPT | Performed by: SURGERY

## 2022-04-18 NOTE — PROGRESS NOTES
Surgery Progress Note    4/18/2022    CC: Leg wound    Subjective:     Doing well over the last 2 months. Wound healing nicely. Acute on chronic depression has been an issue for her. No wound concerns. Constitutional: No fever or chills  Neurologic: No headache  Eyes: No scleral icterus or irritated eyes  Nose: No nasal pain or drainage  Mouth: No oral lesions or sore throat  Cardiac: No palpations or chest pain  Pulmonary: No cough or shortness of breath  Gastrointestinal: No nausea, emesis, diarrhea, or constipation  Genitourinary: No dysuria  Musculoskeletal: No muscle or joint tenderness  Skin: Right leg wound  Psychiatric: No anxiety or depressed mood    Objective:     Visit Vitals  BP (!) 148/88 (BP 1 Location: Left upper arm, BP Patient Position: Sitting, BP Cuff Size: Adult)   Pulse 74   Ht 5' 7\" (1.702 m)   Wt 163 lb (73.9 kg)   SpO2 96%   BMI 25.53 kg/m²       General: No acute distress, conversant  Eyes: PERRLA, no scleral icterus  HENT: Normocephalic without oral lesions  Neck: Trachea midline without LAD  Cardiac: Normal pulse rate and rhythm  Pulmonary: Symmetric chest rise with normal effort  GI: Soft, NT, ND, no hernia, no splenomegaly  Skin: 2-1/2 x 1 cm superficial ulceration with good granulation bed elevated above the skin  Extremities: No edema or joint stiffness  Psych: Appropriate mood and affect    Assessment:     62-year-old female healing well from right leg ulceration doing diligent wound care    Plan:     I supported the patient in her quest for finding depression support. No signs of suicidal intention. Wound looks good. Recommend Telfa dressing over wound and allow it to scab over. I will see her back in 1 month.     Ana Gupta MD  Bariatric and General Surgeon  [facility]

## 2022-04-18 NOTE — PROGRESS NOTES
1. Have you been to the ER, urgent care clinic since your last visit? Hospitalized since your last visit? No    2. Have you seen or consulted any other health care providers outside of the [address] [place] [address] since your last visit? Include any pap smears or colon screening.  No

## 2022-05-02 ENCOUNTER — OFFICE VISIT (OUTPATIENT)
Dept: SURGERY | Age: 72
End: 2022-05-02
Payer: MEDICARE

## 2022-05-02 VITALS
SYSTOLIC BLOOD PRESSURE: 144 MMHG | DIASTOLIC BLOOD PRESSURE: 70 MMHG | WEIGHT: 161.4 LBS | RESPIRATION RATE: 20 BRPM | HEART RATE: 87 BPM | BODY MASS INDEX: 25.33 KG/M2 | HEIGHT: 67 IN | OXYGEN SATURATION: 97 %

## 2022-05-02 DIAGNOSIS — Z09 POSTOPERATIVE EXAMINATION: Primary | ICD-10-CM

## 2022-05-02 PROCEDURE — 99024 POSTOP FOLLOW-UP VISIT: CPT | Performed by: SURGERY

## 2022-05-02 NOTE — PROGRESS NOTES
66year old female here for wound check. 1. Have you been to the ER, urgent care clinic since your last visit? Hospitalized since your last visit? No    2. Have you seen or consulted any other health care providers outside of the [address] [place] [address] since your last visit? Include any pap smears or colon screening. No     Pt Blood Pressure is high. Pt states she takes her Bp meds at night. Last dose last night. Pt denies headache, chest pain, arm pain blurry vision. Dr. Xander Torres notified.

## 2022-05-02 NOTE — PROGRESS NOTES
Surgery Progress Note    5/2/2022    CC: Leg check    Subjective:     Patient anxious about wound progress since going to Telfa dressing. Having some intermittent pain at the site. Constitutional: No fever or chills  Neurologic: No headache  Eyes: No scleral icterus or irritated eyes  Nose: No nasal pain or drainage  Mouth: No oral lesions or sore throat  Cardiac: No palpations or chest pain  Pulmonary: No cough or shortness of breath  Gastrointestinal: No nausea, emesis, diarrhea, or constipation  Genitourinary: No dysuria  Musculoskeletal: No muscle or joint tenderness  Skin: Leg pain  Psychiatric: No anxiety or depressed mood    Objective:     Visit Vitals  BP (!) 144/70 (BP 1 Location: Left arm, BP Patient Position: Sitting, BP Cuff Size: Large adult)   Pulse 87   Resp 20   Ht 5' 7\" (1.702 m)   Wt 161 lb 6.4 oz (73.2 kg)   SpO2 97%   BMI 25.28 kg/m²       General: No acute distress, conversant  Eyes: PERRLA, no scleral icterus  HENT: Normocephalic without oral lesions  Neck: Trachea midline without LAD  Cardiac: Normal pulse rate and rhythm  Pulmonary: Symmetric chest rise with normal effort  GI: Soft, NT, ND, no hernia, no splenomegaly  Skin: Right leg pain at ulceration site. Ulcer is 2 x 1 cm. Some early scab tissue along the circumference  Extremities: No edema or joint stiffness  Psych: Appropriate mood and affect    Assessment:     78-year-old female with slowly healing right leg ulcer    Plan:     Continue Telfa dressing after showering. I will see her back in 2 weeks.       Mahamed Washington MD  Bariatric and General Surgeon  [facility]

## 2022-05-12 ENCOUNTER — TELEPHONE (OUTPATIENT)
Dept: SURGERY | Age: 72
End: 2022-05-12

## 2022-05-13 ENCOUNTER — TELEPHONE (OUTPATIENT)
Dept: SURGERY | Age: 72
End: 2022-05-13

## 2022-05-13 NOTE — TELEPHONE ENCOUNTER
Called patient in attempt to move their appointment on Monday May 16 th from 2 PM to an earlier time per Dr. Robert Robles request. No answer, left voicemail advising to return call.

## 2022-05-16 ENCOUNTER — OFFICE VISIT (OUTPATIENT)
Dept: SURGERY | Age: 72
End: 2022-05-16

## 2022-05-16 VITALS
TEMPERATURE: 97.7 F | HEIGHT: 67 IN | RESPIRATION RATE: 18 BRPM | OXYGEN SATURATION: 98 % | SYSTOLIC BLOOD PRESSURE: 139 MMHG | HEART RATE: 72 BPM | BODY MASS INDEX: 26.37 KG/M2 | WEIGHT: 168 LBS | DIASTOLIC BLOOD PRESSURE: 80 MMHG

## 2022-05-16 DIAGNOSIS — N18.30 STAGE 3 CHRONIC KIDNEY DISEASE, UNSPECIFIED WHETHER STAGE 3A OR 3B CKD (HCC): ICD-10-CM

## 2022-05-16 DIAGNOSIS — T14.8XXD WOUND HEALING, DELAYED: Primary | ICD-10-CM

## 2022-05-16 PROCEDURE — G9717 DOC PT DX DEP/BP F/U NT REQ: HCPCS | Performed by: SURGERY

## 2022-05-16 PROCEDURE — G8752 SYS BP LESS 140: HCPCS | Performed by: SURGERY

## 2022-05-16 PROCEDURE — G8427 DOCREV CUR MEDS BY ELIG CLIN: HCPCS | Performed by: SURGERY

## 2022-05-16 PROCEDURE — 99213 OFFICE O/P EST LOW 20 MIN: CPT | Performed by: SURGERY

## 2022-05-16 PROCEDURE — G8754 DIAS BP LESS 90: HCPCS | Performed by: SURGERY

## 2022-05-16 PROCEDURE — G8536 NO DOC ELDER MAL SCRN: HCPCS | Performed by: SURGERY

## 2022-05-16 PROCEDURE — 1101F PT FALLS ASSESS-DOCD LE1/YR: CPT | Performed by: SURGERY

## 2022-05-16 PROCEDURE — G8400 PT W/DXA NO RESULTS DOC: HCPCS | Performed by: SURGERY

## 2022-05-16 PROCEDURE — G8419 CALC BMI OUT NRM PARAM NOF/U: HCPCS | Performed by: SURGERY

## 2022-05-16 PROCEDURE — 1090F PRES/ABSN URINE INCON ASSESS: CPT | Performed by: SURGERY

## 2022-05-16 PROCEDURE — 3017F COLORECTAL CA SCREEN DOC REV: CPT | Performed by: SURGERY

## 2022-05-16 NOTE — PROGRESS NOTES
Surgery Progress Note    5/16/2022    CC: Leg pain    Subjective:     Patient feels in a better place mentally today. Last seen 2 weeks ago. Recurrent bilateral leg swelling. Seeing her cardiologist for this and on Lasix. She is been doing Telfa dressing over her leg. Also has another bump on her left leg with ulceration. She has been scratching her legs. Constitutional: No fever or chills  Neurologic: No headache  Eyes: No scleral icterus or irritated eyes  Nose: No nasal pain or drainage  Mouth: No oral lesions or sore throat  Cardiac: No palpations or chest pain  Pulmonary: No cough or shortness of breath  Gastrointestinal: No nausea, emesis, diarrhea, or constipation  Genitourinary: No dysuria  Musculoskeletal: No muscle or joint tenderness  Skin: Bilateral lower extremity ulcerations  Psychiatric: No anxiety or depressed mood    Objective:     Visit Vitals  /80 (BP 1 Location: Left upper arm, BP Patient Position: Sitting, BP Cuff Size: Adult)   Pulse 72   Temp 97.7 °F (36.5 °C) (Oral)   Resp 18   Ht 5' 7\" (1.702 m)   Wt 168 lb (76.2 kg)   SpO2 98%   BMI 26.31 kg/m²       General: No acute distress, conversant  Eyes: PERRLA, no scleral icterus  HENT: Normocephalic without oral lesions  Neck: Trachea midline without LAD  Cardiac: Normal pulse rate and rhythm  Pulmonary: Symmetric chest rise with normal effort  GI: Soft, NT, ND, no hernia, no splenomegaly  Skin: 2 x 1 cm area of ulceration on the right medial shin which is slowly improving, dry skin around the area, 2+ edema bilateral lower extremities  Extremities: No edema or joint stiffness  Psych: Appropriate mood and affect    Assessment:     78-year-old female with slowly resolving right lower leg ulceration from trauma    Plan:     Recommend Vaseline ointment around the wound to maintain moisture, cover with Telfa dressing, no signs of infection.   Recommend compressive undergarments or wrapping the legs to help decrease swelling in the legs.  Intermittent shooting leg pain. I suspect these are healing pains. I offered gabapentin or Lyrica but patient would like to avoid more medication at this time.   Follow-up in 1 month      Kayla Reyes MD  Bariatric and General Surgeon  [facility]

## 2022-05-16 NOTE — PROGRESS NOTES
1. Have you been to the ER, urgent care clinic since your last visit? Hospitalized since your last visit? No    2. Have you seen or consulted any other health care providers outside of the [address] [place] [address] since your last visit? Include any pap smears or colon screening.  No

## 2022-05-17 PROBLEM — D69.2 SENILE PURPURA (HCC): Status: ACTIVE | Noted: 2022-05-17

## 2022-05-18 PROBLEM — D68.69 SECONDARY HYPERCOAGULABLE STATE (HCC): Status: ACTIVE | Noted: 2022-05-18

## 2022-06-13 ENCOUNTER — OFFICE VISIT (OUTPATIENT)
Dept: SURGERY | Age: 72
End: 2022-06-13
Payer: MEDICARE

## 2022-06-13 VITALS
HEIGHT: 67 IN | RESPIRATION RATE: 16 BRPM | WEIGHT: 173 LBS | HEART RATE: 70 BPM | BODY MASS INDEX: 27.15 KG/M2 | OXYGEN SATURATION: 96 %

## 2022-06-13 DIAGNOSIS — F41.9 ANXIETY: ICD-10-CM

## 2022-06-13 DIAGNOSIS — M79.89 RIGHT LEG SWELLING: Primary | ICD-10-CM

## 2022-06-13 DIAGNOSIS — I50.22 CHRONIC SYSTOLIC (CONGESTIVE) HEART FAILURE (HCC): ICD-10-CM

## 2022-06-13 DIAGNOSIS — R51.9 NONINTRACTABLE HEADACHE, UNSPECIFIED CHRONICITY PATTERN, UNSPECIFIED HEADACHE TYPE: ICD-10-CM

## 2022-06-13 PROCEDURE — G9717 DOC PT DX DEP/BP F/U NT REQ: HCPCS | Performed by: SURGERY

## 2022-06-13 PROCEDURE — G8756 NO BP MEASURE DOC: HCPCS | Performed by: SURGERY

## 2022-06-13 PROCEDURE — 99214 OFFICE O/P EST MOD 30 MIN: CPT | Performed by: SURGERY

## 2022-06-13 PROCEDURE — G8417 CALC BMI ABV UP PARAM F/U: HCPCS | Performed by: SURGERY

## 2022-06-13 PROCEDURE — G8536 NO DOC ELDER MAL SCRN: HCPCS | Performed by: SURGERY

## 2022-06-13 PROCEDURE — 1123F ACP DISCUSS/DSCN MKR DOCD: CPT | Performed by: SURGERY

## 2022-06-13 PROCEDURE — G8427 DOCREV CUR MEDS BY ELIG CLIN: HCPCS | Performed by: SURGERY

## 2022-06-13 PROCEDURE — 1101F PT FALLS ASSESS-DOCD LE1/YR: CPT | Performed by: SURGERY

## 2022-06-13 PROCEDURE — 1090F PRES/ABSN URINE INCON ASSESS: CPT | Performed by: SURGERY

## 2022-06-13 PROCEDURE — 3017F COLORECTAL CA SCREEN DOC REV: CPT | Performed by: SURGERY

## 2022-06-13 PROCEDURE — G8400 PT W/DXA NO RESULTS DOC: HCPCS | Performed by: SURGERY

## 2022-06-13 RX ORDER — BUTALBITAL, ACETAMINOPHEN AND CAFFEINE 50; 325; 40 MG/1; MG/1; MG/1
1 TABLET ORAL
Qty: 30 TABLET | Refills: 1 | Status: SHIPPED | OUTPATIENT
Start: 2022-06-13

## 2022-06-13 RX ORDER — TRAMADOL HYDROCHLORIDE 50 MG/1
50 TABLET ORAL
Qty: 20 TABLET | Refills: 0 | Status: SHIPPED | OUTPATIENT
Start: 2022-06-13 | End: 2022-06-16

## 2022-06-13 RX ORDER — DICLOFENAC SODIUM 10 MG/G
2 GEL TOPICAL EVERY 6 HOURS
Qty: 3 EACH | Refills: 0 | Status: SHIPPED | OUTPATIENT
Start: 2022-06-13 | End: 2022-06-16 | Stop reason: SDUPTHER

## 2022-06-13 RX ORDER — PREGABALIN 100 MG/1
100 CAPSULE ORAL 2 TIMES DAILY
Qty: 30 CAPSULE | Refills: 1 | Status: SHIPPED | OUTPATIENT
Start: 2022-06-13 | End: 2022-09-23 | Stop reason: SDUPTHER

## 2022-06-13 RX ORDER — DIAZEPAM 2 MG/1
2 TABLET ORAL
Qty: 20 TABLET | Refills: 0 | Status: SHIPPED | OUTPATIENT
Start: 2022-06-13 | End: 2022-07-06 | Stop reason: SDUPTHER

## 2022-06-13 RX ORDER — OXYCODONE AND ACETAMINOPHEN 5; 325 MG/1; MG/1
1 TABLET ORAL
Qty: 20 TABLET | Refills: 0 | Status: SHIPPED | OUTPATIENT
Start: 2022-06-13 | End: 2022-06-13 | Stop reason: ALTCHOICE

## 2022-06-13 RX ORDER — CEPHALEXIN 500 MG/1
500 CAPSULE ORAL 4 TIMES DAILY
Qty: 40 CAPSULE | Refills: 0 | Status: SHIPPED | OUTPATIENT
Start: 2022-06-13 | End: 2022-06-23

## 2022-06-13 NOTE — PROGRESS NOTES
Surgery Progress Note    6/13/2022    CC: Right leg swelling    Subjective: Worsening pain right leg shooting in nature. Pain 9/10 at times. Time and ultram help. Activity and pressing the area worsens the pain. No fever or chills. Swelling right leg as well more than usual. She has been doing the typical topical wound care. She is on Xarelto already for her heart. Constitutional: No fever or chills  Neurologic: No headache  Eyes: No scleral icterus or irritated eyes  Nose: No nasal pain or drainage  Mouth: No oral lesions or sore throat  Cardiac: No palpations or chest pain  Pulmonary: No cough or shortness of breath  Gastrointestinal: No nausea, emesis, diarrhea, or constipation  Genitourinary: No dysuria  Musculoskeletal: Right leg swelling and pain  Skin: No rashes or lesions  Psychiatric: No anxiety or depressed mood    Objective:     Visit Vitals  Pulse 70   Resp 16   Ht 5' 7\" (1.702 m)   Wt 173 lb (78.5 kg)   SpO2 96%   BMI 27.10 kg/m²       General: No acute distress, conversant  Eyes: PERRLA, no scleral icterus  HENT: Normocephalic without oral lesions  Neck: Trachea midline without LAD  Cardiac: Normal pulse rate and rhythm  Pulmonary: Symmetric chest rise with normal effort  GI: Soft, NT, ND, no hernia, no splenomegaly  Skin: Right wound 3 X 3 cm no surrounding erythema  Extremities: Right leg 3+ edema  Psych: Appropriate mood and affect    Assessment:     67 y/o F with concern for right leg swelling and wound infection    Plan:     Unsure why leg is swelling. Worsening pain. Recommend 10 days abx. Compress and elevate leg. US right leg STAT to eval for DVT but already on Xarelto. Voltaren gel around the wound. Lyrica and Ultram for pain. Refill Valium and Fioricet for her headaches.     I will see back in office next week      Kayla Reyes MD  Bariatric and General Surgeon  [facility]

## 2022-06-13 NOTE — PROGRESS NOTES
1. Have you been to the ER, urgent care clinic since your last visit? Hospitalized since your last visit? No    2. Have you seen or consulted any other health care providers outside of the [address] [place] [address] since your last visit? Include any pap smears or colon screening.  No

## 2022-06-14 ENCOUNTER — TELEPHONE (OUTPATIENT)
Dept: SURGERY | Age: 72
End: 2022-06-14

## 2022-06-14 NOTE — TELEPHONE ENCOUNTER
I reached out to the patient and informed her that I got your message. We'll look for the results. She acknowledged understanding and thanked me for the call.

## 2022-06-14 NOTE — TELEPHONE ENCOUNTER
Patient called just wanting to inform our office she is scheduled for the ultrasound on right leg tomorrow.

## 2022-06-15 ENCOUNTER — HOSPITAL ENCOUNTER (OUTPATIENT)
Dept: VASCULAR SURGERY | Age: 72
Discharge: HOME | End: 2022-06-15
Attending: SURGERY
Payer: MEDICARE

## 2022-06-15 DIAGNOSIS — M79.89 RIGHT LEG SWELLING: ICD-10-CM

## 2022-06-15 PROCEDURE — 93970 EXTREMITY STUDY: CPT

## 2022-06-16 RX ORDER — DICLOFENAC SODIUM 10 MG/G
2 GEL TOPICAL EVERY 6 HOURS
Qty: 300 G | Refills: 3 | Status: SHIPPED | OUTPATIENT
Start: 2022-06-16 | End: 2022-07-06 | Stop reason: ALTCHOICE

## 2022-06-20 ENCOUNTER — OFFICE VISIT (OUTPATIENT)
Dept: SURGERY | Age: 72
End: 2022-06-20
Payer: MEDICARE

## 2022-06-20 VITALS
OXYGEN SATURATION: 97 % | BODY MASS INDEX: 27.15 KG/M2 | HEART RATE: 62 BPM | DIASTOLIC BLOOD PRESSURE: 83 MMHG | TEMPERATURE: 97.9 F | WEIGHT: 173 LBS | RESPIRATION RATE: 16 BRPM | SYSTOLIC BLOOD PRESSURE: 144 MMHG | HEIGHT: 67 IN

## 2022-06-20 DIAGNOSIS — N18.30 STAGE 3 CHRONIC KIDNEY DISEASE, UNSPECIFIED WHETHER STAGE 3A OR 3B CKD (HCC): ICD-10-CM

## 2022-06-20 DIAGNOSIS — S81.801D WOUND OF RIGHT LOWER EXTREMITY, SUBSEQUENT ENCOUNTER: Primary | ICD-10-CM

## 2022-06-20 DIAGNOSIS — I50.9 CONGESTIVE HEART FAILURE, UNSPECIFIED HF CHRONICITY, UNSPECIFIED HEART FAILURE TYPE (HCC): ICD-10-CM

## 2022-06-20 PROCEDURE — G8417 CALC BMI ABV UP PARAM F/U: HCPCS | Performed by: SURGERY

## 2022-06-20 PROCEDURE — 1090F PRES/ABSN URINE INCON ASSESS: CPT | Performed by: SURGERY

## 2022-06-20 PROCEDURE — G8400 PT W/DXA NO RESULTS DOC: HCPCS | Performed by: SURGERY

## 2022-06-20 PROCEDURE — 99214 OFFICE O/P EST MOD 30 MIN: CPT | Performed by: SURGERY

## 2022-06-20 PROCEDURE — 3017F COLORECTAL CA SCREEN DOC REV: CPT | Performed by: SURGERY

## 2022-06-20 PROCEDURE — 1123F ACP DISCUSS/DSCN MKR DOCD: CPT | Performed by: SURGERY

## 2022-06-20 PROCEDURE — G8536 NO DOC ELDER MAL SCRN: HCPCS | Performed by: SURGERY

## 2022-06-20 PROCEDURE — G8427 DOCREV CUR MEDS BY ELIG CLIN: HCPCS | Performed by: SURGERY

## 2022-06-20 PROCEDURE — G9717 DOC PT DX DEP/BP F/U NT REQ: HCPCS | Performed by: SURGERY

## 2022-06-20 PROCEDURE — 1101F PT FALLS ASSESS-DOCD LE1/YR: CPT | Performed by: SURGERY

## 2022-06-20 RX ORDER — OXYCODONE AND ACETAMINOPHEN 5; 325 MG/1; MG/1
TABLET ORAL
COMMUNITY
Start: 2022-06-13

## 2022-06-20 NOTE — PROGRESS NOTES
Surgery Progress Note    6/20/2022    CC: Right leg wound    Subjective:     She has been doing better over the last week. She had her ultrasound done showing no thrombus. She thinks her legs are swollen because of her CHF. She wants to talk to her cardiologist about increasing this. She has been doing better keeping her leg elevated. She was placing the Voltaren gel into the wound and not around the wound. She has been taking the antibiotics. Her pain is generally improved. Frequency of spasms are much improved. Has not been taking the Lyrica because of concerns over side effects. She is getting relief from the Ultram and NSAIDs combined. Is now intermittent but sharp at times at a 5 out of 10. Pressing the area worsens the pain. Elevation and pain medication helped.   No radiation of pain    Constitutional: No fever or chills  Neurologic: No headache  Eyes: No scleral icterus or irritated eyes  Nose: No nasal pain or drainage  Mouth: No oral lesions or sore throat  Cardiac: No palpations or chest pain  Pulmonary: No cough or shortness of breath  Gastrointestinal: No nausea, emesis, diarrhea, or constipation  Genitourinary: No dysuria  Musculoskeletal: Right leg wound and swelling  Skin: No rashes or lesions  Psychiatric: No anxiety or depressed mood    Objective:     Visit Vitals  BP (!) 144/83 (BP 1 Location: Left upper arm, BP Patient Position: Sitting, BP Cuff Size: Adult)   Pulse 62   Temp 97.9 °F (36.6 °C) (Oral)   Resp 16   Ht 5' 7\" (1.702 m)   Wt 173 lb (78.5 kg)   SpO2 97%   BMI 27.10 kg/m²       General: No acute distress, conversant  Eyes: PERRLA, no scleral icterus  HENT: Normocephalic without oral lesions  Neck: Trachea midline without LAD  Cardiac: Normal pulse rate and rhythm  Pulmonary: Symmetric chest rise with normal effort  GI: Soft, NT, ND, no hernia, no splenomegaly  Skin: Warm without rash  Extremities: Bilateral lower extremity swelling 2+, right leg ulcer about 3 x 3 cm which is improved from before. No signs of infection. Psych: Appropriate mood and affect    Assessment:     69-year-old female with persistent right leg wound with complicating CHF    Plan:     Ultrasound reviewed-no thrombus  Recommend keeping the leg compressed. I redressed the wound with Telfa and wrapped it with an Ace wrap. I told her to see her cardiologist to discuss increasing her Bumex or adding another agent to help with her CHF  New antibiotics. No signs of active infection  Can stop Voltaren gel around the wound. Do not place it into the wound. I told her to decrease the NSAIDs as able and take Tylenol or Ultram for pain.   We discussed the risk of gastric ulcers, bleeding, kidney impacts from NSAIDs  I will see her back in 2 weeks      Chavez Magana MD  Bariatric and General Surgeon  [facility]

## 2022-06-20 NOTE — PROGRESS NOTES
1. Have you been to the ER, urgent care clinic since your last visit? Hospitalized since your last visit? No    2. Have you seen or consulted any other health care providers outside of the [address] [place] [address] since your last visit? Include any pap smears or colon screening.  No

## 2022-07-06 ENCOUNTER — OFFICE VISIT (OUTPATIENT)
Dept: SURGERY | Age: 72
End: 2022-07-06
Payer: MEDICARE

## 2022-07-06 VITALS
OXYGEN SATURATION: 95 % | DIASTOLIC BLOOD PRESSURE: 70 MMHG | HEIGHT: 67 IN | SYSTOLIC BLOOD PRESSURE: 146 MMHG | WEIGHT: 170 LBS | HEART RATE: 65 BPM | RESPIRATION RATE: 16 BRPM | BODY MASS INDEX: 26.68 KG/M2 | TEMPERATURE: 98 F

## 2022-07-06 DIAGNOSIS — F41.9 ANXIETY: ICD-10-CM

## 2022-07-06 DIAGNOSIS — L97.915 ULCER OF RIGHT LOWER EXTREMITY WITH MUSCLE INVOLVEMENT WITHOUT EVIDENCE OF NECROSIS (HCC): Primary | ICD-10-CM

## 2022-07-06 PROCEDURE — 3017F COLORECTAL CA SCREEN DOC REV: CPT | Performed by: SURGERY

## 2022-07-06 PROCEDURE — 97597 DBRDMT OPN WND 1ST 20 CM/<: CPT | Performed by: SURGERY

## 2022-07-06 PROCEDURE — G8400 PT W/DXA NO RESULTS DOC: HCPCS | Performed by: SURGERY

## 2022-07-06 PROCEDURE — G8754 DIAS BP LESS 90: HCPCS | Performed by: SURGERY

## 2022-07-06 PROCEDURE — G8417 CALC BMI ABV UP PARAM F/U: HCPCS | Performed by: SURGERY

## 2022-07-06 PROCEDURE — 99214 OFFICE O/P EST MOD 30 MIN: CPT | Performed by: SURGERY

## 2022-07-06 PROCEDURE — 1101F PT FALLS ASSESS-DOCD LE1/YR: CPT | Performed by: SURGERY

## 2022-07-06 PROCEDURE — G8427 DOCREV CUR MEDS BY ELIG CLIN: HCPCS | Performed by: SURGERY

## 2022-07-06 PROCEDURE — 1090F PRES/ABSN URINE INCON ASSESS: CPT | Performed by: SURGERY

## 2022-07-06 PROCEDURE — 1123F ACP DISCUSS/DSCN MKR DOCD: CPT | Performed by: SURGERY

## 2022-07-06 PROCEDURE — G8536 NO DOC ELDER MAL SCRN: HCPCS | Performed by: SURGERY

## 2022-07-06 PROCEDURE — G9717 DOC PT DX DEP/BP F/U NT REQ: HCPCS | Performed by: SURGERY

## 2022-07-06 PROCEDURE — G8753 SYS BP > OR = 140: HCPCS | Performed by: SURGERY

## 2022-07-06 RX ORDER — TRAMADOL HYDROCHLORIDE 50 MG/1
50 TABLET ORAL
Qty: 25 TABLET | Refills: 0 | Status: SHIPPED | OUTPATIENT
Start: 2022-07-06 | End: 2022-07-09

## 2022-07-06 RX ORDER — DIAZEPAM 2 MG/1
2 TABLET ORAL
Qty: 20 TABLET | Refills: 0 | Status: SHIPPED | OUTPATIENT
Start: 2022-07-06

## 2022-07-06 NOTE — PROGRESS NOTES
1. Have you been to the ER, urgent care clinic since your last visit? Hospitalized since your last visit? No    2. Have you seen or consulted any other health care providers outside of the [address] [place] [address] since your last visit? Include any pap smears or colon screening.  No

## 2022-07-06 NOTE — PROGRESS NOTES
Surgery Progress Note    7/6/2022    CC: Right leg ulcer    Subjective:     Patient doing fair with right leg ulcer. Still with intermittent pain. Using Ultram for pain. Valium helps her sleep at night along with helping her anxiety. She has been keeping her leg compressed and elevated. The ulcer has been doing fair. Continuing to use Telfa. Constitutional: No fever or chills  Neurologic: No headache  Eyes: No scleral icterus or irritated eyes  Nose: No nasal pain or drainage  Mouth: No oral lesions or sore throat  Cardiac: No palpations or chest pain  Pulmonary: No cough or shortness of breath  Gastrointestinal: No nausea, emesis, diarrhea, or constipation  Genitourinary: No dysuria  Musculoskeletal: No muscle or joint tenderness  Skin: Right leg ulcer  Psychiatric: No anxiety or depressed mood    Objective:     Visit Vitals  BP (!) 146/70 (BP 1 Location: Left upper arm, BP Patient Position: Sitting, BP Cuff Size: Adult)   Pulse 65   Temp 98 °F (36.7 °C) (Oral)   Resp 16   Ht 5' 7\" (1.702 m)   Wt 170 lb (77.1 kg)   SpO2 95%   BMI 26.63 kg/m²       General: No acute distress, conversant  Eyes: PERRLA, no scleral icterus  HENT: Normocephalic without oral lesions  Neck: Trachea midline without LAD  Cardiac: Normal pulse rate and rhythm  Pulmonary: Symmetric chest rise with normal effort  GI: Soft, NT, ND, no hernia, no splenomegaly  Skin: Right leg 3 x 3 cm ulcer with granulation tissue at base along with some fibrinous exudate  Extremities: No edema or joint stiffness  Psych: Appropriate mood and affect    Assessment:     69-year-old female status posttraumatic injury in November of last year with slowly healing right leg ulcer    Plan:     I performed sharp debridement with a 11 blade removing some of the fibrinous tissue. I covered the wound with Telfa. I told her to stop using the Voltaren gel. The wound was healing up nicely but over the last few months it has been fairly stagnant.   We did lose some ground a few months ago with repeat CHF and lower extremity edema. I will refill her Ultram for pain control and 1 more time for her Valium. I want her to see wound care to consider possible topical applications of the skin substitutes and any other ideas that may have to help assist in granulation. She appears to have good vascular flow with 2+ DP and PT.     I will see her back in 1 month for wound check      Ciro Cobian MD  Bariatric and General Surgeon  [facility]

## 2022-07-07 ENCOUNTER — TELEPHONE (OUTPATIENT)
Dept: SURGERY | Age: 72
End: 2022-07-07

## 2022-07-07 NOTE — TELEPHONE ENCOUNTER
I called and spoke with the patient. I informed her the referral and office note have been faxed and confirmed to [address]. I gave her the phone number, which is [phone]. She acknowledged understanding and thanked me for the call.

## 2022-07-15 ENCOUNTER — HOSPITAL ENCOUNTER (OUTPATIENT)
Dept: WOUND CARE | Age: 72
Discharge: HOME | End: 2022-07-15
Payer: MEDICARE

## 2022-07-15 VITALS
RESPIRATION RATE: 20 BRPM | HEIGHT: 67 IN | BODY MASS INDEX: 26.68 KG/M2 | WEIGHT: 170 LBS | SYSTOLIC BLOOD PRESSURE: 164 MMHG | HEART RATE: 79 BPM | DIASTOLIC BLOOD PRESSURE: 107 MMHG | TEMPERATURE: 99.5 F

## 2022-07-15 PROBLEM — L97.912 LEG ULCER, RIGHT, WITH FAT LAYER EXPOSED (HCC): Status: ACTIVE | Noted: 2022-07-15

## 2022-07-15 PROCEDURE — 99203 OFFICE O/P NEW LOW 30 MIN: CPT

## 2022-07-15 PROCEDURE — 11042 DBRDMT SUBQ TIS 1ST 20SQCM/<: CPT

## 2022-07-15 RX ORDER — SILVER SULFADIAZINE 10 G/1000G
CREAM TOPICAL ONCE
Status: CANCELLED | OUTPATIENT
Start: 2022-07-15 | End: 2022-07-15

## 2022-07-15 RX ORDER — LIDOCAINE HYDROCHLORIDE 20 MG/ML
JELLY TOPICAL ONCE
Status: CANCELLED | OUTPATIENT
Start: 2022-07-15 | End: 2022-07-15

## 2022-07-15 RX ORDER — BACITRACIN ZINC AND POLYMYXIN B SULFATE 500; 1000 [USP'U]/G; [USP'U]/G
OINTMENT TOPICAL ONCE
Status: CANCELLED | OUTPATIENT
Start: 2022-07-15 | End: 2022-07-15

## 2022-07-15 RX ORDER — MUPIROCIN 20 MG/G
OINTMENT TOPICAL ONCE
Status: CANCELLED | OUTPATIENT
Start: 2022-07-15 | End: 2022-07-15

## 2022-07-15 RX ORDER — GENTAMICIN SULFATE 1 MG/G
OINTMENT TOPICAL ONCE
Status: CANCELLED | OUTPATIENT
Start: 2022-07-15 | End: 2022-07-15

## 2022-07-15 RX ORDER — CLOBETASOL PROPIONATE 0.5 MG/G
OINTMENT TOPICAL ONCE
Status: CANCELLED | OUTPATIENT
Start: 2022-07-15 | End: 2022-07-15

## 2022-07-15 RX ORDER — LIDOCAINE HYDROCHLORIDE 40 MG/ML
SOLUTION TOPICAL ONCE
Status: CANCELLED | OUTPATIENT
Start: 2022-07-15 | End: 2022-07-15

## 2022-07-15 RX ORDER — BETAMETHASONE DIPROPIONATE 0.5 MG/G
OINTMENT TOPICAL ONCE
Status: CANCELLED | OUTPATIENT
Start: 2022-07-15 | End: 2022-07-15

## 2022-07-15 RX ORDER — BACITRACIN 500 [USP'U]/G
OINTMENT TOPICAL ONCE
Status: CANCELLED | OUTPATIENT
Start: 2022-07-15 | End: 2022-07-15

## 2022-07-15 RX ORDER — TRIAMCINOLONE ACETONIDE 1 MG/G
OINTMENT TOPICAL ONCE
Status: CANCELLED | OUTPATIENT
Start: 2022-07-15 | End: 2022-07-15

## 2022-07-15 RX ORDER — LIDOCAINE 40 MG/G
CREAM TOPICAL ONCE
Status: CANCELLED | OUTPATIENT
Start: 2022-07-15 | End: 2022-07-15

## 2022-07-15 RX ORDER — LIDOCAINE 50 MG/G
OINTMENT TOPICAL ONCE
Status: CANCELLED | OUTPATIENT
Start: 2022-07-15 | End: 2022-07-15

## 2022-07-15 NOTE — H&P
Patient presents to wound clinic for: Bogdan Phillips is a 66 y.o. female who presents for wound care of the following: right anterior leg ulcer. She notes that she had it surgically debrided and had been using a wound vac. She notes that there has been tremendous progress in resolution in terms of the depth of the wound. However, lately progress of the wound has slowed. She has been referred her for further treatment by her surgeon. Pertinent Medical History:  Past Medical History:   Diagnosis Date    Atrial fibrillation ([facility] 7/15/2011    Depression     HTN (hypertension) 7/14/2011    Paroxysmal atrial fibrillation (HCC)     SIADH (syndrome of inappropriate ADH production) ([facility] 1/14/2022    [facility]    Papillary    Unspecified hypothyroidism 7/15/2011     Past Surgical History:   Procedure Laterality Date    ENDOSCOPY, COLON, DIAGNOSTIC  2003    neg. rep 10 yrs.  [place] THYROIDECTOMY  11/05     Prior to Admission medications    Medication Sig Start Date End Date Taking? Authorizing Provider   diazePAM (VALIUM) 2 mg tablet Take 1 Tablet by mouth every six (6) hours as needed for Anxiety for up to 20 doses. Max Daily Amount: 8 mg. 7/6/22   Terrell Herrera MD   Xarelto 20 mg tab tablet TAKE 1 TABLET BY MOUTH EVERY DAY 7/3/22   Cliff Tavarez MD   ondansetron (ZOFRAN ODT) 4 mg disintegrating tablet TAKE 1 TABLET BY MOUTH FOUR (4) TIMES DAILY AS NEEDED FOR NAUSEA OR VOMITING. 7/3/22   Cliff Tavarez MD   oxyCODONE-acetaminophen (PERCOCET) 5-325 mg per tablet TAKE 1 TABLET BY MOUTH EVERY 4 HOURS AS NEEDED FOR PAIN FOR UP TO 3 DAYS. MAX 6 TABS DAILY.   Patient not taking: Reported on 6/20/2022 6/13/22   Provider, Historical   butalbital-acetaminophen-caffeine (FIORICET, ESGIC) -40 mg per tablet TAKE 1 TO 2 TABLETS BY MOUTH EVERY 6 HOURS AS NEEDED FOR HEADACHE 6/15/22   Cliff Tavarez MD   pregabalin (LYRICA) 100 mg capsule Take 1 Capsule by mouth two (2) times a day. Max Daily Amount: 200 mg. 6/13/22   David Cevallos MD   butalbital-acetaminophen-caffeine [place] [facility]) -40 mg per tablet Take 1 Tablet by mouth every six (6) hours as needed for Headache. 6/13/22   David Cevallos MD   traZODone (DESYREL) 50 mg tablet Take 1 Tablet by mouth nightly. 5/17/22   Elisabet Jackson MD   diazePAM (VALIUM) 2 mg tablet TAKE 1 TABLET BY MOUTH EVERY DAY AS NEEDED FOR ANXIETY 3/15/22   Elisabet Jackson MD   metoprolol succinate (TOPROL-XL) 50 mg XL tablet TAKE 1 TABLET BY MOUTH AFTER DINNER 2/10/22   Elisabet Jackson MD   sacubitriL-valsartan Vi Mio) 49-51 mg tab tablet Take 1 Tablet by mouth two (2) times a day. Provider, Historical   bumetanide (BUMEX) 1 mg tablet Take 1.5 Tablets by mouth daily.  1/14/22   Elisabet Jackson MD   levothyroxine (SYNTHROID) 175 mcg tablet TAKE 1 TABLET BY MOUTH EVERY DAY BEFORE BREAKFAST 8/16/21   Elisabet Jackson MD     Allergies   Allergen Reactions    Penicillins Unknown (comments)     Patient reports allergy to penicillin with unknown reaction, but states she has tolerated amoxicillin    Opioids - Morphine Analogues Itching and Nausea and Vomiting    Percocet [Oxycodone-Acetaminophen] Rash     Per patient     Family History   Problem Relation Age of Onset    Cancer Mother         lung     Social History     Socioeconomic History    Marital status:      Spouse name: Not on file    Number of children: Not on file    Years of education: Not on file    Highest education level: Not on file   Occupational History    Not on file   Tobacco Use    Smoking status: Never Smoker    Smokeless tobacco: Never Used   Substance and Sexual Activity    Alcohol use: Yes     Comment: soc    Drug use: No    Sexual activity: Not on file   Other Topics Concern     Service Not Asked    Blood Transfusions Not Asked    Caffeine Concern Not Asked    Occupational Exposure Not Asked    Hobby Hazards Not Asked    Sleep Concern Not Asked    Stress Concern Not Asked    Weight Concern Not Asked    Special Diet Not Asked    Back Care Not Asked    Exercise Not Asked    Bike Helmet Not Asked   [address] Not Asked    Self-Exams Not Asked   Social History Narrative    Not on file     Social Determinants of Health     Financial Resource Strain:     Difficulty of Paying Living Expenses: Not on file   Food Insecurity:     Worried About Running Out of Food in the Last Year: Not on file    Bianca of Food in the Last Year: Not on file   Transportation Needs:     Lack of Transportation (Medical): Not on file    Lack of Transportation (Non-Medical): Not on file   Physical Activity:     Days of Exercise per Week: Not on file    Minutes of Exercise per Session: Not on file   Stress:     Feeling of Stress : Not on file   Social Connections:     Frequency of Communication with Friends and Family: Not on file    Frequency of Social Gatherings with Friends and Family: Not on file    Attends Caodaism Services: Not on file    Active Member of [address] [place] [address] or Organizations: Not on file    Attends Club or Organization Meetings: Not on file    Marital Status: Not on file   Intimate Partner Violence:     Fear of Current or Ex-Partner: Not on file    Emotionally Abused: Not on file    Physically Abused: Not on file    Sexually Abused: Not on file   Housing Stability:     Unable to Pay for Housing in the Last Year: Not on file    Number of [place] in the Last Year: Not on file    Unstable Housing in the Last Year: Not on file       Vitals:    07/15/22 1026   BP: (!) 164/107   Pulse: 79   Resp: 20   Temp: 99.5 °F (37.5 °C)   Weight: 77.1 kg (170 lb)   Height: 5' 7\" (1.702 m)       Review of Systems:    Gen: No fever, chills, malaise, weight loss/gain. Heent: No headache, rhinorrhea, epistaxis, ear pain, hearing loss, sinus pain, neck pain/stiffness, sore throat.    Heart: No chest pain, palpitations, HERNDON, pnd, or orthopnea. Resp: No cough, hemoptysis, wheezing and shortness of breath. GI: No nausea, vomiting, diarrhea, constipation, melena or hematochezia. : No urinary obstruction, dysuria or hematuria. Derm: see below  Musc/skeletal: no bone or joint complains. Vasc: No edema, cyanosis or claudication. Endo: No heat/cold intolerance, no polyuria,polydipsia or polyphagia. Neuro: No unilateral weakness, numbness, tingling. No seizures. Heme: No easy bruising or bleeding. Wound Description:  Wound Length (cm) -- 3.7 cm  -LM      Wound Width (cm) -- 3.1 cm  -LM      Wound Depth (cm) -- 0.1 cm  -LM         Debridement: required today for wound healing    Debridement Wound Care        Problem List Items Addressed This Visit     None          Procedure Note  Indications:  Based on my examination of this patient's wound(s)/ulcer(s) today, debridement is required to promote healing and evaluate the wound base.     Performed by: Zehra Johns DPM    Consent obtained: Yes    Time out taken: Yes    Debridement: Excisional    Using # 15 blade scalpel the wound(s)/ulcer(s) was/were sharply debrided down through and including the removal of    epidermis, dermis and subcutaneous tissue    Devitalized Tissue Debrided: fibrin, biofilm, slough and exudate    Pre Debridement Measurements:  Are located in the [place]  Documentation Flow Sheet    Diabetic ulcer, fat layer exposed    Wound/Ulcer #: 1    Post Debridement Measurements:  Wound/Ulcer Descriptions are Pre Debridement except measurements:    Wound Leg lower Right (Active)   Wound Image   07/15/22 1026   Wound Etiology Traumatic 07/15/22 1026   Dressing Status Intact;Breakthrough drainage noted 07/15/22 1026   Cleansed Irrigated with saline 07/15/22 1026   Wound Length (cm) 3.7 cm 07/15/22 1026   Wound Width (cm) 3.1 cm 07/15/22 1026   Wound Depth (cm) 0.1 cm 07/15/22 1026   Wound Surface Area (cm^2) 11.47 cm^2 07/15/22 1026   Wound Volume (cm^3) 1.147 cm^3 07/15/22 1026   Wound Assessment Pink/red;Slough 07/15/22 1026   Drainage Amount Moderate 07/15/22 1026   Drainage Description Serosanguinous 07/15/22 1026   Wound Odor None 07/15/22 1026   Flora-Wound/Incision Assessment Edematous 07/15/22 1026   Edges Defined edges 07/15/22 1026   Wound Thickness Description Full thickness 07/15/22 1026   Number of days: 0       Incision 11/19/21 Leg Right (Active)   Number of days: 238        Total Surface Area Debrided:  11.47 sq cm     Estimated Blood Loss:  Minimal     Hemostasis Achieved: Pressure    Procedural Pain: 0 / 10     Post Procedural Pain: 0 / 10     Response to treatment: Well tolerated by patient     Assessment:   Right anterior leg ulcer    Plan:   -Patient seen and assessed  -Wound debrided  -Endoform, borderfoam, dsd  -Follow-up 1 week

## 2022-07-15 NOTE — DISCHARGE INSTRUCTIONS
Discharge Instructions/Wound Orders  [address], [facility] [address]  Telephone: [phone]  NAME:  Wu Main OF BIRTH:  1950  MEDICAL RECORD NUMBER:  [id number]  DATE:  7/15/2022  Wound Care Orders:  Right Lower Leg: Cleanse with baby shampoo, rinse and pat dry. Apply Endoform to wound bed. Cover with border foam. Change dressing every other day. Elevate legs throughout the day while sitting. Dietary:  [x] Diet as tolerated: [] Calorie Diabetic Diet:Low carb and no Sugar [] No Added Salt:[] Increase Protein: [] Other:Limit the amount of liquid you are drinking and avoid drinking in between meals   Activity:  [x] Activity as tolerated:  [] Patient has no activity restrictions     [] Strict Bedrest: [] Remain off Work:     [] May return to full duty work:                                   [] Return to work with restrictions:             Return Appointment:   [x] Return Appointment: With Dr Odilia Palomino in [facility])  [] Ordered tests:    Electronically signed Ana Rosa Hook RN on 7/15/2022 at 11:20 [facility]: Should you experience any significant changes in your wound(s) or have questions about your wound care, please contact the [facility] at [address] [place] [address] 8:00 am - 4:30. If you need help with your wound outside these hours and cannot wait until we are again available, contact your PCP or go to the hospital emergency room. PLEASE NOTE: IF YOU ARE UNABLE TO OBTAIN WOUND SUPPLIES, CONTINUE TO USE THE SUPPLIES YOU HAVE AVAILABLE UNTIL YOU ARE ABLE TO REACH US. IT IS MOST IMPORTANT TO KEEP THE WOUND COVERED AT ALL TIMES.      Physician Signature:_______________________    Date: ___________ Time:  ____________

## 2022-07-15 NOTE — WOUND CARE
07/15/22 1026   RLE Peripheral Vascular    Capillary Refill Less than/equal to 3 seconds   Color Appropriate for race   Temperature Warm   Sensation Present   Pedal Pulse Present     Visit Vitals  BP (!) 164/107 (BP 1 Location: Left upper arm, BP Patient Position: At rest)   Pulse 79   Temp 99.5 °F (37.5 °C)   Resp 20   Ht 5' 7\" (1.702 m)   Wt 77.1 kg (170 lb)   BMI 26.63 kg/m²

## 2022-07-22 ENCOUNTER — HOSPITAL ENCOUNTER (OUTPATIENT)
Dept: WOUND CARE | Age: 72
Discharge: HOME | End: 2022-07-22
Payer: MEDICARE

## 2022-07-22 VITALS
SYSTOLIC BLOOD PRESSURE: 197 MMHG | TEMPERATURE: 97.8 F | RESPIRATION RATE: 16 BRPM | DIASTOLIC BLOOD PRESSURE: 62 MMHG | HEART RATE: 69 BPM

## 2022-07-22 DIAGNOSIS — L97.912 LEG ULCER, RIGHT, WITH FAT LAYER EXPOSED (HCC): Primary | ICD-10-CM

## 2022-07-22 PROCEDURE — 11042 DBRDMT SUBQ TIS 1ST 20SQCM/<: CPT

## 2022-07-22 RX ORDER — LIDOCAINE 40 MG/G
CREAM TOPICAL ONCE
Status: CANCELLED | OUTPATIENT
Start: 2022-07-22 | End: 2022-07-22

## 2022-07-22 RX ORDER — CLOBETASOL PROPIONATE 0.5 MG/G
OINTMENT TOPICAL ONCE
Status: CANCELLED | OUTPATIENT
Start: 2022-07-22 | End: 2022-07-22

## 2022-07-22 RX ORDER — LIDOCAINE HYDROCHLORIDE 20 MG/ML
JELLY TOPICAL ONCE
Status: CANCELLED | OUTPATIENT
Start: 2022-07-22 | End: 2022-07-22

## 2022-07-22 RX ORDER — BETAMETHASONE DIPROPIONATE 0.5 MG/G
OINTMENT TOPICAL ONCE
Status: CANCELLED | OUTPATIENT
Start: 2022-07-22 | End: 2022-07-22

## 2022-07-22 RX ORDER — GENTAMICIN SULFATE 1 MG/G
OINTMENT TOPICAL ONCE
Status: CANCELLED | OUTPATIENT
Start: 2022-07-22 | End: 2022-07-22

## 2022-07-22 RX ORDER — LIDOCAINE HYDROCHLORIDE 40 MG/ML
SOLUTION TOPICAL ONCE
Status: CANCELLED | OUTPATIENT
Start: 2022-07-22 | End: 2022-07-22

## 2022-07-22 RX ORDER — BACITRACIN ZINC AND POLYMYXIN B SULFATE 500; 1000 [USP'U]/G; [USP'U]/G
OINTMENT TOPICAL ONCE
Status: CANCELLED | OUTPATIENT
Start: 2022-07-22 | End: 2022-07-22

## 2022-07-22 RX ORDER — SILVER SULFADIAZINE 10 G/1000G
CREAM TOPICAL ONCE
Status: CANCELLED | OUTPATIENT
Start: 2022-07-22 | End: 2022-07-22

## 2022-07-22 RX ORDER — TRIAMCINOLONE ACETONIDE 1 MG/G
OINTMENT TOPICAL ONCE
Status: CANCELLED | OUTPATIENT
Start: 2022-07-22 | End: 2022-07-22

## 2022-07-22 RX ORDER — MUPIROCIN 20 MG/G
OINTMENT TOPICAL ONCE
Status: CANCELLED | OUTPATIENT
Start: 2022-07-22 | End: 2022-07-22

## 2022-07-22 RX ORDER — LIDOCAINE 50 MG/G
OINTMENT TOPICAL ONCE
Status: CANCELLED | OUTPATIENT
Start: 2022-07-22 | End: 2022-07-22

## 2022-07-22 RX ORDER — BACITRACIN 500 [USP'U]/G
OINTMENT TOPICAL ONCE
Status: CANCELLED | OUTPATIENT
Start: 2022-07-22 | End: 2022-07-22

## 2022-07-22 NOTE — PROGRESS NOTES
Patient presents to wound clinic for: Susna Persaud is a 66 y.o. female who presents for wound care of the following: right anterior leg ulcer. She notes that she had it surgically debrided and had been using a wound vac. She notes that there has been tremendous progress in resolution in terms of the depth of the wound. However, lately progress of the wound has slowed. She has been referred her for further treatment by her surgeon. Update 7/22/22: Patient presents today for follow-up. She notes that she is having some pain in her wound. Relates that it is mostly sharp shooting transient pain. Notes that wound healing has been slow. Pertinent Medical History:  Past Medical History:   Diagnosis Date    Atrial fibrillation ([facility] 7/15/2011    Depression     HTN (hypertension) 7/14/2011    Paroxysmal atrial fibrillation (HCC)     SIADH (syndrome of inappropriate ADH production) ([facility] 1/14/2022    [facility]    Papillary    Unspecified hypothyroidism 7/15/2011     Past Surgical History:   Procedure Laterality Date    ENDOSCOPY, COLON, DIAGNOSTIC  2003    neg. rep 10 yrs. [place] THYROIDECTOMY  11/05     Prior to Admission medications    Medication Sig Start Date End Date Taking? Authorizing Provider   diazePAM (VALIUM) 2 mg tablet Take 1 Tablet by mouth every six (6) hours as needed for Anxiety for up to 20 doses. Max Daily Amount: 8 mg. 7/6/22   Terrell Hoffmann MD   Xarelto 20 mg tab tablet TAKE 1 TABLET BY MOUTH EVERY DAY 7/3/22   Jefferson Juan MD   ondansetron (ZOFRAN ODT) 4 mg disintegrating tablet TAKE 1 TABLET BY MOUTH FOUR (4) TIMES DAILY AS NEEDED FOR NAUSEA OR VOMITING. 7/3/22   Jefferson Juan MD   oxyCODONE-acetaminophen (PERCOCET) 5-325 mg per tablet TAKE 1 TABLET BY MOUTH EVERY 4 HOURS AS NEEDED FOR PAIN FOR UP TO 3 DAYS. MAX 6 TABS DAILY.   Patient not taking: Reported on 6/20/2022 6/13/22   Provider, Historical   butalbital-acetaminophen-caffeine (Macrelino Tavarez) -40 mg per tablet TAKE 1 TO 2 TABLETS BY MOUTH EVERY 6 HOURS AS NEEDED FOR HEADACHE 6/15/22   Brooklyn Vasquez MD   pregabalin (LYRICA) 100 mg capsule Take 1 Capsule by mouth two (2) times a day. Max Daily Amount: 200 mg. 6/13/22   Olga Lidia Ceja MD   butalbital-acetaminophen-caffeine [place] [facility]) -40 mg per tablet Take 1 Tablet by mouth every six (6) hours as needed for Headache. 6/13/22   Olga Lidia Ceja MD   traZODone (DESYREL) 50 mg tablet Take 1 Tablet by mouth nightly. 5/17/22   Brooklyn Vasquez MD   diazePAM (VALIUM) 2 mg tablet TAKE 1 TABLET BY MOUTH EVERY DAY AS NEEDED FOR ANXIETY 3/15/22   Brooklyn Vasquez MD   metoprolol succinate (TOPROL-XL) 50 mg XL tablet TAKE 1 TABLET BY MOUTH AFTER DINNER 2/10/22   Brooklyn Vasquez MD   sacubitriL-valsartan Ruby Alas) 49-51 mg tab tablet Take 1 Tablet by mouth two (2) times a day. Provider, Historical   bumetanide (BUMEX) 1 mg tablet Take 1.5 Tablets by mouth daily.  1/14/22   Brooklyn Vasquez MD   levothyroxine (SYNTHROID) 175 mcg tablet TAKE 1 TABLET BY MOUTH EVERY DAY BEFORE BREAKFAST 8/16/21   Brooklyn Vasquez MD     Allergies   Allergen Reactions    Penicillins Unknown (comments)     Patient reports allergy to penicillin with unknown reaction, but states she has tolerated amoxicillin    Opioids - Morphine Analogues Itching and Nausea and Vomiting    Percocet [Oxycodone-Acetaminophen] Rash     Per patient     Family History   Problem Relation Age of Onset    Cancer Mother         lung     Social History     Socioeconomic History    Marital status:      Spouse name: Not on file    Number of children: Not on file    Years of education: Not on file    Highest education level: Not on file   Occupational History    Not on file   Tobacco Use    Smoking status: Never    Smokeless tobacco: Never   Substance and Sexual Activity    Alcohol use: Yes     Comment: soc    Drug use: No    Sexual activity: Not on file   Other Topics Concern     Service Not Asked    Blood Transfusions Not Asked    Caffeine Concern Not Asked    Occupational Exposure Not Asked    Hobby Hazards Not Asked    Sleep Concern Not Asked    Stress Concern Not Asked    Weight Concern Not Asked    Special Diet Not Asked    Back Care Not Asked    Exercise Not Asked    Bike Helmet Not Asked    Seat Belt Not Asked    Self-Exams Not Asked   Social History Narrative    Not on file     Social Determinants of Health     Financial Resource Strain: Not on file   Food Insecurity: Not on file   Transportation Needs: Not on file   Physical Activity: Not on file   Stress: Not on file   Social Connections: Not on file   Intimate Partner Violence: Not on file   Housing Stability: Not on file       Vitals:    07/22/22 1103   BP: (!) 197/62   Pulse: 69   Resp: 16   Temp: 97.8 °F (36.6 °C)       Review of Systems:    Gen: No fever, chills, malaise, weight loss/gain. Heent: No headache, rhinorrhea, epistaxis, ear pain, hearing loss, sinus pain, neck pain/stiffness, sore throat. Heart: No chest pain, palpitations, HERNDON, pnd, or orthopnea. Resp: No cough, hemoptysis, wheezing and shortness of breath. GI: No nausea, vomiting, diarrhea, constipation, melena or hematochezia. : No urinary obstruction, dysuria or hematuria. Derm: see below  Musc/skeletal: no bone or joint complains. Vasc: No edema, cyanosis or claudication. Endo: No heat/cold intolerance, no polyuria,polydipsia or polyphagia. Neuro: No unilateral weakness, numbness, tingling. No seizures. Heme: No easy bruising or bleeding.     Wound Description:  07/22/22 1103    RLE Peripheral Vascular   Capillary Refill Less than/equal to 3 seconds   Color Appropriate for race   Temperature Warm   Sensation Present   Pedal Pulse Present   Wound Leg lower Right   Date First Assessed/Time First Assessed: 07/15/22 1042   Present on Hospital Admission: Yes  Wound Approximate Age at First Assessment (Weeks): 56 weeks  Primary Wound Type: Arterial Ulcer  Location: Leg lower  Wound Location Orientation: Right   Wound Etiology Traumatic   Dressing Status Intact;Breakthrough drainage noted   Cleansed Irrigated with saline   Wound Length (cm) 4 cm   Wound Width (cm) 2.8 cm   Wound Depth (cm) 0.1 cm   Wound Surface Area (cm^2) 11.2 cm^2   Change in Wound Size % 2.35   Wound Volume (cm^3) 1.12 cm^3   Wound Healing % 2   Wound Assessment [place]/red;Slough   Drainage Amount Moderate   Drainage Description Serosanguinous   Wound Odor None   Flora-Wound/Incision Assessment Edematous   Edges Defined edges   Wound Thickness Description Full thickness   Pain 1   Pain Scale 1 Numeric (0 - 10)   Pain Intensity 1 6   Patient Stated Pain Goal 4   Pain Description 1 Stabbing   Visit Vitals  BP (!) 197/62 (BP 1 Location: Left upper arm, BP Patient Position: At rest)   Pulse 69   Temp 97.8 °F (36.6 °C)   Resp 16     Debridement Wound Care        Problem List Items Addressed This Visit          Other    Leg ulcer, right, with fat layer exposed ([facility] - Primary    Relevant Orders    INITIATE OUTPATIENT WOUND CARE PROTOCOL       Procedure Note  Indications:  Based on my examination of this patient's wound(s)/ulcer(s) today, debridement is required to promote healing and evaluate the wound base.     Performed by: David Miranda DPM    Consent obtained: Yes    Time out taken: Yes    Debridement: Excisional    Using # 15 blade scalpel the wound(s)/ulcer(s) was/were sharply debrided down through and including the removal of    epidermis, dermis, and subcutaneous tissue    Devitalized Tissue Debrided: fibrin, biofilm, slough, and exudate    Pre Debridement Measurements:  Are located in the [place]  Documentation Flow Sheet    Non-Pressure ulcer, fat layer exposed    Wound/Ulcer #: 1    Post Debridement Measurements:  Wound/Ulcer Descriptions are Pre Debridement except measurements:    Wound Leg lower Right (Active)   Wound Image   07/15/22 1026   Wound Etiology Traumatic 07/22/22 1103   Dressing Status Intact;Breakthrough drainage noted 07/22/22 1103   Cleansed Irrigated with saline 07/22/22 1103   Wound Length (cm) 4 cm 07/22/22 1103   Wound Width (cm) 2.8 cm 07/22/22 1103   Wound Depth (cm) 0.1 cm 07/22/22 1103   Wound Surface Area (cm^2) 11.2 cm^2 07/22/22 1103   Change in Wound Size % 2.35 07/22/22 1103   Wound Volume (cm^3) 1.12 cm^3 07/22/22 1103   Wound Healing % 2 07/22/22 1103   Wound Assessment [place]/red;Slough 07/22/22 1103   Drainage Amount Moderate 07/22/22 1103   Drainage Description Serosanguinous 07/22/22 1103   Wound Odor None 07/22/22 1103   Flora-Wound/Incision Assessment Edematous 07/22/22 1103   Edges Defined edges 07/22/22 1103   Wound Thickness Description Full thickness 07/22/22 1103   Number of days: 7       Incision 11/19/21 Leg Right (Active)   Number of days: 245        Total Surface Area Debrided:  11.2 sq cm     Estimated Blood Loss:  Minimal     Hemostasis Achieved: Pressure    Procedural Pain: 2 / 10     Post Procedural Pain: 0 / 10     Response to treatment: Well tolerated by patient  and Patient tolerated treatment with mild discomfort but relieved after procedure was completed    Assessment:   Right anterior leg ulcer    Plan:   -Patient seen and assessed  -Discussed that further pain (opioid) medication is not appropriate at this time. Related that patient's symptoms appear to be most consistent with nerve pain. Recommend patient start taking the Lyrica that Dr. Miah Cobian prescribed last month, which she has not been taking.    -Wound debrided  -Endoform, borderfoam, dsd  -Will start process of getting skin substitute approved.   -Follow-up 1 week

## 2022-07-22 NOTE — WOUND CARE
07/22/22 1103   RLE Peripheral Vascular    Capillary Refill Less than/equal to 3 seconds   Color Appropriate for race   Temperature Warm   Sensation Present   Pedal Pulse Present   Wound Leg lower Right   Date First Assessed/Time First Assessed: 07/15/22 1042   Present on Hospital Admission: Yes  Wound Approximate Age at First Assessment (Weeks): 56 weeks  Primary Wound Type: Arterial Ulcer  Location: Leg lower  Wound Location Orientation: Right   Wound Etiology Traumatic   Dressing Status Intact;Breakthrough drainage noted   Cleansed Irrigated with saline   Wound Length (cm) 4 cm   Wound Width (cm) 2.8 cm   Wound Depth (cm) 0.1 cm   Wound Surface Area (cm^2) 11.2 cm^2   Change in Wound Size % 2.35   Wound Volume (cm^3) 1.12 cm^3   Wound Healing % 2   Wound Assessment Vonore/red;Slough   Drainage Amount Moderate   Drainage Description Serosanguinous   Wound Odor None   Flora-Wound/Incision Assessment Edematous   Edges Defined edges   Wound Thickness Description Full thickness   Pain 1   Pain Scale 1 Numeric (0 - 10)   Pain Intensity 1 6   Patient Stated Pain Goal 4   Pain Description 1 Stabbing   Visit Vitals  BP (!) 197/62 (BP 1 Location: Left upper arm, BP Patient Position: At rest)   Pulse 69   Temp 97.8 °F (36.6 °C)   Resp 16

## 2022-07-22 NOTE — DISCHARGE INSTRUCTIONS
Discharge Instructions for          [place] [address] [facility], [address]  Phone: [phone] Fax: [phone]    NAME:  Jerman Naqvi  YOB: 1950  MEDICAL RECORD NUMBER:  [id number]  DATE:  07/22/2022  WOUND CARE ORDERS:  Right Lower Leg: Cleanse with baby shampoo, rinse and pat dry. Apply a thin layer of hydrocortisone cream around the wound. Apply Endoform to wound bed. Cover with gauze and secure with kind tape. Change dressing every other day. TREATMENT ORDERS:    Elevate leg(s) above the level of the heart when sitting. Avoid prolonged standing in one place. Do no get dressing/wrap wet. Follow Diet as prescribed:   [] Diet as tolerated: [] Calorie Diabetic Diet: Low carb and no Sugar [x] No Added Salt:  [x] Increase Protein: [] Limit the amount of liquid you are drinking and avoid drinking in between meals   Return Appointment:  [x] Return Appointment: With Dr. Real William in 1 week. [] Nurse Visit : *** days  [] Ordered tests:    Electronically signed Husam Vasques RN on 7/22/2022 at 11:33 AM     [address] [place] [address] Information: Should you experience any significant changes in your wound(s) or have questions about your wound care, please contact the [address] [place] [address] at [address] [place] [address] 8:00 am - 4:30. If you need help with your wound outside these hours and cannot wait until we are again available, contact your PCP or go to the hospital emergency room. PLEASE NOTE: IF YOU ARE UNABLE TO OBTAIN WOUND SUPPLIES, CONTINUE TO USE THE SUPPLIES YOU HAVE AVAILABLE UNTIL YOU ARE ABLE TO REACH US. IT IS MOST IMPORTANT TO KEEP THE WOUND COVERED AT ALL TIMES.      Physician Signature:_______________________    Date: ___________ Time:  ____________

## 2022-07-29 ENCOUNTER — HOSPITAL ENCOUNTER (OUTPATIENT)
Dept: WOUND CARE | Age: 72
Discharge: HOME | End: 2022-07-29
Payer: MEDICARE

## 2022-07-29 VITALS
HEART RATE: 71 BPM | SYSTOLIC BLOOD PRESSURE: 150 MMHG | RESPIRATION RATE: 16 BRPM | TEMPERATURE: 96.5 F | DIASTOLIC BLOOD PRESSURE: 82 MMHG

## 2022-07-29 DIAGNOSIS — L97.912 LEG ULCER, RIGHT, WITH FAT LAYER EXPOSED (HCC): Primary | ICD-10-CM

## 2022-07-29 PROCEDURE — 15271 SKIN SUB GRAFT TRNK/ARM/LEG: CPT

## 2022-07-29 RX ORDER — LIDOCAINE 40 MG/G
CREAM TOPICAL ONCE
Status: CANCELLED | OUTPATIENT
Start: 2022-07-29 | End: 2022-07-29

## 2022-07-29 RX ORDER — CLOBETASOL PROPIONATE 0.5 MG/G
OINTMENT TOPICAL ONCE
Status: CANCELLED | OUTPATIENT
Start: 2022-07-29 | End: 2022-07-29

## 2022-07-29 RX ORDER — BACITRACIN 500 [USP'U]/G
OINTMENT TOPICAL ONCE
Status: CANCELLED | OUTPATIENT
Start: 2022-07-29 | End: 2022-07-29

## 2022-07-29 RX ORDER — SILVER SULFADIAZINE 10 G/1000G
CREAM TOPICAL ONCE
Status: CANCELLED | OUTPATIENT
Start: 2022-07-29 | End: 2022-07-29

## 2022-07-29 RX ORDER — TRIAMCINOLONE ACETONIDE 1 MG/G
OINTMENT TOPICAL ONCE
Status: CANCELLED | OUTPATIENT
Start: 2022-07-29 | End: 2022-07-29

## 2022-07-29 RX ORDER — LIDOCAINE HYDROCHLORIDE 20 MG/ML
JELLY TOPICAL ONCE
Status: CANCELLED | OUTPATIENT
Start: 2022-07-29 | End: 2022-07-29

## 2022-07-29 RX ORDER — LIDOCAINE 50 MG/G
OINTMENT TOPICAL ONCE
Status: CANCELLED | OUTPATIENT
Start: 2022-07-29 | End: 2022-07-29

## 2022-07-29 RX ORDER — BACITRACIN ZINC AND POLYMYXIN B SULFATE 500; 1000 [USP'U]/G; [USP'U]/G
OINTMENT TOPICAL ONCE
Status: CANCELLED | OUTPATIENT
Start: 2022-07-29 | End: 2022-07-29

## 2022-07-29 RX ORDER — LIDOCAINE HYDROCHLORIDE 40 MG/ML
SOLUTION TOPICAL ONCE
Status: CANCELLED | OUTPATIENT
Start: 2022-07-29 | End: 2022-07-29

## 2022-07-29 RX ORDER — MUPIROCIN 20 MG/G
OINTMENT TOPICAL ONCE
Status: CANCELLED | OUTPATIENT
Start: 2022-07-29 | End: 2022-07-29

## 2022-07-29 RX ORDER — GENTAMICIN SULFATE 1 MG/G
OINTMENT TOPICAL ONCE
Status: CANCELLED | OUTPATIENT
Start: 2022-07-29 | End: 2022-07-29

## 2022-07-29 RX ORDER — BETAMETHASONE DIPROPIONATE 0.5 MG/G
OINTMENT TOPICAL ONCE
Status: CANCELLED | OUTPATIENT
Start: 2022-07-29 | End: 2022-07-29

## 2022-07-29 NOTE — DISCHARGE INSTRUCTIONS
Discharge Instructions/Wound Orders  [address], [facility] [address]  Telephone: [phone]  NAME:  Gerri Clear:  1950  MEDICAL RECORD NUMBER:  [id number]  DATE:  7/29/22  Wound Care Orders:  Right lower leg - Cleanse with saline, rinse and pat dry. Apply Puraply AM skin substitute. Cover with mepitel dressing and secure with steri strips. Cover with gauze. Secure with roll gauze and tape. Leave mepitel dressing and steri strips in place. You may change outer gauze dressing as needed. Use cast cover (can be obtained at [facility]) for showering. Dietary:  [] Diet as tolerated: [] Calorie Diabetic Diet:Low carb and no Sugar [x] No Added Salt:[] Increase Protein: [] Other:Limit the amount of liquid you are drinking and avoid drinking in between meals   Activity:  [x] Activity as tolerated:  [] Patient has no activity restrictions     [] Strict Bedrest: [] Remain off Work:     [] May return to full duty work:                                   [] Return to work with restrictions:             Return Appointment:   [x] Return Appointment: With Dr Bennett Washington in  [facility])  [] Ordered tests:    Electronically signed Alaina Kulkarni RN on 7/29/2022 at 11:33 AM     Kim Marin 281: Should you experience any significant changes in your wound(s) or have questions about your wound care, please contact the [address] [place] [address] at [address] [place] [address] 8:00 am - 4:30. If you need help with your wound outside these hours and cannot wait until we are again available, contact your PCP or go to the hospital emergency room. PLEASE NOTE: IF YOU ARE UNABLE TO OBTAIN WOUND SUPPLIES, CONTINUE TO USE THE SUPPLIES YOU HAVE AVAILABLE UNTIL YOU ARE ABLE TO REACH US. IT IS MOST IMPORTANT TO KEEP THE WOUND COVERED AT ALL TIMES.      Physician Signature:_______________________    Date: ___________ Time: ____________

## 2022-07-29 NOTE — WOUND CARE
07/29/22 1134   Wound Leg lower Right   Date First Assessed/Time First Assessed: 07/15/22 1042   Present on Hospital Admission: Yes  Wound Approximate Age at First Assessment (Weeks): 56 weeks  Primary Wound Type: Arterial Ulcer  Location: Leg lower  Wound Location Orientation: Right   Dressing/Treatment   (gauze/rg/tape)

## 2022-07-29 NOTE — WOUND CARE
07/29/22 1058   Left Leg Edema Point of Measurement   Leg circumference 39 cm   Ankle circumference 25.9 cm   RLE Peripheral Vascular    Capillary Refill Less than/equal to 3 seconds   Color Appropriate for race   Temperature Warm   Sensation Present   Pedal Pulse Present   Wound Leg lower Right   Date First Assessed/Time First Assessed: 07/15/22 1042   Present on Hospital Admission: Yes  Wound Approximate Age at First Assessment (Weeks): 56 weeks  Primary Wound Type: Arterial Ulcer  Location: Leg lower  Wound Location Orientation: Right   Wound Image    Wound Etiology Traumatic   Dressing Status Intact   Cleansed Soap and water   Wound Length (cm) 4 cm   Wound Width (cm) 2.8 cm   Wound Depth (cm) 0.1 cm   Wound Surface Area (cm^2) 11.2 cm^2   Change in Wound Size % 2.35   Wound Volume (cm^3) 1.12 cm^3   Wound Healing % 2   Wound Assessment Vonore/red;Slough   Drainage Amount Moderate   Drainage Description Serosanguinous;Green   Wound Odor None   Flora-Wound/Incision Assessment Edematous   Edges Defined edges   Wound Thickness Description Full thickness   Visit Vitals  BP (!) 150/82 (BP 1 Location: Left arm, BP Patient Position: Sitting)   Pulse 71   Temp (!) 96.5 °F (35.8 °C)   Resp 16

## 2022-07-29 NOTE — PROGRESS NOTES
Patient presents to wound clinic for: James Andrade is a 66 y.o. female who presents for wound care of the following: right anterior leg ulcer. She notes that she had it surgically debrided and had been using a wound vac. She notes that there has been tremendous progress in resolution in terms of the depth of the wound. However, lately progress of the wound has slowed. She has been referred her for further treatment by her surgeon. Update 7/29/22: Patient presents today for follow-up. She notes that she did start taking her Lyrica after our conversation last week and it has been very helpful with her sharp, shooting, transient pain. Also relates that the OTC hydrocortisone cream was very helpful with her itching skin. Has been compliant with her dressing instructions. Pertinent Medical History:  Past Medical History:   Diagnosis Date    Atrial fibrillation ([facility] 7/15/2011    Depression     HTN (hypertension) 7/14/2011    Paroxysmal atrial fibrillation ([facility])     SIADH (syndrome of inappropriate ADH production) ([facility] 1/14/2022    Thyroid ca ([facility] 2005    Papillary    Unspecified hypothyroidism 7/15/2011     Past Surgical History:   Procedure Laterality Date    ENDOSCOPY, COLON, DIAGNOSTIC  2003    neg. rep 10 yrs. SC THYROIDECTOMY  11/05     Prior to Admission medications    Medication Sig Start Date End Date Taking? Authorizing Provider   diazePAM (VALIUM) 2 mg tablet Take 1 Tablet by mouth every six (6) hours as needed for Anxiety for up to 20 doses. Max Daily Amount: 8 mg. 7/6/22   Josefa Bourne MD   Xarelto 20 mg tab tablet TAKE 1 TABLET BY MOUTH EVERY DAY 7/3/22   Evy White MD   ondansetron (ZOFRAN ODT) 4 mg disintegrating tablet TAKE 1 TABLET BY MOUTH FOUR (4) TIMES DAILY AS NEEDED FOR NAUSEA OR VOMITING. 7/3/22   Evy White MD   oxyCODONE-acetaminophen (PERCOCET) 5-325 mg per tablet TAKE 1 TABLET BY MOUTH EVERY 4 HOURS AS NEEDED FOR PAIN FOR UP TO 3 DAYS.  MAX 6 TABS DAILY. Patient not taking: Reported on 6/20/2022 6/13/22   Provider, Historical   butalbital-acetaminophen-caffeine (FIORICET, ESGIC) -40 mg per tablet TAKE 1 TO 2 TABLETS BY MOUTH EVERY 6 HOURS AS NEEDED FOR HEADACHE 6/15/22   Mal Sales MD   pregabalin (LYRICA) 100 mg capsule Take 1 Capsule by mouth two (2) times a day. Max Daily Amount: 200 mg. 6/13/22   Nenita Douglas MD   butalbital-acetaminophen-caffeine [facility]) -40 mg per tablet Take 1 Tablet by mouth every six (6) hours as needed for Headache. 6/13/22   Nenita Douglas MD   traZODone (DESYREL) 50 mg tablet Take 1 Tablet by mouth nightly. 5/17/22   Mal Sales MD   diazePAM (VALIUM) 2 mg tablet TAKE 1 TABLET BY MOUTH EVERY DAY AS NEEDED FOR ANXIETY 3/15/22   Mal Sales MD   metoprolol succinate (TOPROL-XL) 50 mg XL tablet TAKE 1 TABLET BY MOUTH AFTER DINNER 2/10/22   Mal Sales MD   sacubitriL-valsartan Misbah Bottom) 49-51 mg tab tablet Take 1 Tablet by mouth two (2) times a day. Provider, Historical   bumetanide (BUMEX) 1 mg tablet Take 1.5 Tablets by mouth daily.  1/14/22   Mal Sales MD   levothyroxine (SYNTHROID) 175 mcg tablet TAKE 1 TABLET BY MOUTH EVERY DAY BEFORE BREAKFAST 8/16/21   Mal Sales MD     Allergies   Allergen Reactions    Penicillins Unknown (comments)     Patient reports allergy to penicillin with unknown reaction, but states she has tolerated amoxicillin    Opioids - Morphine Analogues Itching and Nausea and Vomiting    Percocet [Oxycodone-Acetaminophen] Rash     Per patient     Family History   Problem Relation Age of Onset    Cancer Mother         lung     Social History     Socioeconomic History    Marital status:      Spouse name: Not on file    Number of children: Not on file    Years of education: Not on file    Highest education level: Not on file   Occupational History    Not on file   Tobacco Use    Smoking status: Never    Smokeless tobacco: Never   Substance and Sexual Activity    Alcohol use: Yes     Comment: soc    Drug use: No    Sexual activity: Not on file   Other Topics Concern     Service Not Asked    Blood Transfusions Not Asked    Caffeine Concern Not Asked    Occupational Exposure Not Asked    Hobby Hazards Not Asked    Sleep Concern Not Asked    Stress Concern Not Asked    Weight Concern Not Asked    Special Diet Not Asked    Back Care Not Asked    Exercise Not Asked    Bike Helmet Not Asked    Seat Belt Not Asked    Self-Exams Not Asked   Social History Narrative    Not on file     Social Determinants of Health     Financial Resource Strain: Not on file   Food Insecurity: Not on file   Transportation Needs: Not on file   Physical Activity: Not on file   Stress: Not on file   Social Connections: Not on file   Intimate Partner Violence: Not on file   Housing Stability: Not on file       Vitals:    07/29/22 1046   BP: (!) 150/82   Pulse: 71   Resp: 16   Temp: (!) 96.5 °F (35.8 °C)       Review of Systems:    Gen: No fever, chills, malaise, weight loss/gain. Heent: No headache, rhinorrhea, epistaxis, ear pain, hearing loss, sinus pain, neck pain/stiffness, sore throat. Heart: No chest pain, palpitations, HERNDON, pnd, or orthopnea. Resp: No cough, hemoptysis, wheezing and shortness of breath. GI: No nausea, vomiting, diarrhea, constipation, melena or hematochezia. : No urinary obstruction, dysuria or hematuria. Derm: see below  Musc/skeletal: no bone or joint complains. Vasc: No edema, cyanosis or claudication. Endo: No heat/cold intolerance, no polyuria,polydipsia or polyphagia. Neuro: No unilateral weakness, numbness, tingling. No seizures.    Heme: No easy bruising or bleeding.      07/29/22 1058   Left Leg Edema Point of Measurement   Leg circumference 39 cm   Ankle circumference 25.9 cm   RLE Peripheral Vascular   Capillary Refill Less than/equal to 3 seconds   Color Appropriate for race Temperature Warm   Sensation Present   Pedal Pulse Present   Wound Leg lower Right   Date First Assessed/Time First Assessed: 07/15/22 1042   Present on Hospital Admission: Yes  Wound Approximate Age at First Assessment (Weeks): 56 weeks  Primary Wound Type: Arterial Ulcer  Location: Leg lower  Wound Location Orientation: Right   Wound Image    Wound Etiology Traumatic   Dressing Status Intact   Cleansed Soap and water   Wound Length (cm) 4 cm   Wound Width (cm) 2.8 cm   Wound Depth (cm) 0.1 cm   Wound Surface Area (cm^2) 11.2 cm^2   Change in Wound Size % 2.35   Wound Volume (cm^3) 1.12 cm^3   Wound Healing % 2   Wound Assessment [place]/red;Slough   Drainage Amount Moderate   Drainage Description Serosanguinous;Green   Wound Odor None   Flora-Wound/Incision Assessment Edematous   Edges Defined edges   Wound Thickness Description Full thickness   Visit Vitals  BP (!) 150/82 (BP 1 Location: Left arm, BP Patient Position: Sitting)   Pulse 71   Temp (!) 96.5 °F (35.8 °C)   Resp 16        Debridement Wound Care        Problem List Items Addressed This Visit          Other    Leg ulcer, right, with fat layer exposed ([facility] - Primary    Relevant Orders    INITIATE OUTPATIENT WOUND CARE PROTOCOL       Procedure Note  Indications:  Based on my examination of this patient's wound(s)/ulcer(s) today, debridement is required to promote healing and evaluate the wound base.     Performed by: Italia Rod DPM    Consent obtained: Yes    Time out taken: Yes    Debridement: Excisional    Using curette the wound(s)/ulcer(s) was/were sharply debrided down through and including the removal of    epidermis, dermis, and subcutaneous tissue    Devitalized Tissue Debrided: fibrin, biofilm, slough, and exudate    Pre Debridement Measurements:  Are located in the [place]  Documentation Flow Sheet    Non-Pressure ulcer, fat layer exposed    Wound/Ulcer #: 1    Post Debridement Measurements:  Wound/Ulcer Descriptions are Pre Debridement except measurements:    Wound Leg lower Right (Active)   Wound Image   07/29/22 1058   Wound Etiology Traumatic 07/29/22 1058   Dressing Status Intact 07/29/22 1058   Cleansed Soap and water 07/29/22 1058   Wound Length (cm) 4 cm 07/29/22 1058   Wound Width (cm) 2.8 cm 07/29/22 1058   Wound Depth (cm) 0.1 cm 07/29/22 1058   Wound Surface Area (cm^2) 11.2 cm^2 07/29/22 1058   Change in Wound Size % 2.35 07/29/22 1058   Wound Volume (cm^3) 1.12 cm^3 07/29/22 1058   Wound Healing % 2 07/29/22 1058   Wound Assessment [place]/red;Slough 07/29/22 1058   Drainage Amount Moderate 07/29/22 1058   Drainage Description Serosanguinous;Green 07/29/22 1058   Wound Odor None 07/29/22 1058   Flora-Wound/Incision Assessment Edematous 07/29/22 1058   Edges Defined edges 07/29/22 1058   Wound Thickness Description Full thickness 07/29/22 1058   Number of days: 14       Incision 11/19/21 Leg Right (Active)   Number of days: 252        Total Surface Area Debrided:  11.2 sq cm     Estimated Blood Loss:  Minimal     Hemostasis Achieved: Pressure    Procedural Pain: 1 / 10     Post Procedural Pain: 0 / 10     Response to treatment: Well tolerated by patient     Assessment:   Right anterior leg ulcer due to previous trauma    Plan:   -Patient approved for Puraply AM  -Wound debrided and Puraply 4x4 cm applied to wound bed in accordance with 's instructions at today's appointment.   -Patient to keep dressing intact until her next appointment.  -Follow-up 1 week

## 2022-08-04 ENCOUNTER — TELEPHONE (OUTPATIENT)
Dept: SURGERY | Age: 72
End: 2022-08-04

## 2022-08-04 NOTE — TELEPHONE ENCOUNTER
Patient called and stated that she is seeing the wound specialist that Dr. Jorge Schilder referred her to, Dr. Shayla Baltazar. She cancelled her appointment tomorrow, 8/5/22, because she is currently seeing the specialist for skin graft. Patient states that she would appreciate some clarification of what to do regarding the next steps in her care process now that she is working with Dr. Jorge Schilder and Dr. Shayla Baltazar. Patient states that she would appreciate a call back on Monday 8/8/22.

## 2022-08-05 ENCOUNTER — HOSPITAL ENCOUNTER (OUTPATIENT)
Dept: WOUND CARE | Age: 72
Discharge: HOME | End: 2022-08-05
Payer: MEDICARE

## 2022-08-05 VITALS — SYSTOLIC BLOOD PRESSURE: 175 MMHG | TEMPERATURE: 97.6 F | RESPIRATION RATE: 16 BRPM | DIASTOLIC BLOOD PRESSURE: 88 MMHG

## 2022-08-05 DIAGNOSIS — L97.912 LEG ULCER, RIGHT, WITH FAT LAYER EXPOSED (HCC): Primary | ICD-10-CM

## 2022-08-05 PROCEDURE — 15271 SKIN SUB GRAFT TRNK/ARM/LEG: CPT

## 2022-08-05 RX ORDER — TRIAMCINOLONE ACETONIDE 1 MG/G
OINTMENT TOPICAL ONCE
Status: CANCELLED | OUTPATIENT
Start: 2022-08-05 | End: 2022-08-05

## 2022-08-05 RX ORDER — BACITRACIN ZINC AND POLYMYXIN B SULFATE 500; 1000 [USP'U]/G; [USP'U]/G
OINTMENT TOPICAL ONCE
Status: CANCELLED | OUTPATIENT
Start: 2022-08-05 | End: 2022-08-05

## 2022-08-05 RX ORDER — LIDOCAINE HYDROCHLORIDE 20 MG/ML
JELLY TOPICAL ONCE
Status: CANCELLED | OUTPATIENT
Start: 2022-08-05 | End: 2022-08-05

## 2022-08-05 RX ORDER — MUPIROCIN 20 MG/G
OINTMENT TOPICAL ONCE
Status: CANCELLED | OUTPATIENT
Start: 2022-08-05 | End: 2022-08-05

## 2022-08-05 RX ORDER — BETAMETHASONE DIPROPIONATE 0.5 MG/G
OINTMENT TOPICAL ONCE
Status: CANCELLED | OUTPATIENT
Start: 2022-08-05 | End: 2022-08-05

## 2022-08-05 RX ORDER — GENTAMICIN SULFATE 1 MG/G
OINTMENT TOPICAL ONCE
Status: CANCELLED | OUTPATIENT
Start: 2022-08-05 | End: 2022-08-05

## 2022-08-05 RX ORDER — LIDOCAINE 40 MG/G
CREAM TOPICAL ONCE
Status: CANCELLED | OUTPATIENT
Start: 2022-08-05 | End: 2022-08-05

## 2022-08-05 RX ORDER — LIDOCAINE 50 MG/G
OINTMENT TOPICAL ONCE
Status: CANCELLED | OUTPATIENT
Start: 2022-08-05 | End: 2022-08-05

## 2022-08-05 RX ORDER — SILVER SULFADIAZINE 10 G/1000G
CREAM TOPICAL ONCE
Status: CANCELLED | OUTPATIENT
Start: 2022-08-05 | End: 2022-08-05

## 2022-08-05 RX ORDER — LIDOCAINE HYDROCHLORIDE 40 MG/ML
SOLUTION TOPICAL ONCE
Status: CANCELLED | OUTPATIENT
Start: 2022-08-05 | End: 2022-08-05

## 2022-08-05 RX ORDER — BACITRACIN 500 [USP'U]/G
OINTMENT TOPICAL ONCE
Status: CANCELLED | OUTPATIENT
Start: 2022-08-05 | End: 2022-08-05

## 2022-08-05 RX ORDER — CLOBETASOL PROPIONATE 0.5 MG/G
OINTMENT TOPICAL ONCE
Status: CANCELLED | OUTPATIENT
Start: 2022-08-05 | End: 2022-08-05

## 2022-08-05 NOTE — TELEPHONE ENCOUNTER
I called the patient and left a message. I informed her I received your message from yesterday and I am calling to get some clarification.

## 2022-08-05 NOTE — WOUND CARE
08/05/22 1043   Right Leg Edema Point of Measurement   Leg circumference 44 cm   Ankle circumference 27 cm   RLE Peripheral Vascular    Capillary Refill Less than/equal to 3 seconds   Color Appropriate for race   Temperature Warm   Sensation Present   Pedal Pulse Present   Wound Leg lower Right   Date First Assessed/Time First Assessed: 07/15/22 1042   Present on Hospital Admission: Yes  Wound Approximate Age at First Assessment (Weeks): 56 weeks  Primary Wound Type: Arterial Ulcer  Location: Leg lower  Wound Location Orientation: Right   Wound Etiology Traumatic   Dressing Status Intact   Visit Vitals  Temp 97.6 °F (36.4 °C)   Resp 16

## 2022-08-05 NOTE — PROGRESS NOTES
Patient presents to wound clinic for: Yvonne Spears is a 66 y.o. female who presents for wound care of the following: right anterior leg ulcer. She notes that she had it surgically debrided and had been using a wound vac. She notes that there has been tremendous progress in resolution in terms of the depth of the wound. However, lately progress of the wound has slowed. She has been referred her for further treatment by her surgeon. Update 8/5/22: Patient presents today for follow-up. Noted some increased oozing after the application of the puraply AM graft last week. Pertinent Medical History:  Past Medical History:   Diagnosis Date    Atrial fibrillation ([facility] [address] 7/15/2011    Depression     HTN (hypertension) 7/14/2011    Paroxysmal atrial fibrillation (HCC)     SIADH (syndrome of inappropriate ADH production) ([facility] [address] 1/14/2022    Thyroid ca ([facility] [address] 2005    Papillary    Unspecified hypothyroidism 7/15/2011     Past Surgical History:   Procedure Laterality Date    ENDOSCOPY, COLON, DIAGNOSTIC  2003    neg. rep 10 yrs. NM THYROIDECTOMY  11/05     Prior to Admission medications    Medication Sig Start Date End Date Taking? Authorizing Provider   diazePAM (VALIUM) 2 mg tablet Take 1 Tablet by mouth every six (6) hours as needed for Anxiety for up to 20 doses. Max Daily Amount: 8 mg. 7/6/22   Tejas Mack MD   Xarelto 20 mg tab tablet TAKE 1 TABLET BY MOUTH EVERY DAY 7/3/22   Tod Nicolas MD   ondansetron (ZOFRAN ODT) 4 mg disintegrating tablet TAKE 1 TABLET BY MOUTH FOUR (4) TIMES DAILY AS NEEDED FOR NAUSEA OR VOMITING. 7/3/22   Tod Nicolas MD   oxyCODONE-acetaminophen (PERCOCET) 5-325 mg per tablet TAKE 1 TABLET BY MOUTH EVERY 4 HOURS AS NEEDED FOR PAIN FOR UP TO 3 DAYS. MAX 6 TABS DAILY.   Patient not taking: Reported on 6/20/2022 6/13/22   Provider, Historical   butalbital-acetaminophen-caffeine (FIORICET, ESGIC) -40 mg per tablet TAKE 1 TO 2 TABLETS BY MOUTH EVERY 6 HOURS AS NEEDED FOR HEADACHE 6/15/22   Cliff Tavarez MD   pregabalin (LYRICA) 100 mg capsule Take 1 Capsule by mouth two (2) times a day. Max Daily Amount: 200 mg. 6/13/22   Terrell Herrera MD   butalbital-acetaminophen-caffeine [facility]) -40 mg per tablet Take 1 Tablet by mouth every six (6) hours as needed for Headache. 6/13/22   Terrell Herrera MD   traZODone (DESYREL) 50 mg tablet Take 1 Tablet by mouth nightly. 5/17/22   Cliff Tavarez MD   diazePAM (VALIUM) 2 mg tablet TAKE 1 TABLET BY MOUTH EVERY DAY AS NEEDED FOR ANXIETY 3/15/22   Cliff Tavarez MD   metoprolol succinate (TOPROL-XL) 50 mg XL tablet TAKE 1 TABLET BY MOUTH AFTER DINNER 2/10/22   Cliff Tavarez MD   sacubitriL-valsartan Carlus Santacruz) 49-51 mg tab tablet Take 1 Tablet by mouth two (2) times a day. Provider, Historical   bumetanide (BUMEX) 1 mg tablet Take 1.5 Tablets by mouth daily.  1/14/22   Cliff Tavarez MD   levothyroxine (SYNTHROID) 175 mcg tablet TAKE 1 TABLET BY MOUTH EVERY DAY BEFORE BREAKFAST 8/16/21   Cliff Tavarez MD     Allergies   Allergen Reactions    Penicillins Unknown (comments)     Patient reports allergy to penicillin with unknown reaction, but states she has tolerated amoxicillin    Opioids - Morphine Analogues Itching and Nausea and Vomiting    Percocet [Oxycodone-Acetaminophen] Rash     Per patient     Family History   Problem Relation Age of Onset    Cancer Mother         lung     Social History     Socioeconomic History    Marital status:      Spouse name: Not on file    Number of children: Not on file    Years of education: Not on file    Highest education level: Not on file   Occupational History    Not on file   Tobacco Use    Smoking status: Never    Smokeless tobacco: Never   Substance and Sexual Activity    Alcohol use: Yes     Comment: soc    Drug use: No    Sexual activity: Not on file   Other Topics Concern     Service Not Asked Blood Transfusions Not Asked    Caffeine Concern Not Asked    Occupational Exposure Not Asked    Hobby Hazards Not Asked    Sleep Concern Not Asked    Stress Concern Not Asked    Weight Concern Not Asked    Special Diet Not Asked    Back Care Not Asked    Exercise Not Asked    Bike Helmet Not Asked    Seat Belt Not Asked    Self-Exams Not Asked   Social History Narrative    Not on file     Social Determinants of Health     Financial Resource Strain: Not on file   Food Insecurity: Not on file   Transportation Needs: Not on file   Physical Activity: Not on file   Stress: Not on file   Social Connections: Not on file   Intimate Partner Violence: Not on file   Housing Stability: Not on file       Vitals:    08/05/22 1039 08/05/22 1056   BP:  (!) 175/88   Resp: 16    Temp: 97.6 °F (36.4 °C)        Review of Systems:    Gen: No fever, chills, malaise, weight loss/gain. Heent: No headache, rhinorrhea, epistaxis, ear pain, hearing loss, sinus pain, neck pain/stiffness, sore throat. Heart: No chest pain, palpitations, HERNDON, pnd, or orthopnea. Resp: No cough, hemoptysis, wheezing and shortness of breath. GI: No nausea, vomiting, diarrhea, constipation, melena or hematochezia. : No urinary obstruction, dysuria or hematuria. Derm: see below  Musc/skeletal: no bone or joint complains. Vasc: No edema, cyanosis or claudication. Endo: No heat/cold intolerance, no polyuria,polydipsia or polyphagia. Neuro: No unilateral weakness, numbness, tingling. No seizures. Heme: No easy bruising or bleeding.      08/05/22 1043   Right Leg Edema Point of Measurement   Leg circumference 44 cm   Ankle circumference 27 cm   RLE Peripheral Vascular   Capillary Refill Less than/equal to 3 seconds   Color Appropriate for race   Temperature Warm   Sensation Present   Pedal Pulse Present   Wound Leg lower Right   Date First Assessed/Time First Assessed: 07/15/22 1042   Present on Hospital Admission: Yes  Wound Approximate Age at First Assessment (Weeks): 56 weeks  Primary Wound Type: Arterial Ulcer  Location: Leg lower  Wound Location Orientation: Right   Wound Image    Wound Etiology Traumatic   Dressing Status Intact   Wound Length (cm) 4 cm   Wound Width (cm) 3 cm   Wound Depth (cm) 0.1 cm   Wound Surface Area (cm^2) 12 cm^2   Change in Wound Size % -4.62   Wound Volume (cm^3) 1.2 cm^3   Wound Healing % -5   Wound Assessment Knappa/red;Slough   Drainage Amount Moderate   Drainage Description Serosanguinous   Wound Odor None   Flora-Wound/Incision Assessment Blanchable erythema;Edematous   Edges Defined edges   Wound Thickness Description Full thickness   Incision 11/19/21 Leg Right   Date First Assessed/Time First Assessed: 11/19/21 1412   Location: Leg  Wound Location Orientation: Right   Wound Length (cm) 4 cm   Wound Width (cm) 3 cm   Wound Depth (cm) 0.1 cm   Wound Volume (cm^3) 1.2 cm^3       Debridement Wound Care        Problem List Items Addressed This Visit          Other    Leg ulcer, right, with fat layer exposed ([facility] [address] - Primary    Relevant Orders    INITIATE OUTPATIENT WOUND CARE PROTOCOL       Procedure Note  Indications:  Based on my examination of this patient's wound(s)/ulcer(s) today, debridement is required to promote healing and evaluate the wound base.     Performed by: Steven Vela DPM    Consent obtained: Yes    Time out taken: Yes    Debridement: Excisional    Using curette the wound(s)/ulcer(s) was/were sharply debrided down through and including the removal of    epidermis, dermis, and subcutaneous tissue    Devitalized Tissue Debrided: fibrin, biofilm, slough, and exudate    Pre Debridement Measurements:  Are located in the [place]  Documentation Flow Sheet    Non-Pressure ulcer, fat layer exposed    Wound/Ulcer #: 1    Post Debridement Measurements:  Wound/Ulcer Descriptions are Pre Debridement except measurements:    Wound Leg lower Right (Active)   Wound Image   08/05/22 1043   Wound Etiology Traumatic 08/05/22 1043   Dressing Status Intact 08/05/22 1043   Cleansed Soap and water 07/29/22 1058   Wound Length (cm) 4 cm 08/05/22 1043   Wound Width (cm) 3 cm 08/05/22 1043   Wound Depth (cm) 0.1 cm 08/05/22 1043   Wound Surface Area (cm^2) 12 cm^2 08/05/22 1043   Change in Wound Size % -4.62 08/05/22 1043   Wound Volume (cm^3) 1.2 cm^3 08/05/22 1043   Wound Healing % -5 08/05/22 1043   Wound Assessment Schubert/red;Slough 08/05/22 1043   Drainage Amount Moderate 08/05/22 1043   Drainage Description Serosanguinous 08/05/22 1043   Wound Odor None 08/05/22 1043   Flora-Wound/Incision Assessment Blanchable erythema;Edematous 08/05/22 1043   Edges Defined edges 08/05/22 1043   Wound Thickness Description Full thickness 08/05/22 1043   Number of days: 21        Total Surface Area Debrided:  12 sq cm     Estimated Blood Loss:  Minimal     Hemostasis Achieved: Pressure    Procedural Pain: 0 / 10     Post Procedural Pain: 0 / 10     Response to treatment: Well tolerated by patient      Assessment:   Right anterior leg ulcer due to previous trauma    Plan:   -Reassured patient that her wound bed is significantly healthier and has increased granular tissue.  -Patient approved for Puraply AM previously  for a series of 10 applications. Today is the 2nd of 10 applications.  -Wound debrided and Puraply 4x4 cm applied to wound bed in accordance with 's instructions at today's appointment.   -Patient to keep dressing intact until her next appointment. May change outer gauze as needed due to drainage. Discussed increased drainage from ulcer is to be expected.   -Follow-up 1 week

## 2022-08-05 NOTE — DISCHARGE INSTRUCTIONS
Discharge Instructions/Wound Orders  [place] [address] [facility], [address]  Telephone: [phone]    NAME:  Zoey Mccloud OF BIRTH:  1950  MEDICAL RECORD NUMBER:  [id number]  DATE:  August 5, 2022  Wound Care Orders:  Right lower leg - Cleanse with saline, rinse and pat dry. Apply Puraply AM to wound bed. Cover with non-adherent dressing and secure with steri strips. Cover with gauze. Secure with roll gauze and tape. Leave everything on under the steri strips in place. You may change outer gauze dressing as needed. Dietary:  [x] Diet as tolerated: [] Calorie Diabetic Diet:Low carb and no Sugar [x] No Added Salt:[x] Increase Protein: [] Other:Limit the amount of liquid you are drinking and avoid drinking in between meals   Activity:  [] Activity as tolerated:  [] Patient has no activity restrictions     [] Strict Bedrest: [] Remain off Work:     [] May return to full duty work:                                   [] Return to work with restrictions:   Return Appointment:  [x] Return Appointment: With Dr. Dai Almonte in 1 week. [] Ordered tests:    Electronically signed Carol Gates RN on 8/5/2022 at 11:06 AM     Kim Marin 281: Should you experience any significant changes in your wound(s) or have questions about your wound care, please contact the [address] [place] [address] at [address] [place] [address] 8:00 am - 4:30. If you need help with your wound outside these hours and cannot wait until we are again available, contact your PCP or go to the hospital emergency room. PLEASE NOTE: IF YOU ARE UNABLE TO OBTAIN WOUND SUPPLIES, CONTINUE TO USE THE SUPPLIES YOU HAVE AVAILABLE UNTIL YOU ARE ABLE TO REACH US. IT IS MOST IMPORTANT TO KEEP THE WOUND COVERED AT ALL TIMES.      Physician Signature:_______________________    Date: ___________ Time:  ____________

## 2022-08-05 NOTE — WOUND CARE
08/05/22 1043   Right Leg Edema Point of Measurement   Leg circumference 44 cm   Ankle circumference 27 cm   RLE Peripheral Vascular    Capillary Refill Less than/equal to 3 seconds   Color Appropriate for race   Temperature Warm   Sensation Present   Pedal Pulse Present   Wound Leg lower Right   Date First Assessed/Time First Assessed: 07/15/22 1042   Present on Hospital Admission: Yes  Wound Approximate Age at First Assessment (Weeks): 56 weeks  Primary Wound Type: Arterial Ulcer  Location: Leg lower  Wound Location Orientation: Right   Wound Image    Wound Etiology Traumatic   Dressing Status Intact   Wound Length (cm) 4 cm   Wound Width (cm) 3 cm   Wound Depth (cm) 0.1 cm   Wound Surface Area (cm^2) 12 cm^2   Change in Wound Size % -4.62   Wound Volume (cm^3) 1.2 cm^3   Wound Healing % -5   Wound Assessment Kiel/red;Slough   Drainage Amount Moderate   Drainage Description Serosanguinous   Wound Odor None   Flora-Wound/Incision Assessment Blanchable erythema;Edematous   Edges Defined edges   Wound Thickness Description Full thickness   Incision 11/19/21 Leg Right   Date First Assessed/Time First Assessed: 11/19/21 1412   Location: Leg  Wound Location Orientation: Right   Wound Length (cm) 4 cm   Wound Width (cm) 3 cm   Wound Depth (cm) 0.1 cm   Wound Volume (cm^3) 1.2 cm^3   Visit Vitals  BP (!) 175/88 (BP 1 Location: Left upper arm)   Temp 97.6 °F (36.4 °C)   Resp 16

## 2022-08-08 NOTE — TELEPHONE ENCOUNTER
Returned call to the patient and verified using 2 patient identifiers. She wanted to know since she is seeing the wound care specialists does she still need to see Dr. Gerri Bañuelos while she is seeing the wound specialists. Informed her per the last office notes he wants to see her in 1 month for a follow up for a wound check. She states that she canceled that appointment because she had seen the wound specialists. She still wants to check with Dr. Gerri Bañuelos to see when she needs to come back to see him. Informed will forward information to him and get back with her with tomorrow with recommendations. Pt voiced understandings.

## 2022-08-08 NOTE — TELEPHONE ENCOUNTER
Patient returning Alhaji's call and stated she is seeing the wound specialist once a week for the skin graft. She asked, \"am I supposed to see Dr. Gaudencio Dill while I am seeing Dr. Avtar Elizabeth? \" Patient requesting to speak to [facility] [employer].

## 2022-08-09 NOTE — TELEPHONE ENCOUNTER
I spoke with the patient. I informed her Dr. Nesha Guerin would like to know how things are going with [facility]. She is doing well with [facility] Tapia. Currently going through the synthetic skin loni process and is within the 2 second week. She is pleased with the process. I told her she can follow up with as needed. She acknowledged understanding and thanked me for the call.

## 2022-08-12 ENCOUNTER — HOSPITAL ENCOUNTER (OUTPATIENT)
Dept: WOUND CARE | Age: 72
Discharge: HOME | End: 2022-08-12
Payer: MEDICARE

## 2022-08-12 VITALS — SYSTOLIC BLOOD PRESSURE: 135 MMHG | DIASTOLIC BLOOD PRESSURE: 78 MMHG | RESPIRATION RATE: 16 BRPM | TEMPERATURE: 98.9 F

## 2022-08-12 DIAGNOSIS — L97.912 LEG ULCER, RIGHT, WITH FAT LAYER EXPOSED (HCC): Primary | ICD-10-CM

## 2022-08-12 PROCEDURE — 15271 SKIN SUB GRAFT TRNK/ARM/LEG: CPT

## 2022-08-12 RX ORDER — LIDOCAINE 50 MG/G
OINTMENT TOPICAL ONCE
Status: CANCELLED | OUTPATIENT
Start: 2022-08-12 | End: 2022-08-12

## 2022-08-12 RX ORDER — BETAMETHASONE DIPROPIONATE 0.5 MG/G
OINTMENT TOPICAL ONCE
Status: CANCELLED | OUTPATIENT
Start: 2022-08-12 | End: 2022-08-12

## 2022-08-12 RX ORDER — BACITRACIN ZINC AND POLYMYXIN B SULFATE 500; 1000 [USP'U]/G; [USP'U]/G
OINTMENT TOPICAL ONCE
Status: CANCELLED | OUTPATIENT
Start: 2022-08-12 | End: 2022-08-12

## 2022-08-12 RX ORDER — LIDOCAINE HYDROCHLORIDE 20 MG/ML
JELLY TOPICAL ONCE
Status: CANCELLED | OUTPATIENT
Start: 2022-08-12 | End: 2022-08-12

## 2022-08-12 RX ORDER — TRIAMCINOLONE ACETONIDE 1 MG/G
OINTMENT TOPICAL ONCE
Status: CANCELLED | OUTPATIENT
Start: 2022-08-12 | End: 2022-08-12

## 2022-08-12 RX ORDER — MUPIROCIN 20 MG/G
OINTMENT TOPICAL ONCE
Status: CANCELLED | OUTPATIENT
Start: 2022-08-12 | End: 2022-08-12

## 2022-08-12 RX ORDER — CLOBETASOL PROPIONATE 0.5 MG/G
OINTMENT TOPICAL ONCE
Status: CANCELLED | OUTPATIENT
Start: 2022-08-12 | End: 2022-08-12

## 2022-08-12 RX ORDER — SILVER SULFADIAZINE 10 G/1000G
CREAM TOPICAL ONCE
Status: CANCELLED | OUTPATIENT
Start: 2022-08-12 | End: 2022-08-12

## 2022-08-12 RX ORDER — LIDOCAINE HYDROCHLORIDE 40 MG/ML
SOLUTION TOPICAL ONCE
Status: CANCELLED | OUTPATIENT
Start: 2022-08-12 | End: 2022-08-12

## 2022-08-12 RX ORDER — BACITRACIN 500 [USP'U]/G
OINTMENT TOPICAL ONCE
Status: CANCELLED | OUTPATIENT
Start: 2022-08-12 | End: 2022-08-12

## 2022-08-12 RX ORDER — GENTAMICIN SULFATE 1 MG/G
OINTMENT TOPICAL ONCE
Status: CANCELLED | OUTPATIENT
Start: 2022-08-12 | End: 2022-08-12

## 2022-08-12 RX ORDER — LIDOCAINE 40 MG/G
CREAM TOPICAL ONCE
Status: CANCELLED | OUTPATIENT
Start: 2022-08-12 | End: 2022-08-12

## 2022-08-12 NOTE — WOUND CARE
08/12/22 1117   Right Leg Edema Point of Measurement   Leg circumference 38 cm   Ankle circumference 25.5 cm   RLE Peripheral Vascular    Capillary Refill Less than/equal to 3 seconds   Color Appropriate for race   Temperature Warm   Sensation Present   Pedal Pulse Present   Wound Leg lower Right   Date First Assessed/Time First Assessed: 07/15/22 1042   Present on Hospital Admission: Yes  Wound Approximate Age at First Assessment (Weeks): 56 weeks  Primary Wound Type: Arterial Ulcer  Location: Leg lower  Wound Location Orientation: Right   Wound Image    Wound Etiology Traumatic   Dressing Status Intact   Wound Length (cm) 4 cm   Wound Width (cm) 3 cm   Wound Depth (cm) 0.1 cm   Wound Surface Area (cm^2) 12 cm^2   Change in Wound Size % -4.62   Wound Volume (cm^3) 1.2 cm^3   Wound Healing % -5   Wound Assessment Other (Comment)  (skin graft in place)   Drainage Amount Moderate   Drainage Description Serosanguinous   Wound Odor None   Flora-Wound/Incision Assessment Blanchable erythema   Edges Defined edges   Wound Thickness Description Full thickness   Pain 1   Pain Scale 1 Numeric (0 - 10)   Pain Intensity 1 0   Patient Stated Pain Goal 0   Visit Vitals  /78 (BP 1 Location: Left upper arm, BP Patient Position: At rest)   Temp 98.9 °F (37.2 °C)   Resp 16

## 2022-08-12 NOTE — PROGRESS NOTES
Patient presents to wound clinic for: Win Helms is a 66 y.o. female who presents for wound care of the following: right anterior leg ulcer. She notes that she had it surgically debrided and had been using a wound vac. She notes that there has been tremendous progress in resolution in terms of the depth of the wound. However, lately progress of the wound has slowed. She has been referred her for further treatment by her surgeon. Update 8/12/22: Patient presents today for follow-up. No issues over the past week. In good spirits. Pertinent Medical History:  Past Medical History:   Diagnosis Date    Atrial fibrillation ([facility] 7/15/2011    Depression     HTN (hypertension) 7/14/2011    Paroxysmal atrial fibrillation (HCC)     SIADH (syndrome [facility] production) [facility] 1/14/2022    Thyroid ca ([facility] 2005    Papillary    Unspecified hypothyroidism 7/15/2011     Past Surgical History:   Procedure Laterality Date    ENDOSCOPY, COLON, DIAGNOSTIC  2003    neg. rep 10 yrs. [place] THYROIDECTOMY  11/05     Prior to Admission medications    Medication Sig Start Date End Date Taking? Authorizing Provider   diazePAM (VALIUM) 2 mg tablet TAKE 1 TABLET BY MOUTH EVERY DAY AS NEEDED FOR ANXIETY 8/8/22   Dinah Barrios MD   diazePAM (VALIUM) 2 mg tablet Take 1 Tablet by mouth every six (6) hours as needed for Anxiety for up to 20 doses. Max Daily Amount: 8 mg. 7/6/22   Rosalind Eng MD   Xarelto 20 mg tab tablet TAKE 1 TABLET BY MOUTH EVERY DAY 7/3/22   Dinah Barrios MD   ondansetron (ZOFRAN ODT) 4 mg disintegrating tablet TAKE 1 TABLET BY MOUTH FOUR (4) TIMES DAILY AS NEEDED FOR NAUSEA OR VOMITING. 7/3/22   Dinah Barrios MD   oxyCODONE-acetaminophen (PERCOCET) 5-325 mg per tablet TAKE 1 TABLET BY MOUTH EVERY 4 HOURS AS NEEDED FOR PAIN FOR UP TO 3 DAYS. MAX 6 TABS DAILY.   Patient not taking: Reported on 6/20/2022 6/13/22   Provider, Historical   butalbital-acetaminophen-caffeine (FIORICET, ESGIC) -40 mg per tablet TAKE 1 TO 2 TABLETS BY MOUTH EVERY 6 HOURS AS NEEDED FOR HEADACHE 6/15/22   Praneeth Alexandre MD   pregabalin (LYRICA) 100 mg capsule Take 1 Capsule by mouth two (2) times a day. Max Daily Amount: 200 mg. 6/13/22   Karoline De Jesus MD   butalbital-acetaminophen-caffeine [place] [facility]) -40 mg per tablet Take 1 Tablet by mouth every six (6) hours as needed for Headache. 6/13/22   Karoline De Jesus MD   traZODone (DESYREL) 50 mg tablet Take 1 Tablet by mouth nightly. 5/17/22   Praneeth Alexandre MD   metoprolol succinate (TOPROL-XL) 50 mg XL tablet TAKE 1 TABLET BY MOUTH AFTER DINNER 2/10/22   Praneeth Alexandre MD   sacubitriL-valsartan Robyne Snipe) 49-51 mg tab tablet Take 1 Tablet by mouth two (2) times a day. Provider, Historical   bumetanide (BUMEX) 1 mg tablet Take 1.5 Tablets by mouth daily.  1/14/22   Praneeth Alexandre MD   levothyroxine (SYNTHROID) 175 mcg tablet TAKE 1 TABLET BY MOUTH EVERY DAY BEFORE BREAKFAST 8/16/21   Praneeth Alexandre MD     Allergies   Allergen Reactions    Penicillins Unknown (comments)     Patient reports allergy to penicillin with unknown reaction, but states she has tolerated amoxicillin    Opioids - Morphine Analogues Itching and Nausea and Vomiting    Percocet [Oxycodone-Acetaminophen] Rash     Per patient     Family History   Problem Relation Age of Onset    Cancer Mother         lung     Social History     Socioeconomic History    Marital status:      Spouse name: Not on file    Number of children: Not on file    Years of education: Not on file    Highest education level: Not on file   Occupational History    Not on file   Tobacco Use    Smoking status: Never    Smokeless tobacco: Never   Substance and Sexual Activity    Alcohol use: Yes     Comment: soc    Drug use: No    Sexual activity: Not on file   Other Topics Concern     Service Not Asked    Blood Transfusions Not Asked    Caffeine Concern Not Asked    Occupational Exposure Not Asked    Hobby Hazards Not Asked    Sleep Concern Not Asked    Stress Concern Not Asked    Weight Concern Not Asked    Special Diet Not Asked    Back Care Not Asked    Exercise Not Asked    Bike Helmet Not Asked    Seat Belt Not Asked    Self-Exams Not Asked   Social History Narrative    Not on file     Social Determinants of Health     Financial Resource Strain: Not on file   Food Insecurity: Not on file   Transportation Needs: Not on file   Physical Activity: Not on file   Stress: Not on file   Social Connections: Not on file   Intimate Partner Violence: Not on file   Housing Stability: Not on file       Vitals:    08/12/22 1117   BP: 135/78   Resp: 16   Temp: 98.9 °F (37.2 °C)       Review of Systems:    Gen: No fever, chills, malaise, weight loss/gain. Heent: No headache, rhinorrhea, epistaxis, ear pain, hearing loss, sinus pain, neck pain/stiffness, sore throat. Heart: No chest pain, palpitations, HERNDON, pnd, or orthopnea. Resp: No cough, hemoptysis, wheezing and shortness of breath. GI: No nausea, vomiting, diarrhea, constipation, melena or hematochezia. : No urinary obstruction, dysuria or hematuria. Derm: see below  Musc/skeletal: no bone or joint complains. Vasc: No edema, cyanosis or claudication. Endo: No heat/cold intolerance, no polyuria,polydipsia or polyphagia. Neuro: No unilateral weakness, numbness, tingling. No seizures. Heme: No easy bruising or bleeding.       08/12/22 1117   Right Leg Edema Point of Measurement   Leg circumference 38 cm   Ankle circumference 25.5 cm   RLE Peripheral Vascular   Capillary Refill Less than/equal to 3 seconds   Color Appropriate for race   Temperature Warm   Sensation Present   Pedal Pulse Present   Wound Leg lower Right   Date First Assessed/Time First Assessed: 07/15/22 1042   Present on Hospital Admission: Yes  Wound Approximate Age at First Assessment (Weeks): 56 weeks  Primary Wound Type: Arterial Ulcer  Location: Leg lower  Wound Location Orientation: Right   Wound Image    Wound Etiology Traumatic   Dressing Status Intact   Wound Length (cm) 4 cm   Wound Width (cm) 3 cm   Wound Depth (cm) 0.1 cm   Wound Surface Area (cm^2) 12 cm^2   Change in Wound Size % -4.62   Wound Volume (cm^3) 1.2 cm^3   Wound Healing % -5   Wound Assessment Other (Comment)  (skin graft in place)   Drainage Amount Moderate   Drainage Description Serosanguinous   Wound Odor None   Flora-Wound/Incision Assessment Blanchable erythema   Edges Defined edges   Wound Thickness Description Full thickness   Pain 1   Pain Scale 1 Numeric (0 - 10)   Pain Intensity 1 0   Patient Stated Pain Goal 0       Debridement Wound Care        Problem List Items Addressed This Visit          Other    Leg ulcer, right, with fat layer exposed ([facility] - Primary       Procedure Note  Indications:  Based on my examination of this patient's wound(s)/ulcer(s) today, debridement is required to promote healing and evaluate the wound base.     Performed by: Yanet Kulkarni DPM    Consent obtained: Yes    Time out taken: Yes    Debridement: Excisional    Using curette the wound(s)/ulcer(s) was/were sharply debrided down through and including the removal of    epidermis, dermis, and subcutaneous tissue    Devitalized Tissue Debrided: fibrin, biofilm, slough, and exudate    Pre Debridement Measurements:  Are located in the [facility] Flow Sheet    Non-Pressure ulcer, fat layer exposed    Wound/Ulcer #: 1    Post Debridement Measurements:  Wound/Ulcer Descriptions are Pre Debridement except measurements:    Wound Leg lower Right (Active)   Wound Image   08/12/22 1117   Wound Etiology Traumatic 08/12/22 1117   Dressing Status Intact 08/12/22 1117   Cleansed Soap and water 07/29/22 1058   Wound Length (cm) 4 cm 08/12/22 1117   Wound Width (cm) 3 cm 08/12/22 1117   Wound Depth (cm) 0.1 cm 08/12/22 1117   Wound Surface Area (cm^2) 12 cm^2 08/12/22 1117   Change in Wound Size % -4.62 08/12/22 1117   Wound Volume (cm^3) 1.2 cm^3 08/12/22 1117   Wound Healing % -5 08/12/22 1117   Wound Assessment Other (Comment) 08/12/22 1117   Drainage Amount Moderate 08/12/22 1117   Drainage Description Serosanguinous 08/12/22 1117   Wound Odor None 08/12/22 1117   Flora-Wound/Incision Assessment Blanchable erythema 08/12/22 1117   Edges Defined edges 08/12/22 1117   Wound Thickness Description Full thickness 08/12/22 1117   Number of days: 28        Total Surface Area Debrided:  12 sq cm     Estimated Blood Loss:  Minimal     Hemostasis Achieved: Pressure    Procedural Pain: 0 / 10     Post Procedural Pain: 0 / 10     Response to treatment: Well tolerated by patient        Assessment:   Right anterior leg ulcer due to previous trauma    Plan:   -Reassured patient that her wound bed is significantly healthier and has increased granular tissue compared to last visit. Much healthier bleeding base compared to prior visits.  -Patient approved for Puraply AM previously  for a series of 10 applications. Today is the 3rd of 10 applications.  -Wound debrided and Puraply 4x4 cm applied to wound bed in accordance with 's instructions at today's appointment.   -Patient to keep dressing intact until her next appointment. May change outer gauze as needed due to drainage. Discussed increased drainage from ulcer is to be expected.   -Follow-up 1 week

## 2022-08-12 NOTE — WOUND CARE
08/12/22 1206   Wound Leg lower Right   Date First Assessed/Time First Assessed: 07/15/22 1042   Present on Hospital Admission: Yes  Wound Approximate Age at First Assessment (Weeks): 56 weeks  Primary Wound Type: Arterial Ulcer  Location: Leg lower  Wound Location Orientation: Right   Dressing/Treatment   (gauze/rg/tape)

## 2022-08-12 NOTE — DISCHARGE INSTRUCTIONS
Discharge Instructions/Wound Orders  [place] [address] [facility], [address]  Telephone: [phone]    NAME:  Alexandrea Byrne OF BIRTH:  1950  MEDICAL RECORD NUMBER:  [id number]  DATE:  August 12, 2022  Wound Care Orders:  Right lower leg - Cleanse with saline, rinse and pat dry. Apply Puraply AM to wound bed. Cover with non-adherent dressing and secure with steri strips. Cover with gauze. Secure with roll gauze and tape. Leave everything on under the steri strips in place. You may change outer gauze dressing as needed. Dietary:  [x] Diet as tolerated: [] Calorie Diabetic Diet:Low carb and no Sugar [x] No Added Salt:[x] Increase Protein: [] Other:Limit the amount of liquid you are drinking and avoid drinking in between meals   Activity:  [x] Activity as tolerated:  [] Patient has no activity restrictions     [] Strict Bedrest: [] Remain off Work:     [] May return to full duty work:                                   [] Return to work with restrictions:   Return Appointment:  [x] Return Appointment: With Dr. Tia Carr in 1 week. [] Ordered tests:    Electronically signed Vicky Kimball RN on 8/12/2022 at 11:43 AM     [facility] Information: Should you experience any significant changes in your wound(s) or have questions about your wound care, please contact the [address] [place] [address] at [address] [place] [address] 8:00 am - 4:30. If you need help with your wound outside these hours and cannot wait until we are again available, contact your PCP or go to the hospital emergency room. PLEASE NOTE: IF YOU ARE UNABLE TO OBTAIN WOUND SUPPLIES, CONTINUE TO USE THE SUPPLIES YOU HAVE AVAILABLE UNTIL YOU ARE ABLE TO REACH US. IT IS MOST IMPORTANT TO KEEP THE WOUND COVERED AT ALL TIMES.      Physician Signature:_______________________    Date: ___________ Time:  ____________

## 2022-08-19 ENCOUNTER — HOSPITAL ENCOUNTER (OUTPATIENT)
Dept: WOUND CARE | Age: 72
Discharge: HOME | End: 2022-08-19
Payer: MEDICARE

## 2022-08-19 VITALS — TEMPERATURE: 97.1 F | DIASTOLIC BLOOD PRESSURE: 95 MMHG | SYSTOLIC BLOOD PRESSURE: 163 MMHG | HEART RATE: 71 BPM

## 2022-08-19 DIAGNOSIS — L97.912 LEG ULCER, RIGHT, WITH FAT LAYER EXPOSED (HCC): Primary | ICD-10-CM

## 2022-08-19 PROCEDURE — 15271 SKIN SUB GRAFT TRNK/ARM/LEG: CPT

## 2022-08-19 RX ORDER — BETAMETHASONE DIPROPIONATE 0.5 MG/G
OINTMENT TOPICAL ONCE
Status: CANCELLED | OUTPATIENT
Start: 2022-08-19 | End: 2022-08-19

## 2022-08-19 RX ORDER — TRIAMCINOLONE ACETONIDE 1 MG/G
OINTMENT TOPICAL ONCE
Status: CANCELLED | OUTPATIENT
Start: 2022-08-19 | End: 2022-08-19

## 2022-08-19 RX ORDER — CLOBETASOL PROPIONATE 0.5 MG/G
OINTMENT TOPICAL ONCE
Status: CANCELLED | OUTPATIENT
Start: 2022-08-19 | End: 2022-08-19

## 2022-08-19 RX ORDER — LIDOCAINE HYDROCHLORIDE 20 MG/ML
JELLY TOPICAL ONCE
Status: CANCELLED | OUTPATIENT
Start: 2022-08-19 | End: 2022-08-19

## 2022-08-19 RX ORDER — SILVER SULFADIAZINE 10 G/1000G
CREAM TOPICAL ONCE
Status: CANCELLED | OUTPATIENT
Start: 2022-08-19 | End: 2022-08-19

## 2022-08-19 RX ORDER — GENTAMICIN SULFATE 1 MG/G
OINTMENT TOPICAL ONCE
Status: CANCELLED | OUTPATIENT
Start: 2022-08-19 | End: 2022-08-19

## 2022-08-19 RX ORDER — BACITRACIN 500 [USP'U]/G
OINTMENT TOPICAL ONCE
Status: CANCELLED | OUTPATIENT
Start: 2022-08-19 | End: 2022-08-19

## 2022-08-19 RX ORDER — LIDOCAINE 50 MG/G
OINTMENT TOPICAL ONCE
Status: CANCELLED | OUTPATIENT
Start: 2022-08-19 | End: 2022-08-19

## 2022-08-19 RX ORDER — LIDOCAINE 40 MG/G
CREAM TOPICAL ONCE
Status: CANCELLED | OUTPATIENT
Start: 2022-08-19 | End: 2022-08-19

## 2022-08-19 RX ORDER — MUPIROCIN 20 MG/G
OINTMENT TOPICAL ONCE
Status: CANCELLED | OUTPATIENT
Start: 2022-08-19 | End: 2022-08-19

## 2022-08-19 RX ORDER — LIDOCAINE HYDROCHLORIDE 40 MG/ML
SOLUTION TOPICAL ONCE
Status: CANCELLED | OUTPATIENT
Start: 2022-08-19 | End: 2022-08-19

## 2022-08-19 RX ORDER — BACITRACIN ZINC AND POLYMYXIN B SULFATE 500; 1000 [USP'U]/G; [USP'U]/G
OINTMENT TOPICAL ONCE
Status: CANCELLED | OUTPATIENT
Start: 2022-08-19 | End: 2022-08-19

## 2022-08-19 NOTE — WOUND CARE
08/19/22 1055   Right Leg Edema Point of Measurement   Leg circumference 37 cm   Ankle circumference 25 cm   RLE Peripheral Vascular    Capillary Refill Less than/equal to 3 seconds   Color Appropriate for race   Temperature Warm   Sensation Present   Pedal Pulse Present   Wound Leg lower Right   Date First Assessed/Time First Assessed: 07/15/22 1042   Present on Hospital Admission: Yes  Wound Approximate Age at First Assessment (Weeks): 56 weeks  Primary Wound Type: Arterial Ulcer  Location: Leg lower  Wound Location Orientation: Right   Wound Image    Wound Etiology Traumatic   Dressing Status Breakthrough drainage noted   Cleansed Cleansed with saline   Wound Length (cm) 4 cm   Wound Width (cm) 3 cm   Wound Depth (cm) 0.1 cm   Wound Surface Area (cm^2) 12 cm^2   Change in Wound Size % -4.62   Wound Volume (cm^3) 1.2 cm^3   Wound Healing % -5   Wound Assessment   (skin graft intact)   Drainage Amount Moderate   Drainage Description Serosanguinous;Green   Wound Odor None   Flora-Wound/Incision Assessment Blanchable erythema;Dry/flaky   Edges Defined edges   Wound Thickness Description Full thickness   Visit Vitals  BP (!) 163/95 (BP 1 Location: Left upper arm, BP Patient Position: At rest)   Pulse 71   Temp 97.1 °F (36.2 °C)

## 2022-08-19 NOTE — PROGRESS NOTES
Patient presents to wound clinic for: Raynard Angelucci is a 66 y.o. female who presents for wound care of the following: right anterior leg ulcer. She notes that she had it surgically debrided and had been using a wound vac. She notes that there has been tremendous progress in resolution in terms of the depth of the wound. However, lately progress of the wound has slowed. She has been referred her for further treatment by her surgeon. Update 8/19/22: Patient presents today for follow-up. No issues over the past week. In good spirits. Pertinent Medical History:  Past Medical History:   Diagnosis Date    Atrial fibrillation ([facility] 7/15/2011    Depression     HTN (hypertension) 7/14/2011    Paroxysmal atrial fibrillation (HCC)     SIADH (syndrome of inappropriate ADH production) [facility] 1/14/2022    Thyroid ca ([facility] 2005    Papillary    Unspecified hypothyroidism 7/15/2011     Past Surgical History:   Procedure Laterality Date    ENDOSCOPY, COLON, DIAGNOSTIC  2003    neg. rep 10 yrs. [place] THYROIDECTOMY  11/05     Prior to Admission medications    Medication Sig Start Date End Date Taking? Authorizing Provider   diazePAM (VALIUM) 2 mg tablet TAKE 1 TABLET BY MOUTH EVERY DAY AS NEEDED FOR ANXIETY 8/8/22   Brian Luis MD   diazePAM (VALIUM) 2 mg tablet Take 1 Tablet by mouth every six (6) hours as needed for Anxiety for up to 20 doses. Max Daily Amount: 8 mg. 7/6/22   Jeff Soria MD   Xarelto 20 mg tab tablet TAKE 1 TABLET BY MOUTH EVERY DAY 7/3/22   Brian Luis MD   ondansetron (ZOFRAN ODT) 4 mg disintegrating tablet TAKE 1 TABLET BY MOUTH FOUR (4) TIMES DAILY AS NEEDED FOR NAUSEA OR VOMITING. 7/3/22   Brian Luis MD   oxyCODONE-acetaminophen (PERCOCET) 5-325 mg per tablet TAKE 1 TABLET BY MOUTH EVERY 4 HOURS AS NEEDED FOR PAIN FOR UP TO 3 DAYS. MAX 6 TABS DAILY.   Patient not taking: Reported on 6/20/2022 6/13/22   Provider, Historical   butalbital-acetaminophen-caffeine (FIORICET, ESGIC) -40 mg per tablet TAKE 1 TO 2 TABLETS BY MOUTH EVERY 6 HOURS AS NEEDED FOR HEADACHE 6/15/22   Tod Nicolas MD   pregabalin (LYRICA) 100 mg capsule Take 1 Capsule by mouth two (2) times a day. Max Daily Amount: 200 mg. 6/13/22   Tejas Mack MD   butalbital-acetaminophen-caffeine [place] [facility]) -40 mg per tablet Take 1 Tablet by mouth every six (6) hours as needed for Headache. 6/13/22   Tejas Mack MD   traZODone (DESYREL) 50 mg tablet Take 1 Tablet by mouth nightly. 5/17/22   Tod Nicolas MD   metoprolol succinate (TOPROL-XL) 50 mg XL tablet TAKE 1 TABLET BY MOUTH AFTER DINNER 2/10/22   Tod Nicolas MD   sacubitriL-valsartan Tati Duval) 49-51 mg tab tablet Take 1 Tablet by mouth two (2) times a day. Provider, Historical   bumetanide (BUMEX) 1 mg tablet Take 1.5 Tablets by mouth daily.  1/14/22   Tod Nicolas MD   levothyroxine (SYNTHROID) 175 mcg tablet TAKE 1 TABLET BY MOUTH EVERY DAY BEFORE BREAKFAST 8/16/21   Tod Nicolas MD     Allergies   Allergen Reactions    Penicillins Unknown (comments)     Patient reports allergy to penicillin with unknown reaction, but states she has tolerated amoxicillin    Opioids - Morphine Analogues Itching and Nausea and Vomiting    Percocet [Oxycodone-Acetaminophen] Rash     Per patient     Family History   Problem Relation Age of Onset    Cancer Mother         lung     Social History     Socioeconomic History    Marital status:      Spouse name: Not on file    Number of children: Not on file    Years of education: Not on file    Highest education level: Not on file   Occupational History    Not on file   Tobacco Use    Smoking status: Never    Smokeless tobacco: Never   Substance and Sexual Activity    Alcohol use: Yes     Comment: soc    Drug use: No    Sexual activity: Not on file   Other Topics Concern     Service Not Asked    Blood Transfusions Not Asked    Caffeine Concern Not Asked    Occupational Exposure Not Asked    Hobby Hazards Not Asked    Sleep Concern Not Asked    Stress Concern Not Asked    Weight Concern Not Asked    Special Diet Not Asked    Back Care Not Asked    Exercise Not Asked    Bike Helmet Not Asked    Seat Belt Not Asked    Self-Exams Not Asked   Social History Narrative    Not on file     Social Determinants of Health     Financial Resource Strain: Not on file   Food Insecurity: Not on file   Transportation Needs: Not on file   Physical Activity: Not on file   Stress: Not on file   Social Connections: Not on file   Intimate Partner Violence: Not on file   Housing Stability: Not on file       Vitals:    08/19/22 1050   BP: (!) 163/95   Pulse: 71   Temp: 97.1 °F (36.2 °C)       Review of Systems:    Gen: No fever, chills, malaise, weight loss/gain. Heent: No headache, rhinorrhea, epistaxis, ear pain, hearing loss, sinus pain, neck pain/stiffness, sore throat. Heart: No chest pain, palpitations, HERNDON, pnd, or orthopnea. Resp: No cough, hemoptysis, wheezing and shortness of breath. GI: No nausea, vomiting, diarrhea, constipation, melena or hematochezia. : No urinary obstruction, dysuria or hematuria. Derm: see below  Musc/skeletal: no bone or joint complains. Vasc: No edema, cyanosis or claudication. Endo: No heat/cold intolerance, no polyuria,polydipsia or polyphagia. Neuro: No unilateral weakness, numbness, tingling. No seizures. Heme: No easy bruising or bleeding.       08/19/22 1055   Right Leg Edema Point of Measurement   Leg circumference 37 cm   Ankle circumference 25 cm   RLE Peripheral Vascular    Capillary Refill Less than/equal to 3 seconds   Color Appropriate for race   Temperature Warm   Sensation Present   Pedal Pulse Present   Wound Leg lower Right   Date First Assessed/Time First Assessed: 07/15/22 1042   Present on Hospital Admission: Yes  Wound Approximate Age at First Assessment (Weeks): 56 weeks  Primary Wound Type: Arterial Ulcer  Location: Leg lower  Wound Location Orientation: Right   Wound Image    Wound Etiology Traumatic   Dressing Status Breakthrough drainage noted   Cleansed Cleansed with saline   Wound Length (cm) 4 cm   Wound Width (cm) 3 cm   Wound Depth (cm) 0.1 cm   Wound Surface Area (cm^2) 12 cm^2   Change in Wound Size % -4.62   Wound Volume (cm^3) 1.2 cm^3   Wound Healing % -5   Wound Assessment    (skin graft intact)   Drainage Amount Moderate   Drainage Description Serosanguinous;Green   Wound Odor None   Flora-Wound/Incision Assessment Blanchable erythema;Dry/flaky   Edges Defined edges   Wound Thickness Description Full thickness   My measurements after removal of graft were 4.5x2.5x0.1 cm      Debridement Wound Care        Problem List Items Addressed This Visit          Other    Leg ulcer, right, with fat layer exposed ([facility] - Primary       Procedure Note  Indications:  Based on my examination of this patient's wound(s)/ulcer(s) today, debridement is required to promote healing and evaluate the wound base.     Performed by: Kika Lin DPM    Consent obtained: Yes    Time out taken: Yes    Debridement: Excisional    Using forceps and # 15 blade scalpel the wound(s)/ulcer(s) was/were sharply debrided down through and including the removal of    epidermis, dermis, and subcutaneous tissue    Devitalized Tissue Debrided: fibrin, biofilm, slough, and exudate    Pre Debridement Measurements:  Are located in the [place]  Documentation Flow Sheet    Non-Pressure ulcer, fat layer exposed    Wound/Ulcer #: 1    Post Debridement Measurements:  Wound/Ulcer Descriptions are Pre Debridement except measurements:    Wound Leg lower Right (Active)   Wound Image   08/19/22 1055   Wound Etiology Traumatic 08/19/22 1055   Dressing Status Breakthrough drainage noted 08/19/22 1055   Cleansed Cleansed with saline 08/19/22 1055   Wound Length (cm) 4 cm 08/19/22 1055   Wound Width (cm) 3 cm 08/19/22 1055   Wound Depth (cm) 0.1 cm 08/19/22 1055   Wound Surface Area (cm^2) 12 cm^2 08/19/22 1055   Change in Wound Size % -4.62 08/19/22 1055   Wound Volume (cm^3) 1.2 cm^3 08/19/22 1055   Wound Healing % -5 08/19/22 1055   Post-Procedure Width (cm) 2.5 cm 08/19/22 1055   Wound Assessment Other (Comment) 08/12/22 1117   Drainage Amount Moderate 08/19/22 1055   Drainage Description Serosanguinous;Green 08/19/22 1055   Wound Odor None 08/19/22 1055   Flora-Wound/Incision Assessment Blanchable erythema;Dry/flaky 08/19/22 1055   Edges Defined edges 08/19/22 1055   Wound Thickness Description Full thickness 08/19/22 1055   Number of days: 35        Total Surface Area Debrided:  11.25 sq cm     Estimated Blood Loss:  Minimal     Hemostasis Achieved: Pressure    Procedural Pain: 1 / 10     Post Procedural Pain: 0 / 10     Response to treatment: Well tolerated by patient      Assessment:   Right anterior leg ulcer due to previous trauma    Plan:   -Reassured patient that her wound bed is significantly healthier and has increased granular tissue compared to last visit. Much healthier bleeding base compared to prior visits. Wound is also smaller  -Patient approved for Puraply AM previously  for a series of 10 applications. Today is the 4th of 10 applications.  -Wound debrided and Puraply 4x4 cm applied to wound bed in accordance with 's instructions at today's appointment.   -Patient to keep dressing intact until her next appointment. May change outer gauze as needed due to drainage. Discussed increased drainage from ulcer is to be expected.   -Follow-up 1 week

## 2022-08-19 NOTE — DISCHARGE INSTRUCTIONS
Discharge Instructions for          [place] [address] [facility], [address]  Phone: [phone] Fax: [phone]    NAME:  Chava Cortes  YOB: 1950  MEDICAL RECORD NUMBER:  [id number]  DATE:  August 19, 2022  WOUND CARE ORDERS:  Right lower leg - Cleanse with saline, rinse and pat dry. Apply Puraply AM to wound bed. Cover with non-adherent dressing and secure with steri strips. Cover with gauze. Secure with roll gauze and tape. Leave everything on under the steri strips in place. You may change outer gauze dressing as needed. TREATMENT ORDERS:    Elevate leg(s) above the level of the heart when sitting. Avoid prolonged standing in one place. Do no get dressing/wrap wet. Follow Diet as prescribed:   [] Diet as tolerated: [] Calorie Diabetic Diet: Low carb and no Sugar [x] No Added Salt:  [x] Increase Protein: [] Limit the amount of liquid you are drinking and avoid drinking in between meals   Return Appointment:  [x] Return Appointment: With Dr. Tammie Jade in 1 week. [] Nurse Visit : *** days  [] Ordered tests:    Electronically signed Sarthak Day RN on 8/19/2022 at 11:21 AM     [address] Information: Should you experience any significant changes in your wound(s) or have questions about your wound care, please contact the [address] [place] [address] at [address] [place] [address] 8:00 am - 4:30. If you need help with your wound outside these hours and cannot wait until we are again available, contact your PCP or go to the hospital emergency room. PLEASE NOTE: IF YOU ARE UNABLE TO OBTAIN WOUND SUPPLIES, CONTINUE TO USE THE SUPPLIES YOU HAVE AVAILABLE UNTIL YOU ARE ABLE TO REACH US. IT IS MOST IMPORTANT TO KEEP THE WOUND COVERED AT ALL TIMES.      Physician Signature:_______________________    Date: ___________ Time:  ____________

## 2022-08-26 ENCOUNTER — HOSPITAL ENCOUNTER (OUTPATIENT)
Dept: WOUND CARE | Age: 72
Discharge: HOME | End: 2022-08-26
Payer: MEDICARE

## 2022-08-26 VITALS
HEART RATE: 75 BPM | DIASTOLIC BLOOD PRESSURE: 93 MMHG | TEMPERATURE: 97.8 F | SYSTOLIC BLOOD PRESSURE: 149 MMHG | RESPIRATION RATE: 18 BRPM

## 2022-08-26 DIAGNOSIS — L97.912 LEG ULCER, RIGHT, WITH FAT LAYER EXPOSED (HCC): Primary | ICD-10-CM

## 2022-08-26 PROCEDURE — 87186 SC STD MICRODIL/AGAR DIL: CPT

## 2022-08-26 PROCEDURE — 11042 DBRDMT SUBQ TIS 1ST 20SQCM/<: CPT

## 2022-08-26 PROCEDURE — 87205 SMEAR GRAM STAIN: CPT

## 2022-08-26 PROCEDURE — 87075 CULTR BACTERIA EXCEPT BLOOD: CPT

## 2022-08-26 PROCEDURE — 87077 CULTURE AEROBIC IDENTIFY: CPT

## 2022-08-26 PROCEDURE — 87147 CULTURE TYPE IMMUNOLOGIC: CPT

## 2022-08-26 RX ORDER — CLOBETASOL PROPIONATE 0.5 MG/G
OINTMENT TOPICAL ONCE
Status: CANCELLED | OUTPATIENT
Start: 2022-08-26 | End: 2022-08-26

## 2022-08-26 RX ORDER — LIDOCAINE HYDROCHLORIDE 20 MG/ML
JELLY TOPICAL ONCE
Status: CANCELLED | OUTPATIENT
Start: 2022-08-26 | End: 2022-08-26

## 2022-08-26 RX ORDER — BACITRACIN ZINC AND POLYMYXIN B SULFATE 500; 1000 [USP'U]/G; [USP'U]/G
OINTMENT TOPICAL ONCE
Status: CANCELLED | OUTPATIENT
Start: 2022-08-26 | End: 2022-08-26

## 2022-08-26 RX ORDER — CIPROFLOXACIN 500 MG/1
500 TABLET ORAL 2 TIMES DAILY
Qty: 14 TABLET | Refills: 0 | Status: SHIPPED | OUTPATIENT
Start: 2022-08-26 | End: 2022-09-02

## 2022-08-26 RX ORDER — LIDOCAINE 50 MG/G
OINTMENT TOPICAL ONCE
Status: CANCELLED | OUTPATIENT
Start: 2022-08-26 | End: 2022-08-26

## 2022-08-26 RX ORDER — LIDOCAINE 40 MG/G
CREAM TOPICAL ONCE
Status: CANCELLED | OUTPATIENT
Start: 2022-08-26 | End: 2022-08-26

## 2022-08-26 RX ORDER — SILVER SULFADIAZINE 10 G/1000G
CREAM TOPICAL ONCE
Status: CANCELLED | OUTPATIENT
Start: 2022-08-26 | End: 2022-08-26

## 2022-08-26 RX ORDER — BETAMETHASONE DIPROPIONATE 0.5 MG/G
OINTMENT TOPICAL ONCE
Status: CANCELLED | OUTPATIENT
Start: 2022-08-26 | End: 2022-08-26

## 2022-08-26 RX ORDER — BACITRACIN 500 [USP'U]/G
OINTMENT TOPICAL ONCE
Status: CANCELLED | OUTPATIENT
Start: 2022-08-26 | End: 2022-08-26

## 2022-08-26 RX ORDER — MUPIROCIN 20 MG/G
OINTMENT TOPICAL ONCE
Status: CANCELLED | OUTPATIENT
Start: 2022-08-26 | End: 2022-08-26

## 2022-08-26 RX ORDER — TRIAMCINOLONE ACETONIDE 1 MG/G
OINTMENT TOPICAL ONCE
Status: CANCELLED | OUTPATIENT
Start: 2022-08-26 | End: 2022-08-26

## 2022-08-26 RX ORDER — GENTAMICIN SULFATE 1 MG/G
OINTMENT TOPICAL ONCE
Status: CANCELLED | OUTPATIENT
Start: 2022-08-26 | End: 2022-08-26

## 2022-08-26 RX ORDER — LIDOCAINE HYDROCHLORIDE 40 MG/ML
SOLUTION TOPICAL ONCE
Status: CANCELLED | OUTPATIENT
Start: 2022-08-26 | End: 2022-08-26

## 2022-08-26 NOTE — WOUND CARE
08/26/22 1129   Right Leg Edema Point of Measurement   Leg circumference 37 cm   Ankle circumference 26 cm   RLE Peripheral Vascular    Capillary Refill Less than/equal to 3 seconds   Color Appropriate for race   Temperature Warm   Sensation Present   Pedal Pulse Palpable   Wound Leg lower Right   Date First Assessed/Time First Assessed: 07/15/22 1042   Present on Hospital Admission: Yes  Wound Approximate Age at First Assessment (Weeks): 56 weeks  Primary Wound Type: Arterial Ulcer  Location: Leg lower  Wound Location Orientation: Right   Wound Image    Wound Etiology Traumatic   Dressing Status Old drainage noted   Cleansed Other (Comment)  (not cleaned. Skin sub still in place.)   Wound Length (cm)   (unable to measure, skin sub still in place.)   Wound Assessment Other (Comment)  (unable to assess, skin sub in place.)   Drainage Amount Moderate   Drainage Description Serosanguinous   Wound Odor None   Flora-Wound/Incision Assessment Blanchable erythema; Other (Comment); Dry/flaky  (scabbed areas)   Edges Defined edges   Wound Thickness Description Full thickness   Pain 1   Pain Scale 1 Numeric (0 - 10)   Pain Intensity 1 1   Pain Location 1 Leg   Pain Orientation 1 Right   Pain Description 1 Aching   Pain Intervention(s) 1 Emotional support   Visit Vitals  BP (!) 149/93 (BP 1 Location: Left upper arm, BP Patient Position: At rest;Sitting)   Pulse 75   Temp 97.8 °F (36.6 °C)   Resp 18

## 2022-08-26 NOTE — WOUND CARE
08/26/22 1234   Wound Leg lower Right   Date First Assessed/Time First Assessed: 07/15/22 1042   Present on Hospital Admission: Yes  Wound Approximate Age at First Assessment (Weeks): 56 weeks  Primary Wound Type: Arterial Ulcer  Location: Leg lower  Wound Location Orientation: Right   Dressing/Treatment   (alda/gentian violet//gauze/rg/tape)

## 2022-08-26 NOTE — PROGRESS NOTES
Patient presents to wound clinic for: Sharif Moses is a 66 y.o. female who presents for wound care of the following: right anterior leg ulcer. She notes that she had it surgically debrided and had been using a wound vac. She notes that there has been tremendous progress in resolution in terms of the depth of the wound. However, lately progress of the wound has slowed. She has been referred her for further treatment by her surgeon. Update 8/26/22: Patient presents today for follow-up. Patient appears to be very tearful with manic repetitive movements. When she presented for her appointment, other patients noted in the lobby that she was loudly talking to no one about her life in detail and making inappropriate comments. She appears very anxious and is asking the same questions over and over again. She notes that she did take part of the outer dressing and rub hydrocortisone cream all over the area because she felt like it was itching and that it needed to air out. Pertinent Medical History:  Past Medical History:   Diagnosis Date    Atrial fibrillation ([facility] [address] 7/15/2011    Depression     HTN (hypertension) 7/14/2011    Paroxysmal atrial fibrillation (HCC)     SIADH (syndrome of inappropriate ADH production) ([facility] [address] 1/14/2022    Thyroid [facility] ([facility] 2005    Papillary    Unspecified hypothyroidism 7/15/2011     Past Surgical History:   Procedure Laterality Date    ENDOSCOPY, COLON, DIAGNOSTIC  2003    neg. rep 10 yrs. [facility]  11/05     Prior to Admission medications    Medication Sig Start Date End Date Taking? Authorizing Provider   ciprofloxacin HCl (CIPRO) 500 mg tablet Take 1 Tablet by mouth two (2) times a day for 7 days.  8/26/22 9/2/22 Yes Mora Amin DPM   levothyroxine (SYNTHROID) 175 mcg tablet TAKE 1 TABLET BY MOUTH EVERY DAY BEFORE BREAKFAST 8/21/22   Mal Sales MD   diazePAM (VALIUM) 2 mg tablet TAKE 1 TABLET BY MOUTH EVERY DAY AS NEEDED FOR ANXIETY 8/8/22 Victor Hugo Dumont MD   diazePAM (VALIUM) 2 mg tablet Take 1 Tablet by mouth every six (6) hours as needed for Anxiety for up to 20 doses. Max Daily Amount: 8 mg. 7/6/22   Shamar Dhillon MD   Xarelto 20 mg tab tablet TAKE 1 TABLET BY MOUTH EVERY DAY 7/3/22   Victor Hugo Dumont MD   ondansetron (ZOFRAN ODT) 4 mg disintegrating tablet TAKE 1 TABLET BY MOUTH FOUR (4) TIMES DAILY AS NEEDED FOR NAUSEA OR VOMITING. 7/3/22   Victor Hugo Dumont MD   oxyCODONE-acetaminophen (PERCOCET) 5-325 mg per tablet TAKE 1 TABLET BY MOUTH EVERY 4 HOURS AS NEEDED FOR PAIN FOR UP TO 3 DAYS. MAX 6 TABS DAILY. Patient not taking: Reported on 6/20/2022 6/13/22   Provider, Historical   butalbital-acetaminophen-caffeine (FIORICET, ESGIC) -40 mg per tablet TAKE 1 TO 2 TABLETS BY MOUTH EVERY 6 HOURS AS NEEDED FOR HEADACHE 6/15/22   Victor Hugo Dumont MD   pregabalin (LYRICA) 100 mg capsule Take 1 Capsule by mouth two (2) times a day. Max Daily Amount: 200 mg. 6/13/22   Shamar Dhillon MD   butalbital-acetaminophen-caffeine [facility]) -40 mg per tablet Take 1 Tablet by mouth every six (6) hours as needed for Headache. 6/13/22   Shamar Dhillon MD   traZODone (DESYREL) 50 mg tablet Take 1 Tablet by mouth nightly. 5/17/22   Victor Hugo Dumont MD   metoprolol succinate (TOPROL-XL) 50 mg XL tablet TAKE 1 TABLET BY MOUTH AFTER DINNER 2/10/22   Victor Hugo Dumont MD   sacubitriL-valsartan Ray Duffel) 49-51 mg tab tablet Take 1 Tablet by mouth two (2) times a day. Provider, Historical   bumetanide (BUMEX) 1 mg tablet Take 1.5 Tablets by mouth daily.  1/14/22   Victor Hugo Dumont MD     Allergies   Allergen Reactions    Penicillins Unknown (comments)     Patient reports allergy to penicillin with unknown reaction, but states she has tolerated amoxicillin    Opioids - Morphine Analogues Itching and Nausea and Vomiting    Percocet [Oxycodone-Acetaminophen] Rash     Per patient     Family History Problem Relation Age of Onset    Cancer Mother         lung     Social History     Socioeconomic History    Marital status:      Spouse name: Not on file    Number of children: Not on file    Years of education: Not on file    Highest education level: Not on file   Occupational History    Not on file   Tobacco Use    Smoking status: Never    Smokeless tobacco: Never   Substance and Sexual Activity    Alcohol use: Yes     Comment: soc    Drug use: No    Sexual activity: Not on file   Other Topics Concern     Service Not Asked    Blood Transfusions Not Asked    Caffeine Concern Not Asked    Occupational Exposure Not Asked    Hobby Hazards Not Asked    Sleep Concern Not Asked    Stress Concern Not Asked    Weight Concern Not Asked    Special Diet Not Asked    Back Care Not Asked    Exercise Not Asked    Bike Helmet Not Asked    Seat Belt Not Asked    Self-Exams Not Asked   Social History Narrative    Not on file     Social Determinants of Health     Financial Resource Strain: Not on file   Food Insecurity: Not on file   Transportation Needs: Not on file   Physical Activity: Not on file   Stress: Not on file   Social Connections: Not on file   Intimate Partner Violence: Not on file   Housing Stability: Not on file       Vitals:    08/26/22 1129   BP: (!) 149/93   Pulse: 75   Resp: 18   Temp: 97.8 °F (36.6 °C)       Review of Systems:    Gen: No fever, chills, malaise, weight loss/gain. Heent: No headache, rhinorrhea, epistaxis, ear pain, hearing loss, sinus pain, neck pain/stiffness, sore throat. Heart: No chest pain, palpitations, HERNDON, pnd, or orthopnea. Resp: No cough, hemoptysis, wheezing and shortness of breath. GI: No nausea, vomiting, diarrhea, constipation, melena or hematochezia. : No urinary obstruction, dysuria or hematuria. Derm: see below  Musc/skeletal: no bone or joint complains. Vasc: No edema, cyanosis or claudication.    Endo: No heat/cold intolerance, no polyuria,polydipsia or polyphagia. Neuro: No unilateral weakness, numbness, tingling. No seizures. Heme: No easy bruising or bleeding. 08/26/22 1129   Right Leg Edema Point of Measurement   Leg circumference 37 cm   Ankle circumference 26 cm   RLE Peripheral Vascular    Capillary Refill Less than/equal to 3 seconds   Color Appropriate for race   Temperature Warm   Sensation Present   Pedal Pulse Palpable   Wound Leg lower Right   Date First Assessed/Time First Assessed: 07/15/22 1042   Present on Hospital Admission: Yes  Wound Approximate Age at First Assessment (Weeks): 56 weeks  Primary Wound Type: Arterial Ulcer  Location: Leg lower  Wound Location Orientation: Right   Wound Image    Wound Etiology Traumatic   Dressing Status Old drainage noted   Cleansed Other (Comment)  (not cleaned. Skin sub still in place.)   Wound Length (cm)    (unable to measure, skin sub still in place.)   Wound Assessment Other (Comment)  (unable to assess, skin sub in place.)   Drainage Amount Moderate   Drainage Description Serosanguinous   Wound Odor None   Flora-Wound/Incision Assessment Blanchable erythema; Other (Comment); Dry/flaky  (scabbed areas)   Edges Defined edges   Wound Thickness Description Full thickness   Pain 1   Pain Scale 1 Numeric (0 - 10)   Pain Intensity 1 1   Pain Location 1 Leg   Pain Orientation 1 Right   Pain Description 1 Aching   Pain Intervention(s) 1 Emotional support     Debridement Wound Care        Problem List Items Addressed This Visit          Other    Leg ulcer, right, with fat layer exposed ([address] - Primary    Relevant Medications    ciprofloxacin HCl (CIPRO) 500 mg tablet    Other Relevant Orders    INITIATE OUTPATIENT WOUND CARE PROTOCOL       Procedure Note  Indications:  Based on my examination of this patient's wound(s)/ulcer(s) today, debridement is required to promote healing and evaluate the wound base.     Performed by: Raheel Borden DPM    Consent obtained: Yes    Time out taken: Yes    Debridement: Excisional    Using # 15 blade scalpel the wound(s)/ulcer(s) was/were sharply debrided down through and including the removal of    epidermis, dermis, and subcutaneous tissue    Devitalized Tissue Debrided: fibrin, biofilm, slough, and exudate    Pre Debridement Measurements:  Are located in the [place]  Documentation Flow Sheet    Non-Pressure ulcer, fat layer exposed    Wound/Ulcer #: 1    Post Debridement Measurements:  Wound/Ulcer Descriptions are Pre Debridement except measurements:    Wound Leg lower Right (Active)   Wound Image   08/26/22 1129   Wound Etiology Traumatic 08/26/22 1129   Dressing Status Old drainage noted 08/26/22 1129   Cleansed Other (Comment) 08/26/22 1129   Wound Length (cm) 4 cm 08/19/22 1055   Wound Width (cm) 3 cm 08/19/22 1055   Wound Depth (cm) 0.1 cm 08/19/22 1055   Wound Surface Area (cm^2) 12 cm^2 08/19/22 1055   Change in Wound Size % -4.62 08/19/22 1055   Wound Volume (cm^3) 1.2 cm^3 08/19/22 1055   Wound Healing % -5 08/19/22 1055   Post-Procedure Width (cm) 2.5 cm 08/19/22 1055   Wound Assessment Other (Comment) 08/26/22 1129   Drainage Amount Moderate 08/26/22 1129   Drainage Description Serosanguinous 08/26/22 1129   Wound Odor None 08/26/22 1129   Flora-Wound/Incision Assessment Blanchable erythema; Other (Comment); Dry/flaky 08/26/22 1129   Edges Defined edges 08/26/22 1129   Wound Thickness Description Full thickness 08/26/22 1129   Number of days: 42        Total Surface Area Debrided:  10 sq cm     Estimated Blood Loss:  Minimal     Hemostasis Achieved: Pressure    Procedural Pain: 0 / 10     Post Procedural Pain: 0 / 10     Response to treatment: Well tolerated by patient       Assessment:   Right anterior leg ulcer due to previous trauma    Plan:   -Discussed with patient that I am concerned that she took off her outer dressing and possibly contaminated the area by placing hydrocortisone cream in the graft area and irritating the skin area.  Area shows signs of possible infection.   -Culture taken of wound area  -Patient started on Cipro  -Wound debridement as noted above  -Will hold off on skin substitute application until next week until skin irritation resolved and patient completes antibiotics.  Will follow-up results of wound cultures.  -Angela dsd q3d.  -Follow-up 1 week

## 2022-08-26 NOTE — DISCHARGE INSTRUCTIONS
Discharge Instructions for          [address], [address]  Phone: [phone] Fax: [phone]    NAME:  Michaela Nevarez  YOB: 1950  MEDICAL RECORD NUMBER:  [id number]  DATE:  August 26, 2022  WOUND CARE ORDERS:  Right lower leg - Cleanse with saline, rinse and pat dry. Apply gentian violet to sera wound in clinic. Apply alda to wound bed. Cover with gauze. Secure with roll gauze and tape. Change every 2 days. TREATMENT ORDERS:    Elevate leg(s) above the level of the heart when sitting. Avoid prolonged standing in one place. Do no get dressing/wrap wet. Follow Diet as prescribed:   [] Diet as tolerated: [] Calorie Diabetic Diet: Low carb and no Sugar [x] No Added Salt:  [x] Increase Protein: [] Limit the amount of liquid you are drinking and avoid drinking in between meals   Return Appointment:  [x] Return Appointment: With Dr. Kirill Hairston in 1 week. [] Nurse Visit : *** days  [] Ordered tests:    Electronically signed Cristal Villar RN on 8/26/2022 at 11:49 AM     [address] [facility] Information: Should you experience any significant changes in your wound(s) or have questions about your wound care, please contact the [address] [place] [address] at [address] [place] [address] 8:00 am - 4:30. If you need help with your wound outside these hours and cannot wait until we are again available, contact your PCP or go to the hospital emergency room. PLEASE NOTE: IF YOU ARE UNABLE TO OBTAIN WOUND SUPPLIES, CONTINUE TO USE THE SUPPLIES YOU HAVE AVAILABLE UNTIL YOU ARE ABLE TO REACH US. IT IS MOST IMPORTANT TO KEEP THE WOUND COVERED AT ALL TIMES.      Physician Signature:_______________________    Date: ___________ Time:  ____________

## 2022-08-26 NOTE — PROGRESS NOTES
Occupational Therapy:    Chart reviewed and attempted to see for OT session, however patient off the floor at this time. Of note, will be going to OR tomorrow for surgical debridement. Will continue to follow up and attempt as able.       Ariel Harkins, OT no lesions, no deformities, no traumatic injuries, no significant scars are present, chest wall non-tender, no masses present, tactile fremitus is normal, breathing is unlabored without accessory muscle use., normal breath sounds. , chest wall non-tender, breathing is unlabored without accessory muscle use, normal breath sounds

## 2022-08-28 LAB
BACTERIA SPEC CULT: NORMAL
SERVICE CMNT-IMP: NORMAL

## 2022-08-29 LAB
BACTERIA SPEC CULT: ABNORMAL
BACTERIA SPEC CULT: ABNORMAL
GRAM STN SPEC: ABNORMAL
SERVICE CMNT-IMP: ABNORMAL

## 2022-09-02 ENCOUNTER — HOSPITAL ENCOUNTER (OUTPATIENT)
Dept: WOUND CARE | Age: 72
Discharge: HOME | End: 2022-09-02
Payer: MEDICARE

## 2022-09-02 VITALS
HEART RATE: 76 BPM | RESPIRATION RATE: 18 BRPM | TEMPERATURE: 97.8 F | SYSTOLIC BLOOD PRESSURE: 133 MMHG | DIASTOLIC BLOOD PRESSURE: 68 MMHG

## 2022-09-02 DIAGNOSIS — L97.912 LEG ULCER, RIGHT, WITH FAT LAYER EXPOSED (HCC): Primary | ICD-10-CM

## 2022-09-02 PROCEDURE — 15271 SKIN SUB GRAFT TRNK/ARM/LEG: CPT

## 2022-09-02 RX ORDER — LIDOCAINE HYDROCHLORIDE 20 MG/ML
JELLY TOPICAL ONCE
Status: CANCELLED | OUTPATIENT
Start: 2022-09-02 | End: 2022-09-02

## 2022-09-02 RX ORDER — CLOBETASOL PROPIONATE 0.5 MG/G
OINTMENT TOPICAL ONCE
Status: CANCELLED | OUTPATIENT
Start: 2022-09-02 | End: 2022-09-02

## 2022-09-02 RX ORDER — SILVER SULFADIAZINE 10 G/1000G
CREAM TOPICAL ONCE
Status: CANCELLED | OUTPATIENT
Start: 2022-09-02 | End: 2022-09-02

## 2022-09-02 RX ORDER — MUPIROCIN 20 MG/G
OINTMENT TOPICAL ONCE
Status: CANCELLED | OUTPATIENT
Start: 2022-09-02 | End: 2022-09-02

## 2022-09-02 RX ORDER — LIDOCAINE 50 MG/G
OINTMENT TOPICAL ONCE
Status: CANCELLED | OUTPATIENT
Start: 2022-09-02 | End: 2022-09-02

## 2022-09-02 RX ORDER — TRIAMCINOLONE ACETONIDE 1 MG/G
OINTMENT TOPICAL ONCE
Status: CANCELLED | OUTPATIENT
Start: 2022-09-02 | End: 2022-09-02

## 2022-09-02 RX ORDER — LIDOCAINE 40 MG/G
CREAM TOPICAL ONCE
Status: CANCELLED | OUTPATIENT
Start: 2022-09-02 | End: 2022-09-02

## 2022-09-02 RX ORDER — BACITRACIN ZINC AND POLYMYXIN B SULFATE 500; 1000 [USP'U]/G; [USP'U]/G
OINTMENT TOPICAL ONCE
Status: CANCELLED | OUTPATIENT
Start: 2022-09-02 | End: 2022-09-02

## 2022-09-02 RX ORDER — GENTAMICIN SULFATE 1 MG/G
OINTMENT TOPICAL ONCE
Status: CANCELLED | OUTPATIENT
Start: 2022-09-02 | End: 2022-09-02

## 2022-09-02 RX ORDER — BACITRACIN 500 [USP'U]/G
OINTMENT TOPICAL ONCE
Status: CANCELLED | OUTPATIENT
Start: 2022-09-02 | End: 2022-09-02

## 2022-09-02 RX ORDER — LIDOCAINE HYDROCHLORIDE 40 MG/ML
SOLUTION TOPICAL ONCE
Status: CANCELLED | OUTPATIENT
Start: 2022-09-02 | End: 2022-09-02

## 2022-09-02 RX ORDER — BETAMETHASONE DIPROPIONATE 0.5 MG/G
OINTMENT TOPICAL ONCE
Status: CANCELLED | OUTPATIENT
Start: 2022-09-02 | End: 2022-09-02

## 2022-09-02 NOTE — WOUND CARE
09/02/22 1044   Right Leg Edema Point of Measurement   Leg circumference 32 cm   Ankle circumference 23.6 cm   Left Leg Edema Point of Measurement   Leg circumference 34.3 cm   Ankle circumference 25.6 cm   RLE Peripheral Vascular    Capillary Refill Less than/equal to 3 seconds   Color Appropriate for race   Temperature Warm   Sensation Present   Pedal Pulse Palpable   Wound Leg lower Right   Date First Assessed/Time First Assessed: 07/15/22 1042   Present on Hospital Admission: Yes  Wound Approximate Age at First Assessment (Weeks): 56 weeks  Primary Wound Type: Arterial Ulcer  Location: Leg lower  Wound Location Orientation: Right   Wound Image    Wound Etiology Traumatic   Dressing Status Old drainage noted   Cleansed Soap and water;Cleansed with saline   Wound Length (cm) 4.4 cm   Wound Width (cm) 2.4 cm   Wound Depth (cm) 0.1 cm   Wound Surface Area (cm^2) 10.56 cm^2   Change in Wound Size % 7.93   Wound Volume (cm^3) 1. 056 cm^3   Wound Healing % 8   Wound Assessment Pink/red   Drainage Amount Moderate   Drainage Description Serosanguinous   Wound Odor None   Flora-Wound/Incision Assessment Blanchable erythema   Edges Defined edges   Wound Thickness Description Full thickness   Visit Vitals  /68 (BP 1 Location: Left upper arm)   Pulse 76   Temp 97.8 °F (36.6 °C)   Resp 18

## 2022-09-02 NOTE — PROGRESS NOTES
Patient presents to wound clinic for: Faina Mccoy is a 66 y.o. female who presents for wound care of the following: right anterior leg ulcer. She notes that she had it surgically debrided and had been using a wound vac. She notes that there has been tremendous progress in resolution in terms of the depth of the wound. However, lately progress of the wound has slowed. She has been referred her for further treatment by her surgeon. Update 9/2/22: Patient presents today for follow-up. Notes that she has been taking her cipro, but it has been irritating her stomach. Otherwise, her leg has been feeling much better this week. In much better spirits compared to last week. Pertinent Medical History:  Past Medical History:   Diagnosis Date    Atrial fibrillation ([facility] 7/15/2011    Depression     HTN (hypertension) 7/14/2011    Paroxysmal atrial fibrillation (HCC)     SIADH (syndrome of inappropriate ADH production) ([facility] 1/14/2022    Thyroid ca ([facility] 2005    Papillary    Unspecified hypothyroidism 7/15/2011     Past Surgical History:   Procedure Laterality Date    ENDOSCOPY, COLON, DIAGNOSTIC  2003    neg. rep 10 yrs. [place] THYROIDECTOMY  11/05     Prior to Admission medications    Medication Sig Start Date End Date Taking? Authorizing Provider   levothyroxine (SYNTHROID) 175 mcg tablet TAKE 1 TABLET BY MOUTH EVERY DAY BEFORE BREAKFAST 8/21/22  Yes Que Lopez MD   diazePAM (VALIUM) 2 mg tablet TAKE 1 TABLET BY MOUTH EVERY DAY AS NEEDED FOR ANXIETY 8/8/22  Yes Que Lopez MD   diazePAM (VALIUM) 2 mg tablet Take 1 Tablet by mouth every six (6) hours as needed for Anxiety for up to 20 doses.  Max Daily Amount: 8 mg. 7/6/22  Yes Valente Phillips MD   Xarelto 20 mg tab tablet TAKE 1 TABLET BY MOUTH EVERY DAY 7/3/22  Yes Que Lopez MD   ondansetron (ZOFRAN ODT) 4 mg disintegrating tablet TAKE 1 TABLET BY MOUTH FOUR (4) TIMES DAILY AS NEEDED FOR NAUSEA OR VOMITING. 7/3/22  Yes Arash Herring MD   butalbital-acetaminophen-caffeine (FIORICET, ESGIC) -40 mg per tablet TAKE 1 TO 2 TABLETS BY MOUTH EVERY 6 HOURS AS NEEDED FOR HEADACHE 6/15/22  Yes Arash Herring MD   pregabalin (LYRICA) 100 mg capsule Take 1 Capsule by mouth two (2) times a day. Max Daily Amount: 200 mg. 6/13/22  Yes Juli Chavez MD   butalbital-acetaminophen-caffeine [facility]) -40 mg per tablet Take 1 Tablet by mouth every six (6) hours as needed for Headache. 6/13/22  Yes Juli Chavez MD   traZODone (DESYREL) 50 mg tablet Take 1 Tablet by mouth nightly. 5/17/22  Yes Arash Herring MD   metoprolol succinate (TOPROL-XL) 50 mg XL tablet TAKE 1 TABLET BY MOUTH AFTER DINNER 2/10/22  Yes Arash Herring MD   sacubitriL-valsartan Luane Hurst) 49-51 mg tab tablet Take 1 Tablet by mouth two (2) times a day. Yes Provider, Historical   bumetanide (BUMEX) 1 mg tablet Take 1.5 Tablets by mouth daily. 1/14/22  Yes Arash Herring MD   ciprofloxacin HCl (CIPRO) 500 mg tablet Take 1 Tablet by mouth two (2) times a day for 7 days. Patient not taking: Reported on 9/2/2022 8/26/22 9/2/22  Shu Means DPM   oxyCODONE-acetaminophen (PERCOCET) 5-325 mg per tablet TAKE 1 TABLET BY MOUTH EVERY 4 HOURS AS NEEDED FOR PAIN FOR UP TO 3 DAYS. MAX 6 TABS DAILY.   Patient not taking: No sig reported 6/13/22   Provider, Historical     Allergies   Allergen Reactions    Penicillins Unknown (comments)     Patient reports allergy to penicillin with unknown reaction, but states she has tolerated amoxicillin    Opioids - Morphine Analogues Itching and Nausea and Vomiting    Percocet [Oxycodone-Acetaminophen] Rash     Per patient     Family History   Problem Relation Age of Onset    Cancer Mother         lung     Social History     Socioeconomic History    Marital status:      Spouse name: Not on file    Number of children: Not on file    Years of education: Not on file    Highest education level: Not on file   Occupational History    Not on file   Tobacco Use    Smoking status: Never    Smokeless tobacco: Never   Substance and Sexual Activity    Alcohol use: Yes     Comment: soc    Drug use: No    Sexual activity: Not on file   Other Topics Concern     Service Not Asked    Blood Transfusions Not Asked    Caffeine Concern Not Asked    Occupational Exposure Not Asked    Hobby Hazards Not Asked    Sleep Concern Not Asked    Stress Concern Not Asked    Weight Concern Not Asked    Special Diet Not Asked    Back Care Not Asked    Exercise Not Asked    Bike Helmet Not Asked    Seat Belt Not Asked    Self-Exams Not Asked   Social History Narrative    Not on file     Social Determinants of Health     Financial Resource Strain: Not on file   Food Insecurity: Not on file   Transportation Needs: Not on file   Physical Activity: Not on file   Stress: Not on file   Social Connections: Not on file   Intimate Partner Violence: Not on file   Housing Stability: Not on file       Vitals:    09/02/22 1043   BP: 133/68   Pulse: 76   Resp: 18   Temp: 97.8 °F (36.6 °C)       Review of Systems:    Gen: No fever, chills, malaise, weight loss/gain. Heent: No headache, rhinorrhea, epistaxis, ear pain, hearing loss, sinus pain, neck pain/stiffness, sore throat. Heart: No chest pain, palpitations, HERNDON, pnd, or orthopnea. Resp: No cough, hemoptysis, wheezing and shortness of breath. GI: No nausea, vomiting, diarrhea, constipation, melena or hematochezia. : No urinary obstruction, dysuria or hematuria. Derm: see below  Musc/skeletal: no bone or joint complains. Vasc: No edema, cyanosis or claudication. Endo: No heat/cold intolerance, no polyuria,polydipsia or polyphagia. Neuro: No unilateral weakness, numbness, tingling. No seizures. Heme: No easy bruising or bleeding.       09/02/22 1044   Right Leg Edema Point of Measurement   Leg circumference 32 cm   Ankle circumference 23.6 cm   Left Leg Edema Point of Measurement   Leg circumference 34.3 cm   Ankle circumference 25.6 cm   RLE Peripheral Vascular    Capillary Refill Less than/equal to 3 seconds   Color Appropriate for race   Temperature Warm   Sensation Present   Pedal Pulse Palpable   Wound Leg lower Right   Date First Assessed/Time First Assessed: 07/15/22 1042   Present on Hospital Admission: Yes  Wound Approximate Age at First Assessment (Weeks): 56 weeks  Primary Wound Type: Arterial Ulcer  Location: Leg lower  Wound Location Orientation: Right   Wound Image    Wound Etiology Traumatic   Dressing Status Old drainage noted   Cleansed Soap and water;Cleansed with saline   Wound Length (cm) 4.4 cm   Wound Width (cm) 2.4 cm   Wound Depth (cm) 0.1 cm   Wound Surface Area (cm^2) 10.56 cm^2   Change in Wound Size % 7.93   Wound Volume (cm^3) 1. 056 cm^3   Wound Healing % 8   Wound Assessment Pink/red   Drainage Amount Moderate   Drainage Description Serosanguinous   Wound Odor None   Flora-Wound/Incision Assessment Blanchable erythema   Edges Defined edges   Wound Thickness Description Full thickness     Debridement Wound Care        Problem List Items Addressed This Visit          Other    Leg ulcer, right, with fat layer exposed ([facility] - Primary       Procedure Note  Indications:  Based on my examination of this patient's wound(s)/ulcer(s) today, debridement is required to promote healing and evaluate the wound base.     Performed by: Candy Lockhart DPM    Consent obtained: Yes    Time out taken: Yes    Debridement: Excisional    Using # 15 blade scalpel the wound(s)/ulcer(s) was/were sharply debrided down through and including the removal of    epidermis, dermis, and subcutaneous tissue    Devitalized Tissue Debrided: fibrin, biofilm, slough, and exudate    Pre Debridement Measurements:  Are located in the [place]  Documentation Flow Sheet    Diabetic ulcer, fat layer exposed    Wound/Ulcer #: 1    Post Debridement Measurements:  Wound/Ulcer Descriptions are Pre Debridement except measurements:    Wound Leg lower Right (Active)   Wound Image   09/02/22 1044   Wound Etiology Traumatic 09/02/22 1044   Dressing Status Old drainage noted 09/02/22 1044   Cleansed Soap and water;Cleansed with saline 09/02/22 1044   Wound Length (cm) 4.4 cm 09/02/22 1044   Wound Width (cm) 2.4 cm 09/02/22 1044   Wound Depth (cm) 0.1 cm 09/02/22 1044   Wound Surface Area (cm^2) 10.56 cm^2 09/02/22 1044   Change in Wound Size % 7.93 09/02/22 1044   Wound Volume (cm^3) 1. 056 cm^3 09/02/22 1044   Wound Healing % 8 09/02/22 1044   Post-Procedure Width (cm) 2.5 cm 08/19/22 1055   Wound Assessment Pink/red 09/02/22 1044   Drainage Amount Moderate 09/02/22 1044   Drainage Description Serosanguinous 09/02/22 1044   Wound Odor None 09/02/22 1044   Flora-Wound/Incision Assessment Blanchable erythema 09/02/22 1044   Edges Defined edges 09/02/22 1044   Wound Thickness Description Full thickness 09/02/22 1044   Number of days: 49        Total Surface Area Debrided:  10.56 sq cm     Estimated Blood Loss:  Minimal     Hemostasis Achieved: Pressure    Procedural Pain: 0 / 10     Post Procedural Pain: 0 / 10     Response to treatment: Well tolerated by patient     Assessment:   Right anterior leg ulcer due to previous trauma    Plan:   -Finish Cipro  -Wound debridement as noted above  -Cultures grew MRSA and stenotrophomonas maltophilia  -Will resume skin substitute today. Today is 5th of 10 applications.   -Patient to keep dressing intact until her next appointment. May change outer gauze as needed due to drainage. Discussed increased drainage from ulcer is to be expected.   -Follow-up 1 week

## 2022-09-02 NOTE — DISCHARGE INSTRUCTIONS
Discharge Instructions/Wound Orders  [place] [address] [facility], [address]  Telephone: [phone]    NAME:  Anand Whyte OF BIRTH:  1950  MEDICAL RECORD NUMBER:  [id number]  DATE:  September 2, 2022  Wound Care Orders:  Right lower leg - Cleanse with saline, rinse and pat dry. Apply skin prep to sera wound. Apply Puraply AM to wound bed. Cover with non-adherent dressing and secure with steri strips. Cover with gauze. Secure with roll gauze and tape. Leave everything on under the steri strips in place. You may change outer gauze dressing as needed due to drainage. Dietary:  [x] Diet as tolerated: [] Calorie Diabetic Diet:Low carb and no Sugar [x] No Added Salt:[x] Increase Protein: [] Other:Limit the amount of liquid you are drinking and avoid drinking in between meals   Activity:  [x] Activity as tolerated:  [] Patient has no activity restrictions     [] Strict Bedrest: [] Remain off Work:     [] May return to full duty work:                                   [] Return to work with restrictions:   Return Appointment:  [x] Return Appointment: With Dr. Monroe Roman in 1 week. [] Ordered tests:    Electronically signed Abigail Perry RN on 9/2/2022 at 11:09 AM     [address] [place] [address] Information: Should you experience any significant changes in your wound(s) or have questions about your wound care, please contact the [address] [place] [address] at [address] [place] [address] 8:00 am - 4:30. If you need help with your wound outside these hours and cannot wait until we are again available, contact your PCP or go to the hospital emergency room. PLEASE NOTE: IF YOU ARE UNABLE TO OBTAIN WOUND SUPPLIES, CONTINUE TO USE THE SUPPLIES YOU HAVE AVAILABLE UNTIL YOU ARE ABLE TO REACH US. IT IS MOST IMPORTANT TO KEEP THE WOUND COVERED AT ALL TIMES.      Physician Signature:_______________________    Date: ___________ Time:  ____________

## 2022-09-02 NOTE — WOUND CARE
09/02/22 1151   Wound Leg lower Right   Date First Assessed/Time First Assessed: 07/15/22 1042   Present on Hospital Admission: Yes  Wound Approximate Age at First Assessment (Weeks): 56 weeks  Primary Wound Type: Arterial Ulcer  Location: Leg lower  Wound Location Orientation: Right   Dressing Status New dressing applied   Dressing/Treatment Gauze dressing/dressing sponge;Roll gauze;Tape change

## 2022-09-09 ENCOUNTER — HOSPITAL ENCOUNTER (OUTPATIENT)
Dept: WOUND CARE | Age: 72
Discharge: HOME | End: 2022-09-09
Payer: MEDICARE

## 2022-09-09 VITALS — DIASTOLIC BLOOD PRESSURE: 83 MMHG | TEMPERATURE: 97.6 F | SYSTOLIC BLOOD PRESSURE: 135 MMHG | HEART RATE: 61 BPM

## 2022-09-09 DIAGNOSIS — L97.912 LEG ULCER, RIGHT, WITH FAT LAYER EXPOSED (HCC): Primary | ICD-10-CM

## 2022-09-09 PROCEDURE — 15271 SKIN SUB GRAFT TRNK/ARM/LEG: CPT

## 2022-09-09 RX ORDER — BETAMETHASONE DIPROPIONATE 0.5 MG/G
OINTMENT TOPICAL ONCE
Status: CANCELLED | OUTPATIENT
Start: 2022-09-09 | End: 2022-09-09

## 2022-09-09 RX ORDER — BACITRACIN 500 [USP'U]/G
OINTMENT TOPICAL ONCE
Status: CANCELLED | OUTPATIENT
Start: 2022-09-09 | End: 2022-09-09

## 2022-09-09 RX ORDER — BACITRACIN ZINC AND POLYMYXIN B SULFATE 500; 1000 [USP'U]/G; [USP'U]/G
OINTMENT TOPICAL ONCE
Status: CANCELLED | OUTPATIENT
Start: 2022-09-09 | End: 2022-09-09

## 2022-09-09 RX ORDER — CLOBETASOL PROPIONATE 0.5 MG/G
OINTMENT TOPICAL ONCE
Status: CANCELLED | OUTPATIENT
Start: 2022-09-09 | End: 2022-09-09

## 2022-09-09 RX ORDER — LIDOCAINE 40 MG/G
CREAM TOPICAL ONCE
Status: CANCELLED | OUTPATIENT
Start: 2022-09-09 | End: 2022-09-09

## 2022-09-09 RX ORDER — LIDOCAINE 50 MG/G
OINTMENT TOPICAL ONCE
Status: CANCELLED | OUTPATIENT
Start: 2022-09-09 | End: 2022-09-09

## 2022-09-09 RX ORDER — LIDOCAINE HYDROCHLORIDE 40 MG/ML
SOLUTION TOPICAL ONCE
Status: CANCELLED | OUTPATIENT
Start: 2022-09-09 | End: 2022-09-09

## 2022-09-09 RX ORDER — MUPIROCIN 20 MG/G
OINTMENT TOPICAL ONCE
Status: CANCELLED | OUTPATIENT
Start: 2022-09-09 | End: 2022-09-09

## 2022-09-09 RX ORDER — LIDOCAINE HYDROCHLORIDE 20 MG/ML
JELLY TOPICAL ONCE
Status: CANCELLED | OUTPATIENT
Start: 2022-09-09 | End: 2022-09-09

## 2022-09-09 RX ORDER — SILVER SULFADIAZINE 10 G/1000G
CREAM TOPICAL ONCE
Status: CANCELLED | OUTPATIENT
Start: 2022-09-09 | End: 2022-09-09

## 2022-09-09 RX ORDER — GENTAMICIN SULFATE 1 MG/G
OINTMENT TOPICAL ONCE
Status: CANCELLED | OUTPATIENT
Start: 2022-09-09 | End: 2022-09-09

## 2022-09-09 RX ORDER — TRIAMCINOLONE ACETONIDE 1 MG/G
OINTMENT TOPICAL ONCE
Status: CANCELLED | OUTPATIENT
Start: 2022-09-09 | End: 2022-09-09

## 2022-09-09 NOTE — DISCHARGE INSTRUCTIONS
Discharge Instructions for          [place] [address] 1200 [facility], [address]  Phone: [phone] Fax: [phone]    NAME:  Dunia Pearson  YOB: 1950  MEDICAL RECORD NUMBER:  [id number]  DATE:  September 9, 2022  WOUND CARE ORDERS:  Right lower leg - Cleanse with saline, rinse and pat dry. Apply skin prep to sera wound. Apply Puraply AM to wound bed. Cover with non-adherent dressing and secure with steri strips. Cover with gauze. Secure with roll gauze and tape. Leave everything on under the steri strips in place. You may change outer gauze dressing as needed due to drainage. TREATMENT ORDERS:    Elevate leg(s) above the level of the heart when sitting. Avoid prolonged standing in one place. Do no get dressing/wrap wet. Follow Diet as prescribed:   [] Diet as tolerated: [] Calorie Diabetic Diet: Low carb and no Sugar [x] No Added Salt:  [x] Increase Protein: [] Limit the amount of liquid you are drinking and avoid drinking in between meals   Return Appointment:  [x] Return Appointment: With Dr. Marium Vasquez in 1 week. [] Nurse Visit : *** days  [] Ordered tests:    Electronically signed Jose Santiago RN on 9/9/2022 at 12:15 PM     [address] [facility] Information: Should you experience any significant changes in your wound(s) or have questions about your wound care, please contact the [address] [place] [address] at [address] [place] [address] 8:00 am - 4:30. If you need help with your wound outside these hours and cannot wait until we are again available, contact your PCP or go to the hospital emergency room. PLEASE NOTE: IF YOU ARE UNABLE TO OBTAIN WOUND SUPPLIES, CONTINUE TO USE THE SUPPLIES YOU HAVE AVAILABLE UNTIL YOU ARE ABLE TO REACH US. IT IS MOST IMPORTANT TO KEEP THE WOUND COVERED AT ALL TIMES.      Physician Signature:_______________________    Date: ___________ Time:  ____________

## 2022-09-09 NOTE — WOUND CARE
09/09/22 1201   RLE Peripheral Vascular    Capillary Refill Less than/equal to 3 seconds   Color Appropriate for race   Temperature Warm   Sensation Present   Pedal Pulse Palpable   Wound Leg lower Right   Date First Assessed/Time First Assessed: 07/15/22 1042   Present on Hospital Admission: Yes  Wound Approximate Age at First Assessment (Weeks): 56 weeks  Primary Wound Type: Arterial Ulcer  Location: Leg lower  Wound Location Orientation: Right   Wound Etiology Traumatic   Dressing Status Clean;Dry; Intact   Cleansed Soap and water   Wound Length (cm) 4 cm   Wound Width (cm) 2.2 cm   Wound Depth (cm) 0.1 cm   Wound Surface Area (cm^2) 8.8 cm^2   Change in Wound Size % 23.28   Wound Volume (cm^3) 0.88 cm^3   Wound Healing % 23   Wound Assessment Pink/red   Drainage Amount Moderate   Drainage Description Serosanguinous;Green   Wound Odor None   Flora-Wound/Incision Assessment Blanchable erythema   Edges Defined edges   Wound Thickness Description Full thickness

## 2022-09-09 NOTE — PROGRESS NOTES
Patient presents to wound clinic for: Bogdan Phillips is a 66 y.o. female who presents for wound care of the following: right anterior leg ulcer. She notes that she had it surgically debrided and had been using a wound vac. She notes that there has been tremendous progress in resolution in terms of the depth of the wound. However, lately progress of the wound has slowed. She has been referred her for further treatment by her surgeon. Update 9/9/22: Patient presents today for follow-up. Notes that she has been itching her some of the skin outside of her dressing and pouring hydrogen peroxide on it. Pertinent Medical History:  Past Medical History:   Diagnosis Date    Atrial fibrillation ([facility] 7/15/2011    Depression     HTN (hypertension) 7/14/2011    Paroxysmal atrial fibrillation (HCC)     SIADH (syndrome of inappropriate ADH production) ([facility] 1/14/2022    Thyroid ca ([facility] 2005    Papillary    Unspecified hypothyroidism 7/15/2011     Past Surgical History:   Procedure Laterality Date    ENDOSCOPY, COLON, DIAGNOSTIC  2003    neg. rep 10 yrs. [place] THYROIDECTOMY  11/05     Prior to Admission medications    Medication Sig Start Date End Date Taking? Authorizing Provider   levothyroxine (SYNTHROID) 175 mcg tablet TAKE 1 TABLET BY MOUTH EVERY DAY BEFORE BREAKFAST 8/21/22   Cliff Tavarez MD   diazePAM (VALIUM) 2 mg tablet TAKE 1 TABLET BY MOUTH EVERY DAY AS NEEDED FOR ANXIETY 8/8/22   Cliff Tavarez MD   diazePAM (VALIUM) 2 mg tablet Take 1 Tablet by mouth every six (6) hours as needed for Anxiety for up to 20 doses.  Max Daily Amount: 8 mg. 7/6/22   Terrell Herrera MD   Xarelto 20 mg tab tablet TAKE 1 TABLET BY MOUTH EVERY DAY 7/3/22   Cliff Tavarez MD   ondansetron (ZOFRAN ODT) 4 mg disintegrating tablet TAKE 1 TABLET BY MOUTH FOUR (4) TIMES DAILY AS NEEDED FOR NAUSEA OR VOMITING. 7/3/22   Cliff Tavarez MD   oxyCODONE-acetaminophen (PERCOCET) 5-325 mg per tablet TAKE 1 TABLET BY MOUTH EVERY 4 HOURS AS NEEDED FOR PAIN FOR UP TO 3 DAYS. MAX 6 TABS DAILY. Patient not taking: No sig reported 6/13/22   Provider, Historical   butalbital-acetaminophen-caffeine (FIORICET, ESGIC) -40 mg per tablet TAKE 1 TO 2 TABLETS BY MOUTH EVERY 6 HOURS AS NEEDED FOR HEADACHE 6/15/22   Lowell Rouse MD   pregabalin (LYRICA) 100 mg capsule Take 1 Capsule by mouth two (2) times a day. Max Daily Amount: 200 mg. 6/13/22   Keyon Benitez MD   butalbital-acetaminophen-caffeine [facility]) -40 mg per tablet Take 1 Tablet by mouth every six (6) hours as needed for Headache. 6/13/22   Keyon Benitez MD   traZODone (DESYREL) 50 mg tablet Take 1 Tablet by mouth nightly. 5/17/22   Lowell Rouse MD   metoprolol succinate (TOPROL-XL) 50 mg XL tablet TAKE 1 TABLET BY MOUTH AFTER DINNER 2/10/22   Lowell Rouse MD   sacubitriL-valsartan Ana Rosadinorah Hubbard) 49-51 mg tab tablet Take 1 Tablet by mouth two (2) times a day. Provider, Historical   bumetanide (BUMEX) 1 mg tablet Take 1.5 Tablets by mouth daily.  1/14/22   Lowell Rouse MD     Allergies   Allergen Reactions    Penicillins Unknown (comments)     Patient reports allergy to penicillin with unknown reaction, but states she has tolerated amoxicillin    Opioids - Morphine Analogues Itching and Nausea and Vomiting    Percocet [Oxycodone-Acetaminophen] Rash     Per patient     Family History   Problem Relation Age of Onset    Cancer Mother         lung     Social History     Socioeconomic History    Marital status:      Spouse name: Not on file    Number of children: Not on file    Years of education: Not on file    Highest education level: Not on file   Occupational History    Not on file   Tobacco Use    Smoking status: Never    Smokeless tobacco: Never   Substance and Sexual Activity    Alcohol use: Yes     Comment: soc    Drug use: No    Sexual activity: Not on file   Other Topics Concern     Service Not Asked    Blood Transfusions Not Asked    Caffeine Concern Not Asked    Occupational Exposure Not Asked    Hobby Hazards Not Asked    Sleep Concern Not Asked    Stress Concern Not Asked    Weight Concern Not Asked    Special Diet Not Asked    Back Care Not Asked    Exercise Not Asked    Bike Helmet Not Asked    Seat Belt Not Asked    Self-Exams Not Asked   Social History Narrative    Not on file     Social Determinants of Health     Financial Resource Strain: Not on file   Food Insecurity: Not on file   Transportation Needs: Not on file   Physical Activity: Not on file   Stress: Not on file   Social Connections: Not on file   Intimate Partner Violence: Not on file   Housing Stability: Not on file       Vitals:    09/09/22 1157   BP: 135/83   Pulse: 61   Temp: 97.6 °F (36.4 °C)       Review of Systems:    Gen: No fever, chills, malaise, weight loss/gain. Heent: No headache, rhinorrhea, epistaxis, ear pain, hearing loss, sinus pain, neck pain/stiffness, sore throat. Heart: No chest pain, palpitations, HERNDON, pnd, or orthopnea. Resp: No cough, hemoptysis, wheezing and shortness of breath. GI: No nausea, vomiting, diarrhea, constipation, melena or hematochezia. : No urinary obstruction, dysuria or hematuria. Derm: see below  Musc/skeletal: no bone or joint complains. Vasc: No edema, cyanosis or claudication. Endo: No heat/cold intolerance, no polyuria,polydipsia or polyphagia. Neuro: No unilateral weakness, numbness, tingling. No seizures. Heme: No easy bruising or bleeding.          09/09/22 1201   RLE Peripheral Vascular    Capillary Refill Less than/equal to 3 seconds   Color Appropriate for race   Temperature Warm   Sensation Present   Pedal Pulse Palpable   Wound Leg lower Right   Date First Assessed/Time First Assessed: 07/15/22 1042   Present on Hospital Admission: Yes  Wound Approximate Age at First Assessment (Weeks): 56 weeks  Primary Wound Type: Arterial Ulcer  Location: Leg lower Wound Location Orientation: Right   Wound Etiology Traumatic   Dressing Status Clean;Dry; Intact   Cleansed Soap and water   Wound Length (cm) 4 cm   Wound Width (cm) 2.2 cm   Wound Depth (cm) 0.1 cm   Wound Surface Area (cm^2) 8.8 cm^2   Change in Wound Size % 23.28   Wound Volume (cm^3) 0.88 cm^3   Wound Healing % 23   Wound Assessment Pink/red   Drainage Amount Moderate   Drainage Description Serosanguinous;Green   Wound Odor None   Flora-Wound/Incision Assessment Blanchable erythema   Edges Defined edges   Wound Thickness Description Full thickness       Debridement Wound Care        Problem List Items Addressed This Visit          Other    Leg ulcer, right, with fat layer exposed ([facility] - Primary    Relevant Orders    INITIATE OUTPATIENT WOUND CARE PROTOCOL       Procedure Note  Indications:  Based on my examination of this patient's wound(s)/ulcer(s) today, debridement is required to promote healing and evaluate the wound base. Performed by: Richard Bland DPM    Consent obtained: Yes    Time out taken: Yes    Debridement: Excisional    Using curette the wound(s)/ulcer(s) was/were sharply debrided down through and including the removal of    dermis    Devitalized Tissue Debrided: biofilm, slough, and exudate    Pre Debridement Measurements:  Are located in the [place]  Documentation Flow Sheet    Other: traumatic    Wound/Ulcer #: 3    Post Debridement Measurements:  Wound/Ulcer Descriptions are Pre Debridement except measurements:    Wound Leg lower Right (Active)   Wound Image   09/02/22 1044   Wound Etiology Traumatic 09/09/22 1201   Dressing Status Clean;Dry; Intact 09/09/22 1201   Cleansed Soap and water 09/09/22 1201   Dressing/Treatment Gauze dressing/dressing sponge;Roll gauze;Tape change 09/02/22 1151   Wound Length (cm) 4 cm 09/09/22 1201   Wound Width (cm) 2.2 cm 09/09/22 1201   Wound Depth (cm) 0.1 cm 09/09/22 1201   Wound Surface Area (cm^2) 8.8 cm^2 09/09/22 1201   Change in Wound Size % 23.28 09/09/22 1201   Wound Volume (cm^3) 0.88 cm^3 09/09/22 1201   Wound Healing % 23 09/09/22 1201   Post-Procedure Width (cm) 2.5 cm 08/19/22 1055   Wound Assessment Pink/red 09/09/22 1201   Drainage Amount Moderate 09/09/22 1201   Drainage Description Serosanguinous;Green 09/09/22 1201   Wound Odor None 09/09/22 1201   Flora-Wound/Incision Assessment Blanchable erythema 09/09/22 1201   Edges Defined edges 09/09/22 1201   Wound Thickness Description Full thickness 09/09/22 1201   Number of days: 56        Total Surface Area Debrided:  8.8 sq cm     Estimated Blood Loss:  Minimal     Hemostasis Achieved: Pressure    Procedural Pain: 0 / 10     Post Procedural Pain: 0 / 10     Response to treatment: Well tolerated by patient     Assessment:   Right anterior leg ulcer due to previous trauma    Plan:   -Wound debridement as noted above  -Will resume skin substitute today. Applied 2x4 cm puraply. Today is 6th of 10 applications.   -Patient to keep dressing intact until her next appointment. May change outer gauze as needed due to drainage. Discussed increased drainage from ulcer is to be expected.   -Stressed that she must not itch the area!  -Follow-up 1 week

## 2022-09-14 NOTE — OP NOTES
PROCEDURE NOTE    Date of Procedure: 11/19/2021     Preoperative Diagnosis: Right leg wound  Postoperative Diagnosis: same    Procedure: Excisional debridement of right leg ulcer with washout and placement of wound VAC    Surgeon: Courtney Nelson MD    Surgical Staff: Circ-1: Harpreet Johnson  Scrub Tech-1: Breanne Cheema  Scrub Tech-Relief: Jinny Cuevas  Surg Asst-1: Claudy Sanders      Anesthesia: Mac and local    Indications: 34-year-old female who is on Xarelto experience of leg trauma multiple weeks ago. Now has a slowly healing right leg ulcer with concern for residual hematoma in need of debridement    Findings: Hematoma at medial inferior base of wound that was debrided, poorly perfused edge of ulcer debrided to healthy bleeding tissue. Description of Operation: James Andrade was identified. Informed consent was obtained after a complete discussion of risks, benefits and alternatives to surgery were had with the patient. The patient was then prepped and draped in the usual sterile fashion under MAC anesthesia. The patient was injected with local anesthesia. We began by using a 15 blade scalpel to sharply debride the edges of the wound. I cut back the skin and subcutaneous tissue to healthy bleeding tissue. This was right over the tibia on the medial edge and lateral/posterior to the saphenous vein posteriorly. We dissected down debriding old hematoma over the fascia of the posterior compartment of the lower leg. Was mostly done bluntly. We used the knife to debride the base of the ulcer as well to healthy bleeding fascial tissue. Then used the pulse  to irrigate the wound. We used approximately 1 L of saline. We then ensured hemostasis and then placed a black wound VAC sponge over the open part of the wound. Prior to doing this we measured the wound and it was 6 x 8 x 2 cm deep.   We placed the wound VAC to suction and achieved a good seal.  This concluded the procedure. At the end of the procedure all instrument, needle, and sponge counts were correct. The patient was sent to PACU in stable condition.       Estimated Blood Loss: 10 mL    Specimens: * No specimens in log *     Complications: None    Implants: * No implants in log *      Myrna Pastrana MD  Bariatric and General Surgeon  [address] [place] [address] [facility]  11/19/2021 Cyclophosphamide Counseling:  I discussed with the patient the risks of cyclophosphamide including but not limited to hair loss, hormonal abnormalities, decreased fertility, abdominal pain, diarrhea, nausea and vomiting, bone marrow suppression and infection. The patient understands that monitoring is required while taking this medication.

## 2022-09-16 ENCOUNTER — HOSPITAL ENCOUNTER (OUTPATIENT)
Dept: WOUND CARE | Age: 72
Discharge: HOME | End: 2022-09-16
Payer: MEDICARE

## 2022-09-16 VITALS — HEART RATE: 72 BPM | SYSTOLIC BLOOD PRESSURE: 158 MMHG | TEMPERATURE: 98 F | DIASTOLIC BLOOD PRESSURE: 85 MMHG

## 2022-09-16 DIAGNOSIS — L97.912 LEG ULCER, RIGHT, WITH FAT LAYER EXPOSED (HCC): Primary | ICD-10-CM

## 2022-09-16 PROCEDURE — 15271 SKIN SUB GRAFT TRNK/ARM/LEG: CPT

## 2022-09-16 RX ORDER — SILVER SULFADIAZINE 10 G/1000G
CREAM TOPICAL ONCE
Status: CANCELLED | OUTPATIENT
Start: 2022-09-16 | End: 2022-09-16

## 2022-09-16 RX ORDER — BETAMETHASONE DIPROPIONATE 0.5 MG/G
OINTMENT TOPICAL ONCE
Status: CANCELLED | OUTPATIENT
Start: 2022-09-16 | End: 2022-09-16

## 2022-09-16 RX ORDER — LIDOCAINE HYDROCHLORIDE 40 MG/ML
SOLUTION TOPICAL ONCE
Status: CANCELLED | OUTPATIENT
Start: 2022-09-16 | End: 2022-09-16

## 2022-09-16 RX ORDER — TRIAMCINOLONE ACETONIDE 1 MG/G
OINTMENT TOPICAL ONCE
Status: CANCELLED | OUTPATIENT
Start: 2022-09-16 | End: 2022-09-16

## 2022-09-16 RX ORDER — BACITRACIN 500 [USP'U]/G
OINTMENT TOPICAL ONCE
Status: CANCELLED | OUTPATIENT
Start: 2022-09-16 | End: 2022-09-16

## 2022-09-16 RX ORDER — BACITRACIN ZINC AND POLYMYXIN B SULFATE 500; 1000 [USP'U]/G; [USP'U]/G
OINTMENT TOPICAL ONCE
Status: CANCELLED | OUTPATIENT
Start: 2022-09-16 | End: 2022-09-16

## 2022-09-16 RX ORDER — CLOBETASOL PROPIONATE 0.5 MG/G
OINTMENT TOPICAL ONCE
Status: CANCELLED | OUTPATIENT
Start: 2022-09-16 | End: 2022-09-16

## 2022-09-16 RX ORDER — LIDOCAINE 50 MG/G
OINTMENT TOPICAL ONCE
Status: CANCELLED | OUTPATIENT
Start: 2022-09-16 | End: 2022-09-16

## 2022-09-16 RX ORDER — GENTAMICIN SULFATE 1 MG/G
OINTMENT TOPICAL ONCE
Status: CANCELLED | OUTPATIENT
Start: 2022-09-16 | End: 2022-09-16

## 2022-09-16 RX ORDER — LIDOCAINE 40 MG/G
CREAM TOPICAL ONCE
Status: CANCELLED | OUTPATIENT
Start: 2022-09-16 | End: 2022-09-16

## 2022-09-16 RX ORDER — LIDOCAINE HYDROCHLORIDE 20 MG/ML
JELLY TOPICAL ONCE
Status: CANCELLED | OUTPATIENT
Start: 2022-09-16 | End: 2022-09-16

## 2022-09-16 RX ORDER — MUPIROCIN 20 MG/G
OINTMENT TOPICAL ONCE
Status: CANCELLED | OUTPATIENT
Start: 2022-09-16 | End: 2022-09-16

## 2022-09-16 NOTE — WOUND CARE
09/16/22 1202   Wound Leg lower Right   Date First Assessed/Time First Assessed: 07/15/22 1042   Present on Hospital Admission: Yes  Wound Approximate Age at First Assessment (Weeks): 56 weeks  Primary Wound Type: Arterial Ulcer  Location: Leg lower  Wound Location Orientation: Right   Dressing Status New dressing applied   Dressing/Treatment Other (Comment); Roll gauze;Tape/Soft cloth adhesive tape  (window pane nonborder foam)

## 2022-09-16 NOTE — PROGRESS NOTES
Patient presents to wound clinic for: Nesha Sol is a 66 y.o. female who presents for wound care of the following: right anterior leg ulcer. She notes that she had it surgically debrided and had been using a wound vac. She notes that there has been tremendous progress in resolution in terms of the depth of the wound. However, lately progress of the wound has slowed. She has been referred her for further treatment by her surgeon. Update 9/16/22: Patient presents today for follow-up. Notes that her dressing was bother her again so she was scratching it again and was putting vaseline on it even though she knew she was supposed to leave it alone. Past Medical History:   Diagnosis Date    Atrial fibrillation ([facility] [address] 7/15/2011    Depression      HTN (hypertension) 7/14/2011    Paroxysmal atrial fibrillation (HCC)      SIADH (syndrome of inappropriate ADH production) ([facility] [address] 1/14/2022    Thyroid ca ([facility] 2005     Papillary    Unspecified hypothyroidism 7/15/2011            Past Surgical History:   Procedure Laterality Date    ENDOSCOPY, COLON, DIAGNOSTIC   2003     neg. rep 10 yrs. [place] THYROIDECTOMY   11/05              Prior to Admission medications    Medication Sig Start Date End Date Taking? Authorizing Provider   levothyroxine (SYNTHROID) 175 mcg tablet TAKE 1 TABLET BY MOUTH EVERY DAY BEFORE BREAKFAST 8/21/22     Blu Brower MD   diazePAM (VALIUM) 2 mg tablet TAKE 1 TABLET BY MOUTH EVERY DAY AS NEEDED FOR ANXIETY 8/8/22     Blu Brower MD   diazePAM (VALIUM) 2 mg tablet Take 1 Tablet by mouth every six (6) hours as needed for Anxiety for up to 20 doses.  Max Daily Amount: 8 mg. 7/6/22     Katty Cerda MD   Xarelto 20 mg tab tablet TAKE 1 TABLET BY MOUTH EVERY DAY 7/3/22     Blu Brower MD   ondansetron (ZOFRAN ODT) 4 mg disintegrating tablet TAKE 1 TABLET BY MOUTH FOUR (4) TIMES DAILY AS NEEDED FOR NAUSEA OR VOMITING. 7/3/22     Blu Brower MD oxyCODONE-acetaminophen (PERCOCET) 5-325 mg per tablet TAKE 1 TABLET BY MOUTH EVERY 4 HOURS AS NEEDED FOR PAIN FOR UP TO 3 DAYS. MAX 6 TABS DAILY. Patient not taking: No sig reported 6/13/22     Provider, Historical   butalbital-acetaminophen-caffeine (FIORICET, ESGIC) -40 mg per tablet TAKE 1 TO 2 TABLETS BY MOUTH EVERY 6 HOURS AS NEEDED FOR HEADACHE 6/15/22     lBu Brower MD   pregabalin (LYRICA) 100 mg capsule Take 1 Capsule by mouth two (2) times a day. Max Daily Amount: 200 mg. 6/13/22     Katty Cerda MD   butalbital-acetaminophen-caffeine [facility]) -40 mg per tablet Take 1 Tablet by mouth every six (6) hours as needed for Headache. 6/13/22     Katty Cerda MD   traZODone (DESYREL) 50 mg tablet Take 1 Tablet by mouth nightly. 5/17/22     Blu Brower MD   metoprolol succinate (TOPROL-XL) 50 mg XL tablet TAKE 1 TABLET BY MOUTH AFTER DINNER 2/10/22     Blu Brower MD   sacubitriL-valsartan [facility]) 49-51 mg tab tablet Take 1 Tablet by mouth two (2) times a day. Provider, Historical   bumetanide (BUMEX) 1 mg tablet Take 1.5 Tablets by mouth daily.  1/14/22     Blu Brower MD            Allergies   Allergen Reactions    Penicillins Unknown (comments)       Patient reports allergy to penicillin with unknown reaction, but states she has tolerated amoxicillin    Opioids - Morphine Analogues Itching and Nausea and Vomiting    Percocet [Oxycodone-Acetaminophen] Rash       Per patient            Family History   Problem Relation Age of Onset    Cancer Mother           lung      Social History            Socioeconomic History    Marital status:        Spouse name: Not on file    Number of children: Not on file    Years of education: Not on file    Highest education level: Not on file   Occupational History    Not on file   Tobacco Use    Smoking status: Never    Smokeless tobacco: Never   Substance and Sexual Activity    Alcohol use: Yes       Comment: soc    Drug use: No    Sexual activity: Not on file   Other Topics Concern     Service Not Asked    Blood Transfusions Not Asked    Caffeine Concern Not Asked    Occupational Exposure Not Asked    Hobby Hazards Not Asked    Sleep Concern Not Asked    Stress Concern Not Asked    Weight Concern Not Asked    Special Diet Not Asked    Back Care Not Asked    Exercise Not Asked    Bike Helmet Not Asked    Seat Belt Not Asked    Self-Exams Not Asked   Social History Narrative    Not on file      Social Determinants of Health      Financial Resource Strain: Not on file   Food Insecurity: Not on file   Transportation Needs: Not on file   Physical Activity: Not on file   Stress: Not on file   Social Connections: Not on file   Intimate Partner Violence: Not on file   Housing Stability: Not on file             Vitals:     09/09/22 1157   BP: 135/83   Pulse: 61   Temp: 97.6 °F (36.4 °C)         Review of Systems:     Gen: No fever, chills, malaise, weight loss/gain. Heent: No headache, rhinorrhea, epistaxis, ear pain, hearing loss, sinus pain, neck pain/stiffness, sore throat. Heart: No chest pain, palpitations, HERNDON, pnd, or orthopnea. Resp: No cough, hemoptysis, wheezing and shortness of breath. GI: No nausea, vomiting, diarrhea, constipation, melena or hematochezia. : No urinary obstruction, dysuria or hematuria. Derm: see below  Musc/skeletal: no bone or joint complains. Vasc: No edema, cyanosis or claudication. Endo: No heat/cold intolerance, no polyuria,polydipsia or polyphagia. Neuro: No unilateral weakness, numbness, tingling. No seizures. Heme: No easy bruising or bleeding.        09/16/22 1130   Right Leg Edema Point of Measurement   Leg circumference 36.5 cm   Ankle circumference 25 cm   RLE Peripheral Vascular    Capillary Refill Less than/equal to 3 seconds   Color Appropriate for race   Temperature Warm   Sensation Present   Pedal Pulse Palpable   Wound Leg lower Right   Date First Assessed/Time First Assessed: 07/15/22 1042   Present on Hospital Admission: Yes  Wound Approximate Age at First Assessment (Weeks): 56 weeks  Primary Wound Type: Arterial Ulcer  Location: Leg lower  Wound Location Orientation: Right   Wound Etiology Traumatic   Dressing Status Breakthrough drainage noted   Cleansed Soap and water   Wound Length (cm) 4 cm   Wound Width (cm) 2.5 cm   Wound Depth (cm) 0.1 cm   Wound Surface Area (cm^2) 10 cm^2   Change in Wound Size % 12.82   Wound Volume (cm^3) 1 cm^3   Wound Healing % 13   Wound Assessment [place]/red;Slough   Drainage Amount Large   Drainage Description Serosanguinous;Green   Wound Odor None   Flora-Wound/Incision Assessment Excoriated  (red)   Edges Defined edges   Wound Thickness Description Full thickness      Thin layer of epithelialization to wound bed     Assessment:   Right anterior leg ulcer due to previous trauma     Plan:   -Reassured patient that there is improvement with significant thin layer of epithelialization to the wound bed.  -Continue skin substitute today. Applied 2x4 cm puraply. Today is 7th of 10 applications.   -Patient to keep dressing intact until her next appointment. May change outer gauze as needed due to drainage. Discussed increased drainage from ulcer is to be expected. -Stressed that she must not itch the area! She is also not to apply any other dressings, creams, ointments, etc, other than what has been instructed.   -Follow-up 1 week

## 2022-09-16 NOTE — PROGRESS NOTES
Patient presents to wound clinic for: Vicky Alas is a 66 y.o. female who presents for wound care of the following: right anterior leg ulcer. She notes that she had it surgically debrided and had been using a wound vac. She notes that there has been tremendous progress in resolution in terms of the depth of the wound. However, lately progress of the wound has slowed. She has been referred her for further treatment by her surgeon. Update 9/16/22: Patient presents today for follow-up. Notes that her dressing was bothering her again so she was scratching it again and was putting vaseline on it even though she knew she was supposed to leave it alone. Pertinent Medical History:  Past Medical History:   Diagnosis Date    Atrial fibrillation ([facility] [address] 7/15/2011    Depression     HTN (hypertension) 7/14/2011    Paroxysmal atrial fibrillation (HCC)     SIADH (syndrome of inappropriate ADH production) ([facility] [address] 1/14/2022    Thyroid ca ([facility] 2005    Papillary    Unspecified hypothyroidism 7/15/2011     Past Surgical History:   Procedure Laterality Date    ENDOSCOPY, COLON, DIAGNOSTIC  2003    neg. rep 10 yrs. [place] THYROIDECTOMY  11/05     Prior to Admission medications    Medication Sig Start Date End Date Taking? Authorizing Provider   levothyroxine (SYNTHROID) 175 mcg tablet TAKE 1 TABLET BY MOUTH EVERY DAY BEFORE BREAKFAST 8/21/22   Miguel Arce MD   diazePAM (VALIUM) 2 mg tablet TAKE 1 TABLET BY MOUTH EVERY DAY AS NEEDED FOR ANXIETY 8/8/22   Miguel Arce MD   diazePAM (VALIUM) 2 mg tablet Take 1 Tablet by mouth every six (6) hours as needed for Anxiety for up to 20 doses.  Max Daily Amount: 8 mg. 7/6/22   David Diaz MD   Xarelto 20 mg tab tablet TAKE 1 TABLET BY MOUTH EVERY DAY 7/3/22   Miguel Arce MD   ondansetron (ZOFRAN ODT) 4 mg disintegrating tablet TAKE 1 TABLET BY MOUTH FOUR (4) TIMES DAILY AS NEEDED FOR NAUSEA OR VOMITING. 7/3/22   Miguel Arce MD oxyCODONE-acetaminophen (PERCOCET) 5-325 mg per tablet TAKE 1 TABLET BY MOUTH EVERY 4 HOURS AS NEEDED FOR PAIN FOR UP TO 3 DAYS. MAX 6 TABS DAILY. Patient not taking: No sig reported 6/13/22   Provider, Historical   butalbital-acetaminophen-caffeine (FIORICET, ESGIC) -40 mg per tablet TAKE 1 TO 2 TABLETS BY MOUTH EVERY 6 HOURS AS NEEDED FOR HEADACHE 6/15/22   Cameron Brown MD   pregabalin (LYRICA) 100 mg capsule Take 1 Capsule by mouth two (2) times a day. Max Daily Amount: 200 mg. 6/13/22   Canelo Gaffney MD   butalbital-acetaminophen-caffeine [facility]) -40 mg per tablet Take 1 Tablet by mouth every six (6) hours as needed for Headache. 6/13/22   Canelo Gaffney MD   traZODone (DESYREL) 50 mg tablet Take 1 Tablet by mouth nightly. 5/17/22   Cameron Brown MD   metoprolol succinate (TOPROL-XL) 50 mg XL tablet TAKE 1 TABLET BY MOUTH AFTER DINNER 2/10/22   Cameron Brown MD   sacubitriL-valsartan [facility]) 49-51 mg tab tablet Take 1 Tablet by mouth two (2) times a day. Provider, Historical   bumetanide (BUMEX) 1 mg tablet Take 1.5 Tablets by mouth daily. 1/14/22   Cameron Brown MD     Allergies   Allergen Reactions    Penicillins Unknown (comments)     Patient reports allergy to penicillin with unknown reaction, but states she has tolerated amoxicillin    Opioids - Morphine Analogues Itching and Nausea and Vomiting    Percocet [Oxycodone-Acetaminophen] Rash     Per patient     Family History   Problem Relation Age of Onset    Cancer Mother         lung     Social History     Socioeconomic History    Marital status:      Spouse name: Not on file    Number of children: Not on file    Years of education: Not on file    Highest education level: Not on file   Occupational History    Not on file   Tobacco Use    Smoking status: Never    Smokeless tobacco: Never   Substance and Sexual Activity    Alcohol use: Yes     Comment: soc    Drug use:  No Sexual activity: Not on file   Other Topics Concern     Service Not Asked    Blood Transfusions Not Asked    Caffeine Concern Not Asked    Occupational Exposure Not Asked    Hobby Hazards Not Asked    Sleep Concern Not Asked    Stress Concern Not Asked    Weight Concern Not Asked    Special Diet Not Asked    Back Care Not Asked    Exercise Not Asked    Bike Helmet Not Asked    Seat Belt Not Asked    Self-Exams Not Asked   Social History Narrative    Not on file     Social Determinants of Health     Financial Resource Strain: Not on file   Food Insecurity: Not on file   Transportation Needs: Not on file   Physical Activity: Not on file   Stress: Not on file   Social Connections: Not on file   Intimate Partner Violence: Not on file   Housing Stability: Not on file       Vitals:    09/09/22 1157   BP: 135/83   Pulse: 61   Temp: 97.6 °F (36.4 °C)       Review of Systems:    Gen: No fever, chills, malaise, weight loss/gain. Heent: No headache, rhinorrhea, epistaxis, ear pain, hearing loss, sinus pain, neck pain/stiffness, sore throat. Heart: No chest pain, palpitations, HERNDON, pnd, or orthopnea. Resp: No cough, hemoptysis, wheezing and shortness of breath. GI: No nausea, vomiting, diarrhea, constipation, melena or hematochezia. : No urinary obstruction, dysuria or hematuria. Derm: see below  Musc/skeletal: no bone or joint complains. Vasc: No edema, cyanosis or claudication. Endo: No heat/cold intolerance, no polyuria,polydipsia or polyphagia. Neuro: No unilateral weakness, numbness, tingling. No seizures. Heme: No easy bruising or bleeding.       09/16/22 1130   Right Leg Edema Point of Measurement   Leg circumference 36.5 cm   Ankle circumference 25 cm   RLE Peripheral Vascular    Capillary Refill Less than/equal to 3 seconds   Color Appropriate for race   Temperature Warm   Sensation Present   Pedal Pulse Palpable   Wound Leg lower Right   Date First Assessed/Time First Assessed: 07/15/22 1042   Present on Hospital Admission: Yes  Wound Approximate Age at First Assessment (Weeks): 56 weeks  Primary Wound Type: Arterial Ulcer  Location: Leg lower  Wound Location Orientation: Right   Wound Etiology Traumatic   Dressing Status Breakthrough drainage noted   Cleansed Soap and water   Wound Length (cm) 4 cm   Wound Width (cm) 2.5 cm   Wound Depth (cm) 0.1 cm   Wound Surface Area (cm^2) 10 cm^2   Change in Wound Size % 12.82   Wound Volume (cm^3) 1 cm^3   Wound Healing % 13   Wound Assessment [place]/red;Slough   Drainage Amount Large   Drainage Description Serosanguinous;Green   Wound Odor None   Flora-Wound/Incision Assessment Excoriated  (red)   Edges Defined edges   Wound Thickness Description Full thickness     Epithelialization noted to the wound bed. Assessment:   Right anterior leg ulcer due to previous trauma    Plan:   -Reassured patient that there is improved with significant thin layer of epithelialization to the wound bed.  -Continued skin substitute today. Applied 2x4 cm puraply. Today is 7th of 10 applications.   -Patient to keep dressing intact until her next appointment. May change outer gauze as needed due to drainage. Discussed increased drainage from ulcer is to be expected. -Stressed that she must not itch the area! She is also not to apply any other dressings creams, ointments, etc., other than what has been instructed.   -Follow-up 1 week

## 2022-09-16 NOTE — PROGRESS NOTES
Patient presents to wound clinic for: Jerman Naqvi is a 66 y.o. female who presents for wound care of the following: right anterior leg ulcer. She notes that she had it surgically debrided and had been using a wound vac. She notes that there has been tremendous progress in resolution in terms of the depth of the wound. However, lately progress of the wound has slowed. She has been referred her for further treatment by her surgeon. Update 9/16/22: Patient presents today for follow-up. Notes that her dressing was bother her again so she was scratching it again and was putting vaseline on it even though she knew she was supposed to leave it alone. Past Medical History:   Diagnosis Date    Atrial fibrillation ([facility] 7/15/2011    Depression     HTN (hypertension) 7/14/2011    Paroxysmal atrial fibrillation (HCC)     SIADH (syndrome of inappropriate ADH production) [facility] 1/14/2022    Thyroid ca ([facility] 2005    Papillary    Unspecified hypothyroidism 7/15/2011     Past Surgical History:   Procedure Laterality Date    ENDOSCOPY, COLON, DIAGNOSTIC  2003    neg. rep 10 yrs. [place] THYROIDECTOMY  11/05     Prior to Admission medications    Medication Sig Start Date End Date Taking? Authorizing Provider   levothyroxine (SYNTHROID) 175 mcg tablet TAKE 1 TABLET BY MOUTH EVERY DAY BEFORE BREAKFAST 8/21/22   Tiffanie Banks MD   diazePAM (VALIUM) 2 mg tablet TAKE 1 TABLET BY MOUTH EVERY DAY AS NEEDED FOR ANXIETY 8/8/22   Tiffanie Banks MD   diazePAM (VALIUM) 2 mg tablet Take 1 Tablet by mouth every six (6) hours as needed for Anxiety for up to 20 doses.  Max Daily Amount: 8 mg. 7/6/22   Abdirizak Serrano MD   Xarelto 20 mg tab tablet TAKE 1 TABLET BY MOUTH EVERY DAY 7/3/22   Tiffanie Banks MD   ondansetron (ZOFRAN ODT) 4 mg disintegrating tablet TAKE 1 TABLET BY MOUTH FOUR (4) TIMES DAILY AS NEEDED FOR NAUSEA OR VOMITING. 7/3/22   Tiffanie Banks MD   oxyCODONE-acetaminophen [facility]) 5-325 mg per tablet TAKE 1 TABLET BY MOUTH EVERY 4 HOURS AS NEEDED FOR PAIN FOR UP TO 3 DAYS. MAX 6 TABS DAILY. Patient not taking: No sig reported 6/13/22   Provider, Historical   butalbital-acetaminophen-caffeine (FIORICET, ESGIC) -40 mg per tablet TAKE 1 TO 2 TABLETS BY MOUTH EVERY 6 HOURS AS NEEDED FOR HEADACHE 6/15/22   Marylou Webb MD   pregabalin (LYRICA) 100 mg capsule Take 1 Capsule by mouth two (2) times a day. Max Daily Amount: 200 mg. 6/13/22   Dannielle Baugh MD   butalbital-acetaminophen-caffeine [facility]) -40 mg per tablet Take 1 Tablet by mouth every six (6) hours as needed for Headache. 6/13/22   Dannielle Baugh MD   traZODone (DESYREL) 50 mg tablet Take 1 Tablet by mouth nightly. 5/17/22   Marylou Webb MD   metoprolol succinate (TOPROL-XL) 50 mg XL tablet TAKE 1 TABLET BY MOUTH AFTER DINNER 2/10/22   Marylou Webb MD   sacubitriL-valsartan [facility]) 49-51 mg tab tablet Take 1 Tablet by mouth two (2) times a day. Provider, Historical   bumetanide (BUMEX) 1 mg tablet Take 1.5 Tablets by mouth daily.  1/14/22   Marylou Webb MD     Allergies   Allergen Reactions    Penicillins Unknown (comments)     Patient reports allergy to penicillin with unknown reaction, but states she has tolerated amoxicillin    Opioids - Morphine Analogues Itching and Nausea and Vomiting    Percocet [Oxycodone-Acetaminophen] Rash     Per patient     Family History   Problem Relation Age of Onset    Cancer Mother         lung     Social History     Socioeconomic History    Marital status:      Spouse name: Not on file    Number of children: Not on file    Years of education: Not on file    Highest education level: Not on file   Occupational History    Not on file   Tobacco Use    Smoking status: Never    Smokeless tobacco: Never   Substance and Sexual Activity    Alcohol use: Yes     Comment: soc    Drug use: No    Sexual activity: Not on file   Other Topics Concern     Service Not Asked    Blood Transfusions Not Asked    Caffeine Concern Not Asked    Occupational Exposure Not Asked    Hobby Hazards Not Asked    Sleep Concern Not Asked    Stress Concern Not Asked    Weight Concern Not Asked    Special Diet Not Asked    Back Care Not Asked    Exercise Not Asked    Bike Helmet Not Asked    Seat Belt Not Asked    Self-Exams Not Asked   Social History Narrative    Not on file     Social Determinants of Health     Financial Resource Strain: Not on file   Food Insecurity: Not on file   Transportation Needs: Not on file   Physical Activity: Not on file   Stress: Not on file   Social Connections: Not on file   Intimate Partner Violence: Not on file   Housing Stability: Not on file       Vitals:    09/09/22 1157   BP: 135/83   Pulse: 61   Temp: 97.6 °F (36.4 °C)       Review of Systems:    Gen: No fever, chills, malaise, weight loss/gain. Heent: No headache, rhinorrhea, epistaxis, ear pain, hearing loss, sinus pain, neck pain/stiffness, sore throat. Heart: No chest pain, palpitations, HERNDON, pnd, or orthopnea. Resp: No cough, hemoptysis, wheezing and shortness of breath. GI: No nausea, vomiting, diarrhea, constipation, melena or hematochezia. : No urinary obstruction, dysuria or hematuria. Derm: see below  Musc/skeletal: no bone or joint complains. Vasc: No edema, cyanosis or claudication. Endo: No heat/cold intolerance, no polyuria,polydipsia or polyphagia. Neuro: No unilateral weakness, numbness, tingling. No seizures. Heme: No easy bruising or bleeding.       09/16/22 1130   Right Leg Edema Point of Measurement   Leg circumference 36.5 cm   Ankle circumference 25 cm   RLE Peripheral Vascular    Capillary Refill Less than/equal to 3 seconds   Color Appropriate for race   Temperature Warm   Sensation Present   Pedal Pulse Palpable   Wound Leg lower Right   Date First Assessed/Time First Assessed: 07/15/22 1042   Present on Hospital Admission: Yes  Wound Approximate Age at First Assessment (Weeks): 56 weeks  Primary Wound Type: Arterial Ulcer  Location: Leg lower  Wound Location Orientation: Right   Wound Etiology Traumatic   Dressing Status Breakthrough drainage noted   Cleansed Soap and water   Wound Length (cm) 4 cm   Wound Width (cm) 2.5 cm   Wound Depth (cm) 0.1 cm   Wound Surface Area (cm^2) 10 cm^2   Change in Wound Size % 12.82   Wound Volume (cm^3) 1 cm^3   Wound Healing % 13   Wound Assessment [place]/red;Slough   Drainage Amount Large   Drainage Description Serosanguinous;Green   Wound Odor None   Flora-Wound/Incision Assessment Excoriated  (red)   Edges Defined edges   Wound Thickness Description Full thickness     Thin layer of epithelialization to wound bed    Assessment:   Right anterior leg ulcer due to previous trauma    Plan:   -Reassured patient that there is improvement with significant thin layer of epithelialization to the wound bed.  -Continue skin substitute today. Applied 2x4 cm puraply. Today is 7th of 10 applications.   -Patient to keep dressing intact until her next appointment. May change outer gauze as needed due to drainage. Discussed increased drainage from ulcer is to be expected. -Stressed that she must not itch the area! She is also not to apply any other dressings, creams, ointments, etc, other than what has been instructed.   -Follow-up 1 week

## 2022-09-16 NOTE — DISCHARGE INSTRUCTIONS
Discharge Instructions/Wound Orders  [address], [address]  Telephone: [phone]  NAME:  Anya Johnson:  1950  MEDICAL RECORD NUMBER:  [id number]  DATE:  9/16/22  Wound Care Orders:  Right lower leg - Cleanse with saline, rinse and pat dry. Apply skin prep to sera wound. Apply Puraply AM to wound bed. Cover with non-adherent dressing and secure with steri strips. Cover with window pane nonborder foam. Secure with roll gauze and tape. Leave everything on under the steri strips in place. You may change outer gauze dressing as needed due to drainage. Apply xeroform gauze and tape to medial wound. Do not put vaseline or any other products on lower leg. Dietary:  [x] Diet as tolerated: [] Calorie Diabetic Diet:Low carb and no Sugar [] No Added Salt:[] Increase Protein: [] Other:Limit the amount of liquid you are drinking and avoid drinking in between meals   Activity:  [x] Activity as tolerated:  [] Patient has no activity restrictions     [] Strict Bedrest: [] Remain off Work:     [] May return to full duty work:                                   [] Return to work with restrictions:             Return Appointment:   [x] Return Appointment: With Dr Elizabeth Salmeron in  [address] [place] [address])  [] Ordered tests:    Electronically signed Catrachita Longoria RN on 9/16/2022 at 11:43 AM     Kim Marin 281: Should you experience any significant changes in your wound(s) or have questions about your wound care, please contact the [address] [place] [address] at [address] [place] [address] 8:00 am - 4:30. If you need help with your wound outside these hours and cannot wait until we are again available, contact your PCP or go to the hospital emergency room. PLEASE NOTE: IF YOU ARE UNABLE TO OBTAIN WOUND SUPPLIES, CONTINUE TO USE THE SUPPLIES YOU HAVE AVAILABLE UNTIL YOU ARE ABLE TO REACH US.  IT IS MOST IMPORTANT TO KEEP THE WOUND COVERED AT ALL TIMES.      Physician Signature:_______________________    Date: ___________ Time:  ____________

## 2022-09-16 NOTE — WOUND CARE
09/16/22 1130   Right Leg Edema Point of Measurement   Leg circumference 36.5 cm   Ankle circumference 25 cm   RLE Peripheral Vascular    Capillary Refill Less than/equal to 3 seconds   Color Appropriate for race   Temperature Warm   Sensation Present   Pedal Pulse Palpable   Wound Leg lower Right   Date First Assessed/Time First Assessed: 07/15/22 1042   Present on Hospital Admission: Yes  Wound Approximate Age at First Assessment (Weeks): 56 weeks  Primary Wound Type: Arterial Ulcer  Location: Leg lower  Wound Location Orientation: Right   Wound Etiology Traumatic   Dressing Status Breakthrough drainage noted   Cleansed Soap and water   Wound Length (cm) 4 cm   Wound Width (cm) 2.5 cm   Wound Depth (cm) 0.1 cm   Wound Surface Area (cm^2) 10 cm^2   Change in Wound Size % 12.82   Wound Volume (cm^3) 1 cm^3   Wound Healing % 13   Wound Assessment [place]/red;Slough   Drainage Amount Large   Drainage Description Serosanguinous;Green   Wound Odor None   Flora-Wound/Incision Assessment Excoriated  (red)   Edges Defined edges   Wound Thickness Description Full thickness

## 2022-09-23 ENCOUNTER — HOSPITAL ENCOUNTER (OUTPATIENT)
Dept: WOUND CARE | Age: 72
Discharge: HOME | End: 2022-09-23
Payer: MEDICARE

## 2022-09-23 VITALS
RESPIRATION RATE: 20 BRPM | TEMPERATURE: 97.2 F | DIASTOLIC BLOOD PRESSURE: 90 MMHG | SYSTOLIC BLOOD PRESSURE: 151 MMHG | HEART RATE: 76 BPM

## 2022-09-23 DIAGNOSIS — M79.89 RIGHT LEG SWELLING: ICD-10-CM

## 2022-09-23 DIAGNOSIS — L97.912 LEG ULCER, RIGHT, WITH FAT LAYER EXPOSED (HCC): Primary | ICD-10-CM

## 2022-09-23 PROCEDURE — 15271 SKIN SUB GRAFT TRNK/ARM/LEG: CPT

## 2022-09-23 RX ORDER — LIDOCAINE 40 MG/G
CREAM TOPICAL ONCE
Status: CANCELLED | OUTPATIENT
Start: 2022-09-23 | End: 2022-09-23

## 2022-09-23 RX ORDER — LIDOCAINE HYDROCHLORIDE 40 MG/ML
SOLUTION TOPICAL ONCE
Status: CANCELLED | OUTPATIENT
Start: 2022-09-23 | End: 2022-09-23

## 2022-09-23 RX ORDER — BETAMETHASONE DIPROPIONATE 0.5 MG/G
OINTMENT TOPICAL ONCE
Status: CANCELLED | OUTPATIENT
Start: 2022-09-23 | End: 2022-09-23

## 2022-09-23 RX ORDER — LIDOCAINE 50 MG/G
OINTMENT TOPICAL ONCE
Status: CANCELLED | OUTPATIENT
Start: 2022-09-23 | End: 2022-09-23

## 2022-09-23 RX ORDER — BACITRACIN ZINC AND POLYMYXIN B SULFATE 500; 1000 [USP'U]/G; [USP'U]/G
OINTMENT TOPICAL ONCE
Status: CANCELLED | OUTPATIENT
Start: 2022-09-23 | End: 2022-09-23

## 2022-09-23 RX ORDER — LIDOCAINE HYDROCHLORIDE 20 MG/ML
JELLY TOPICAL ONCE
Status: CANCELLED | OUTPATIENT
Start: 2022-09-23 | End: 2022-09-23

## 2022-09-23 RX ORDER — CLOBETASOL PROPIONATE 0.5 MG/G
OINTMENT TOPICAL ONCE
Status: CANCELLED | OUTPATIENT
Start: 2022-09-23 | End: 2022-09-23

## 2022-09-23 RX ORDER — PREGABALIN 100 MG/1
100 CAPSULE ORAL 2 TIMES DAILY
Qty: 60 CAPSULE | Refills: 0 | Status: SHIPPED | OUTPATIENT
Start: 2022-09-23 | End: 2022-10-23

## 2022-09-23 RX ORDER — MUPIROCIN 20 MG/G
OINTMENT TOPICAL ONCE
Status: CANCELLED | OUTPATIENT
Start: 2022-09-23 | End: 2022-09-23

## 2022-09-23 RX ORDER — GENTAMICIN SULFATE 1 MG/G
OINTMENT TOPICAL ONCE
Status: CANCELLED | OUTPATIENT
Start: 2022-09-23 | End: 2022-09-23

## 2022-09-23 RX ORDER — BACITRACIN 500 [USP'U]/G
OINTMENT TOPICAL ONCE
Status: CANCELLED | OUTPATIENT
Start: 2022-09-23 | End: 2022-09-23

## 2022-09-23 RX ORDER — TRIAMCINOLONE ACETONIDE 1 MG/G
OINTMENT TOPICAL ONCE
Status: CANCELLED | OUTPATIENT
Start: 2022-09-23 | End: 2022-09-23

## 2022-09-23 RX ORDER — SILVER SULFADIAZINE 10 G/1000G
CREAM TOPICAL ONCE
Status: CANCELLED | OUTPATIENT
Start: 2022-09-23 | End: 2022-09-23

## 2022-09-23 NOTE — WOUND CARE
09/23/22 1108   Right Leg Edema Point of Measurement   Leg circumference 37 cm   Ankle circumference 25.5 cm   RLE Peripheral Vascular    Capillary Refill Less than/equal to 3 seconds   Color Appropriate for race   Temperature Warm   Sensation Present   Pedal Pulse Palpable   Wound Leg lower Right   Date First Assessed/Time First Assessed: 07/15/22 1042   Present on Hospital Admission: Yes  Wound Approximate Age at First Assessment (Weeks): 56 weeks  Primary Wound Type: Arterial Ulcer  Location: Leg lower  Wound Location Orientation: Right   Wound Image    Wound Etiology Traumatic   Dressing Status Intact; Old drainage noted   Cleansed Cleansed with saline   Wound Length (cm) 3.8 cm   Wound Width (cm) 2.3 cm   Wound Depth (cm) 0.1 cm   Wound Surface Area (cm^2) 8.74 cm^2   Change in Wound Size % 23.8   Wound Volume (cm^3) 0.874 cm^3   Wound Healing % 24   Wound Assessment Pink/red;Slough;Granulation tissue   Drainage Amount Moderate   Drainage Description Serosanguinous   Wound Odor None   Flora-Wound/Incision Assessment Excoriated;Blanchable erythema   Edges Flat/open edges   Wound Thickness Description Full thickness   Pain 1   Pain Scale 1 Numeric (0 - 10)   Pain Intensity 1 0   Pain Intervention(s) 1 Medication (see MAR)   Visit Vitals  BP (!) 151/90 (BP Patient Position: At rest;Sitting)   Pulse 76   Temp 97.2 °F (36.2 °C)   Resp 20

## 2022-09-23 NOTE — DISCHARGE INSTRUCTIONS
Discharge Instructions for          [place] [address] [facility], [address]  Phone: [phone] Fax: [phone]    NAME:  Michaela Nevarez  YOB: 1950  MEDICAL RECORD NUMBER:  [id number]  DATE:  September 23, 2022  WOUND CARE ORDERS:  Right lower leg - Cleanse with saline, rinse and pat dry. Apply skin prep to sera wound. Apply Puraply AM to wound bed. Cover with non-adherent dressing and secure with steri strips. Apply xeroform to open areas around main wound. Cover with window pane nonborder foam. Secure with roll gauze and tape. Leave everything on under the steri strips in place. You may change outer gauze dressing as needed due to drainage. Do not put vaseline or any other products on lower leg. TREATMENT ORDERS:    Elevate leg(s) above the level of the heart when sitting. Avoid prolonged standing in one place. Do no get dressing/wrap wet. Follow Diet as prescribed:   [] Diet as tolerated: [] Calorie Diabetic Diet: Low carb and no Sugar [x] No Added Salt:  [x] Increase Protein: [] Limit the amount of liquid you are drinking and avoid drinking in between meals   Return Appointment:  [x] Return Appointment: With Dr. Kirill Hairston in 1 week. [] Nurse Visit : *** days  [] Ordered tests:    Electronically signed Cristal Villar RN on 9/23/2022 at 11:32 AM     [address] [place] [address] Information: Should you experience any significant changes in your wound(s) or have questions about your wound care, please contact the [address] [place] [address] at [address] [place] [address] 8:00 am - 4:30. If you need help with your wound outside these hours and cannot wait until we are again available, contact your PCP or go to the hospital emergency room.      PLEASE NOTE: IF YOU ARE UNABLE TO OBTAIN WOUND SUPPLIES, CONTINUE TO USE THE SUPPLIES YOU HAVE AVAILABLE UNTIL YOU ARE ABLE TO REACH US. IT IS MOST IMPORTANT TO KEEP THE WOUND COVERED AT ALL TIMES.      Physician Signature:_______________________    Date: ___________ Time:  ____________

## 2022-09-23 NOTE — PROGRESS NOTES
Patient presents to wound clinic for: Candi Reid is a 66 y.o. female who presents for wound care of the following: right anterior leg ulcer. She notes that she had it surgically debrided and had been using a wound vac. She notes that there has been tremendous progress in resolution in terms of the depth of the wound. However, lately progress of the wound has slowed. She has been referred her for further treatment by her surgeon. Update 9/23/22: Patient presents today for follow-up. Notes that she has had put bandaids and possibly telfa on her dressing after her appointment instead of her steri-strips and gauze. Needs a refill on her neurontin. Pertinent Medical History:  Past Medical History:   Diagnosis Date    Atrial fibrillation ([facility] 7/15/2011    Depression     HTN (hypertension) 7/14/2011    Paroxysmal atrial fibrillation (HCC)     SIADH (syndrome of inappropriate ADH production) ([facility] 1/14/2022    Thyroid ca ([facility] 2005    Papillary    Unspecified hypothyroidism 7/15/2011     Past Surgical History:   Procedure Laterality Date    ENDOSCOPY, COLON, DIAGNOSTIC  2003    neg. rep 10 yrs. [place] THYROIDECTOMY  11/05     Prior to Admission medications    Medication Sig Start Date End Date Taking? Authorizing Provider   pregabalin (LYRICA) 100 mg capsule Take 1 Capsule by mouth two (2) times a day for 30 days. Max Daily Amount: 200 mg. 9/23/22 10/23/22 Yes Mora Dill, DPM   levothyroxine (SYNTHROID) 175 mcg tablet TAKE 1 TABLET BY MOUTH EVERY DAY BEFORE BREAKFAST 8/21/22   Lowell Rouse MD   diazePAM (VALIUM) 2 mg tablet TAKE 1 TABLET BY MOUTH EVERY DAY AS NEEDED FOR ANXIETY 8/8/22   Lowell Rouse MD   diazePAM (VALIUM) 2 mg tablet Take 1 Tablet by mouth every six (6) hours as needed for Anxiety for up to 20 doses.  Max Daily Amount: 8 mg. 7/6/22   Keyon Benitez MD   Xarelto 20 mg tab tablet TAKE 1 TABLET BY MOUTH EVERY DAY 7/3/22   Lowell Rouse MD   ondansetron (ZOFRAN ODT) 4 mg disintegrating tablet TAKE 1 TABLET BY MOUTH FOUR (4) TIMES DAILY AS NEEDED FOR NAUSEA OR VOMITING. 7/3/22   Trey Gagnon MD   oxyCODONE-acetaminophen (PERCOCET) 5-325 mg per tablet TAKE 1 TABLET BY MOUTH EVERY 4 HOURS AS NEEDED FOR PAIN FOR UP TO 3 DAYS. MAX 6 TABS DAILY. Patient not taking: No sig reported 6/13/22   Provider, Historical   butalbital-acetaminophen-caffeine (FIORICET, ESGIC) -40 mg per tablet TAKE 1 TO 2 TABLETS BY MOUTH EVERY 6 HOURS AS NEEDED FOR HEADACHE 6/15/22   Trey Gagnon MD   butalbital-acetaminophen-caffeine (FIORICET, [facility]) -40 mg per tablet Take 1 Tablet by mouth every six (6) hours as needed for Headache. 6/13/22   Vanessa Holland MD   traZODone (DESYREL) 50 mg tablet Take 1 Tablet by mouth nightly. 5/17/22   Trey Gagnon MD   metoprolol succinate (TOPROL-XL) 50 mg XL tablet TAKE 1 TABLET BY MOUTH AFTER DINNER 2/10/22   Trey Gagnon MD   sacubitriL-valsartan [place]) 49-51 mg tab tablet Take 1 Tablet by mouth two (2) times a day. Provider, Historical   bumetanide (BUMEX) 1 mg tablet Take 1.5 Tablets by mouth daily.  1/14/22   Trey Gagnon MD     Allergies   Allergen Reactions    Penicillins Unknown (comments)     Patient reports allergy to penicillin with unknown reaction, but states she has tolerated amoxicillin    Opioids - Morphine Analogues Itching and Nausea and Vomiting    Percocet [Oxycodone-Acetaminophen] Rash     Per patient     Family History   Problem Relation Age of Onset    Cancer Mother         lung     Social History     Socioeconomic History    Marital status:      Spouse name: Not on file    Number of children: Not on file    Years of education: Not on file    Highest education level: Not on file   Occupational History    Not on file   Tobacco Use    Smoking status: Never    Smokeless tobacco: Never   Substance and Sexual Activity    Alcohol use: Yes     Comment: soc Drug use: No    Sexual activity: Not on file   Other Topics Concern     Service Not Asked    Blood Transfusions Not Asked    Caffeine Concern Not Asked    Occupational Exposure Not Asked    Hobby Hazards Not Asked    Sleep Concern Not Asked    Stress Concern Not Asked    Weight Concern Not Asked    Special Diet Not Asked    Back Care Not Asked    Exercise Not Asked    Bike Helmet Not Asked    Seat Belt Not Asked    Self-Exams Not Asked   Social History Narrative    Not on file     Social Determinants of Health     Financial Resource Strain: Not on file   Food Insecurity: Not on file   Transportation Needs: Not on file   Physical Activity: Not on file   Stress: Not on file   Social Connections: Not on file   Intimate Partner Violence: Not on file   Housing Stability: Not on file       Vitals:    09/23/22 1108   BP: (!) 151/90   Pulse: 76   Resp: 20   Temp: 97.2 °F (36.2 °C)       Review of Systems:    Gen: No fever, chills, malaise, weight loss/gain. Heent: No headache, rhinorrhea, epistaxis, ear pain, hearing loss, sinus pain, neck pain/stiffness, sore throat. Heart: No chest pain, palpitations, HERNDON, pnd, or orthopnea. Resp: No cough, hemoptysis, wheezing and shortness of breath. GI: No nausea, vomiting, diarrhea, constipation, melena or hematochezia. : No urinary obstruction, dysuria or hematuria. Derm: see below  Musc/skeletal: no bone or joint complains. Vasc: No edema, cyanosis or claudication. Endo: No heat/cold intolerance, no polyuria,polydipsia or polyphagia. Neuro: No unilateral weakness, numbness, tingling. No seizures. Heme: No easy bruising or bleeding.     09/23/22 1108   Right Leg Edema Point of Measurement   Leg circumference 37 cm   Ankle circumference 25.5 cm   RLE Peripheral Vascular    Capillary Refill Less than/equal to 3 seconds   Color Appropriate for race   Temperature Warm   Sensation Present   Pedal Pulse Palpable   Wound Leg lower Right   Date First Assessed/Time First Assessed: 07/15/22 1042   Present on Hospital Admission: Yes  Wound Approximate Age at First Assessment (Weeks): 56 weeks  Primary Wound Type: Arterial Ulcer  Location: Leg lower  Wound Location Orientation: Right   Wound Image    Wound Etiology Traumatic   Dressing Status Intact; Old drainage noted   Cleansed Cleansed with saline   Wound Length (cm) 3.8 cm   Wound Width (cm) 2.3 cm   Wound Depth (cm) 0.1 cm   Wound Surface Area (cm^2) 8.74 cm^2   Change in Wound Size % 23.8   Wound Volume (cm^3) 0.874 cm^3   Wound Healing % 24   Wound Assessment [place]/red;Slough;Granulation tissue   Drainage Amount Moderate   Drainage Description Serosanguinous   Wound Odor None   Flora-Wound/Incision Assessment Excoriated;Blanchable erythema   Edges Flat/open edges   Wound Thickness Description Full thickness   Pain 1   Pain Scale 1 Numeric (0 - 10)   Pain Intensity 1 0   Pain Intervention(s) 1 Medication (see MAR)     Wound is covered with a thin layer of epithelialization. Debridement Wound Care        Problem List Items Addressed This Visit          Other    Leg ulcer, right, with fat layer exposed ([address] - Primary    Relevant Orders    INITIATE OUTPATIENT WOUND CARE PROTOCOL     Other Visit Diagnoses       Right leg swelling        Relevant Medications    pregabalin (LYRICA) 100 mg capsule            Procedure Note  Indications:  Based on my examination of this patient's wound(s)/ulcer(s) today, debridement is required to promote healing and evaluate the wound base.     Performed by: Lanny Nicholas DPM    Consent obtained: Yes    Time out taken: Yes    Debridement: Excisional    Using curette the wound(s)/ulcer(s) was/were sharply debrided down through and including the removal of    epidermis, dermis, and subcutaneous tissue    Devitalized Tissue Debrided: fibrin, biofilm, and slough    Pre Debridement Measurements:  Are located in the [facility]  Documentation Flow Sheet    Other: prior hematoma    Wound/Ulcer #: 1    Post Debridement Measurements:  Wound/Ulcer Descriptions are Pre Debridement except measurements:    Wound Leg lower Right (Active)   Wound Image   09/23/22 1108   Wound Etiology Traumatic 09/23/22 1108   Dressing Status Intact; Old drainage noted 09/23/22 1108   Cleansed Cleansed with saline 09/23/22 1108   Dressing/Treatment Other (Comment); Roll gauze;Tape/Soft cloth adhesive tape 09/16/22 1202   Wound Length (cm) 3.8 cm 09/23/22 1108   Wound Width (cm) 2.3 cm 09/23/22 1108   Wound Depth (cm) 0.1 cm 09/23/22 1108   Wound Surface Area (cm^2) 8.74 cm^2 09/23/22 1108   Change in Wound Size % 23.8 09/23/22 1108   Wound Volume (cm^3) 0.874 cm^3 09/23/22 1108   Wound Healing % 24 09/23/22 1108   Post-Procedure Width (cm) 2.5 cm 08/19/22 1055   Wound Assessment Pink/red;Slough;Granulation tissue 09/23/22 1108   Drainage Amount Moderate 09/23/22 1108   Drainage Description Serosanguinous 09/23/22 1108   Wound Odor None 09/23/22 1108   Flora-Wound/Incision Assessment Excoriated;Blanchable erythema 09/23/22 1108   Edges Flat/open edges 09/23/22 1108   Wound Thickness Description Full thickness 09/23/22 1108   Number of days: 70        Total Surface Area Debrided:  8.74 sq cm     Estimated Blood Loss:  Minimal     Hemostasis Achieved: Pressure    Procedural Pain: 0 / 10     Post Procedural Pain: 0 / 10     Response to treatment: Well tolerated by patient     Assessment:   Right anterior leg ulcer due to previous trauma    Plan:   -Wound debridement as noted above  -Continueskin substitute today. Applied 2x4 cm puraply. Today is 8th of 10 applications.   -Patient to keep dressing intact until her next appointment. May change outer gauze as needed due to drainage. Discussed increased drainage from ulcer is to be expected.   -Stressed that she must not itch the area!  -Follow-up 1 week

## 2022-09-30 ENCOUNTER — HOSPITAL ENCOUNTER (OUTPATIENT)
Dept: WOUND CARE | Age: 72
Discharge: HOME | End: 2022-09-30
Payer: MEDICARE

## 2022-09-30 VITALS — TEMPERATURE: 97.8 F | SYSTOLIC BLOOD PRESSURE: 157 MMHG | HEART RATE: 80 BPM | DIASTOLIC BLOOD PRESSURE: 85 MMHG

## 2022-09-30 DIAGNOSIS — L97.912 LEG ULCER, RIGHT, WITH FAT LAYER EXPOSED (HCC): Primary | ICD-10-CM

## 2022-09-30 PROCEDURE — 99213 OFFICE O/P EST LOW 20 MIN: CPT

## 2022-09-30 RX ORDER — BACITRACIN ZINC AND POLYMYXIN B SULFATE 500; 1000 [USP'U]/G; [USP'U]/G
OINTMENT TOPICAL ONCE
Status: CANCELLED | OUTPATIENT
Start: 2022-09-30 | End: 2022-09-30

## 2022-09-30 RX ORDER — BACITRACIN 500 [USP'U]/G
OINTMENT TOPICAL ONCE
Status: CANCELLED | OUTPATIENT
Start: 2022-09-30 | End: 2022-09-30

## 2022-09-30 RX ORDER — MUPIROCIN 20 MG/G
OINTMENT TOPICAL ONCE
Status: CANCELLED | OUTPATIENT
Start: 2022-09-30 | End: 2022-09-30

## 2022-09-30 RX ORDER — GENTAMICIN SULFATE 1 MG/G
OINTMENT TOPICAL ONCE
Status: CANCELLED | OUTPATIENT
Start: 2022-09-30 | End: 2022-09-30

## 2022-09-30 RX ORDER — LIDOCAINE 40 MG/G
CREAM TOPICAL ONCE
Status: CANCELLED | OUTPATIENT
Start: 2022-09-30 | End: 2022-09-30

## 2022-09-30 RX ORDER — LIDOCAINE HYDROCHLORIDE 40 MG/ML
SOLUTION TOPICAL ONCE
Status: CANCELLED | OUTPATIENT
Start: 2022-09-30 | End: 2022-09-30

## 2022-09-30 RX ORDER — LIDOCAINE HYDROCHLORIDE 20 MG/ML
JELLY TOPICAL ONCE
Status: CANCELLED | OUTPATIENT
Start: 2022-09-30 | End: 2022-09-30

## 2022-09-30 RX ORDER — BETAMETHASONE DIPROPIONATE 0.5 MG/G
OINTMENT TOPICAL ONCE
Status: CANCELLED | OUTPATIENT
Start: 2022-09-30 | End: 2022-09-30

## 2022-09-30 RX ORDER — SILVER SULFADIAZINE 10 G/1000G
CREAM TOPICAL ONCE
Status: CANCELLED | OUTPATIENT
Start: 2022-09-30 | End: 2022-09-30

## 2022-09-30 RX ORDER — TRIAMCINOLONE ACETONIDE 1 MG/G
OINTMENT TOPICAL ONCE
Status: CANCELLED | OUTPATIENT
Start: 2022-09-30 | End: 2022-09-30

## 2022-09-30 RX ORDER — CLOBETASOL PROPIONATE 0.5 MG/G
OINTMENT TOPICAL ONCE
Status: CANCELLED | OUTPATIENT
Start: 2022-09-30 | End: 2022-09-30

## 2022-09-30 RX ORDER — LIDOCAINE 50 MG/G
OINTMENT TOPICAL ONCE
Status: CANCELLED | OUTPATIENT
Start: 2022-09-30 | End: 2022-09-30

## 2022-09-30 NOTE — WOUND CARE
09/30/22 1127   Wound Leg lower Right   Date First Assessed/Time First Assessed: 07/15/22 1042   Present on Hospital Admission: Yes  Wound Approximate Age at First Assessment (Weeks): 56 weeks  Primary Wound Type: Arterial Ulcer  Location: Leg lower  Wound Location Orientation: Right   Wound Image    Wound Etiology Traumatic   Dressing Status Intact; Old drainage noted   Cleansed Cleansed with saline   Wound Length (cm) 3.5 cm   Wound Width (cm) 2.3 cm   Wound Depth (cm) 0.1 cm   Wound Surface Area (cm^2) 8.05 cm^2   Change in Wound Size % 29.82   Wound Volume (cm^3) 0.805 cm^3   Wound Healing % 30   Wound Assessment [place]/red;Slough   Drainage Amount Moderate   Drainage Description Serosanguinous   Wound Odor None   Flora-Wound/Incision Assessment Excoriated;Blanchable erythema   Edges Flat/open edges   Wound Thickness Description Full thickness   Visit Vitals  BP (!) 157/85 (BP 1 Location: Left upper arm, BP Patient Position: At rest;Sitting)   Pulse 80   Temp 97.8 °F (36.6 °C)

## 2022-09-30 NOTE — PROGRESS NOTES
Patient presents to wound clinic for: Thais Fields is a 66 y.o. female who presents for wound care of the following: right anterior leg ulcer. She notes that she had it surgically debrided and had been using a wound vac. She notes that there has been tremendous progress in resolution in terms of the depth of the wound. However, lately progress of the wound has slowed. She has been referred her for further treatment by her surgeon. Update 9/30/22: Patient presents today for follow-up. Relates that she no longer wants to do her puraply. Pertinent Medical History:  Past Medical History:   Diagnosis Date    Atrial fibrillation ([facility] 7/15/2011    Depression     HTN (hypertension) 7/14/2011    Paroxysmal atrial fibrillation (HCC)     SIAD[facility] (syndrome of inappropriate AD[facility] production) [facility] 1/14/2022    Thyroid ca ([facility] 2005    Papillary    Unspecified hypothyroidism 7/15/2011     Past Surgical History:   Procedure Laterality Date    ENDOSCOPY, COLON, DIAGNOSTIC  2003    neg. rep 10 yrs. [place] THYROIDECTOMY  11/05     Prior to Admission medications    Medication Sig Start Date End Date Taking? Authorizing Provider   traZODone (DESYREL) 50 mg tablet Take 1 Tablet by mouth nightly. 9/29/22   Vanessa Avilez MD   pregabalin (LYRICA) 100 mg capsule Take 1 Capsule by mouth two (2) times a day for 30 days. Max Daily Amount: 200 mg. 9/23/22 10/23/22  Odalis Russo DPM   levothyroxine (SYNTHROID) 175 mcg tablet TAKE 1 TABLET BY MOUTH EVERY DAY BEFORE BREAKFAST 8/21/22   Vanessa Avilez MD   diazePAM (VALIUM) 2 mg tablet TAKE 1 TABLET BY MOUTH EVERY DAY AS NEEDED FOR ANXIETY 8/8/22   Vanessa Avilez MD   diazePAM (VALIUM) 2 mg tablet Take 1 Tablet by mouth every six (6) hours as needed for Anxiety for up to 20 doses.  Max Daily Amount: 8 mg. 7/6/22   Mary Cota MD   Xarelto 20 mg tab tablet TAKE 1 TABLET BY MOUTH EVERY DAY 7/3/22   Vanessa Avilez MD   ondansetron (ZOFRAN ODT) 4 mg disintegrating tablet TAKE 1 TABLET BY MOUTH FOUR (4) TIMES DAILY AS NEEDED FOR NAUSEA OR VOMITING. 7/3/22   Hari Beck MD   oxyCODONE-acetaminophen (PERCOCET) 5-325 mg per tablet TAKE 1 TABLET BY MOUTH EVERY 4 HOURS AS NEEDED FOR PAIN FOR UP TO 3 DAYS. MAX 6 TABS DAILY. Patient not taking: No sig reported 6/13/22   Provider, Historical   butalbital-acetaminophen-caffeine (FIORICET, ESGIC) -40 mg per tablet TAKE 1 TO 2 TABLETS BY MOUTH EVERY 6 HOURS AS NEEDED FOR HEADACHE 6/15/22   Hari Beck MD   butalbital-acetaminophen-caffeine (FIORICET, [facility]) -40 mg per tablet Take 1 Tablet by mouth every six (6) hours as needed for Headache. 6/13/22   Nam Vasquez MD   metoprolol succinate (TOPROL-XL) 50 mg XL tablet TAKE 1 TABLET BY MOUTH AFTER DINNER 2/10/22   Hari Beck MD   sacubitriL-valsartan Joshuamarquita Jeronimo) 49-51 mg tab tablet Take 1 Tablet by mouth two (2) times a day. Provider, Historical   bumetanide (BUMEX) 1 mg tablet Take 1.5 Tablets by mouth daily.  1/14/22   Hari Beck MD     Allergies   Allergen Reactions    Penicillins Unknown (comments)     Patient reports allergy to penicillin with unknown reaction, but states she has tolerated amoxicillin    Opioids - Morphine Analogues Itching and Nausea and Vomiting    Percocet [Oxycodone-Acetaminophen] Rash     Per patient     Family History   Problem Relation Age of Onset    Cancer Mother         lung     Social History     Socioeconomic History    Marital status:      Spouse name: Not on file    Number of children: Not on file    Years of education: Not on file    Highest education level: Not on file   Occupational History    Not on file   Tobacco Use    Smoking status: Never    Smokeless tobacco: Never   Substance and Sexual Activity    Alcohol use: Yes     Comment: soc    Drug use: No    Sexual activity: Not on file   Other Topics Concern     Service Not Asked    Blood Transfusions Not Asked    Caffeine Concern Not Asked    Occupational Exposure Not Asked    Hobby Hazards Not Asked    Sleep Concern Not Asked    Stress Concern Not Asked    Weight Concern Not Asked    Special Diet Not Asked    Back Care Not Asked    Exercise Not Asked    Bike Helmet Not Asked    Seat Belt Not Asked    Self-Exams Not Asked   Social History Narrative    Not on file     Social Determinants of Health     Financial Resource Strain: Not on file   Food Insecurity: Not on file   Transportation Needs: Not on file   Physical Activity: Not on file   Stress: Not on file   Social Connections: Not on file   Intimate Partner Violence: Not on file   Housing Stability: Not on file       Vitals:    09/30/22 1125   BP: (!) 157/85   Pulse: 80   Temp: 97.8 °F (36.6 °C)       Review of Systems:    Gen: No fever, chills, malaise, weight loss/gain. Heent: No headache, rhinorrhea, epistaxis, ear pain, hearing loss, sinus pain, neck pain/stiffness, sore throat. Heart: No chest pain, palpitations, HERNDON, pnd, or orthopnea. Resp: No cough, hemoptysis, wheezing and shortness of breath. GI: No nausea, vomiting, diarrhea, constipation, melena or hematochezia. : No urinary obstruction, dysuria or hematuria. Derm: see below  Musc/skeletal: no bone or joint complains. Vasc: No edema, cyanosis or claudication. Endo: No heat/cold intolerance, no polyuria,polydipsia or polyphagia. Neuro: No unilateral weakness, numbness, tingling. No seizures. Heme: No easy bruising or bleeding. 09/30/22 1127   Wound Leg lower Right   Date First Assessed/Time First Assessed: 07/15/22 1042   Present on Hospital Admission: Yes  Wound Approximate Age at First Assessment (Weeks): 56 weeks  Primary Wound Type: Arterial Ulcer  Location: Leg lower  Wound Location Orientation: Right   Wound Image    Wound Etiology Traumatic   Dressing Status Intact; Old drainage noted   Cleansed Cleansed with saline   Wound Length (cm) 3.5 cm   Wound Width (cm) 2.3 cm Wound Depth (cm) 0.1 cm   Wound Surface Area (cm^2) 8.05 cm^2   Change in Wound Size % 29.82   Wound Volume (cm^3) 0.805 cm^3   Wound Healing % 30   Wound Assessment [place]/red;Slough   Drainage Amount Moderate   Drainage Description Serosanguinous   Wound Odor None   Flora-Wound/Incision Assessment Excoriated;Blanchable erythema   Edges Flat/open edges   Wound Thickness Description Full thickness       Assessment:   Right anterior leg ulcer due to previous trauma    Plan:   -Will discontinue puraply graft at patient's request.  -Angela, gauze, every other day.   -Follow-up 1 week

## 2022-09-30 NOTE — WOUND CARE
09/30/22 1232   Wound Leg lower Right   Date First Assessed/Time First Assessed: 07/15/22 1042   Present on Hospital Admission: Yes  Wound Approximate Age at First Assessment (Weeks): 56 weeks  Primary Wound Type: Arterial Ulcer  Location: Leg lower  Wound Location Orientation: Right   Dressing/Treatment   (prsima/xeroform/A and D/gaue/rg tape)

## 2022-09-30 NOTE — DISCHARGE INSTRUCTIONS
Discharge Instructions for          [place] [address] [facility], [address]  Phone: [phone] Fax: [phone]    NAME:  Tremayne Kruger  YOB: 1950  MEDICAL RECORD NUMBER:  [id number]  DATE:  September 30, 2022  WOUND CARE ORDERS:  Right lower leg - Cleanse with saline, rinse and pat dry. Apply A&D to dry skin. Apply alda to wound bed. Apply xeroform to open areas around main wound. Secure with roll gauze and tape. Change three times a week. TREATMENT ORDERS:    Elevate leg(s) above the level of the heart when sitting. Avoid prolonged standing in one place. Do no get dressing/wrap wet. Follow Diet as prescribed:   [] Diet as tolerated: [] Calorie Diabetic Diet: Low carb and no Sugar [x] No Added Salt:  [x] Increase Protein: [] Limit the amount of liquid you are drinking and avoid drinking in between meals   Return Appointment:  [x] Return Appointment: With Dr. Marcelle Barrientos in 1 week. [] Nurse Visit : *** days  [] Ordered tests:    Electronically signed Shakeel Starks RN on 9/30/2022 at 11:49 AM     [address] [facility] Information: Should you experience any significant changes in your wound(s) or have questions about your wound care, please contact the [address] [place] [address] at [address] [place] [address] 8:00 am - 4:30. If you need help with your wound outside these hours and cannot wait until we are again available, contact your PCP or go to the hospital emergency room. PLEASE NOTE: IF YOU ARE UNABLE TO OBTAIN WOUND SUPPLIES, CONTINUE TO USE THE SUPPLIES YOU HAVE AVAILABLE UNTIL YOU ARE ABLE TO REACH US. IT IS MOST IMPORTANT TO KEEP THE WOUND COVERED AT ALL TIMES.      Physician Signature:_______________________    Date: ___________ Time:  ____________

## 2022-10-03 ENCOUNTER — HOSPITAL ENCOUNTER (OUTPATIENT)
Dept: WOUND CARE | Age: 72
Discharge: HOME | End: 2022-10-03
Payer: MEDICARE

## 2022-10-03 VITALS — HEART RATE: 92 BPM | DIASTOLIC BLOOD PRESSURE: 86 MMHG | SYSTOLIC BLOOD PRESSURE: 156 MMHG | TEMPERATURE: 98.1 F

## 2022-10-03 DIAGNOSIS — L97.912 LEG ULCER, RIGHT, WITH FAT LAYER EXPOSED (HCC): Primary | ICD-10-CM

## 2022-10-03 PROCEDURE — 99213 OFFICE O/P EST LOW 20 MIN: CPT

## 2022-10-03 RX ORDER — BACITRACIN 500 [USP'U]/G
OINTMENT TOPICAL ONCE
Status: CANCELLED | OUTPATIENT
Start: 2022-10-03 | End: 2022-10-03

## 2022-10-03 RX ORDER — LIDOCAINE 40 MG/G
CREAM TOPICAL ONCE
Status: CANCELLED | OUTPATIENT
Start: 2022-10-03 | End: 2022-10-03

## 2022-10-03 RX ORDER — TRIAMCINOLONE ACETONIDE 1 MG/G
OINTMENT TOPICAL ONCE
Status: CANCELLED | OUTPATIENT
Start: 2022-10-03 | End: 2022-10-03

## 2022-10-03 RX ORDER — LIDOCAINE HYDROCHLORIDE 20 MG/ML
JELLY TOPICAL ONCE
Status: CANCELLED | OUTPATIENT
Start: 2022-10-03 | End: 2022-10-03

## 2022-10-03 RX ORDER — LIDOCAINE HYDROCHLORIDE 40 MG/ML
SOLUTION TOPICAL ONCE
Status: CANCELLED | OUTPATIENT
Start: 2022-10-03 | End: 2022-10-03

## 2022-10-03 RX ORDER — MUPIROCIN 20 MG/G
OINTMENT TOPICAL ONCE
Status: CANCELLED | OUTPATIENT
Start: 2022-10-03 | End: 2022-10-03

## 2022-10-03 RX ORDER — LIDOCAINE 50 MG/G
OINTMENT TOPICAL ONCE
Status: CANCELLED | OUTPATIENT
Start: 2022-10-03 | End: 2022-10-03

## 2022-10-03 RX ORDER — BACITRACIN ZINC AND POLYMYXIN B SULFATE 500; 1000 [USP'U]/G; [USP'U]/G
OINTMENT TOPICAL ONCE
Status: CANCELLED | OUTPATIENT
Start: 2022-10-03 | End: 2022-10-03

## 2022-10-03 RX ORDER — DOXYCYCLINE 100 MG/1
100 TABLET ORAL 2 TIMES DAILY
Qty: 20 TABLET | Refills: 0 | Status: SHIPPED | OUTPATIENT
Start: 2022-10-03 | End: 2022-10-13

## 2022-10-03 RX ORDER — CLOBETASOL PROPIONATE 0.5 MG/G
OINTMENT TOPICAL ONCE
Status: CANCELLED | OUTPATIENT
Start: 2022-10-03 | End: 2022-10-03

## 2022-10-03 RX ORDER — TRIAMCINOLONE ACETONIDE 1 MG/G
CREAM TOPICAL 2 TIMES DAILY
Qty: 15 G | Refills: 0 | Status: SHIPPED | OUTPATIENT
Start: 2022-10-03

## 2022-10-03 RX ORDER — BETAMETHASONE DIPROPIONATE 0.5 MG/G
OINTMENT TOPICAL ONCE
Status: CANCELLED | OUTPATIENT
Start: 2022-10-03 | End: 2022-10-03

## 2022-10-03 RX ORDER — GENTAMICIN SULFATE 1 MG/G
OINTMENT TOPICAL ONCE
Status: CANCELLED | OUTPATIENT
Start: 2022-10-03 | End: 2022-10-03

## 2022-10-03 RX ORDER — SILVER SULFADIAZINE 10 G/1000G
CREAM TOPICAL ONCE
Status: CANCELLED | OUTPATIENT
Start: 2022-10-03 | End: 2022-10-03

## 2022-10-03 NOTE — PROGRESS NOTES
[address] [place] [address] PROGRESS NOTE    Name:  Arvind Roy  MR#:  [id number]  :  1950  ACCOUNT #:  [de-identified]  DATE OF SERVICE:  10/03/2022    TREATING PHYSICIAN:  Mariah Silveira DPM    SUBJECTIVE:  The patient of Dr. Dennise Delong presents today for evaluation of her right lower leg wound. States she saw Dr. Dennise Delong last week. States that she has been using Angela on her leg. States that she has developed some redness in the leg and is concerned that it is a reaction due to the medications she is currently using. Denies any nausea, vomiting, fever, night sweats, or chills. OBJECTIVE:  VITAL SIGNS:   Stable. The patient is afebrile. WOUND EXAMINATION:  Right lower extremity exam:  There is a 4 x 2.3 x 0.1 cm wound along the anterior aspect of right lower leg. The wound has a granular base. There is a moderate amount of periwound erythema noted along with some dependant edema. DP and PT pulses are palpable. ASSESSMENT:  1. Right leg ulceration. 2.  Right lower extremity cellulitis. 3.  Right lower extremity edema. PLAN:  I had a long discussion with the patient this afternoon. Clinically, her wound looks healthy. I believe the wound appears to be responding well to the Angela. She is to continue using the Angela every other day. Regarding the redness, a prescription for doxycycline 100 mg 1 pill twice a day was sent to her pharmacy. She is to take that until it is done. Also prescription for triamcinolone cream was sent to the pharmacy. The patient is to use that around the periwound area during her dressing changes. She has an appointment on Friday to see Dr. Dennise Delong. We have instructed her to keep that appointment and follow back up with Dr. Fidencio Wong.       CAT [email or web address]  D:  10/03/2022 14:23  T:  10/03/2022 17:08  JOB #:  [id number]

## 2022-10-03 NOTE — WOUND CARE
10/03/22 1334   Right Leg Edema Point of Measurement   Leg circumference 38.5 cm   Ankle circumference 26 cm   RLE Peripheral Vascular    Capillary Refill Less than/equal to 3 seconds   Color Red   Temperature Warm   Sensation Present   Pedal Pulse Palpable   Wound Leg lower Right   Date First Assessed/Time First Assessed: 07/15/22 1042   Present on Hospital Admission: Yes  Wound Approximate Age at First Assessment (Weeks): 56 weeks  Primary Wound Type: Arterial Ulcer  Location: Leg lower  Wound Location Orientation: Right   Wound Image    Wound Etiology Traumatic   Dressing Status Old drainage noted   Cleansed Cleansed with saline   Wound Length (cm) 4 cm   Wound Width (cm) 2.3 cm   Wound Depth (cm) 0.1 cm   Wound Surface Area (cm^2) 9.2 cm^2   Change in Wound Size % 19.79   Wound Volume (cm^3) 0.92 cm^3   Wound Healing % 20   Wound Assessment Pink/red;Slough; Hyper granulation tissue   Drainage Amount Moderate   Drainage Description Serosanguinous   Wound Odor None   Flora-Wound/Incision Assessment Excoriated;Edematous; Blanchable erythema; Other (Comment)  (friable)   Edges Attached edges   Wound Thickness Description Full thickness   Pain 1   Pain Scale 1 Numeric (0 - 10)   Pain Intensity 1 4   Pain Location 1 Leg   Pain Orientation 1 Right   Visit Vitals  BP (!) 156/86 (BP 1 Location: Left upper arm, BP Patient Position: Sitting)   Pulse 92   Temp 98.1 °F (36.7 °C)

## 2022-10-03 NOTE — WOUND CARE
10/03/22 1334   Right Leg Edema Point of Measurement   Leg circumference 38.5 cm   Ankle circumference 26 cm   RLE Peripheral Vascular    Capillary Refill Less than/equal to 3 seconds   Color Red   Temperature Warm   Sensation Present   Pedal Pulse Palpable   Wound Leg lower Right   Date First Assessed/Time First Assessed: 07/15/22 1042   Present on Hospital Admission: Yes  Wound Approximate Age at First Assessment (Weeks): 56 weeks  Primary Wound Type: Arterial Ulcer  Location: Leg lower  Wound Location Orientation: Right   Wound Image    Wound Etiology Traumatic   Dressing Status Old drainage noted   Cleansed Cleansed with saline   Wound Length (cm) 4 cm   Wound Width (cm) 2.3 cm   Wound Depth (cm) 0.1 cm   Wound Surface Area (cm^2) 9.2 cm^2   Change in Wound Size % 19.79   Wound Volume (cm^3) 0.92 cm^3   Wound Healing % 20   Wound Assessment [place]/red;Slough   Drainage Amount Moderate   Drainage Description Serosanguinous   Wound Odor None   Flora-Wound/Incision Assessment Excoriated;Edematous; Blanchable erythema   Edges Attached edges   Wound Thickness Description Full thickness   Pain 1   Pain Scale 1 Numeric (0 - 10)   Pain Intensity 1 4   Pain Location 1 Leg   Pain Orientation 1 Right   Visit Vitals  BP (!) 156/86 (BP 1 Location: Left upper arm, BP Patient Position: Sitting)   Pulse 92   Temp 98.1 °F (36.7 °C)

## 2022-10-03 NOTE — DISCHARGE INSTRUCTIONS
Discharge Instructions for          [place] [address] 1200 [facility], [address]  Phone: [phone] Fax: [phone]    NAME:  Enedelia Romberg  YOB: 1950  MEDICAL RECORD NUMBER:  [id number]  DATE:  October 3, 2022  WOUND CARE ORDERS:  Right lower leg - Cleanse with saline, rinse and pat dry. Apply triamcinolone to red area around the wound. Apply alda to wound bed. Cover with gauze and secure with roll gauze and tape. Change three times a week. TREATMENT ORDERS:    Elevate leg(s) above the level of the heart when sitting. Avoid prolonged standing in one place. Do no get dressing/wrap wet. Follow Diet as prescribed:   [] Diet as tolerated: [] Calorie Diabetic Diet: Low carb and no Sugar [x] No Added Salt:  [x] Increase Protein: [] Limit the amount of liquid you are drinking and avoid drinking in between meals   Return Appointment:  [x] Return Appointment: With Dr. Aden Rouse on Friday  [] Nurse Visit : *** days  [] Ordered tests:    Electronically signed Donnie Machado RN on 10/3/2022 at 2:03 PM     215 [facility] Information: Should you experience any significant changes in your wound(s) or have questions about your wound care, please contact the [address] [place] [address] at [address] [place] [address] 8:00 am - 4:30. If you need help with your wound outside these hours and cannot wait until we are again available, contact your PCP or go to the hospital emergency room. PLEASE NOTE: IF YOU ARE UNABLE TO OBTAIN WOUND SUPPLIES, CONTINUE TO USE THE SUPPLIES YOU HAVE AVAILABLE UNTIL YOU ARE ABLE TO REACH US. IT IS MOST IMPORTANT TO KEEP THE WOUND COVERED AT ALL TIMES.      Physician Signature:_______________________    Date: ___________ Time:  ____________

## 2022-10-07 ENCOUNTER — HOSPITAL ENCOUNTER (OUTPATIENT)
Dept: WOUND CARE | Age: 72
Discharge: HOME | End: 2022-10-07
Payer: MEDICARE

## 2022-10-07 VITALS — HEART RATE: 81 BPM | TEMPERATURE: 97.8 F

## 2022-10-07 DIAGNOSIS — L97.912 LEG ULCER, RIGHT, WITH FAT LAYER EXPOSED (HCC): Primary | ICD-10-CM

## 2022-10-07 PROCEDURE — 11042 DBRDMT SUBQ TIS 1ST 20SQCM/<: CPT

## 2022-10-07 RX ORDER — MUPIROCIN 20 MG/G
OINTMENT TOPICAL ONCE
Status: CANCELLED | OUTPATIENT
Start: 2022-10-07 | End: 2022-10-07

## 2022-10-07 RX ORDER — TRIAMCINOLONE ACETONIDE 1 MG/G
OINTMENT TOPICAL ONCE
Status: CANCELLED | OUTPATIENT
Start: 2022-10-07 | End: 2022-10-07

## 2022-10-07 RX ORDER — CLOBETASOL PROPIONATE 0.5 MG/G
OINTMENT TOPICAL ONCE
Status: CANCELLED | OUTPATIENT
Start: 2022-10-07 | End: 2022-10-07

## 2022-10-07 RX ORDER — SILVER SULFADIAZINE 10 G/1000G
CREAM TOPICAL ONCE
Status: CANCELLED | OUTPATIENT
Start: 2022-10-07 | End: 2022-10-07

## 2022-10-07 RX ORDER — BETAMETHASONE DIPROPIONATE 0.5 MG/G
OINTMENT TOPICAL ONCE
Status: CANCELLED | OUTPATIENT
Start: 2022-10-07 | End: 2022-10-07

## 2022-10-07 RX ORDER — BACITRACIN ZINC AND POLYMYXIN B SULFATE 500; 1000 [USP'U]/G; [USP'U]/G
OINTMENT TOPICAL ONCE
Status: CANCELLED | OUTPATIENT
Start: 2022-10-07 | End: 2022-10-07

## 2022-10-07 RX ORDER — LIDOCAINE HYDROCHLORIDE 20 MG/ML
JELLY TOPICAL ONCE
Status: CANCELLED | OUTPATIENT
Start: 2022-10-07 | End: 2022-10-07

## 2022-10-07 RX ORDER — LIDOCAINE HYDROCHLORIDE 40 MG/ML
SOLUTION TOPICAL ONCE
Status: CANCELLED | OUTPATIENT
Start: 2022-10-07 | End: 2022-10-07

## 2022-10-07 RX ORDER — GENTAMICIN SULFATE 1 MG/G
OINTMENT TOPICAL ONCE
Status: CANCELLED | OUTPATIENT
Start: 2022-10-07 | End: 2022-10-07

## 2022-10-07 RX ORDER — BACITRACIN 500 [USP'U]/G
OINTMENT TOPICAL ONCE
Status: CANCELLED | OUTPATIENT
Start: 2022-10-07 | End: 2022-10-07

## 2022-10-07 RX ORDER — LIDOCAINE 40 MG/G
CREAM TOPICAL ONCE
Status: CANCELLED | OUTPATIENT
Start: 2022-10-07 | End: 2022-10-07

## 2022-10-07 RX ORDER — LIDOCAINE 50 MG/G
OINTMENT TOPICAL ONCE
Status: CANCELLED | OUTPATIENT
Start: 2022-10-07 | End: 2022-10-07

## 2022-10-07 NOTE — DISCHARGE INSTRUCTIONS
Discharge Instructions for          [address], [address]  Phone: [phone] Fax: [phone]    NAME:  Laura Snowden  YOB: 1950  MEDICAL RECORD NUMBER:  [id number]  DATE:  October 7, 2022  WOUND CARE ORDERS:  Right lower leg - Cleanse with saline, rinse and pat dry. Apply triamcinolone to red area around the wound. Apply alda to wound bed. Cover with gauze and secure with roll gauze and tape. Change three times a week. TREATMENT ORDERS:    Elevate leg(s) above the level of the heart when sitting. Avoid prolonged standing in one place. Do no get dressing/wrap wet. Follow Diet as prescribed:   [] Diet as tolerated: [] Calorie Diabetic Diet: Low carb and no Sugar [x] No Added Salt:  [x] Increase Protein: [] Limit the amount of liquid you are drinking and avoid drinking in between meals   Return Appointment:  [x] Return Appointment: With Dr. Dorothea Thomas in 1 week. [] Nurse Visit : *** days  [] Ordered tests:    Electronically signed Bulmaro Mireles RN on 10/7/2022 at 11:48 AM     215 [facility] Information: Should you experience any significant changes in your wound(s) or have questions about your wound care, please contact the [address] [place] [address] at [address] [place] [address] 8:00 am - 4:30. If you need help with your wound outside these hours and cannot wait until we are again available, contact your PCP or go to the hospital emergency room. PLEASE NOTE: IF YOU ARE UNABLE TO OBTAIN WOUND SUPPLIES, CONTINUE TO USE THE SUPPLIES YOU HAVE AVAILABLE UNTIL YOU ARE ABLE TO REACH US. IT IS MOST IMPORTANT TO KEEP THE WOUND COVERED AT ALL TIMES.      Physician Signature:_______________________    Date: ___________ Time:  ____________

## 2022-10-07 NOTE — WOUND CARE
10/07/22 1132   Left Leg Edema Point of Measurement   Leg circumference 36.5 cm   Ankle circumference 24.5 cm   RLE Peripheral Vascular    Capillary Refill Less than/equal to 3 seconds   Color Appropriate for race   Temperature Warm   Sensation Present   Pedal Pulse Present   Wound Leg lower Right   Date First Assessed/Time First Assessed: 07/15/22 1042   Present on Hospital Admission: Yes  Wound Approximate Age at First Assessment (Weeks): 56 weeks  Primary Wound Type: Arterial Ulcer  Location: Leg lower  Wound Location Orientation: Right   Wound Image    Wound Etiology Traumatic   Dressing Status Intact   Cleansed Cleansed with saline   Wound Length (cm) 3.5 cm   Wound Width (cm) 1.9 cm   Wound Depth (cm) 0.1 cm   Wound Surface Area (cm^2) 6.65 cm^2   Change in Wound Size % 42.02   Wound Volume (cm^3) 0.665 cm^3   Wound Healing % 42   Wound Assessment [place]/red;Slough   Drainage Amount Moderate   Drainage Description Serosanguinous;Green   Wound Odor None   Flora-Wound/Incision Assessment   (pink)   Edges Defined edges   Wound Thickness Description Full thickness   Visit Vitals  Pulse 81   Temp 97.8 °F (36.6 °C)

## 2022-10-12 ENCOUNTER — OFFICE VISIT (OUTPATIENT)
Dept: SURGERY | Age: 72
End: 2022-10-12
Payer: MEDICARE

## 2022-10-12 VITALS
HEART RATE: 69 BPM | SYSTOLIC BLOOD PRESSURE: 126 MMHG | RESPIRATION RATE: 16 BRPM | OXYGEN SATURATION: 92 % | WEIGHT: 163 LBS | TEMPERATURE: 97.5 F | BODY MASS INDEX: 25.58 KG/M2 | HEIGHT: 67 IN | DIASTOLIC BLOOD PRESSURE: 78 MMHG

## 2022-10-12 DIAGNOSIS — S81.801D WOUND OF RIGHT LOWER EXTREMITY, SUBSEQUENT ENCOUNTER: ICD-10-CM

## 2022-10-12 DIAGNOSIS — K42.9 UMBILICAL HERNIA WITHOUT OBSTRUCTION AND WITHOUT GANGRENE: Primary | ICD-10-CM

## 2022-10-12 PROCEDURE — G8417 CALC BMI ABV UP PARAM F/U: HCPCS | Performed by: SURGERY

## 2022-10-12 PROCEDURE — 1101F PT FALLS ASSESS-DOCD LE1/YR: CPT | Performed by: SURGERY

## 2022-10-12 PROCEDURE — G8427 DOCREV CUR MEDS BY ELIG CLIN: HCPCS | Performed by: SURGERY

## 2022-10-12 PROCEDURE — G8400 PT W/DXA NO RESULTS DOC: HCPCS | Performed by: SURGERY

## 2022-10-12 PROCEDURE — 1090F PRES/ABSN URINE INCON ASSESS: CPT | Performed by: SURGERY

## 2022-10-12 PROCEDURE — G9717 DOC PT DX DEP/BP F/U NT REQ: HCPCS | Performed by: SURGERY

## 2022-10-12 PROCEDURE — G8754 DIAS BP LESS 90: HCPCS | Performed by: SURGERY

## 2022-10-12 PROCEDURE — G8536 NO DOC ELDER MAL SCRN: HCPCS | Performed by: SURGERY

## 2022-10-12 PROCEDURE — 1124F ACP DISCUSS-NO DSCNMKR DOCD: CPT | Performed by: SURGERY

## 2022-10-12 PROCEDURE — G8752 SYS BP LESS 140: HCPCS | Performed by: SURGERY

## 2022-10-12 PROCEDURE — 99214 OFFICE O/P EST MOD 30 MIN: CPT | Performed by: SURGERY

## 2022-10-12 PROCEDURE — 3017F COLORECTAL CA SCREEN DOC REV: CPT | Performed by: SURGERY

## 2022-10-12 NOTE — PROGRESS NOTES
Identified pt with two pt identifiers (name and ). Reviewed chart in preparation for visit and have obtained necessary documentation. Christiano Moncada is a 67 y.o. female  Chief Complaint   Patient presents with    Wound Check     (RLE) Ulcer     Visit Vitals  /78 (BP 1 Location: Left upper arm, BP Patient Position: Sitting, BP Cuff Size: Adult)   Pulse 69   Temp 97.5 °F (36.4 °C) (Oral)   Resp 16   Ht 5' 7\" (1.702 m)   Wt 163 lb (73.9 kg)   SpO2 92%   BMI 25.53 kg/m²       1. Have you been to the ER, urgent care clinic since your last visit? Hospitalized since your last visit? No    2. Have you seen or consulted any other health care providers outside of the [address] [place] [address] since your last visit? Include any pap smears or colon screening.  No

## 2022-10-12 NOTE — PROGRESS NOTES
Surgery Progress Note    10/12/2022    CC: Chronic leg wound    Subjective:     Patient is almost a year out from her original injury. She has been seeing wound care. They attempted skin substitute graft which did not close the wound. She has continued to do daily wound care. This has been taking a heavy toll on her personally and mentally given the slow healing process. She appears to be doing well with her medication for her heart. No major leg swelling. Still with shooting 8 out of 10 pains at night that radiates up her leg. She takes pain medication for this intermittently. No fevers or chills. Pressing the area provokes the pain. Also can be spontaneous. She has a known chronic umbilical hernia that does not cause pain but is unsightly for her.     Constitutional: No fever or chills  Neurologic: No headache  Eyes: No scleral icterus or irritated eyes  Nose: No nasal pain or drainage  Mouth: No oral lesions or sore throat  Cardiac: No palpations or chest pain  Pulmonary: No cough or shortness of breath  Gastrointestinal: No nausea, emesis, diarrhea, or constipation  Genitourinary: No dysuria  Musculoskeletal: Right leg wound  Skin: No rashes or lesions  Psychiatric: No anxiety or depressed mood    Objective:   Visit Vitals  /78 (BP 1 Location: Left upper arm, BP Patient Position: Sitting, BP Cuff Size: Adult)   Pulse 69   Temp 97.5 °F (36.4 °C) (Oral)   Resp 16   Ht 5' 7\" (1.702 m)   Wt 163 lb (73.9 kg)   SpO2 92%   BMI 25.53 kg/m²       General: No acute distress, conversant  Eyes: PERRLA, no scleral icterus  HENT: Normocephalic without oral lesions  Neck: Trachea midline without LAD  Cardiac: Normal pulse rate and rhythm  Pulmonary: Symmetric chest rise with normal effort  GI: Soft, NT, ND, reducible 1.5 cm umbilical hernia, no splenomegaly  Skin: Warm without rash  Extremities: Right lower extremity with a medial 1.5 x 3 cm superficial ulcer  Psych: Appropriate mood and affect    Assessment:     69-year-old female with slow to heal right medial leg traumatic wound and persistent ulcer with also a reducible umbilical hernia    Plan:     Wound examined and dressing changed to right leg wound    In terms of the ulcer she has 2 options. She can continue wound care which is totally reasonable and the wound may closed in the next 6 months. On the other hand, it has not healed for the last year and she may be destined for a skin graft. I told her that this would be an outpatient procedure and she could maybe have this heal up in the next month or so after surgery. She wants to consider this further. In terms of the umbilical hernia, this can be repaired at the time of the skin graft. I plan to do this with a vest over pants repair with 0 Prolene suture without mesh. The patient will consider the above and let me know. Otherwise follow-up.       Dwight Mariee MD  Bariatric and General Surgeon  [facility]

## 2022-10-14 ENCOUNTER — HOSPITAL ENCOUNTER (OUTPATIENT)
Dept: WOUND CARE | Age: 72
Discharge: HOME | End: 2022-10-14
Payer: MEDICARE

## 2022-10-14 VITALS
SYSTOLIC BLOOD PRESSURE: 157 MMHG | TEMPERATURE: 97.9 F | HEART RATE: 73 BPM | DIASTOLIC BLOOD PRESSURE: 97 MMHG | RESPIRATION RATE: 18 BRPM

## 2022-10-14 DIAGNOSIS — L97.912 LEG ULCER, RIGHT, WITH FAT LAYER EXPOSED (HCC): Primary | ICD-10-CM

## 2022-10-14 PROCEDURE — 11042 DBRDMT SUBQ TIS 1ST 20SQCM/<: CPT

## 2022-10-14 RX ORDER — MUPIROCIN 20 MG/G
OINTMENT TOPICAL ONCE
Status: CANCELLED | OUTPATIENT
Start: 2022-10-14 | End: 2022-10-14

## 2022-10-14 RX ORDER — GENTAMICIN SULFATE 1 MG/G
OINTMENT TOPICAL ONCE
Status: CANCELLED | OUTPATIENT
Start: 2022-10-14 | End: 2022-10-14

## 2022-10-14 RX ORDER — LIDOCAINE 40 MG/G
CREAM TOPICAL ONCE
Status: CANCELLED | OUTPATIENT
Start: 2022-10-14 | End: 2022-10-14

## 2022-10-14 RX ORDER — SILVER SULFADIAZINE 10 G/1000G
CREAM TOPICAL ONCE
Status: CANCELLED | OUTPATIENT
Start: 2022-10-14 | End: 2022-10-14

## 2022-10-14 RX ORDER — LIDOCAINE HYDROCHLORIDE 20 MG/ML
JELLY TOPICAL ONCE
Status: CANCELLED | OUTPATIENT
Start: 2022-10-14 | End: 2022-10-14

## 2022-10-14 RX ORDER — LIDOCAINE HYDROCHLORIDE 40 MG/ML
SOLUTION TOPICAL ONCE
Status: CANCELLED | OUTPATIENT
Start: 2022-10-14 | End: 2022-10-14

## 2022-10-14 RX ORDER — CLOBETASOL PROPIONATE 0.5 MG/G
OINTMENT TOPICAL ONCE
Status: CANCELLED | OUTPATIENT
Start: 2022-10-14 | End: 2022-10-14

## 2022-10-14 RX ORDER — LIDOCAINE 50 MG/G
OINTMENT TOPICAL ONCE
Status: CANCELLED | OUTPATIENT
Start: 2022-10-14 | End: 2022-10-14

## 2022-10-14 RX ORDER — BACITRACIN ZINC AND POLYMYXIN B SULFATE 500; 1000 [USP'U]/G; [USP'U]/G
OINTMENT TOPICAL ONCE
Status: CANCELLED | OUTPATIENT
Start: 2022-10-14 | End: 2022-10-14

## 2022-10-14 RX ORDER — BETAMETHASONE DIPROPIONATE 0.5 MG/G
OINTMENT TOPICAL ONCE
Status: CANCELLED | OUTPATIENT
Start: 2022-10-14 | End: 2022-10-14

## 2022-10-14 RX ORDER — BACITRACIN 500 [USP'U]/G
OINTMENT TOPICAL ONCE
Status: CANCELLED | OUTPATIENT
Start: 2022-10-14 | End: 2022-10-14

## 2022-10-14 RX ORDER — TRIAMCINOLONE ACETONIDE 1 MG/G
OINTMENT TOPICAL ONCE
Status: CANCELLED | OUTPATIENT
Start: 2022-10-14 | End: 2022-10-14

## 2022-10-14 NOTE — PROGRESS NOTES
Patient presents to wound clinic for: Nayan Rodrigues is a 67 y.o. female who presents for wound care of the following: right anterior leg ulcer. She notes that she had it surgically debrided and had been using a wound vac. She notes that there has been tremendous progress in resolution in terms of the depth of the wound. However, lately progress of the wound has slowed. She has been referred her for further treatment by her surgeon. Update 10/14/22: Patient presents today for follow-up. Had follow-up with her general surgeon earlier this week who recommend skin graft. Patient related that she would rather given her wound more time and wait to see if it heals on its own before undergoing another surgery. Pertinent Medical History:  Past Medical History:   Diagnosis Date    Atrial fibrillation ([facility] 7/15/2011    Depression     HTN (hypertension) 7/14/2011    Paroxysmal atrial fibrillation (HCC)     SIADH (syndrome of inappropriate ADH production) ([facility] 1/14/2022    Thyroid ca ([facility] 2005    Papillary    Unspecified hypothyroidism 7/15/2011     Past Surgical History:   Procedure Laterality Date    ENDOSCOPY, COLON, DIAGNOSTIC  2003    neg. rep 10 yrs. NE THYROIDECTOMY  11/05     Prior to Admission medications    Medication Sig Start Date End Date Taking? Authorizing Provider   doxycycline (ADOXA) 100 mg tablet Take 1 Tablet by mouth two (2) times a day for 10 days. Take with food. Take until done. 10/3/22 10/13/22  Seble Casas DPM   triamcinolone acetonide (KENALOG) 0.1 % topical cream Apply  to affected area two (2) times a day. use thin layer along affected skin surrounding your wound 10/3/22   Seble Casas DPM   traZODone (DESYREL) 50 mg tablet Take 1 Tablet by mouth nightly. 9/29/22   Roosevelt Jean MD   pregabalin (LYRICA) 100 mg capsule Take 1 Capsule by mouth two (2) times a day for 30 days.  Max Daily Amount: 200 mg. 9/23/22 10/23/22  Marlin Mack DPM   levothyroxine (SYNTHROID) 175 mcg tablet TAKE 1 TABLET BY MOUTH EVERY DAY BEFORE BREAKFAST 8/21/22   Hari Beck MD   diazePAM (VALIUM) 2 mg tablet TAKE 1 TABLET BY MOUTH EVERY DAY AS NEEDED FOR ANXIETY 8/8/22   Hari Beck MD   diazePAM (VALIUM) 2 mg tablet Take 1 Tablet by mouth every six (6) hours as needed for Anxiety for up to 20 doses. Max Daily Amount: 8 mg. 7/6/22   Nam Vasquez MD   Xarelto 20 mg tab tablet TAKE 1 TABLET BY MOUTH EVERY DAY 7/3/22   Hari Beck MD   ondansetron (ZOFRAN ODT) 4 mg disintegrating tablet TAKE 1 TABLET BY MOUTH FOUR (4) TIMES DAILY AS NEEDED FOR NAUSEA OR VOMITING. 7/3/22   Hari Beck MD   oxyCODONE-acetaminophen (PERCOCET) 5-325 mg per tablet TAKE 1 TABLET BY MOUTH EVERY 4 HOURS AS NEEDED FOR PAIN FOR UP TO 3 DAYS. MAX 6 TABS DAILY. Patient not taking: No sig reported 6/13/22   Provider, Historical   butalbital-acetaminophen-caffeine (FIORICET, ESGIC) -40 mg per tablet TAKE 1 TO 2 TABLETS BY MOUTH EVERY 6 HOURS AS NEEDED FOR HEADACHE 6/15/22   Hari Beck MD   butalbital-acetaminophen-caffeine (FIORICET, [facility]) -40 mg per tablet Take 1 Tablet by mouth every six (6) hours as needed for Headache. 6/13/22   Nam Vasquez MD   metoprolol succinate (TOPROL-XL) 50 mg XL tablet TAKE 1 TABLET BY MOUTH AFTER DINNER 2/10/22   Hari Beck MD   sacubitriL-valsartan Joshua Jeronimo) 49-51 mg tab tablet Take 1 Tablet by mouth two (2) times a day. Provider, Historical   bumetanide (BUMEX) 1 mg tablet Take 1.5 Tablets by mouth daily.  1/14/22   Hari Beck MD     Allergies   Allergen Reactions    Penicillins Unknown (comments)     Patient reports allergy to penicillin with unknown reaction, but states she has tolerated amoxicillin    Opioids - Morphine Analogues Itching and Nausea and Vomiting    Percocet [Oxycodone-Acetaminophen] Rash     Per patient     Family History   Problem Relation Age of Onset    Cancer Mother         lung     Social History     Socioeconomic History    Marital status:      Spouse name: Not on file    Number of children: Not on file    Years of education: Not on file    Highest education level: Not on file   Occupational History    Not on file   Tobacco Use    Smoking status: Never     Passive exposure: Never    Smokeless tobacco: Never   Vaping Use    Vaping Use: Never used   Substance and Sexual Activity    Alcohol use: Yes     Comment: soc    Drug use: No    Sexual activity: Not on file   Other Topics Concern     Service Not Asked    Blood Transfusions Not Asked    Caffeine Concern Not Asked    Occupational Exposure Not Asked    Hobby Hazards Not Asked    Sleep Concern Not Asked    Stress Concern Not Asked    Weight Concern Not Asked    Special Diet Not Asked    Back Care Not Asked    Exercise Not Asked    Bike Helmet Not Asked    Seat Belt Not Asked    Self-Exams Not Asked   Social History Narrative    Not on file     Social Determinants of Health     Financial Resource Strain: Not on file   Food Insecurity: Not on file   Transportation Needs: Not on file   Physical Activity: Not on file   Stress: Not on file   Social Connections: Not on file   Intimate Partner Violence: Not on file   Housing Stability: Not on file       Vitals:    10/14/22 1107   BP: (!) 157/97   Pulse: 73   Resp: 18   Temp: 97.9 °F (36.6 °C)       Review of Systems:    Gen: No fever, chills, malaise, weight loss/gain. Heent: No headache, rhinorrhea, epistaxis, ear pain, hearing loss, sinus pain, neck pain/stiffness, sore throat. Heart: No chest pain, palpitations, HERNDON, pnd, or orthopnea. Resp: No cough, hemoptysis, wheezing and shortness of breath. GI: No nausea, vomiting, diarrhea, constipation, melena or hematochezia. : No urinary obstruction, dysuria or hematuria. Derm: see below  Musc/skeletal: no bone or joint complains. Vasc: No edema, cyanosis or claudication.    Endo: No heat/cold intolerance, no polyuria,polydipsia or polyphagia. Neuro: No unilateral weakness, numbness, tingling. No seizures. Heme: No easy bruising or bleeding. 10/14/22 1107   Anesthetic   Anesthetic 4% Lidocaine Liquid Topical   Right Leg Edema Point of Measurement   Leg circumference 35.5 cm   Ankle circumference 23 cm   RLE Peripheral Vascular    Capillary Refill Less than/equal to 3 seconds   Color Appropriate for race   Temperature Warm   Sensation Present   Pedal Pulse Present   Wound Leg lower Right   Date First Assessed/Time First Assessed: 07/15/22 1042   Present on Hospital Admission: Yes  Wound Approximate Age at First Assessment (Weeks): 56 weeks  Primary Wound Type: Arterial Ulcer  Location: Leg lower  Wound Location Orientation: Right   Wound Image    Wound Etiology Traumatic   Dressing Status Intact   Cleansed Cleansed with saline   Wound Length (cm) 2.9 cm   Wound Width (cm) 1.3 cm   Wound Depth (cm) 0.1 cm   Wound Surface Area (cm^2) 3.77 cm^2   Change in Wound Size % 67.13   Wound Volume (cm^3) 0.377 cm^3   Wound Healing % 67   Wound Assessment Pink/red   Drainage Amount Moderate   Drainage Description Serosanguinous   Wound Odor None   Flora-Wound/Incision Assessment Blanchable erythema   Edges Defined edges   Wound Thickness Description Full thickness   Pain 1   Pain Scale 1 Numeric (0 - 10)   Pain Intensity 1 0   Patient Stated Pain Goal 0       Debridement Wound Care        Problem List Items Addressed This Visit          Other    Leg ulcer, right, with fat layer exposed ([facility] [address] - Primary    Relevant Orders    INITIATE OUTPATIENT WOUND CARE PROTOCOL       Procedure Note  Indications:  Based on my examination of this patient's wound(s)/ulcer(s) today, debridement is required to promote healing and evaluate the wound base.     Performed by: Shirley Bolaños DPM    Consent obtained: Yes    Time out taken: Yes    Debridement: Excisional    Using curette the wound(s)/ulcer(s) was/were sharply debrided down through and including the removal of    epidermis, dermis, and subcutaneous tissue    Devitalized Tissue Debrided: fibrin, biofilm, slough, and exudate    Pre Debridement Measurements:  Are located in the [place]  Documentation Flow Sheet    Other: former hematoma    Wound/Ulcer #: 1    Post Debridement Measurements:  Wound/Ulcer Descriptions are Pre Debridement except measurements:    Wound Leg lower Right (Active)   Wound Image   10/14/22 1107   Wound Etiology Traumatic 10/14/22 1107   Dressing Status Intact 10/14/22 1107   Cleansed Cleansed with saline 10/14/22 1107   Dressing/Treatment Other (Comment); Roll gauze;Tape/Soft cloth adhesive tape 09/16/22 1202   Wound Length (cm) 2.9 cm 10/14/22 1107   Wound Width (cm) 1.3 cm 10/14/22 1107   Wound Depth (cm) 0.1 cm 10/14/22 1107   Wound Surface Area (cm^2) 3.77 cm^2 10/14/22 1107   Change in Wound Size % 67.13 10/14/22 1107   Wound Volume (cm^3) 0.377 cm^3 10/14/22 1107   Wound Healing % 67 10/14/22 1107   Post-Procedure Width (cm) 2.5 cm 08/19/22 1055   Wound Assessment Pink/red 10/14/22 1107   Drainage Amount Moderate 10/14/22 1107   Drainage Description Serosanguinous 10/14/22 1107   Wound Odor None 10/14/22 1107   Flora-Wound/Incision Assessment Blanchable erythema 10/14/22 1107   Edges Defined edges 10/14/22 1107   Wound Thickness Description Full thickness 10/14/22 1107   Number of days: 91        Total Surface Area Debrided:  3.77 sq cm     Estimated Blood Loss:  Minimal     Hemostasis Achieved: Pressure    Procedural Pain: 0 / 10     Post Procedural Pain: 0 / 10     Response to treatment: Well tolerated by patient     Assessment:   Right anterior leg ulcer due to previous trauma    Plan:   -Wound debridement as noted above  -Angela, gauze, every other day. Triamcinolone to periwound area.   -Follow-up 1 week

## 2022-10-14 NOTE — DISCHARGE INSTRUCTIONS
Discharge Instructions for          [address], [address]  Phone: [phone] Fax: [phone]    NAME:  Hina Lal  YOB: 1950  MEDICAL RECORD NUMBER:  [id number]  DATE:  October 14, 2022  WOUND CARE ORDERS:  Right lower leg - Cleanse with saline, rinse and pat dry. Apply triamcinolone to red area around the wound. Apply alda to wound bed. Cover with gauze and secure with roll gauze and tape. Change three times a week. TREATMENT ORDERS:    Elevate leg(s) above the level of the heart when sitting. Avoid prolonged standing in one place. Do no get dressing/wrap wet. Follow Diet as prescribed:   [x] Diet as tolerated: [] Calorie Diabetic Diet: Low carb and no Sugar [] No Added Salt:  [x] Increase Protein: [] Limit the amount of liquid you are drinking and avoid drinking in between meals   Return Appointment:  [x] Return Appointment: With Dr. Sherlean Blizzard in 2 weeks. [] Nurse Visit : *** days  [] Ordered tests:    Electronically signed Kojo Hawk RN on 10/14/2022 at 11:34 AM     [facility] Information: Should you experience any significant changes in your wound(s) or have questions about your wound care, please contact the [address] [place] [address] at [address] [place] [address] 8:00 am - 4:30. If you need help with your wound outside these hours and cannot wait until we are again available, contact your PCP or go to the hospital emergency room. PLEASE NOTE: IF YOU ARE UNABLE TO OBTAIN WOUND SUPPLIES, CONTINUE TO USE THE SUPPLIES YOU HAVE AVAILABLE UNTIL YOU ARE ABLE TO REACH US. IT IS MOST IMPORTANT TO KEEP THE WOUND COVERED AT ALL TIMES.      Physician Signature:_______________________    Date: ___________ Time:  ____________

## 2022-10-14 NOTE — WOUND CARE
10/14/22 1159   Wound Leg lower Right   Date First Assessed/Time First Assessed: 07/15/22 1042   Present on Hospital Admission: Yes  Wound Approximate Age at First Assessment (Weeks): 56 weeks  Primary Wound Type: Arterial Ulcer  Location: Leg lower  Wound Location Orientation: Right   Dressing Status New dressing applied   Dressing/Treatment Other (Comment); Collagen with Ag;Gauze dressing/dressing sponge;Roll gauze;Tape/Soft cloth adhesive tape

## 2022-10-14 NOTE — WOUND CARE
10/14/22 1107   Anesthetic   Anesthetic 4% Lidocaine Liquid Topical   Right Leg Edema Point of Measurement   Leg circumference 35.5 cm   Ankle circumference 23 cm   RLE Peripheral Vascular    Capillary Refill Less than/equal to 3 seconds   Color Appropriate for race   Temperature Warm   Sensation Present   Pedal Pulse Present   Wound Leg lower Right   Date First Assessed/Time First Assessed: 07/15/22 1042   Present on Hospital Admission: Yes  Wound Approximate Age at First Assessment (Weeks): 56 weeks  Primary Wound Type: Arterial Ulcer  Location: Leg lower  Wound Location Orientation: Right   Wound Image    Wound Etiology Traumatic   Dressing Status Intact   Cleansed Cleansed with saline   Wound Length (cm) 2.9 cm   Wound Width (cm) 1.3 cm   Wound Depth (cm) 0.1 cm   Wound Surface Area (cm^2) 3.77 cm^2   Change in Wound Size % 67.13   Wound Volume (cm^3) 0.377 cm^3   Wound Healing % 67   Wound Assessment Pink/red   Drainage Amount Moderate   Drainage Description Serosanguinous   Wound Odor None   Flora-Wound/Incision Assessment Blanchable erythema   Edges Defined edges   Wound Thickness Description Full thickness   Pain 1   Pain Scale 1 Numeric (0 - 10)   Pain Intensity 1 0   Patient Stated Pain Goal 0   Visit Vitals  BP (!) 157/97 (BP 1 Location: Left upper arm, BP Patient Position: At rest)   Pulse 73   Temp 97.9 °F (36.6 °C)   Resp 18

## 2022-10-28 ENCOUNTER — HOSPITAL ENCOUNTER (OUTPATIENT)
Dept: WOUND CARE | Age: 72
Discharge: HOME | End: 2022-10-28
Payer: MEDICARE

## 2022-10-28 VITALS — TEMPERATURE: 97.8 F

## 2022-10-28 DIAGNOSIS — L97.912 LEG ULCER, RIGHT, WITH FAT LAYER EXPOSED (HCC): Primary | ICD-10-CM

## 2022-10-28 PROCEDURE — 11042 DBRDMT SUBQ TIS 1ST 20SQCM/<: CPT

## 2022-10-28 RX ORDER — LIDOCAINE 40 MG/G
CREAM TOPICAL ONCE
Status: CANCELLED | OUTPATIENT
Start: 2022-10-28 | End: 2022-10-28

## 2022-10-28 RX ORDER — BACITRACIN ZINC AND POLYMYXIN B SULFATE 500; 1000 [USP'U]/G; [USP'U]/G
OINTMENT TOPICAL ONCE
Status: CANCELLED | OUTPATIENT
Start: 2022-10-28 | End: 2022-10-28

## 2022-10-28 RX ORDER — SILVER SULFADIAZINE 10 G/1000G
CREAM TOPICAL ONCE
Status: CANCELLED | OUTPATIENT
Start: 2022-10-28 | End: 2022-10-28

## 2022-10-28 RX ORDER — LIDOCAINE HYDROCHLORIDE 40 MG/ML
SOLUTION TOPICAL ONCE
Status: CANCELLED | OUTPATIENT
Start: 2022-10-28 | End: 2022-10-28

## 2022-10-28 RX ORDER — CLOBETASOL PROPIONATE 0.5 MG/G
OINTMENT TOPICAL ONCE
Status: CANCELLED | OUTPATIENT
Start: 2022-10-28 | End: 2022-10-28

## 2022-10-28 RX ORDER — BACITRACIN 500 [USP'U]/G
OINTMENT TOPICAL ONCE
Status: CANCELLED | OUTPATIENT
Start: 2022-10-28 | End: 2022-10-28

## 2022-10-28 RX ORDER — BETAMETHASONE DIPROPIONATE 0.5 MG/G
OINTMENT TOPICAL ONCE
Status: CANCELLED | OUTPATIENT
Start: 2022-10-28 | End: 2022-10-28

## 2022-10-28 RX ORDER — MUPIROCIN 20 MG/G
OINTMENT TOPICAL ONCE
Status: CANCELLED | OUTPATIENT
Start: 2022-10-28 | End: 2022-10-28

## 2022-10-28 RX ORDER — LIDOCAINE 50 MG/G
OINTMENT TOPICAL ONCE
Status: CANCELLED | OUTPATIENT
Start: 2022-10-28 | End: 2022-10-28

## 2022-10-28 RX ORDER — GENTAMICIN SULFATE 1 MG/G
OINTMENT TOPICAL ONCE
Status: CANCELLED | OUTPATIENT
Start: 2022-10-28 | End: 2022-10-28

## 2022-10-28 RX ORDER — TRIAMCINOLONE ACETONIDE 1 MG/G
OINTMENT TOPICAL ONCE
Status: CANCELLED | OUTPATIENT
Start: 2022-10-28 | End: 2022-10-28

## 2022-10-28 RX ORDER — LIDOCAINE HYDROCHLORIDE 20 MG/ML
JELLY TOPICAL ONCE
Status: CANCELLED | OUTPATIENT
Start: 2022-10-28 | End: 2022-10-28

## 2022-10-28 NOTE — DISCHARGE INSTRUCTIONS
Discharge Instructions for          [place] [address] [address] [facility], [address]  Phone: [phone] Fax: [phone]    NAME:  Dick Barry  YOB: 1950  MEDICAL RECORD NUMBER:  [id number]  DATE:  October 28, 2022  WOUND CARE ORDERS:  Right lower leg - Cleanse with saline, rinse and pat dry. Apply triamcinolone to red area around the wound. Apply alda to wound bed. Cover with gauze and secure with roll gauze and tape. Change three times a week. TREATMENT ORDERS:    Elevate leg(s) above the level of the heart when sitting. Avoid prolonged standing in one place. Do no get dressing/wrap wet. Follow Diet as prescribed:   [] Diet as tolerated: [] Calorie Diabetic Diet: Low carb and no Sugar [] No Added Salt:  [x] Increase Protein: [] Limit the amount of liquid you are drinking and avoid drinking in between meals   Return Appointment:  [x] Return Appointment: With Dr. Fatou Alvarez in 2 weeks. [] Nurse Visit : *** days  [] Ordered tests:    Electronically signed Pratibha Stark RN on 10/28/2022 at 11:43 AM     [facility] Information: Should you experience any significant changes in your wound(s) or have questions about your wound care, please contact the [address] [place] [address] at [address] [place] [address] 8:00 am - 4:30. If you need help with your wound outside these hours and cannot wait until we are again available, contact your PCP or go to the hospital emergency room. PLEASE NOTE: IF YOU ARE UNABLE TO OBTAIN WOUND SUPPLIES, CONTINUE TO USE THE SUPPLIES YOU HAVE AVAILABLE UNTIL YOU ARE ABLE TO REACH US. IT IS MOST IMPORTANT TO KEEP THE WOUND COVERED AT ALL TIMES.      Physician Signature:_______________________    Date: ___________ Time:  ____________

## 2022-10-28 NOTE — WOUND CARE
10/28/22 1103   Right Leg Edema Point of Measurement   Leg circumference 35 cm   Ankle circumference 22.6 cm   Left Leg Edema Point of Measurement   Leg circumference 36 cm   Ankle circumference 22.5 cm   RLE Peripheral Vascular    Capillary Refill Less than/equal to 3 seconds   Color Appropriate for race   Temperature Warm   Sensation Present   Pedal Pulse Present   Wound Leg lower Right   Date First Assessed/Time First Assessed: 07/15/22 1042   Present on Hospital Admission: Yes  Wound Approximate Age at First Assessment (Weeks): 56 weeks  Primary Wound Type: Arterial Ulcer  Location: Leg lower  Wound Location Orientation: Right   Wound Image    Wound Etiology Traumatic   Dressing Status Intact   Cleansed Cleansed with saline   Dressing/Treatment   (triamcinilone/alda/gauze/rg/tape)   Wound Length (cm) 2.5 cm   Wound Width (cm) 1 cm   Wound Depth (cm) 0.1 cm   Wound Surface Area (cm^2) 2.5 cm^2   Change in Wound Size % 78.2   Wound Volume (cm^3) 0.25 cm^3   Wound Healing % 78   Wound Assessment Pink/red   Drainage Amount Moderate   Drainage Description Serosanguinous   Wound Odor None   Flora-Wound/Incision Assessment Blanchable erythema   Edges Defined edges   Wound Thickness Description Full thickness

## 2022-10-28 NOTE — PROGRESS NOTES
Patient presents to wound clinic for: Laura Snowden is a 67 y.o. female who presents for wound care of the following: right anterior leg ulcer. She notes that she had it surgically debrided and had been using a wound vac. She notes that there has been tremendous progress in resolution in terms of the depth of the wound. However, lately progress of the wound has slowed. She has been referred her for further treatment by her surgeon. Update 10/28/22: Patient presents today for follow-up. She has not had any issues with her dressings over the past week. Pertinent Medical History:  Past Medical History:   Diagnosis Date    Atrial fibrillation ([facility] 7/15/2011    Depression     HTN (hypertension) 7/14/2011    Paroxysmal atrial fibrillation (HCC)     SIADH (syndrome of inappropriate ADH production) ([facility] 1/14/2022    Thyroid ca ([facility] 2005    Papillary    Unspecified hypothyroidism 7/15/2011     Past Surgical History:   Procedure Laterality Date    ENDOSCOPY, COLON, DIAGNOSTIC  2003    neg. rep 10 yrs. CT THYROIDECTOMY  11/05     Prior to Admission medications    Medication Sig Start Date End Date Taking? Authorizing Provider   butalbital-acetaminophen-caffeine (FIORICET, ESGIC) -40 mg per tablet TAKE 1 TO 2 TABLETS BY MOUTH EVERY 6 HOURS AS NEEDED FOR HEADACHE 10/19/22   Heriberto López MD   sertraline (ZOLOFT) 50 mg tablet Take 1 Tablet by mouth daily. 10/17/22   Heriberto López MD   triamcinolone acetonide (KENALOG) 0.1 % topical cream Apply  to affected area two (2) times a day. use thin layer along affected skin surrounding your wound 10/3/22   Geetha Recinos DPM   traZODone (DESYREL) 50 mg tablet Take 1 Tablet by mouth nightly.  9/29/22   Heriberot López MD   levothyroxine (SYNTHROID) 175 mcg tablet TAKE 1 TABLET BY MOUTH EVERY DAY BEFORE BREAKFAST 8/21/22   Heriberto óLpez MD   diazePAM (VALIUM) 2 mg tablet TAKE 1 TABLET BY MOUTH EVERY DAY AS NEEDED FOR ANXIETY 8/8/22   Elizabeth Bourgeois MD   diazePAM (VALIUM) 2 mg tablet Take 1 Tablet by mouth every six (6) hours as needed for Anxiety for up to 20 doses. Max Daily Amount: 8 mg. 7/6/22   Elodia Harris MD   Xarelto 20 mg tab tablet TAKE 1 TABLET BY MOUTH EVERY DAY 7/3/22   Elizabeth Bourgeois MD   ondansetron (ZOFRAN ODT) 4 mg disintegrating tablet TAKE 1 TABLET BY MOUTH FOUR (4) TIMES DAILY AS NEEDED FOR NAUSEA OR VOMITING. 7/3/22   Elizabeth Bourgeois MD   oxyCODONE-acetaminophen (PERCOCET) 5-325 mg per tablet TAKE 1 TABLET BY MOUTH EVERY 4 HOURS AS NEEDED FOR PAIN FOR UP TO 3 DAYS. MAX 6 TABS DAILY. Patient not taking: No sig reported 6/13/22   Provider, Historical   butalbital-acetaminophen-caffeine (FIORICET, ESGIC) -40 mg per tablet Take 1 Tablet by mouth every six (6) hours as needed for Headache. 6/13/22   Elodia Harris MD   metoprolol succinate (TOPROL-XL) 50 mg XL tablet TAKE 1 TABLET BY MOUTH AFTER DINNER 2/10/22   Elizabeth Bourgeois MD   sacubitriL-valsartan [facility]) 49-51 mg tab tablet Take 1 Tablet by mouth two (2) times a day. Provider, Historical   bumetanide (BUMEX) 1 mg tablet Take 1.5 Tablets by mouth daily.  1/14/22   Elizabeth Bourgeois MD     Allergies   Allergen Reactions    Penicillins Unknown (comments)     Patient reports allergy to penicillin with unknown reaction, but states she has tolerated amoxicillin    Opioids - Morphine Analogues Itching and Nausea and Vomiting    Percocet [Oxycodone-Acetaminophen] Rash     Per patient     Family History   Problem Relation Age of Onset    Cancer Mother         lung     Social History     Socioeconomic History    Marital status:      Spouse name: Not on file    Number of children: Not on file    Years of education: Not on file    Highest education level: Not on file   Occupational History    Not on file   Tobacco Use    Smoking status: Never     Passive exposure: Never    Smokeless tobacco: Never   Vaping Use    Vaping Use: Never used   Substance and Sexual Activity    Alcohol use: Yes     Comment: soc    Drug use: No    Sexual activity: Not on file   Other Topics Concern     Service Not Asked    Blood Transfusions Not Asked    Caffeine Concern Not Asked    Occupational Exposure Not Asked    Hobby Hazards Not Asked    Sleep Concern Not Asked    Stress Concern Not Asked    Weight Concern Not Asked    Special Diet Not Asked    Back Care Not Asked    Exercise Not Asked    Bike Helmet Not Asked    Seat Belt Not Asked    Self-Exams Not Asked   Social History Narrative    Not on file     Social Determinants of Health     Financial Resource Strain: Not on file   Food Insecurity: Not on file   Transportation Needs: Not on file   Physical Activity: Not on file   Stress: Not on file   Social Connections: Not on file   Intimate Partner Violence: Not on file   Housing Stability: Not on file       Vitals:    10/28/22 1101   Temp: 97.8 °F (36.6 °C)       Review of Systems:    Gen: No fever, chills, malaise, weight loss/gain. Heent: No headache, rhinorrhea, epistaxis, ear pain, hearing loss, sinus pain, neck pain/stiffness, sore throat. Heart: No chest pain, palpitations, HERNDON, pnd, or orthopnea. Resp: No cough, hemoptysis, wheezing and shortness of breath. GI: No nausea, vomiting, diarrhea, constipation, melena or hematochezia. : No urinary obstruction, dysuria or hematuria. Derm: see below  Musc/skeletal: no bone or joint complains. Vasc: No edema, cyanosis or claudication. Endo: No heat/cold intolerance, no polyuria,polydipsia or polyphagia. Neuro: No unilateral weakness, numbness, tingling. No seizures. Heme: No easy bruising or bleeding.       10/28/22 1103   Right Leg Edema Point of Measurement   Leg circumference 35 cm   Ankle circumference 22.6 cm   Left Leg Edema Point of Measurement   Leg circumference 36 cm   Ankle circumference 22.5 cm   RLE Peripheral Vascular    Capillary Refill Less than/equal to 3 seconds   Color Appropriate for race   Temperature Warm   Sensation Present   Pedal Pulse Present   Wound Leg lower Right   Date First Assessed/Time First Assessed: 07/15/22 1042   Present on Hospital Admission: Yes  Wound Approximate Age at First Assessment (Weeks): 56 weeks  Primary Wound Type: Arterial Ulcer  Location: Leg lower  Wound Location Orientation: Right   Wound Image    Wound Etiology Traumatic   Dressing Status Intact   Cleansed Cleansed with saline   Wound Length (cm) 2.5 cm   Wound Width (cm) 1 cm   Wound Depth (cm) 0.1 cm   Wound Surface Area (cm^2) 2.5 cm^2   Change in Wound Size % 78.2   Wound Volume (cm^3) 0.25 cm^3   Wound Healing % 78   Wound Assessment Pink/red   Drainage Amount Moderate   Drainage Description Serosanguinous   Wound Odor None   Flora-Wound/Incision Assessment Blanchable erythema   Edges Defined edges   Wound Thickness Description Full thickness       Debridement Wound Care        Problem List Items Addressed This Visit          Other    Leg ulcer, right, with fat layer exposed ([address] - Primary    Relevant Orders    INITIATE OUTPATIENT WOUND CARE PROTOCOL       Procedure Note  Indications:  Based on my examination of this patient's wound(s)/ulcer(s) today, debridement is required to promote healing and evaluate the wound base.     Performed by: Marvin Clark DPM    Consent obtained: Yes    Time out taken: Yes    Debridement: Excisional    Using curette the wound(s)/ulcer(s) was/were sharply debrided down through and including the removal of    epidermis, dermis, and subcutaneous tissue    Devitalized Tissue Debrided: fibrin, biofilm, slough, and exudate    Pre Debridement Measurements:  Are located in the [place]  Documentation Flow Sheet    Diabetic ulcer, fat layer exposed    Wound/Ulcer #: 1    Post Debridement Measurements:  Wound/Ulcer Descriptions are Pre Debridement except measurements:    Wound Leg lower Right (Active)   Wound Image   10/28/22 1103 Wound Etiology Traumatic 10/28/22 1103   Dressing Status Intact 10/28/22 1103   Cleansed Cleansed with saline 10/28/22 1103   Dressing/Treatment Other (Comment); Collagen with Ag;Gauze dressing/dressing sponge;Roll gauze;Tape/Soft cloth adhesive tape 10/14/22 1159   Wound Length (cm) 2.5 cm 10/28/22 1103   Wound Width (cm) 1 cm 10/28/22 1103   Wound Depth (cm) 0.1 cm 10/28/22 1103   Wound Surface Area (cm^2) 2.5 cm^2 10/28/22 1103   Change in Wound Size % 78.2 10/28/22 1103   Wound Volume (cm^3) 0.25 cm^3 10/28/22 1103   Wound Healing % 78 10/28/22 1103   Post-Procedure Width (cm) 2.5 cm 08/19/22 1055   Wound Assessment Pink/red 10/28/22 1103   Drainage Amount Moderate 10/28/22 1103   Drainage Description Serosanguinous 10/28/22 1103   Wound Odor None 10/28/22 1103   Flora-Wound/Incision Assessment Blanchable erythema 10/28/22 1103   Edges Defined edges 10/28/22 1103   Wound Thickness Description Full thickness 10/28/22 1103   Number of days: 105        Total Surface Area Debrided:  2.5 sq cm     Estimated Blood Loss:  Minimal     Hemostasis Achieved: Pressure    Procedural Pain: 0 / 10     Post Procedural Pain: 0 / 10     Response to treatment: Well tolerated by patient      Assessment:   Right anterior leg ulcer due to previous trauma    Plan:   -Wound debridement as noted above  -Angela, gauze, every other day. Triamcinolone to periwound area.   -Follow-up 2 weeks

## 2022-10-28 NOTE — WOUND CARE
10/28/22 1103   Right Leg Edema Point of Measurement   Leg circumference 35 cm   Ankle circumference 22.6 cm   Left Leg Edema Point of Measurement   Leg circumference 36 cm   Ankle circumference 22.5 cm   RLE Peripheral Vascular    Capillary Refill Less than/equal to 3 seconds   Color Appropriate for race   Temperature Warm   Sensation Present   Pedal Pulse Present   Wound Leg lower Right   Date First Assessed/Time First Assessed: 07/15/22 1042   Present on Hospital Admission: Yes  Wound Approximate Age at First Assessment (Weeks): 56 weeks  Primary Wound Type: Arterial Ulcer  Location: Leg lower  Wound Location Orientation: Right   Wound Image    Wound Etiology Traumatic   Dressing Status Intact   Cleansed Cleansed with saline   Wound Length (cm) 2.5 cm   Wound Width (cm) 1 cm   Wound Depth (cm) 0.1 cm   Wound Surface Area (cm^2) 2.5 cm^2   Change in Wound Size % 78.2   Wound Volume (cm^3) 0.25 cm^3   Wound Healing % 78   Wound Assessment Pink/red   Drainage Amount Moderate   Drainage Description Serosanguinous   Wound Odor None   Flora-Wound/Incision Assessment Blanchable erythema   Edges Defined edges   Wound Thickness Description Full thickness   Visit Vitals  Temp 97.8 °F (36.6 °C)

## 2022-11-08 PROBLEM — F32.A MILD DEPRESSION: Status: RESOLVED | Noted: 2019-01-25 | Resolved: 2022-11-08

## 2022-11-08 PROBLEM — N18.31 STAGE 3A CHRONIC KIDNEY DISEASE (HCC): Status: ACTIVE | Noted: 2022-05-16

## 2022-11-08 PROBLEM — I77.1 TORTUOUS AORTA (HCC): Status: ACTIVE | Noted: 2022-11-08

## 2022-11-08 PROBLEM — F33.1 MODERATE EPISODE OF RECURRENT MAJOR DEPRESSIVE DISORDER (HCC): Status: ACTIVE | Noted: 2022-11-08

## 2022-11-08 PROBLEM — F13.20 BENZODIAZEPINE DEPENDENCE (HCC): Status: ACTIVE | Noted: 2022-11-08

## 2022-11-08 PROBLEM — N18.30 CHRONIC RENAL DISEASE, STAGE III (HCC): Status: RESOLVED | Noted: 2022-05-16 | Resolved: 2022-11-08

## 2022-11-11 ENCOUNTER — HOSPITAL ENCOUNTER (OUTPATIENT)
Dept: WOUND CARE | Age: 72
Discharge: HOME | End: 2022-11-11
Payer: MEDICARE

## 2022-11-11 VITALS
HEART RATE: 71 BPM | DIASTOLIC BLOOD PRESSURE: 73 MMHG | TEMPERATURE: 98.9 F | SYSTOLIC BLOOD PRESSURE: 147 MMHG | RESPIRATION RATE: 18 BRPM

## 2022-11-11 DIAGNOSIS — L97.912 LEG ULCER, RIGHT, WITH FAT LAYER EXPOSED (HCC): Primary | ICD-10-CM

## 2022-11-11 PROCEDURE — 11042 DBRDMT SUBQ TIS 1ST 20SQCM/<: CPT

## 2022-11-11 RX ORDER — LIDOCAINE 40 MG/G
CREAM TOPICAL ONCE
Status: CANCELLED | OUTPATIENT
Start: 2022-11-11 | End: 2022-11-11

## 2022-11-11 RX ORDER — SILVER SULFADIAZINE 10 G/1000G
CREAM TOPICAL ONCE
Status: CANCELLED | OUTPATIENT
Start: 2022-11-11 | End: 2022-11-11

## 2022-11-11 RX ORDER — MUPIROCIN 20 MG/G
OINTMENT TOPICAL ONCE
Status: CANCELLED | OUTPATIENT
Start: 2022-11-11 | End: 2022-11-11

## 2022-11-11 RX ORDER — GENTAMICIN SULFATE 1 MG/G
OINTMENT TOPICAL ONCE
Status: CANCELLED | OUTPATIENT
Start: 2022-11-11 | End: 2022-11-11

## 2022-11-11 RX ORDER — BACITRACIN 500 [USP'U]/G
OINTMENT TOPICAL ONCE
Status: CANCELLED | OUTPATIENT
Start: 2022-11-11 | End: 2022-11-11

## 2022-11-11 RX ORDER — TRIAMCINOLONE ACETONIDE 1 MG/G
OINTMENT TOPICAL ONCE
Status: CANCELLED | OUTPATIENT
Start: 2022-11-11 | End: 2022-11-11

## 2022-11-11 RX ORDER — BACITRACIN ZINC AND POLYMYXIN B SULFATE 500; 1000 [USP'U]/G; [USP'U]/G
OINTMENT TOPICAL ONCE
Status: CANCELLED | OUTPATIENT
Start: 2022-11-11 | End: 2022-11-11

## 2022-11-11 RX ORDER — BETAMETHASONE DIPROPIONATE 0.5 MG/G
OINTMENT TOPICAL ONCE
Status: CANCELLED | OUTPATIENT
Start: 2022-11-11 | End: 2022-11-11

## 2022-11-11 RX ORDER — CLOBETASOL PROPIONATE 0.5 MG/G
OINTMENT TOPICAL ONCE
Status: CANCELLED | OUTPATIENT
Start: 2022-11-11 | End: 2022-11-11

## 2022-11-11 RX ORDER — LIDOCAINE HYDROCHLORIDE 40 MG/ML
SOLUTION TOPICAL ONCE
Status: CANCELLED | OUTPATIENT
Start: 2022-11-11 | End: 2022-11-11

## 2022-11-11 RX ORDER — LIDOCAINE HYDROCHLORIDE 20 MG/ML
JELLY TOPICAL ONCE
Status: CANCELLED | OUTPATIENT
Start: 2022-11-11 | End: 2022-11-11

## 2022-11-11 RX ORDER — LIDOCAINE 50 MG/G
OINTMENT TOPICAL ONCE
Status: CANCELLED | OUTPATIENT
Start: 2022-11-11 | End: 2022-11-11

## 2022-11-11 NOTE — PROGRESS NOTES
Patient presents to wound clinic for: Nayan Rodrigues is a 67 y.o. female who presents for wound care of the following: right anterior leg ulcer. She notes that she had it surgically debrided and had been using a wound vac. She notes that there has been tremendous progress in resolution in terms of the depth of the wound. However, lately progress of the wound has slowed. She has been referred her for further treatment by her surgeon. Update 11/11/22: Patient presents today for follow-up. She has not had any issues with her dressings over the two weeks. Pertinent Medical History:  Past Medical History:   Diagnosis Date    Atrial fibrillation ([facility] 7/15/2011    Depression     HTN (hypertension) 7/14/2011    Paroxysmal atrial fibrillation (HCC)     SIADH (syndrome of inappropriate ADH production) [facility] 1/14/2022    Thyroid ca ([facility] 2005    Papillary    Unspecified hypothyroidism 7/15/2011     Past Surgical History:   Procedure Laterality Date    ENDOSCOPY, COLON, DIAGNOSTIC  2003    neg. rep 10 yrs. [place] THYROIDECTOMY  11/05     Prior to Admission medications    Medication Sig Start Date End Date Taking? Authorizing Provider   diazePAM (VALIUM) 2 mg tablet TAKE 1 TABLET BY MOUTH EVERY DAY AS NEEDED FOR ANXIETY 11/9/22   Roosevelt Jean MD   vilazodone (VIIBRYD) 40 mg tab tablet Take 1 Tablet by mouth daily. 11/22/22   Roosevelt Jean MD   butalbital-acetaminophen-caffeine (FIORICET, ESGIC) -40 mg per tablet TAKE 1 TO 2 TABLETS BY MOUTH EVERY 6 HOURS AS NEEDED FOR HEADACHE 10/19/22   Roosevelt Jean MD   triamcinolone acetonide (KENALOG) 0.1 % topical cream Apply  to affected area two (2) times a day. use thin layer along affected skin surrounding your wound 10/3/22   Seble Casas DPM   traZODone (DESYREL) 50 mg tablet Take 1 Tablet by mouth nightly.  9/29/22   Roosevelt Jean MD   levothyroxine (SYNTHROID) 175 mcg tablet TAKE 1 TABLET BY MOUTH EVERY DAY BEFORE BREAKFAST 8/21/22   Patricia Stein MD   Xarelto 20 mg tab tablet TAKE 1 TABLET BY MOUTH EVERY DAY 7/3/22   Patricia Stein MD   ondansetron (ZOFRAN ODT) 4 mg disintegrating tablet TAKE 1 TABLET BY MOUTH FOUR (4) TIMES DAILY AS NEEDED FOR NAUSEA OR VOMITING. 7/3/22   Patricia Stein MD   metoprolol succinate (TOPROL-XL) 50 mg XL tablet TAKE 1 TABLET BY MOUTH AFTER DINNER 2/10/22   Patricia Stein MD   sacubitriL-valsartan Mela S[facility]on) 49-51 mg tab tablet Take 1 Tablet by mouth two (2) times a day. Provider, Historical   bumetanide (BUMEX) 1 mg tablet Take 1.5 Tablets by mouth daily.  1/14/22   Patricia Stein MD     Allergies   Allergen Reactions    Penicillins Unknown (comments)     Patient reports allergy to penicillin with unknown reaction, but states she has tolerated amoxicillin    Opioids - Morphine Analogues Itching and Nausea and Vomiting    Percocet [Oxycodone-Acetaminophen] Rash     Per patient     Family History   Problem Relation Age of Onset    Cancer Mother         lung     Social History     Socioeconomic History    Marital status:      Spouse name: Not on file    Number of children: Not on file    Years of education: Not on file    Highest education level: Not on file   Occupational History    Not on file   Tobacco Use    Smoking status: Never     Passive exposure: Never    Smokeless tobacco: Never   Vaping Use    Vaping Use: Never used   Substance and Sexual Activity    Alcohol use: Yes     Comment: soc    Drug use: No    Sexual activity: Not on file   Other Topics Concern     Service Not Asked    Blood Transfusions Not Asked    Caffeine Concern Not Asked    Occupational Exposure Not Asked    Hobby Hazards Not Asked    Sleep Concern Not Asked    Stress Concern Not Asked    Weight Concern Not Asked    Special Diet Not Asked    Back Care Not Asked    Exercise Not Asked    Bike Helmet Not Asked    Seat Belt Not Asked    Self-Exams Not Asked Social History Narrative    Not on file     Social Determinants of Health     Financial Resource Strain: Not on file   Food Insecurity: Not on file   Transportation Needs: Not on file   Physical Activity: Not on file   Stress: Not on file   Social Connections: Not on file   Intimate Partner Violence: Not on file   Housing Stability: Not on file       Vitals:    11/11/22 1100   BP: (!) 147/73   Pulse: 71   Resp: 18   Temp: 98.9 °F (37.2 °C)       Review of Systems:    Gen: No fever, chills, malaise, weight loss/gain. Heent: No headache, rhinorrhea, epistaxis, ear pain, hearing loss, sinus pain, neck pain/stiffness, sore throat. Heart: No chest pain, palpitations, HERNDON, pnd, or orthopnea. Resp: No cough, hemoptysis, wheezing and shortness of breath. GI: No nausea, vomiting, diarrhea, constipation, melena or hematochezia. : No urinary obstruction, dysuria or hematuria. Derm: see below  Musc/skeletal: no bone or joint complains. Vasc: No edema, cyanosis or claudication. Endo: No heat/cold intolerance, no polyuria,polydipsia or polyphagia. Neuro: No unilateral weakness, numbness, tingling. No seizures. Heme: No easy bruising or bleeding.        11/11/22 1100   Anesthetic   Anesthetic 4% Lidocaine Liquid Topical   RLE Peripheral Vascular    Capillary Refill Less than/equal to 3 seconds   Color Appropriate for race   Temperature Warm   Sensation Present   Pedal Pulse Present   Circumference of Calf (cm) 35 cm   Circumference of Ankle (cm) 24 cm   Wound Leg lower Right   Date First Assessed/Time First Assessed: 07/15/22 1042   Present on Hospital Admission: Yes  Wound Approximate Age at First Assessment (Weeks): 56 weeks  Primary Wound Type: Arterial Ulcer  Location: Leg lower  Wound Location Orientation: Right   Wound Image    Wound Etiology Traumatic   Dressing Status Intact   Cleansed Cleansed with saline   Wound Length (cm) 2 cm   Wound Width (cm) 1 cm   Wound Depth (cm) 0.1 cm   Wound Surface Area (cm^2) 2 cm^2   Change in Wound Size % 82.56   Wound Volume (cm^3) 0.2 cm^3   Wound Healing % 83   Wound Assessment Pink/red   Drainage Amount Moderate   Drainage Description Serosanguinous   Wound Odor None   Flora-Wound/Incision Assessment Blanchable erythema   Edges Defined edges   Wound Thickness Description Full thickness   Pain 1   Pain Scale 1 Numeric (0 - 10)   Pain Intensity 1 0   Patient Stated Pain Goal 0       Debridement Wound Care        Problem List Items Addressed This Visit          Other    Leg ulcer, right, with fat layer exposed ([address] - Primary    Relevant Orders    INITIATE OUTPATIENT WOUND CARE PROTOCOL       Procedure Note  Indications:  Based on my examination of this patient's wound(s)/ulcer(s) today, debridement is required to promote healing and evaluate the wound base. Performed by: Fredrik Dakins, DPM    Consent obtained: Yes    Time out taken: Yes    Debridement: Excisional    Using # 15 blade scalpel the wound(s)/ulcer(s) was/were sharply debrided down through and including the removal of    epidermis, dermis, and subcutaneous tissue    Devitalized Tissue Debrided: fibrin, biofilm, slough, and exudate    Pre Debridement Measurements:  Are located in the [place]  Documentation Flow Sheet    Non-Pressure ulcer, Traumatic    Wound/Ulcer #: 1    Post Debridement Measurements:  Wound/Ulcer Descriptions are Pre Debridement except measurements:    Wound Leg lower Right (Active)   Wound Image   11/11/22 1100   Wound Etiology Traumatic 11/11/22 1100   Dressing Status New dressing applied 11/11/22 1204   Cleansed Cleansed with saline 11/11/22 1100   Dressing/Treatment Other (Comment); Collagen with Ag;Gauze dressing/dressing sponge;Roll gauze;Tape/Soft cloth adhesive tape 11/11/22 1204   Wound Length (cm) 2 cm 11/11/22 1100   Wound Width (cm) 1 cm 11/11/22 1100   Wound Depth (cm) 0.1 cm 11/11/22 1100   Wound Surface Area (cm^2) 2 cm^2 11/11/22 1100   Change in Wound Size % 82.56 11/11/22 1100 Wound Volume (cm^3) 0.2 cm^3 11/11/22 1100   Wound Healing % 83 11/11/22 1100   Post-Procedure Length (cm) 1.5 cm 11/11/22 1100   Post-Procedure Width (cm) 1 cm 11/11/22 1100   Post-Procedure Depth (cm) 0.1 cm 11/11/22 1100   Post-Procedure Surface Area (cm^2) 1.5 cm^2 11/11/22 1100   Post-Procedure Volume (cm^3) 0.15 cm^3 11/11/22 1100   Wound Assessment Pink/red 11/11/22 1100   Drainage Amount Moderate 11/11/22 1100   Drainage Description Serosanguinous 11/11/22 1100   Wound Odor None 11/11/22 1100   Flora-Wound/Incision Assessment Blanchable erythema 11/11/22 1100   Edges Defined edges 11/11/22 1100   Wound Thickness Description Full thickness 11/11/22 1100   Number of days: 119        Total Surface Area Debrided:  2 sq cm     Estimated Blood Loss:  Minimal     Hemostasis Achieved: Pressure    Procedural Pain: 0 / 10     Post Procedural Pain: 0 / 10     Response to treatment: Well tolerated by patient     Assessment:   Right anterior leg ulcer due to previous trauma    Plan:   -Wound debridement as noted above  -Angela, gauze, every other day. Triamcinolone to periwound area.   -Follow-up 3 weeks

## 2022-11-11 NOTE — WOUND CARE
11/11/22 1100   Anesthetic   Anesthetic 4% Lidocaine Liquid Topical   RLE Peripheral Vascular    Capillary Refill Less than/equal to 3 seconds   Color Appropriate for race   Temperature Warm   Sensation Present   Pedal Pulse Present   Circumference of Calf (cm) 35 cm   Circumference of Ankle (cm) 24 cm   Wound Leg lower Right   Date First Assessed/Time First Assessed: 07/15/22 1042   Present on Hospital Admission: Yes  Wound Approximate Age at First Assessment (Weeks): 56 weeks  Primary Wound Type: Arterial Ulcer  Location: Leg lower  Wound Location Orientation: Right   Wound Image    Wound Etiology Traumatic   Dressing Status Intact   Cleansed Cleansed with saline   Wound Length (cm) 2 cm   Wound Width (cm) 1 cm   Wound Depth (cm) 0.1 cm   Wound Surface Area (cm^2) 2 cm^2   Change in Wound Size % 82.56   Wound Volume (cm^3) 0.2 cm^3   Wound Healing % 83   Wound Assessment Pink/red   Drainage Amount Moderate   Drainage Description Serosanguinous   Wound Odor None   Flora-Wound/Incision Assessment Blanchable erythema   Edges Defined edges   Wound Thickness Description Full thickness   Pain 1   Pain Scale 1 Numeric (0 - 10)   Pain Intensity 1 0   Patient Stated Pain Goal 0   Visit Vitals  BP (!) 147/73 (BP 1 Location: Left upper arm, BP Patient Position: At rest)   Pulse 71   Temp 98.9 °F (37.2 °C)   Resp 18

## 2022-11-11 NOTE — DISCHARGE INSTRUCTIONS
Discharge Instructions for          [place] [address] [facility], [address]  Phone: [phone] Fax: [phone]    NAME:  Dick Barry  YOB: 1950  MEDICAL RECORD NUMBER:  [id number]  DATE:  November 11, 2022  WOUND CARE ORDERS:  Right lower leg - Cleanse with saline, rinse and pat dry. Apply triamcinolone to red area around the wound. Apply alda to wound bed. Cover with gauze and secure with roll gauze and tape. Change three times a week. TREATMENT ORDERS:    Elevate leg(s) above the level of the heart when sitting. Avoid prolonged standing in one place. Do no get dressing/wrap wet. Follow Diet as prescribed:   [] Diet as tolerated: [] Calorie Diabetic Diet: Low carb and no Sugar [x] No Added Salt:  [x] Increase Protein: [] Limit the amount of liquid you are drinking and avoid drinking in between meals   Return Appointment:  [x] Return Appointment: With Dr. Fatou Alvarez in 3 weeks. [] Nurse Visit : *** days  [] Ordered tests:    Electronically signed Pratibha Stark RN on 11/11/2022 at 11:36 AM     [facility] Information: Should you experience any significant changes in your wound(s) or have questions about your wound care, please contact the [address] [place] [address] at [address] [place] [address] 8:00 am - 4:30. If you need help with your wound outside these hours and cannot wait until we are again available, contact your PCP or go to the hospital emergency room. PLEASE NOTE: IF YOU ARE UNABLE TO OBTAIN WOUND SUPPLIES, CONTINUE TO USE THE SUPPLIES YOU HAVE AVAILABLE UNTIL YOU ARE ABLE TO REACH US. IT IS MOST IMPORTANT TO KEEP THE WOUND COVERED AT ALL TIMES.      Physician Signature:_______________________    Date: ___________ Time:  ____________

## 2022-11-11 NOTE — WOUND CARE
11/11/22 1204   Wound Leg lower Right   Date First Assessed/Time First Assessed: 07/15/22 1042   Present on Hospital Admission: Yes  Wound Approximate Age at First Assessment (Weeks): 56 weeks  Primary Wound Type: Arterial Ulcer  Location: Leg lower  Wound Location Orientation: Right   Dressing Status New dressing applied   Dressing/Treatment Other (Comment); Collagen with Ag;Gauze dressing/dressing sponge;Roll gauze;Tape/Soft cloth adhesive tape  (triamcinalone sera)

## 2022-11-18 ENCOUNTER — HOSPITAL ENCOUNTER (OUTPATIENT)
Dept: WOUND CARE | Age: 72
Discharge: HOME | End: 2022-11-18
Payer: MEDICARE

## 2022-11-18 VITALS
RESPIRATION RATE: 18 BRPM | SYSTOLIC BLOOD PRESSURE: 167 MMHG | HEART RATE: 75 BPM | TEMPERATURE: 98.2 F | DIASTOLIC BLOOD PRESSURE: 71 MMHG

## 2022-11-18 DIAGNOSIS — L97.912 LEG ULCER, RIGHT, WITH FAT LAYER EXPOSED (HCC): Primary | ICD-10-CM

## 2022-11-18 PROCEDURE — 97597 DBRDMT OPN WND 1ST 20 CM/<: CPT

## 2022-11-18 RX ORDER — GENTAMICIN SULFATE 1 MG/G
OINTMENT TOPICAL ONCE
OUTPATIENT
Start: 2022-11-18 | End: 2022-11-18

## 2022-11-18 RX ORDER — BETAMETHASONE DIPROPIONATE 0.5 MG/G
OINTMENT TOPICAL ONCE
OUTPATIENT
Start: 2022-11-18 | End: 2022-11-18

## 2022-11-18 RX ORDER — LIDOCAINE HYDROCHLORIDE 40 MG/ML
SOLUTION TOPICAL ONCE
OUTPATIENT
Start: 2022-11-18 | End: 2022-11-18

## 2022-11-18 RX ORDER — SILVER SULFADIAZINE 10 G/1000G
CREAM TOPICAL ONCE
OUTPATIENT
Start: 2022-11-18 | End: 2022-11-18

## 2022-11-18 RX ORDER — LIDOCAINE 50 MG/G
OINTMENT TOPICAL ONCE
OUTPATIENT
Start: 2022-11-18 | End: 2022-11-18

## 2022-11-18 RX ORDER — LIDOCAINE 40 MG/G
CREAM TOPICAL ONCE
OUTPATIENT
Start: 2022-11-18 | End: 2022-11-18

## 2022-11-18 RX ORDER — BACITRACIN 500 [USP'U]/G
OINTMENT TOPICAL ONCE
OUTPATIENT
Start: 2022-11-18 | End: 2022-11-18

## 2022-11-18 RX ORDER — CLOBETASOL PROPIONATE 0.5 MG/G
OINTMENT TOPICAL ONCE
OUTPATIENT
Start: 2022-11-18 | End: 2022-11-18

## 2022-11-18 RX ORDER — LIDOCAINE HYDROCHLORIDE 20 MG/ML
JELLY TOPICAL ONCE
OUTPATIENT
Start: 2022-11-18 | End: 2022-11-18

## 2022-11-18 RX ORDER — MUPIROCIN 20 MG/G
OINTMENT TOPICAL ONCE
OUTPATIENT
Start: 2022-11-18 | End: 2022-11-18

## 2022-11-18 RX ORDER — TRIAMCINOLONE ACETONIDE 1 MG/G
OINTMENT TOPICAL ONCE
OUTPATIENT
Start: 2022-11-18 | End: 2022-11-18

## 2022-11-18 RX ORDER — BACITRACIN ZINC AND POLYMYXIN B SULFATE 500; 1000 [USP'U]/G; [USP'U]/G
OINTMENT TOPICAL ONCE
OUTPATIENT
Start: 2022-11-18 | End: 2022-11-18

## 2022-11-18 NOTE — WOUND CARE
11/18/22 1120   Anesthetic   Anesthetic 4% Lidocaine Liquid Topical   RLE Peripheral Vascular    Capillary Refill Less than/equal to 3 seconds   Color Appropriate for race   Temperature Warm   Sensation Present   Pedal Pulse Present   Circumference of Calf (cm) 34.5 cm   Circumference of Ankle (cm) 23 cm   Wound Leg lower Right   Date First Assessed/Time First Assessed: 07/15/22 1042   Present on Hospital Admission: Yes  Wound Approximate Age at First Assessment (Weeks): 56 weeks  Primary Wound Type: Arterial Ulcer  Location: Leg lower  Wound Location Orientation: Right   Wound Image    Wound Etiology Traumatic   Dressing Status Intact   Cleansed Cleansed with saline   Wound Length (cm) 1.5 cm   Wound Width (cm) 0.9 cm   Wound Depth (cm) 0.1 cm   Wound Surface Area (cm^2) 1.35 cm^2   Change in Wound Size % 88.23   Wound Volume (cm^3) 0.135 cm^3   Wound Healing % 88   Wound Assessment   (dry scab)   Drainage Amount Moderate   Drainage Description Serosanguinous   Wound Odor None   Flora-Wound/Incision Assessment Blanchable erythema   Edges Defined edges   Wound Thickness Description Full thickness   Pain 1   Pain Scale 1 Numeric (0 - 10)   Pain Intensity 1 0   Patient Stated Pain Goal 0   Visit Vitals  BP (!) 167/71 (BP 1 Location: Left upper arm)   Pulse 75   Temp 98.2 °F (36.8 °C)   Resp 18

## 2022-11-18 NOTE — DISCHARGE INSTRUCTIONS
Discharge Instructions for          [address], [address]  Phone: [phone] Fax: [phone]    NAME:  Maggi Carreno  YOB: 1950  MEDICAL RECORD NUMBER:  [id number]  DATE:  November 18, 2022  WOUND CARE ORDERS:  Right lower leg - Cleanse with soap and water in the shower. Pat dry. Cover with foam border or large bandaid. Change daily. TREATMENT ORDERS:    Elevate leg(s) above the level of the heart when sitting. Avoid prolonged standing in one place. Do no get dressing/wrap wet. Follow Diet as prescribed:   [] Diet as tolerated: [] Calorie Diabetic Diet: Low carb and no Sugar [x] No Added Salt:  [x] Increase Protein: [] Limit the amount of liquid you are drinking and avoid drinking in between meals   Return Appointment:  [x] Return Appointment: With Dr. Tj Corcoran in 2 weeks. [] Nurse Visit : *** days  [] Ordered tests:    Electronically signed Herlinda Dutta RN on 11/18/2022 at 11:41 AM     [address] [place] [address] Information: Should you experience any significant changes in your wound(s) or have questions about your wound care, please contact the [address] [place] [address] at [address] [place] [address] 8:00 am - 4:30. If you need help with your wound outside these hours and cannot wait until we are again available, contact your PCP or go to the hospital emergency room. PLEASE NOTE: IF YOU ARE UNABLE TO OBTAIN WOUND SUPPLIES, CONTINUE TO USE THE SUPPLIES YOU HAVE AVAILABLE UNTIL YOU ARE ABLE TO REACH US. IT IS MOST IMPORTANT TO KEEP THE WOUND COVERED AT ALL TIMES.      Physician Signature:_______________________    Date: ___________ Time:  ____________

## 2022-11-18 NOTE — PROGRESS NOTES
Patient presents to wound clinic for: Enedelia Romberg is a 67 y.o. female who presents for wound care of the following: right anterior leg ulcer. She notes that she had it surgically debrided and had been using a wound vac. She notes that there has been tremendous progress in resolution in terms of the depth of the wound. However, lately progress of the wound has slowed. She has been referred her for further treatment by her surgeon. Update 11/18/22: Patient presents today for follow-up. She is concerned because she has a buildup of alda to the wound area. Pertinent Medical History:  Past Medical History:   Diagnosis Date    Atrial fibrillation ([facility] 7/15/2011    Depression     HTN (hypertension) 7/14/2011    Paroxysmal atrial fibrillation (HCC)     SIADH (syndrome of inappropriate ADH production) ([facility] 1/14/2022    [facility] ca [facility] 2005    Papillary    Unspecified hypothyroidism 7/15/2011     Past Surgical History:   Procedure Laterality Date    ENDOSCOPY, COLON, DIAGNOSTIC  2003    neg. rep 10 yrs. [place] THYROIDECTOMY  11/05     Prior to Admission medications    Medication Sig Start Date End Date Taking? Authorizing Provider   diazePAM (VALIUM) 2 mg tablet TAKE 1 TABLET BY MOUTH EVERY DAY AS NEEDED FOR ANXIETY 11/9/22   Patricia Stein MD   vilazodone (VIIBRYD) 40 mg tab tablet Take 1 Tablet by mouth daily. 11/22/22   Patricia Stein MD   butalbital-acetaminophen-caffeine (FIORICET, ESGIC) -40 mg per tablet TAKE 1 TO 2 TABLETS BY MOUTH EVERY 6 HOURS AS NEEDED FOR HEADACHE 10/19/22   Patricia Stein MD   triamcinolone acetonide (KENALOG) 0.1 % topical cream Apply  to affected area two (2) times a day. use thin layer along affected skin surrounding your wound 10/3/22   Liu Ser, DPM   traZODone (DESYREL) 50 mg tablet Take 1 Tablet by mouth nightly.  9/29/22   Patricia Stein MD   levothyroxine (SYNTHROID) 175 mcg tablet TAKE 1 TABLET BY MOUTH EVERY DAY BEFORE BREAKFAST 8/21/22   Madonna Lopez MD   Xarelto 20 mg tab tablet TAKE 1 TABLET BY MOUTH EVERY DAY 7/3/22   Madonna Lopez MD   ondansetron (ZOFRAN ODT) 4 mg disintegrating tablet TAKE 1 TABLET BY MOUTH FOUR (4) TIMES DAILY AS NEEDED FOR NAUSEA OR VOMITING. 7/3/22   Madonna Lopez MD   metoprolol succinate (TOPROL-XL) 50 mg XL tablet TAKE 1 TABLET BY MOUTH AFTER DINNER 2/10/22   Madonna Lopez MD   sacubitriL-valsartan Amol Cj) 49-51 mg tab tablet Take 1 Tablet by mouth two (2) times a day. Provider, Historical   bumetanide (BUMEX) 1 mg tablet Take 1.5 Tablets by mouth daily.  1/14/22   Madonna Lopez MD     Allergies   Allergen Reactions    Penicillins Unknown (comments)     Patient reports allergy to penicillin with unknown reaction, but states she has tolerated amoxicillin    Opioids - Morphine Analogues Itching and Nausea and Vomiting    Percocet [Oxycodone-Acetaminophen] Rash     Per patient     Family History   Problem Relation Age of Onset    Cancer Mother         lung     Social History     Socioeconomic History    Marital status:      Spouse name: Not on file    Number of children: Not on file    Years of education: Not on file    Highest education level: Not on file   Occupational History    Not on file   Tobacco Use    Smoking status: Never     Passive exposure: Never    Smokeless tobacco: Never   Vaping Use    Vaping Use: Never used   Substance and Sexual Activity    Alcohol use: Yes     Comment: soc    Drug use: No    Sexual activity: Not on file   Other Topics Concern     Service Not Asked    Blood Transfusions Not Asked    Caffeine Concern Not Asked    Occupational Exposure Not Asked    Hobby Hazards Not Asked    Sleep Concern Not Asked    Stress Concern Not Asked    Weight Concern Not Asked    Special Diet Not Asked    Back Care Not Asked    Exercise Not Asked    Bike Helmet Not Asked    Seat Belt Not Asked    Self-Exams Not Asked Social History Narrative    Not on file     Social Determinants of Health     Financial Resource Strain: Not on file   Food Insecurity: Not on file   Transportation Needs: Not on file   Physical Activity: Not on file   Stress: Not on file   Social Connections: Not on file   Intimate Partner Violence: Not on file   Housing Stability: Not on file       Vitals:    11/18/22 1120   BP: (!) 167/71   Pulse: 75   Resp: 18   Temp: 98.2 °F (36.8 °C)       Review of Systems:    Gen: No fever, chills, malaise, weight loss/gain. Heent: No headache, rhinorrhea, epistaxis, ear pain, hearing loss, sinus pain, neck pain/stiffness, sore throat. Heart: No chest pain, palpitations, HERNDON, pnd, or orthopnea. Resp: No cough, hemoptysis, wheezing and shortness of breath. GI: No nausea, vomiting, diarrhea, constipation, melena or hematochezia. : No urinary obstruction, dysuria or hematuria. Derm: see below  Musc/skeletal: no bone or joint complains. Vasc: No edema, cyanosis or claudication. Endo: No heat/cold intolerance, no polyuria,polydipsia or polyphagia. Neuro: No unilateral weakness, numbness, tingling. No seizures. Heme: No easy bruising or bleeding.        11/18/22 1120   Anesthetic   Anesthetic 4% Lidocaine Liquid Topical   RLE Peripheral Vascular    Capillary Refill Less than/equal to 3 seconds   Color Appropriate for race   Temperature Warm   Sensation Present   Pedal Pulse Present   Circumference of Calf (cm) 34.5 cm   Circumference of Ankle (cm) 23 cm   Wound Leg lower Right   Date First Assessed/Time First Assessed: 07/15/22 1042   Present on Hospital Admission: Yes  Wound Approximate Age at First Assessment (Weeks): 56 weeks  Primary Wound Type: Arterial Ulcer  Location: Leg lower  Wound Location Orientation: Right   Wound Image    Wound Etiology Traumatic   Dressing Status Intact   Cleansed Cleansed with saline   Wound Length (cm) 1.5 cm   Wound Width (cm) 0.9 cm   Wound Depth (cm) 0.1 cm   Wound Surface Area (cm^2) 1.35 cm^2   Change in Wound Size % 88.23   Wound Volume (cm^3) 0.135 cm^3   Wound Healing % 88   Wound Assessment    (dry scab)   Drainage Amount Moderate   Drainage Description Serosanguinous   Wound Odor None   Flora-Wound/Incision Assessment Blanchable erythema   Edges Defined edges   Wound Thickness Description Full thickness   Pain 1   Pain Scale 1 Numeric (0 - 10)   Pain Intensity 1 0   Patient Stated Pain Goal 0       Debridement Wound Care        Problem List Items Addressed This Visit          Other    Leg ulcer, right, with fat layer exposed ([address] - Primary    Relevant Orders    INITIATE OUTPATIENT WOUND CARE PROTOCOL       Procedure Note  Indications:  Based on my examination of this patient's wound(s)/ulcer(s) today, debridement is required to promote healing and evaluate the wound base. Performed by: Joon Trammell DPM    Consent obtained: Yes    Time out taken: Yes    Debridement: Non-excisional/Selective    Using forceps and # 15 blade scalpel the wound(s)/ulcer(s) was/were sharply debrided down through and including the removal of    epidermis, old alda    Devitalized Tissue Debrided: fibrin, slough, and old collagen dressing    Pre Debridement Measurements:  Are located in the [facility]  Documentation Flow Sheet    Other: traumatic ulcer    Wound/Ulcer #: 1    Post Debridement Measurements:  Wound/Ulcer Descriptions are Pre Debridement except measurements:    Wound Leg lower Right (Active)   Wound Image   11/18/22 1120   Wound Etiology Traumatic 11/18/22 1120   Dressing Status Intact 11/18/22 1120   Cleansed Cleansed with saline 11/18/22 1120   Dressing/Treatment Other (Comment); Collagen with Ag;Gauze dressing/dressing sponge;Roll gauze;Tape/Soft cloth adhesive tape 11/11/22 1204   Wound Length (cm) 1.5 cm 11/18/22 1120   Wound Width (cm) 0.9 cm 11/18/22 1120   Wound Depth (cm) 0.1 cm 11/18/22 1120   Wound Surface Area (cm^2) 1.35 cm^2 11/18/22 1120   Change in Wound Size % 88.23 11/18/22 1120   Wound Volume (cm^3) 0.135 cm^3 11/18/22 1120   Wound Healing % 88 11/18/22 1120   Post-Procedure Length (cm) 1.5 cm 11/11/22 1100   Post-Procedure Width (cm) 1 cm 11/11/22 1100   Post-Procedure Depth (cm) 0.1 cm 11/11/22 1100   Post-Procedure Surface Area (cm^2) 1.5 cm^2 11/11/22 1100   Post-Procedure Volume (cm^3) 0.15 cm^3 11/11/22 1100   Wound Assessment Pink/red 11/11/22 1100   Drainage Amount Moderate 11/18/22 1120   Drainage Description Serosanguinous 11/18/22 1120   Wound Odor None 11/18/22 1120   Flora-Wound/Incision Assessment Blanchable erythema 11/18/22 1120   Edges Defined edges 11/18/22 1120   Wound Thickness Description Full thickness 11/18/22 1120   Number of days: 126        Total Surface Area Debrided:  1.35 sq cm     Estimated Blood Loss:  Minimal     Hemostasis Achieved: Pressure    Procedural Pain: 0 / 10     Post Procedural Pain: 0 / 10     Response to treatment: Well tolerated by patient     Assessment:   Right anterior leg ulcer due to previous trauma    Plan:   -Wound debridement as noted above  -Discussed with patient that the ulcer is nearly healed.   -Bandaid/border foam to ulcer.  -Follow-up 2 weeks

## 2022-11-25 ENCOUNTER — HOSPITAL ENCOUNTER (INPATIENT)
Age: 72
LOS: 1 days | Discharge: HOME | DRG: 644 | End: 2022-11-26
Attending: STUDENT IN AN ORGANIZED HEALTH CARE EDUCATION/TRAINING PROGRAM | Admitting: FAMILY MEDICINE
Payer: MEDICARE

## 2022-11-25 ENCOUNTER — APPOINTMENT (OUTPATIENT)
Dept: CT IMAGING | Age: 72
DRG: 644 | End: 2022-11-25
Attending: FAMILY MEDICINE
Payer: MEDICARE

## 2022-11-25 ENCOUNTER — APPOINTMENT (OUTPATIENT)
Dept: VASCULAR SURGERY | Age: 72
DRG: 644 | End: 2022-11-25
Attending: FAMILY MEDICINE
Payer: MEDICARE

## 2022-11-25 DIAGNOSIS — E87.1 HYPONATREMIA: Primary | ICD-10-CM

## 2022-11-25 DIAGNOSIS — R11.0 NAUSEA: ICD-10-CM

## 2022-11-25 DIAGNOSIS — R60.0 LOCALIZED EDEMA: ICD-10-CM

## 2022-11-25 DIAGNOSIS — R11.0 NAUSEA WITHOUT VOMITING: ICD-10-CM

## 2022-11-25 LAB
ALBUMIN SERPL-MCNC: 4.2 G/DL (ref 3.5–5)
ALBUMIN/GLOB SERPL: 1.4 {RATIO} (ref 1.1–2.2)
ALP SERPL-CCNC: 105 U/L (ref 45–117)
ALT SERPL-CCNC: 34 U/L (ref 12–78)
ANION GAP SERPL CALC-SCNC: 5 MMOL/L (ref 5–15)
APPEARANCE UR: CLEAR
AST SERPL-CCNC: 26 U/L (ref 15–37)
BACTERIA URNS QL MICRO: ABNORMAL /HPF
BASOPHILS # BLD: 0 K/UL (ref 0–0.1)
BASOPHILS NFR BLD: 0 % (ref 0–1)
BILIRUB SERPL-MCNC: 0.5 MG/DL (ref 0.2–1)
BILIRUB UR QL: NEGATIVE
BUN SERPL-MCNC: 13 MG/DL (ref 6–20)
BUN/CREAT SERPL: 16 (ref 12–20)
CALCIUM SERPL-MCNC: 8.8 MG/DL (ref 8.5–10.1)
CHLORIDE SERPL-SCNC: 89 MMOL/L (ref 97–108)
CO2 SERPL-SCNC: 31 MMOL/L (ref 21–32)
COLOR UR: ABNORMAL
COMMENT, HOLDF: NORMAL
CREAT SERPL-MCNC: 0.83 MG/DL (ref 0.55–1.02)
CREAT UR-MCNC: 49.7 MG/DL
DIFFERENTIAL METHOD BLD: ABNORMAL
EOSINOPHIL # BLD: 0 K/UL (ref 0–0.4)
EOSINOPHIL NFR BLD: 0 % (ref 0–7)
EPITH CASTS URNS QL MICRO: ABNORMAL /LPF
ERYTHROCYTE [DISTWIDTH] IN BLOOD BY AUTOMATED COUNT: 14.4 % (ref 11.5–14.5)
FLUAV RNA SPEC QL NAA+PROBE: NOT DETECTED
FLUBV RNA SPEC QL NAA+PROBE: NOT DETECTED
GLOBULIN SER CALC-MCNC: 3.1 G/DL (ref 2–4)
GLUCOSE SERPL-MCNC: 112 MG/DL (ref 65–100)
GLUCOSE UR STRIP.AUTO-MCNC: NEGATIVE MG/DL
HCT VFR BLD AUTO: 37.5 % (ref 35–47)
HGB BLD-MCNC: 12.6 G/DL (ref 11.5–16)
HGB UR QL STRIP: NEGATIVE
IMM GRANULOCYTES # BLD AUTO: 0 K/UL (ref 0–0.04)
IMM GRANULOCYTES NFR BLD AUTO: 0 % (ref 0–0.5)
KETONES UR QL STRIP.AUTO: ABNORMAL MG/DL
LEUKOCYTE ESTERASE UR QL STRIP.AUTO: ABNORMAL
LYMPHOCYTES # BLD: 0.9 K/UL (ref 0.8–3.5)
LYMPHOCYTES NFR BLD: 14 % (ref 12–49)
MAGNESIUM SERPL-MCNC: 2 MG/DL (ref 1.6–2.4)
MCH RBC QN AUTO: 29.5 PG (ref 26–34)
MCHC RBC AUTO-ENTMCNC: 33.6 G/DL (ref 30–36.5)
MCV RBC AUTO: 87.8 FL (ref 80–99)
MONOCYTES # BLD: 0.4 K/UL (ref 0–1)
MONOCYTES NFR BLD: 6 % (ref 5–13)
NEUTS SEG # BLD: 4.7 K/UL (ref 1.8–8)
NEUTS SEG NFR BLD: 80 % (ref 32–75)
NITRITE UR QL STRIP.AUTO: NEGATIVE
NRBC # BLD: 0 K/UL (ref 0–0.01)
NRBC BLD-RTO: 0 PER 100 WBC
OSMOLALITY SERPL: NORMAL MOSM/KG H2O
OSMOLALITY UR: NORMAL MOSM/KG H2O
PH UR STRIP: 7.5 [PH] (ref 5–8)
PLATELET # BLD AUTO: 209 K/UL (ref 150–400)
PMV BLD AUTO: 10 FL (ref 8.9–12.9)
POTASSIUM SERPL-SCNC: 4.2 MMOL/L (ref 3.5–5.1)
PROT SERPL-MCNC: 7.3 G/DL (ref 6.4–8.2)
PROT UR STRIP-MCNC: ABNORMAL MG/DL
RBC # BLD AUTO: 4.27 M/UL (ref 3.8–5.2)
RBC #/AREA URNS HPF: ABNORMAL /HPF (ref 0–5)
SAMPLES BEING HELD,HOLD: NORMAL
SARS-COV-2, COV2: NOT DETECTED
SODIUM SERPL-SCNC: 125 MMOL/L (ref 136–145)
SODIUM UR-SCNC: 128 MMOL/L
SP GR UR REFRACTOMETRY: 1.01 (ref 1–1.03)
UR CULT HOLD, URHOLD: NORMAL
UROBILINOGEN UR QL STRIP.AUTO: 1 EU/DL (ref 0.2–1)
WBC # BLD AUTO: 6 K/UL (ref 3.6–11)
WBC URNS QL MICRO: >100 /HPF (ref 0–4)

## 2022-11-25 PROCEDURE — 36415 COLL VENOUS BLD VENIPUNCTURE: CPT

## 2022-11-25 PROCEDURE — 99285 EMERGENCY DEPT VISIT HI MDM: CPT

## 2022-11-25 PROCEDURE — 93970 EXTREMITY STUDY: CPT

## 2022-11-25 PROCEDURE — 82570 ASSAY OF URINE CREATININE: CPT

## 2022-11-25 PROCEDURE — 74011250636 HC RX REV CODE- 250/636: Performed by: FAMILY MEDICINE

## 2022-11-25 PROCEDURE — 96374 THER/PROPH/DIAG INJ IV PUSH: CPT

## 2022-11-25 PROCEDURE — 87150 DNA/RNA AMPLIFIED PROBE: CPT

## 2022-11-25 PROCEDURE — 83935 ASSAY OF URINE OSMOLALITY: CPT

## 2022-11-25 PROCEDURE — 83735 ASSAY OF MAGNESIUM: CPT

## 2022-11-25 PROCEDURE — 85025 COMPLETE CBC W/AUTO DIFF WBC: CPT

## 2022-11-25 PROCEDURE — 81001 URINALYSIS AUTO W/SCOPE: CPT

## 2022-11-25 PROCEDURE — 80053 COMPREHEN METABOLIC PANEL: CPT

## 2022-11-25 PROCEDURE — 70450 CT HEAD/BRAIN W/O DYE: CPT

## 2022-11-25 PROCEDURE — 65270000046 HC RM TELEMETRY

## 2022-11-25 PROCEDURE — 87636 SARSCOV2 & INF A&B AMP PRB: CPT

## 2022-11-25 PROCEDURE — 74011000250 HC RX REV CODE- 250: Performed by: FAMILY MEDICINE

## 2022-11-25 PROCEDURE — 84300 ASSAY OF URINE SODIUM: CPT

## 2022-11-25 PROCEDURE — 87040 BLOOD CULTURE FOR BACTERIA: CPT

## 2022-11-25 PROCEDURE — 74176 CT ABD & PELVIS W/O CONTRAST: CPT

## 2022-11-25 PROCEDURE — 74011250636 HC RX REV CODE- 250/636: Performed by: STUDENT IN AN ORGANIZED HEALTH CARE EDUCATION/TRAINING PROGRAM

## 2022-11-25 PROCEDURE — 83930 ASSAY OF BLOOD OSMOLALITY: CPT

## 2022-11-25 RX ORDER — HYDRALAZINE HYDROCHLORIDE 20 MG/ML
10 INJECTION INTRAMUSCULAR; INTRAVENOUS
Status: DISCONTINUED | OUTPATIENT
Start: 2022-11-25 | End: 2022-11-26 | Stop reason: HOSPADM

## 2022-11-25 RX ORDER — PROMETHAZINE HYDROCHLORIDE 25 MG/1
25 TABLET ORAL
Status: DISCONTINUED | OUTPATIENT
Start: 2022-11-25 | End: 2022-11-26 | Stop reason: HOSPADM

## 2022-11-25 RX ORDER — SODIUM CHLORIDE 0.9 % (FLUSH) 0.9 %
5-40 SYRINGE (ML) INJECTION EVERY 8 HOURS
Status: DISCONTINUED | OUTPATIENT
Start: 2022-11-25 | End: 2022-11-26 | Stop reason: HOSPADM

## 2022-11-25 RX ORDER — ONDANSETRON 2 MG/ML
4 INJECTION INTRAMUSCULAR; INTRAVENOUS
Status: DISCONTINUED | OUTPATIENT
Start: 2022-11-25 | End: 2022-11-26 | Stop reason: HOSPADM

## 2022-11-25 RX ORDER — VANCOMYCIN 1.75 GRAM/500 ML IN 0.9 % SODIUM CHLORIDE INTRAVENOUS
1750 ONCE
Status: COMPLETED | OUTPATIENT
Start: 2022-11-25 | End: 2022-11-25

## 2022-11-25 RX ORDER — ONDANSETRON 2 MG/ML
4 INJECTION INTRAMUSCULAR; INTRAVENOUS
Status: COMPLETED | OUTPATIENT
Start: 2022-11-25 | End: 2022-11-25

## 2022-11-25 RX ORDER — SODIUM CHLORIDE 9 MG/ML
75 INJECTION, SOLUTION INTRAVENOUS CONTINUOUS
Status: DISCONTINUED | OUTPATIENT
Start: 2022-11-25 | End: 2022-11-26

## 2022-11-25 RX ORDER — SODIUM CHLORIDE 0.9 % (FLUSH) 0.9 %
5-40 SYRINGE (ML) INJECTION AS NEEDED
Status: DISCONTINUED | OUTPATIENT
Start: 2022-11-25 | End: 2022-11-26 | Stop reason: HOSPADM

## 2022-11-25 RX ORDER — ACETAMINOPHEN 325 MG/1
650 TABLET ORAL
Status: DISCONTINUED | OUTPATIENT
Start: 2022-11-25 | End: 2022-11-26 | Stop reason: HOSPADM

## 2022-11-25 RX ADMIN — SODIUM CHLORIDE 75 ML/HR: 9 INJECTION, SOLUTION INTRAVENOUS at 14:57

## 2022-11-25 RX ADMIN — SODIUM CHLORIDE, PRESERVATIVE FREE 10 ML: 5 INJECTION INTRAVENOUS at 14:59

## 2022-11-25 RX ADMIN — SODIUM CHLORIDE, POTASSIUM CHLORIDE, SODIUM LACTATE AND CALCIUM CHLORIDE 500 ML: 600; 310; 30; 20 INJECTION, SOLUTION INTRAVENOUS at 12:30

## 2022-11-25 RX ADMIN — ONDANSETRON 4 MG: 2 INJECTION INTRAMUSCULAR; INTRAVENOUS at 17:57

## 2022-11-25 RX ADMIN — VANCOMYCIN HYDROCHLORIDE 1750 MG: 10 INJECTION, POWDER, LYOPHILIZED, FOR SOLUTION INTRAVENOUS at 17:50

## 2022-11-25 RX ADMIN — ONDANSETRON HYDROCHLORIDE 4 MG: 2 SOLUTION INTRAMUSCULAR; INTRAVENOUS at 12:54

## 2022-11-25 RX ADMIN — CEFEPIME 2 G: 2 INJECTION, POWDER, FOR SOLUTION INTRAVENOUS at 17:48

## 2022-11-25 NOTE — ED PROVIDER NOTES
Patient is a 76-year-old female with a complex medical history presenting to the ED with fatigue and generalized nausea for the last 10 days. Patient has had poor p.o. intake. She takes Bumex and spironolactone as well as oral anticoagulation for A. fib. She has tried Zofran with some improvement but nausea continues. The history is provided by the patient and medical records. Nausea   This is a new problem. The current episode started more than 1 week ago. The problem occurs continuously. The problem has not changed since onset. There has been no fever. Associated symptoms include myalgias. The patient is not pregnant. Her pertinent negatives include no irritable bowel syndrome and no DM. Fatigue  This is a new problem. The current episode started more than 1 week ago. Associated symptoms include nausea. Past Medical History:   Diagnosis Date    Atrial fibrillation ([facility] 7/15/2011    Depression     HTN (hypertension) 7/14/2011    Paroxysmal atrial fibrillation (HCC)     SIADH (syndrome of inappropriate ADH production) ([facility] 1/14/2022    [facility] 2005    Papillary    Unspecified hypothyroidism 7/15/2011       Past Surgical History:   Procedure Laterality Date    ENDOSCOPY, COLON, DIAGNOSTIC  2003    neg. rep 10 yrs.     [place] THYROIDECTOMY  11/05         Family History:   Problem Relation Age of Onset    Cancer Mother         lung       Social History     Socioeconomic History    Marital status:      Spouse name: Not on file    Number of children: Not on file    Years of education: Not on file    Highest education level: Not on file   Occupational History    Not on file   Tobacco Use    Smoking status: Never     Passive exposure: Never    Smokeless tobacco: Never   Vaping Use    Vaping Use: Never used   Substance and Sexual Activity    Alcohol use: Yes     Comment: soc    Drug use: No    Sexual activity: Not on file   Other Topics Concern     Service Not Asked    Blood Transfusions Not Asked    Caffeine Concern Not Asked    Occupational Exposure Not Asked    Hobby Hazards Not Asked    Sleep Concern Not Asked    Stress Concern Not Asked    Weight Concern Not Asked    Special Diet Not Asked    Back Care Not Asked    Exercise Not Asked    Bike Helmet Not Asked    Seat Belt Not Asked    Self-Exams Not Asked   Social History Narrative    Not on file     Social Determinants of Health     Financial Resource Strain: Not on file   Food Insecurity: Not on file   Transportation Needs: Not on file   Physical Activity: Not on file   Stress: Not on file   Social Connections: Not on file   Intimate Partner Violence: Not on file   Housing Stability: Not on file         ALLERGIES: Penicillins, Opioids - morphine analogues, and Percocet [oxycodone-acetaminophen]    Review of Systems   Constitutional:  Positive for activity change, appetite change and fatigue. HENT: Negative. Eyes: Negative. Respiratory: Negative. Cardiovascular: Negative. Gastrointestinal:  Positive for nausea. Endocrine: Negative. Genitourinary: Negative. Musculoskeletal:  Positive for myalgias. Skin: Negative. Allergic/Immunologic: Negative. Neurological: Negative. Hematological: Negative. Psychiatric/Behavioral: Negative. Vitals:    11/25/22 0950   BP: (!) 158/91   Pulse: 69   Resp: 18   Temp: 97.7 °F (36.5 °C)   SpO2: 96%   Weight: 72.6 kg (160 lb)   Height: 5' 7\" (1.702 m)            Physical Exam  Vitals and nursing note reviewed. Constitutional:       General: She is not in acute distress. Appearance: Normal appearance. HENT:      Head: Normocephalic and atraumatic. Right Ear: External ear normal.      Left Ear: External ear normal.      Nose: Nose normal.   Eyes:      Extraocular Movements: Extraocular movements intact. Conjunctiva/sclera: Conjunctivae normal.   Cardiovascular:      Rate and Rhythm: Normal rate. Pulses: Normal pulses.            Radial pulses are 2+ on the right side and 2+ on the left side. Heart sounds: Normal heart sounds. Pulmonary:      Effort: Pulmonary effort is normal.      Breath sounds: Normal breath sounds. Chest:      Chest wall: No deformity or tenderness. Abdominal:      General: Abdomen is flat. There is no distension. Tenderness: There is no abdominal tenderness. Musculoskeletal:         General: No deformity or signs of injury. Normal range of motion. Cervical back: Normal range of motion and neck supple. No tenderness. Skin:     General: Skin is warm and dry. Capillary Refill: Capillary refill takes less than 2 seconds. Neurological:      General: No focal deficit present. Mental Status: She is alert and oriented to person, place, and time.    Psychiatric:         Attention and Perception: Attention normal.         Mood and Affect: Mood normal.         Behavior: Behavior normal.        MDM     Amount and/or Complexity of Data Reviewed  Decide to obtain previous medical records or to obtain history from someone other than the patient: yes      ED Course as of 11/25/22 1316   Fri Nov 25, 2022   1234 Sodium(!): 125 [AL]      ED Course User Index  [AL] Mychal Monterroso MD     LABORATORY RESULTS:  Labs Reviewed   CBC WITH AUTOMATED DIFF - Abnormal; Notable for the following components:       Result Value    NEUTROPHILS 80 (*)     All other components within normal limits   METABOLIC PANEL, COMPREHENSIVE - Abnormal; Notable for the following components:    Sodium 125 (*)     Chloride 89 (*)     Glucose 112 (*)     All other components within normal limits   MAGNESIUM   SAMPLES BEING HELD       IMAGING RESULTS:  No orders to display       MEDICATIONS GIVEN:  Medications   promethazine (PHENERGAN) tablet 25 mg (has no administration in time range)   lactated ringers bolus infusion 500 mL (500 mL IntraVENous New Bag 11/25/22 1230)   ondansetron (ZOFRAN) injection 4 mg (4 mg IntraVENous Given 11/25/22 1254) Differential diagnosis: Nausea, dehydration, electrolyte abnormality, poor p.o. intake    ED physician interpretation of laboratory results: Patient's sodium 125 with significant fatigue concerning for symptomatic hyponatremia. Previous sodiums in the 130s. MDM: Patient is a 65-year-old female presented the ED with 10 days of nausea, fatigue and poor p.o. intake found to have persistent nausea and symptomatic hyponatremia requiring hospital admission. No seizure activity or concerning neurodeficits. Patient has having decreased water intake so patient is likely hemoconcentrated and like for hyponatremic then on lab work. Based on patient's nausea, poor p.o. intake and symptomatic hyponatremia hospital admission is indicated. DISPOSITION: Admitted    [facility] Consult for Admission  1:21 PM    ED Room Number: R37/R37  Patient Name and age:  Tremayne Kruger 67 y.o.  female  Working Diagnosis:   1. Hyponatremia    2. Nausea without vomiting        COVID-19 Suspicion:  no  Sepsis present:  no  Reassessment needed: no  Code Status:  Full Code  Readmission: no  Isolation Requirements:  no  Recommended Level of Care:  med/surg  Department: [facility] Adult ED - 21     Other: Patient with 10 days of nausea, now with hyponatremia to 125 with symptomatic fatigue. Additionally patient has been not drinking much water so hyponatremia is more concerning. Renaldo Talbert.  Randall Grimes MD      Procedures

## 2022-11-25 NOTE — H&P
[facility] Adult  Hospitalist Group  History and Physical    Date of Service:  11/25/2022  Primary Care Provider: Adia Covington MD  Source of information: The patient and Chart review    Chief Complaint: Nausea, Wound Check, Chills, and Fatigue      History of Presenting Illness:   Jonnie Powell is a 67 y.o. female who presents with fatigue and nausea. Patient is a poor historian, presents to the ER with generalized fatigue, and poor appetite, patient came to the ER, was found to be hyponatremic, history of poor p.o. intake, on Bumex and spironolactone, was requested to be admitted to the hospitalist service, patient also has a lower extremity wound           REVIEW OF SYSTEMS:  Review of systems not obtained due to patient factors. Poor historian    Past Medical History:   Diagnosis Date    Atrial fibrillation ([facility] 7/15/2011    Depression     HTN (hypertension) 7/14/2011    Paroxysmal atrial fibrillation (HCC)     SIADH (syndrome of inappropriate ADH production) ([facility] 1/14/2022    Thyroid ca ([facility] 2005    Papillary    Unspecified hypothyroidism 7/15/2011      Past Surgical History:   Procedure Laterality Date    ENDOSCOPY, COLON, DIAGNOSTIC  2003    neg. rep 10 yrs. [place] THYROIDECTOMY  11/05     Prior to Admission medications    Medication Sig Start Date End Date Taking? Authorizing Provider   PARoxetine (PAXIL) 20 mg tablet Take 1 Tablet by mouth daily. 11/21/22   Adia Covington MD   diazePAM (VALIUM) 2 mg tablet TAKE 1 TABLET BY MOUTH EVERY DAY AS NEEDED FOR ANXIETY 11/9/22   Adia Covington MD   butalbital-acetaminophen-caffeine (FIORICET, ESGIC) -40 mg per tablet TAKE 1 TO 2 TABLETS BY MOUTH EVERY 6 HOURS AS NEEDED FOR HEADACHE 10/19/22   Adia Covington MD   triamcinolone acetonide (KENALOG) 0.1 % topical cream Apply  to affected area two (2) times a day.  use thin layer along affected skin surrounding your wound 10/3/22   Vanessa Garcia, Stephannie Bumpers, DPM   traZODone (DESYREL) 50 mg tablet Take 1 Tablet by mouth nightly. 9/29/22   Vanessa Avilez MD   levothyroxine (SYNTHROID) 175 mcg tablet TAKE 1 TABLET BY MOUTH EVERY DAY BEFORE BREAKFAST 8/21/22   Vanessa Avilez MD   Xarelto 20 mg tab tablet TAKE 1 TABLET BY MOUTH EVERY DAY 7/3/22   Vanessa Avilez MD   ondansetron (ZOFRAN ODT) 4 mg disintegrating tablet TAKE 1 TABLET BY MOUTH FOUR (4) TIMES DAILY AS NEEDED FOR NAUSEA OR VOMITING. 7/3/22   Vanessa Avilez MD   metoprolol succinate (TOPROL-XL) 50 mg XL tablet TAKE 1 TABLET BY MOUTH AFTER DINNER 2/10/22   Vanessa Avilez MD   sacubitriL-valsartan Trivedi Andrew) 49-51 mg tab tablet Take 1 Tablet by mouth two (2) times a day. Provider, Historical   bumetanide (BUMEX) 1 mg tablet Take 1.5 Tablets by mouth daily. 1/14/22   Vanessa Avilez MD     Allergies   Allergen Reactions    Penicillins Unknown (comments)     Patient reports allergy to penicillin with unknown reaction, but states she has tolerated amoxicillin    Opioids - Morphine Analogues Itching and Nausea and Vomiting    Percocet [Oxycodone-Acetaminophen] Rash     Per patient      Family History   Problem Relation Age of Onset    Cancer Mother         lung      Social History:  reports that she has never smoked. She has never been exposed to tobacco smoke. She has never used smokeless tobacco. She reports current alcohol use. She reports that she does not use drugs. Family and social history were personally reviewed, all pertinent and relevant details are outlined as above.     Objective:   Visit Vitals  BP (!) 171/96   Pulse (!) 55   Temp 98 °F (36.7 °C)   Resp 18   Ht 5' 7\" (1.702 m)   Wt 72.6 kg (160 lb)   SpO2 96%   BMI 25.06 kg/m²      O2 Device: None (Room air)    PHYSICAL EXAM:   General: Alert, awake, poor historian  HEENT: PEERL moist mucus membranes  Neck: Supple,   Chest: Decreased basal breath sounds  CVS: RRR, S1 S2 heard, no murmurs/rubs/gallops  Abd: Soft, non-tender, non-distended, +bowel sounds   Ext: No clubbing, no cyanosis, right lower extremity wound  Neuro/Psych: Limited exam, moves all 4, strength could not be tested  Cap refill: Brisk, less than 3 seconds  Pulses: 2+, symmetric in all extremities  Skin: Warm, dry, without rashes or lesions    Data Review: All diagnostic labs and studies have been reviewed. Abnormal Labs Reviewed   CBC WITH AUTOMATED DIFF - Abnormal; Notable for the following components:       Result Value    NEUTROPHILS 80 (*)     All other components within normal limits   METABOLIC PANEL, COMPREHENSIVE - Abnormal; Notable for the following components:    Sodium 125 (*)     Chloride 89 (*)     Glucose 112 (*)     All other components within normal limits       All Micro Results       Procedure Component Value Units Date/Time    COVID-19 WITH INFLUENZA A/B [[id number]]     Order Status: Sent Specimen: Nasopharyngeal             IMAGING:   CT ABD PELV WO CONT    (Results Pending)        ECG/ECHO:    Results for orders placed or performed during the hospital encounter of 11/16/21   EKG, 12 LEAD, INITIAL   Result Value Ref Range    Ventricular Rate 79 BPM    QRS Duration 84 ms    Q-T Interval 390 ms    QTC Calculation (Bezet) 447 ms    Calculated R Axis 92 degrees    Calculated T Axis 62 degrees    Diagnosis       Atrial fibrillation  Rightward axis    Abnormal ECG  When compared with ECG of 01-AUG-2017 13:20,    Nonspecific T wave abnormality no longer evident in Inferior leads  Confirmed by Jose Martin MD. ([id number]) on 11/18/2021 11:06:13 AM     Results for orders placed or performed in visit on 04/08/16   AMB POC EKG ROUTINE W/ 12 LEADS, INTER & REP    Impression    Atrial fibrillation with controlled rate. No ischemic changes. Assessment:   Given the patient's current clinical presentation, there is a high level of concern for decompensation if discharged from the emergency department.  Complex decision making was performed, which includes reviewing the patient's available past medical records, laboratory results, and imaging studies. Acute hyponatremia  Nausea  Accelerated hypertension  Paroxysmal atrial fibrillation  Cellulitis of the lower extremity  Plan:     Patient will be admitted on a telemetry bed, patient appears to have history of SIADH but also appears to be hypovolemic, will provide gentle IV hydration, free water restriction, hold Bumex and spironolactone, FeNa, repeat labs in the morning, may consider nephrology consult, close monitoring  CT of the abdomen pelvis does not show any acute pathology, IV antiemetics, close monitoring, supportive care and further intervention per hospital course  Continue home medications, hydralazine as needed  Currently rate controlled, continue home medications monitor on telemetry  IV antibiotics, blood cultures, venous duplex, pain control, wound care consult and further intervention per hospital course        DIET: ADULT DIET Regular; 4 carb choices (60 gm/meal)   ISOLATION PRECAUTIONS: There are currently no Active Isolations  CODE STATUS: Full Code   DVT PROPHYLAXIS: Heparin  FUNCTIONAL STATUS PRIOR TO HOSPITALIZATION: Ambulatory and capable of self-care but relies on assistive devices (rolling walker/cane). Ambulatory status/function: Ambulates with assistance:  Cane   EARLY MOBILITY ASSESSMENT: Recommend an assessment from physical therapy and/or occupational therapy  ANTICIPATED DISCHARGE: 24-48 hours. ANTICIPATED DISPOSITION: Home with Home Healthcare      Signed By: Victor Manuel Mckoy MD     November 25, 2022         Please note that this dictation may have been completed with Krista Canales, the computer voice recognition software. Quite often unanticipated grammatical, syntax, homophones, and other interpretive errors are inadvertently transcribed by the computer software. Please disregard these errors.   Please excuse any errors that have escaped final proofreading.

## 2022-11-25 NOTE — PROGRESS NOTES
Day #1 of Cefepime  Indication:  ssti  Current regimen:  1G IV q8h  Abx regimen: Cefepime + Vanc  Recent Labs     22  1139   WBC 6.0   CREA 0.83   BUN 13     Est CrCl: 58.7 ml/min; UO: -- ml/kg/hr  Temp (24hrs), Av.9 °F (36.6 °C), Min:97.7 °F (36.5 °C), Max:98 °F (36.7 °C)    Cultures: pending    Plan: Change to 2G IV q24h for indication and renal function

## 2022-11-26 VITALS
HEART RATE: 75 BPM | WEIGHT: 158.73 LBS | TEMPERATURE: 97.9 F | BODY MASS INDEX: 24.91 KG/M2 | RESPIRATION RATE: 19 BRPM | OXYGEN SATURATION: 97 % | HEIGHT: 67 IN | DIASTOLIC BLOOD PRESSURE: 78 MMHG | SYSTOLIC BLOOD PRESSURE: 152 MMHG

## 2022-11-26 LAB
ACC. NO. FROM MICRO ORDER, ACCP: ABNORMAL
ACC. NO. FROM MICRO ORDER, ACCP: ABNORMAL
ACINETOBACTER CALCOACETICUS-BAUMANII COMPLEX, ACBCX: NOT DETECTED
ACINETOBACTER CALCOACETICUS-BAUMANII COMPLEX, ACBCX: NOT DETECTED
ANION GAP SERPL CALC-SCNC: 9 MMOL/L (ref 5–15)
BACTEROIDES FRAGILIS, BFRA: NOT DETECTED
BACTEROIDES FRAGILIS, BFRA: NOT DETECTED
BASOPHILS # BLD: 0 K/UL (ref 0–0.1)
BASOPHILS NFR BLD: 1 % (ref 0–1)
BIOFIRE COMMENT, BCIDPF: ABNORMAL
BIOFIRE COMMENT, BCIDPF: ABNORMAL
BUN SERPL-MCNC: 9 MG/DL (ref 6–20)
BUN/CREAT SERPL: 15 (ref 12–20)
C GLABRATA DNA VAG QL NAA+PROBE: NOT DETECTED
C GLABRATA DNA VAG QL NAA+PROBE: NOT DETECTED
CALCIUM SERPL-MCNC: 7.9 MG/DL (ref 8.5–10.1)
CANDIDA ALBICANS: NOT DETECTED
CANDIDA ALBICANS: NOT DETECTED
CANDIDA AURIS, CAAU: NOT DETECTED
CANDIDA AURIS, CAAU: NOT DETECTED
CANDIDA KRUSEI, CKRP: NOT DETECTED
CANDIDA KRUSEI, CKRP: NOT DETECTED
CANDIDA PARAPSILOSIS, CPAUP: NOT DETECTED
CANDIDA PARAPSILOSIS, CPAUP: NOT DETECTED
CANDIDA TROPICALIS, CTROP: NOT DETECTED
CANDIDA TROPICALIS, CTROP: NOT DETECTED
CHLORIDE SERPL-SCNC: 90 MMOL/L (ref 97–108)
CO2 SERPL-SCNC: 30 MMOL/L (ref 21–32)
CREAT SERPL-MCNC: 0.59 MG/DL (ref 0.55–1.02)
CRYPTO NEOFORMANS/GATTII, CRYNEG: NOT DETECTED
CRYPTO NEOFORMANS/GATTII, CRYNEG: NOT DETECTED
DIFFERENTIAL METHOD BLD: ABNORMAL
ENTEROBACTER CLOACAE COMPLEX, ECCP: NOT DETECTED
ENTEROBACTER CLOACAE COMPLEX, ECCP: NOT DETECTED
ENTEROBACTERALES SP. , ENBLS: NOT DETECTED
ENTEROBACTERALES SP. , ENBLS: NOT DETECTED
ENTEROCOCCUS FAECALIS, ENFA: NOT DETECTED
ENTEROCOCCUS FAECALIS, ENFA: NOT DETECTED
ENTEROCOCCUS FAECIUM, ENFAM: NOT DETECTED
ENTEROCOCCUS FAECIUM, ENFAM: NOT DETECTED
EOSINOPHIL # BLD: 0 K/UL (ref 0–0.4)
EOSINOPHIL NFR BLD: 1 % (ref 0–7)
ERYTHROCYTE [DISTWIDTH] IN BLOOD BY AUTOMATED COUNT: 14.3 % (ref 11.5–14.5)
ESCHERICHIA COLI: NOT DETECTED
ESCHERICHIA COLI: NOT DETECTED
GLUCOSE SERPL-MCNC: 100 MG/DL (ref 65–100)
HAEMOPHILUS INFLUENZAE, HMI: NOT DETECTED
HAEMOPHILUS INFLUENZAE, HMI: NOT DETECTED
HCT VFR BLD AUTO: 31.5 % (ref 35–47)
HGB BLD-MCNC: 10.8 G/DL (ref 11.5–16)
IMM GRANULOCYTES # BLD AUTO: 0 K/UL (ref 0–0.04)
IMM GRANULOCYTES NFR BLD AUTO: 0 % (ref 0–0.5)
KLEBSIELLA AEROGENES, KLAE: NOT DETECTED
KLEBSIELLA AEROGENES, KLAE: NOT DETECTED
KLEBSIELLA OXYTOCA: NOT DETECTED
KLEBSIELLA OXYTOCA: NOT DETECTED
KLEBSIELLA PNEUMONIAE GROUP, KPPG: NOT DETECTED
KLEBSIELLA PNEUMONIAE GROUP, KPPG: NOT DETECTED
LISTERIA MONOCYTOGENES, LMONP: NOT DETECTED
LISTERIA MONOCYTOGENES, LMONP: NOT DETECTED
LYMPHOCYTES # BLD: 0.9 K/UL (ref 0.8–3.5)
LYMPHOCYTES NFR BLD: 14 % (ref 12–49)
Lab: NORMAL
Lab: NORMAL
MCH RBC QN AUTO: 29 PG (ref 26–34)
MCHC RBC AUTO-ENTMCNC: 34.3 G/DL (ref 30–36.5)
MCV RBC AUTO: 84.7 FL (ref 80–99)
MECA/C (METHICILLIN RESISTANT GENE), MECACP: NOT DETECTED
MONOCYTES # BLD: 0.5 K/UL (ref 0–1)
MONOCYTES NFR BLD: 8 % (ref 5–13)
NEISSERIA MENINGITIDIS, NMNI: NOT DETECTED
NEISSERIA MENINGITIDIS, NMNI: NOT DETECTED
NEUTS SEG # BLD: 4.9 K/UL (ref 1.8–8)
NEUTS SEG NFR BLD: 76 % (ref 32–75)
NRBC # BLD: 0 K/UL (ref 0–0.01)
NRBC BLD-RTO: 0 PER 100 WBC
PLATELET # BLD AUTO: 175 K/UL (ref 150–400)
PMV BLD AUTO: 10.2 FL (ref 8.9–12.9)
POTASSIUM SERPL-SCNC: 3.5 MMOL/L (ref 3.5–5.1)
PROTEUS, PRP: NOT DETECTED
PROTEUS, PRP: NOT DETECTED
PSEUDOMONAS AERUGINOSA: NOT DETECTED
PSEUDOMONAS AERUGINOSA: NOT DETECTED
RBC # BLD AUTO: 3.72 M/UL (ref 3.8–5.2)
REFERENCE LAB,REFLB: NORMAL
REFERENCE LAB,REFLB: NORMAL
RESISTANT GENE SPACE, REGENE: ABNORMAL
RESISTANT GENE SPACE, REGENE: ABNORMAL
SALMONELLA, SALMO: NOT DETECTED
SALMONELLA, SALMO: NOT DETECTED
SERRATIA MARCESCENS: NOT DETECTED
SERRATIA MARCESCENS: NOT DETECTED
SODIUM SERPL-SCNC: 128 MMOL/L (ref 136–145)
SODIUM SERPL-SCNC: 129 MMOL/L (ref 136–145)
STAPH EPIDERMIDIS, STEP: DETECTED
STAPH EPIDERMIDIS, STEP: NOT DETECTED
STAPH LUGDUNENSIS, STALUG: NOT DETECTED
STAPH LUGDUNENSIS, STALUG: NOT DETECTED
STAPHYLOCOCCUS AUREUS: NOT DETECTED
STAPHYLOCOCCUS AUREUS: NOT DETECTED
STAPHYLOCOCCUS, STAPP: DETECTED
STAPHYLOCOCCUS, STAPP: NOT DETECTED
STENO MALTOPHILIA, STMA: NOT DETECTED
STENO MALTOPHILIA, STMA: NOT DETECTED
STREPTOCOCCUS , STPSP: DETECTED
STREPTOCOCCUS , STPSP: NOT DETECTED
STREPTOCOCCUS AGALACTIAE (GROUP B): NOT DETECTED
STREPTOCOCCUS AGALACTIAE (GROUP B): NOT DETECTED
STREPTOCOCCUS PNEUMONIAE , SPNP: NOT DETECTED
STREPTOCOCCUS PNEUMONIAE , SPNP: NOT DETECTED
STREPTOCOCCUS PYOGENES (GROUP A), SPYOP: NOT DETECTED
STREPTOCOCCUS PYOGENES (GROUP A), SPYOP: NOT DETECTED
TEST DESCRIPTION:,ATST: NORMAL
TEST DESCRIPTION:,ATST: NORMAL
WBC # BLD AUTO: 6.5 K/UL (ref 3.6–11)

## 2022-11-26 PROCEDURE — 74011250636 HC RX REV CODE- 250/636: Performed by: FAMILY MEDICINE

## 2022-11-26 PROCEDURE — 85025 COMPLETE CBC W/AUTO DIFF WBC: CPT

## 2022-11-26 PROCEDURE — 80048 BASIC METABOLIC PNL TOTAL CA: CPT

## 2022-11-26 PROCEDURE — 36415 COLL VENOUS BLD VENIPUNCTURE: CPT

## 2022-11-26 PROCEDURE — 74011250637 HC RX REV CODE- 250/637: Performed by: STUDENT IN AN ORGANIZED HEALTH CARE EDUCATION/TRAINING PROGRAM

## 2022-11-26 PROCEDURE — 84295 ASSAY OF SERUM SODIUM: CPT

## 2022-11-26 PROCEDURE — 74011000250 HC RX REV CODE- 250: Performed by: FAMILY MEDICINE

## 2022-11-26 RX ORDER — SPIRONOLACTONE 25 MG/1
25 TABLET ORAL DAILY
Status: ON HOLD | COMMUNITY

## 2022-11-26 RX ORDER — ONDANSETRON 4 MG/1
4 TABLET, ORALLY DISINTEGRATING ORAL
Qty: 30 TABLET | Refills: 0 | Status: ON HOLD | OUTPATIENT
Start: 2022-11-26

## 2022-11-26 RX ORDER — DIAZEPAM 2 MG/1
2 TABLET ORAL
Status: DISCONTINUED | OUTPATIENT
Start: 2022-11-26 | End: 2022-11-26 | Stop reason: HOSPADM

## 2022-11-26 RX ORDER — METOPROLOL SUCCINATE 50 MG/1
50 TABLET, EXTENDED RELEASE ORAL DAILY
Status: DISCONTINUED | OUTPATIENT
Start: 2022-11-26 | End: 2022-11-26 | Stop reason: HOSPADM

## 2022-11-26 RX ORDER — BUMETANIDE 1 MG/1
1 TABLET ORAL DAILY
Qty: 135 TABLET | Refills: 3 | Status: ON HOLD | OUTPATIENT
Start: 2022-11-26

## 2022-11-26 RX ORDER — PAROXETINE HYDROCHLORIDE 20 MG/1
20 TABLET, FILM COATED ORAL DAILY
Status: DISCONTINUED | OUTPATIENT
Start: 2022-11-26 | End: 2022-11-26 | Stop reason: HOSPADM

## 2022-11-26 RX ADMIN — ONDANSETRON 4 MG: 2 INJECTION INTRAMUSCULAR; INTRAVENOUS at 12:50

## 2022-11-26 RX ADMIN — PROMETHAZINE HYDROCHLORIDE 25 MG: 25 TABLET ORAL at 08:39

## 2022-11-26 RX ADMIN — SODIUM CHLORIDE, PRESERVATIVE FREE 10 ML: 5 INJECTION INTRAVENOUS at 06:00

## 2022-11-26 RX ADMIN — PROMETHAZINE HYDROCHLORIDE 25 MG: 25 TABLET ORAL at 03:11

## 2022-11-26 NOTE — PROGRESS NOTES
Renal Dosing/Monitoring  Medication: Cefepime for SSTI  Current regimen:  2 gm IV  every 24 hr  Recent Labs     11/26/22  0348 11/25/22  1139   CREA 0.59 0.83   BUN 9 13     Estimated CrCl:  >60 ml/min  Plan: Adjust to q12h interval for crcl > 60

## 2022-11-26 NOTE — PROGRESS NOTES
Problem: Falls - Risk of  Goal: *Absence of Falls  Description: Document Gorge Mackey Fall Risk and appropriate interventions in the flowsheet. Outcome: Resolved/Met  Note: Fall Risk Interventions:  Mobility Interventions: Patient to call before getting OOB, Strengthening exercises (ROM-active/passive)    Mentation Interventions: Adequate sleep, hydration, pain control, Familiar objects from home, Reorient patient                        Problem: Patient Education: Go to Patient Education Activity  Goal: Patient/Family Education  Outcome: Resolved/Met     Problem: Risk for Spread of Infection  Goal: Prevent transmission of infectious organism to others  Description: Prevent the transmission of infectious organisms to other patients, staff members, and visitors.   Outcome: Resolved/Met     Problem: Patient Education:  Go to Education Activity  Goal: Patient/Family Education  Outcome: Resolved/Met

## 2022-11-26 NOTE — ED NOTES
TRANSFER - OUT REPORT:    Verbal report given to Win Smith RN(name) on Navarro Aponte  being transferred to [facility](unit) for routine progression of care       Report consisted of patients Situation, Background, Assessment and   Recommendations(SBAR). Information from the following report(s) SBAR, Kardex, and ED Summary was reviewed with the receiving nurse. Lines:   Peripheral IV 11/25/22 Left Antecubital (Active)   Site Assessment Clean, dry, & intact 11/25/22 1142   Phlebitis Assessment 0 11/25/22 1142   Infiltration Assessment 0 11/25/22 1142   Dressing Status Clean, dry, & intact 11/25/22 1142        Opportunity for questions and clarification was provided.       Patient transported with:   [employer]

## 2022-11-26 NOTE — PROGRESS NOTES
TRANSITION OF CARE  RUR--12%  Disposition--Return to independent functioning. Consult: nephrology  Transport--Family    Patient's primary family contact: sister Areli Garcia    1st  Medicare IM Letter: 11/26/22  2nd Medicare IM Letter:      CM gave patient first Medicare IM Letter which informed patient of the right to appeal the discharge. Patient signed the original which was given to the patient and a copy was placed on the chart. Care Management Interventions  PCP Verified by CM: Yes  Transition of Care Consult (CM Consult): Other  Physical Therapy Consult: No  Occupational Therapy Consult: No  Speech Therapy Consult: No  Support Systems: Other Family Member(s)  The Plan for Transition of Care is Related to the Following Treatment Goals : Return to independent functioning. Discharge Location  Patient Expects to be Discharged to[de-identified] Home    Reason for Admission:  Acute hyponatremia           Nausea                      Accelerated hypertension        A fib         Cellulitis of lower extremity. RUR Score:            12%           Plan for utilizing home health:      None    PCP: First and Last name:  Patricia Stein MD   Name of Practice:    Are you a current patient: Yes    Approximate date of last visit:    Can you participate in a virtual visit with your PCP:                     Current Advanced Directive/Advance Care Plan: Full Code      Healthcare Decision Maker:   Click here to complete [address] including selection of the Healthcare Decision Maker Relationship (ie \"Primary\")                         Transition of Care Plan:           CM met with patient. Patient lives alone. Local family support includes a sister. Patient was ambulatory prior to admission. Patient confirmed PCP, health insurance, and prescription coverage.

## 2022-11-26 NOTE — CONSULTS
Seen and examined  Thanks for the consult  A/P:  Chronic hyponatremia,2nd SAIDH,better today  Nausea 2nd hyponatremia  JANESSA,prerenal better  Hx of CHF on diuretics,spironolactone,entresto  Hx of thyroid ca  Recent start of paxil    Dc NS  FR as op 1500 cc per day  Increase protein intake  Age appropriate cancer screen  Labs next week   Follow up with us in 2-3 eeks  Can dc from renal standpoint  Discussed with her and dr Heather Palacios

## 2022-11-26 NOTE — DISCHARGE SUMMARY
Discharge Summary       PATIENT ID: Adali Dillon  MRN: [id number]   YOB: 1950    DATE OF ADMISSION: 11/25/2022 11:35 AM    DATE OF DISCHARGE: 11/26/22   PRIMARY CARE PROVIDER: Anil Yuen MD     ATTENDING PHYSICIAN: Ar Harrison MD  DISCHARGING PROVIDER: Ar Harrison MD    To contact this individual call [phone] and ask the  to page. If unavailable ask to be transferred the Adult Hospitalist Department. CONSULTATIONS: IP CONSULT TO HOSPITALIST  IP CONSULT TO NEPHROLOGY    PROCEDURES/SURGERIES: * No surgery found *    ADMITTING [facility] COURSE:   Adali Dillon is a 67 y.o. female who presents with fatigue and nausea. Patient is a poor historian, presents to the ER with generalized fatigue, and poor appetite, patient came to the ER, was found to be hyponatremic, history of poor p.o. intake, on Bumex and spironolactone, was requested to be admitted to the hospitalist service, patient also has a lower extremity wound    Acute hyponatremia  Nausea  Accelerated hypertension  Paroxysmal atrial fibrillation  Cellulitis of the lower extremity    Hospital course  Patient admitted for monitoring of sodium level. Patient has history of SIADH previously treated with follow-up with nephrology but she stopped following with them. Patient treated with fluid restriction. Nephrology was consulted so that they could monitoring reestablish care with her. Nephrology gave recommendations and cleared patient for discharge. Discussed with patient that she needs to follow with nephrology as an outpatient, continue fluid restriction, continue protein intake. Patient demonstrated understanding. Patient requested medication to help treat her nausea which was prescribed. DISCHARGE DIAGNOSES / PLAN:      Acute on chronic hyponatremia secondary to SIADH. Improved. Continue outpatient follow-up with nephrology.   Fluid restriction 1500 mL.  Nausea. Continue Zofran. Elevated hypertension. Resolved. Paroxysmal atrial fibrillation. Resume home meds. Cellulitis of lower right lower extremity. Does not look infected at this time. Continue outpatient wound management. PENDING TEST RESULTS:   At the time of discharge the following test results are still pending: none    FOLLOW UP APPOINTMENTS:    Follow-up Information       Follow up With Specialties Details Why Contact Info    Vincent Mary MD Internal Medicine Physician Call Call to schedule follow up within 1 week. [address]  [phone]      Kathleen Burgos MD Nephrology Call Call to schedule follow up. [address] [facility]  [address] [facility]  [phone]               ADDITIONAL CARE RECOMMENDATIONS: none    DIET: Fluid restriction to 1500 mL. Increase protein intake. Regular diet. ACTIVITY: Activity as tolerated    WOUND CARE: none    EQUIPMENT needed: none      DISCHARGE MEDICATIONS:  Discharge Medication List as of 11/26/2022  3:05 PM        CONTINUE these medications which have CHANGED    Details   bumetanide (BUMEX) 1 mg tablet Take 1 Tablet by mouth daily. , Normal, Disp-135 Tablet, R-3      ondansetron (ZOFRAN ODT) 4 mg disintegrating tablet Take 1 Tablet by mouth four (4) times daily as needed for Nausea or Vomiting., Normal, Disp-30 Tablet, R-0           CONTINUE these medications which have NOT CHANGED    Details   spironolactone (ALDACTONE) 25 mg tablet Take 25 mg by mouth daily. , Historical Med      PARoxetine (PAXIL) 20 mg tablet Take 1 Tablet by mouth daily. , Normal, Disp-30 Tablet, R-6      butalbital-acetaminophen-caffeine (FIORICET, ESGIC) -40 mg per tablet TAKE 1 TO 2 TABLETS BY MOUTH EVERY 6 HOURS AS NEEDED FOR HEADACHE, Normal, Disp-30 Tablet, R-2      levothyroxine (SYNTHROID) 175 mcg tablet TAKE 1 TABLET BY MOUTH EVERY DAY BEFORE BREAKFAST, Normal, Disp-90 Tablet, R-3      Xarelto 20 mg tab tablet TAKE 1 TABLET BY MOUTH EVERY DAY, Normal, Disp-90 Tablet, R-3      metoprolol succinate (TOPROL-XL) 50 mg XL tablet TAKE 1 TABLET BY MOUTH AFTER DINNER, Normal, Disp-90 Tablet, R-3      sacubitriL-valsartan (Entresto) 49-51 mg tab tablet Take 1 Tablet by mouth two (2) times a day., Historical Med      diazePAM (VALIUM) 2 mg tablet TAKE 1 TABLET BY MOUTH EVERY DAY AS NEEDED FOR ANXIETY, Normal, Disp-30 Tablet, R-2Not to exceed 3 additional fills before 02/04/2023      triamcinolone acetonide (KENALOG) 0.1 % topical cream Apply  to affected area two (2) times a day. use thin layer along affected skin surrounding your wound, Normal, Disp-15 g, R-0      traZODone (DESYREL) 50 mg tablet Take 1 Tablet by mouth nightly., Normal, Disp-90 Tablet, R-3               NOTIFY YOUR PHYSICIAN FOR ANY OF THE FOLLOWING:   Fever over 101 degrees for 24 hours. Chest pain, shortness of breath, fever, chills, nausea, vomiting, diarrhea, change in mentation, falling, weakness, bleeding. Severe pain or pain not relieved by medications. Or, any other signs or symptoms that you may have questions about.     DISPOSITION:   X Home With:   OT  PT  HH  RN       Long term SNF/Inpatient Rehab    Independent/assisted living    Hospice    Other:       PATIENT CONDITION AT DISCHARGE:     Functional status    Poor     Deconditioned    X Independent      Cognition   X  Lucid     Forgetful     Dementia      Catheters/lines (plus indication)    Morrison     PICC     PEG    X None      Code status   X  Full code     DNR      PHYSICAL EXAMINATION AT DISCHARGE:  General: Alert, awake, poor historian  HEENT: PEERL moist mucus membranes  Neck: Supple,   Chest: Decreased basal breath sounds  CVS: RRR, S1 S2 heard, no murmurs/rubs/gallops  Abd: Soft, non-tender, non-distended, +bowel sounds   Ext: No clubbing, no cyanosis, right lower extremity wound  Neuro/Psych: Limited exam, moves all 4, strength could not be tested  Cap refill: Brisk, less than 3 seconds  Pulses: 2+, symmetric in all extremities  Skin: Warm, dry, without rashes or lesions    CHRONIC MEDICAL DIAGNOSES:  Problem List as of 11/26/2022 Date Reviewed: 11/21/2022            Codes Class Noted - Resolved    Hyponatremia ICD-10-CM: E87.1  ICD-9-CM: 276.1  11/25/2022 - Present        Moderate episode of recurrent major depressive disorder ([facility] ICD-10-CM: F33.1  ICD-9-CM: 296.32  11/8/2022 - Present        Benzodiazepine dependence ([facility] ICD-10-CM: F13.20  ICD-9-CM: 304.10  11/8/2022 - Present        Tortuous aorta (HCC) ICD-10-CM: I77.1  ICD-9-CM: 447.1  11/8/2022 - Present        Leg ulcer, right, with fat layer exposed ([facility] ICD-10-CM: X15.190  ICD-9-CM: 707.10  7/15/2022 - Present        Chronic systolic (congestive) heart failure ICD-10-CM: I50.22  ICD-9-CM: 428.22, 428.0  6/13/2022 - Present        Secondary hypercoagulable state ([facility] ICD-10-CM: Q83.22  ICD-9-CM: 289.82  5/18/2022 - Present        Senile purpura ([facility] ICD-10-CM: D69.2  ICD-9-CM: 287.2  5/17/2022 - Present        Stage 3a chronic kidney disease ([facility] ICD-10-CM: N18.31  ICD-9-CM: 585.3  5/16/2022 - Present        SIADH (syndrome of inappropriate ADH production) ([facility] ICD-10-CM: E22.2  ICD-9-CM: 253.6  1/14/2022 - Present        Open wound of right lower leg ICD-10-CM: S81.801A  ICD-9-CM: 891.0  11/17/2021 - Present        Chronic diastolic congestive heart failure (HCC) ICD-10-CM: I50.32  ICD-9-CM: 428.32, 428.0  8/22/2017 - Present        Encounter for long-term (current) drug use ICD-10-CM: Z79.899  ICD-9-CM: V58.69  8/22/2017 - Present        Acquired hypothyroidism ICD-10-CM: E03.9  ICD-9-CM: 244.9  4/8/2016 - Present        Atrial fibrillation (HCC) ICD-10-CM: I48.91  ICD-9-CM: 427.31  7/15/2011 - Present        HTN (hypertension) ICD-10-CM: I10  ICD-9-CM: 401.9  7/14/2011 - Present        RESOLVED: Mild depression ICD-10-CM: F32. A  ICD-9-CM: 843  1/25/2019 - 11/8/2022        RESOLVED: Hyponatremia ICD-10-CM: E87.1  ICD-9-CM: 276.1  8/2/2017 - 12/22/2021        RESOLVED: CHF (congestive heart failure) ([facility] ICD-10-CM: I50.9  ICD-9-CM: 428.0  8/1/2017 - 8/22/2017        RESOLVED: Chest pain ICD-10-CM: R07.9  ICD-9-CM: 786.50  8/1/2017 - 1/14/2022        RESOLVED: Encounter for long-term (current) use of other medications ICD-10-CM: Z79.899  ICD-9-CM: V58.69  7/15/2011 - 4/8/2016        RESOLVED: Unspecified hypothyroidism ICD-10-CM: E03.9  ICD-9-CM: 244.9  7/15/2011 - 4/8/2016           Greater than 30 minutes were spent with the patient on counseling and coordination of care    Signed:   Mandeep Desai MD  11/26/2022  5:04 PM

## 2022-11-27 LAB
BACTERIA SPEC CULT: ABNORMAL
SERVICE CMNT-IMP: ABNORMAL

## 2022-11-27 NOTE — CONSULTS
[address]    Name:  Elsie Flores  MR#:  [id number]  :  1950  ACCOUNT #:  [de-identified]  DATE OF SERVICE:  2022    REASON FOR CONSULTATION:  Seen for hyponatremia. Thanks for the consult. HISTORY OF PRESENT ILLNESS:  The patient was seen roughly a year ago for hyponatremia. She is known to have SIADH. Also known to have lower extremity edema, on Bumex and spironolactone, that has been under control. She has a history of thyroid cancer and sees Dr. Lysbeth Angelucci as a PCP. Has more fatigue and gets nauseous, less p.o. intake. Came here with a sodium of 125; in 2022, it was 133; on , 135; now it is 125. She got some normal saline. Sodium went up to 129, then 128 now. Her creatinine improved with IV fluid, and then she started on Paxil a few days ago, she had p.r.n. Zofran. CT scan of her head was done, looked okay. CT scan of abdomen and pelvis was done, no acute findings. MEDICATIONS:  As an inpatient include  1. Normal saline. 2.  Vancomycin. 3.  Entresto. 4.  Cefepime. 5.  Levothyroxine. ALLERGIES:  TO PENICILLIN, PERCOCET, AND MORPHINE. PAST MEDICAL HISTORY:  Thyroid cancer; AFib; hypertension; SIADH; and thyroidectomy. SOCIAL HISTORY:  Reviewed. FAMILY HISTORY:  Reviewed. REVIEW OF SYSTEMS:  Look at the HPI, the rest is negative. PHYSICAL EXAMINATION:  GENERAL:  Not in distress. The patient has very quick speech and idea flights  . VITAL SIGNS:  /87, saturating 97%. EXTREMITIES:  No edema. PSYCHIATRIC:  Alert, oriented to time and place. LABORATORY DATA:  Hemoglobin 10.8. UA:  Specific gravity is 1.015, pH 7.5, more than 100 wbc's. Sodium 129, 128; creatinine 0.5; calcium 7.9. I do not see any cortisol level. Urine sodium 128. IMPRESSION:  1. Chronic hyponatremia related to syndrome of inappropriate secretion of antidiuretic hormone, now with exacerbation. Doubt it is hypovolemic.   Sodium better today. 2.  Acute kidney injury, likely from over-diuresis, better. 3.  History of thyroid cancer. 4.  History of heart failure, on Entresto and diuretics and spironolactone. 5.  Update on her cancer screen. RECOMMENDATIONS:  1. Can discharge home from renal standpoint. 2.  Discontinue normal saline. 3.  Need to have age-appropriate cancer screen. 4.  Need to have renal followup for serial sodium monitoring. 5.  Fluid restriction as an outpatient. 6.  Discussed at length with her and primary team, Dr. Filemon Vance.       MD BRANDON [email or web address]  D:  11/26/2022 12:35  T:  11/26/2022 19:20  JOB #:  [id number]

## 2022-11-30 ENCOUNTER — HOSPITAL ENCOUNTER (INPATIENT)
Age: 72
LOS: 2 days | Discharge: HOME | DRG: 644 | End: 2022-12-02
Attending: STUDENT IN AN ORGANIZED HEALTH CARE EDUCATION/TRAINING PROGRAM | Admitting: FAMILY MEDICINE
Payer: MEDICARE

## 2022-11-30 ENCOUNTER — APPOINTMENT (OUTPATIENT)
Dept: ULTRASOUND IMAGING | Age: 72
DRG: 644 | End: 2022-11-30
Attending: FAMILY MEDICINE
Payer: MEDICARE

## 2022-11-30 DIAGNOSIS — N17.9 AKI (ACUTE KIDNEY INJURY) (HCC): Primary | ICD-10-CM

## 2022-11-30 DIAGNOSIS — E87.1 HYPONATREMIA: ICD-10-CM

## 2022-11-30 LAB
ALBUMIN SERPL-MCNC: 4.2 G/DL (ref 3.5–5)
ALBUMIN/GLOB SERPL: 1.2 {RATIO} (ref 1.1–2.2)
ALP SERPL-CCNC: 87 U/L (ref 45–117)
ALT SERPL-CCNC: 21 U/L (ref 12–78)
ANION GAP SERPL CALC-SCNC: 7 MMOL/L (ref 5–15)
ANION GAP SERPL CALC-SCNC: 7 MMOL/L (ref 5–15)
APPEARANCE UR: CLEAR
AST SERPL-CCNC: 28 U/L (ref 15–37)
BACTERIA URNS QL MICRO: NEGATIVE /HPF
BASOPHILS # BLD: 0 K/UL (ref 0–0.1)
BASOPHILS NFR BLD: 0 % (ref 0–1)
BILIRUB SERPL-MCNC: 0.5 MG/DL (ref 0.2–1)
BILIRUB UR QL: NEGATIVE
BUN SERPL-MCNC: 15 MG/DL (ref 6–20)
BUN SERPL-MCNC: 17 MG/DL (ref 6–20)
BUN/CREAT SERPL: 14 (ref 12–20)
BUN/CREAT SERPL: 16 (ref 12–20)
CALCIUM SERPL-MCNC: 8.5 MG/DL (ref 8.5–10.1)
CALCIUM SERPL-MCNC: 8.9 MG/DL (ref 8.5–10.1)
CHLORIDE SERPL-SCNC: 84 MMOL/L (ref 97–108)
CHLORIDE SERPL-SCNC: 88 MMOL/L (ref 97–108)
CK SERPL-CCNC: 105 U/L (ref 26–192)
CK SERPL-CCNC: 58 U/L (ref 26–192)
CO2 SERPL-SCNC: 29 MMOL/L (ref 21–32)
CO2 SERPL-SCNC: 30 MMOL/L (ref 21–32)
COLOR UR: ABNORMAL
COMMENT, HOLDF: NORMAL
CREAT SERPL-MCNC: 0.92 MG/DL (ref 0.55–1.02)
CREAT SERPL-MCNC: 1.23 MG/DL (ref 0.55–1.02)
DIFFERENTIAL METHOD BLD: ABNORMAL
EOSINOPHIL # BLD: 0 K/UL (ref 0–0.4)
EOSINOPHIL #/AREA URNS HPF: NEGATIVE /[HPF]
EOSINOPHIL NFR BLD: 0 % (ref 0–7)
EPITH CASTS URNS QL MICRO: ABNORMAL /LPF
ERYTHROCYTE [DISTWIDTH] IN BLOOD BY AUTOMATED COUNT: 14.6 % (ref 11.5–14.5)
GLOBULIN SER CALC-MCNC: 3.5 G/DL (ref 2–4)
GLUCOSE SERPL-MCNC: 102 MG/DL (ref 65–100)
GLUCOSE SERPL-MCNC: 114 MG/DL (ref 65–100)
GLUCOSE UR STRIP.AUTO-MCNC: NEGATIVE MG/DL
HCT VFR BLD AUTO: 41.1 % (ref 35–47)
HGB BLD-MCNC: 14 G/DL (ref 11.5–16)
HGB UR QL STRIP: NEGATIVE
HYALINE CASTS URNS QL MICRO: ABNORMAL /LPF (ref 0–5)
IMM GRANULOCYTES # BLD AUTO: 0 K/UL (ref 0–0.04)
IMM GRANULOCYTES NFR BLD AUTO: 0 % (ref 0–0.5)
KETONES UR QL STRIP.AUTO: ABNORMAL MG/DL
LEUKOCYTE ESTERASE UR QL STRIP.AUTO: NEGATIVE
LYMPHOCYTES # BLD: 1.4 K/UL (ref 0.8–3.5)
LYMPHOCYTES NFR BLD: 15 % (ref 12–49)
MAGNESIUM SERPL-MCNC: 1.8 MG/DL (ref 1.6–2.4)
MAGNESIUM SERPL-MCNC: 1.9 MG/DL (ref 1.6–2.4)
MCH RBC QN AUTO: 29.2 PG (ref 26–34)
MCHC RBC AUTO-ENTMCNC: 34.1 G/DL (ref 30–36.5)
MCV RBC AUTO: 85.6 FL (ref 80–99)
MONOCYTES # BLD: 0.7 K/UL (ref 0–1)
MONOCYTES NFR BLD: 7 % (ref 5–13)
NEUTS SEG # BLD: 7.4 K/UL (ref 1.8–8)
NEUTS SEG NFR BLD: 77 % (ref 32–75)
NITRITE UR QL STRIP.AUTO: NEGATIVE
NRBC # BLD: 0 K/UL (ref 0–0.01)
NRBC BLD-RTO: 0 PER 100 WBC
OSMOLALITY UR: 281 MOSM/KG H2O
PH UR STRIP: 6.5 [PH] (ref 5–8)
PHOSPHATE SERPL-MCNC: 4 MG/DL (ref 2.6–4.7)
PLATELET # BLD AUTO: 328 K/UL (ref 150–400)
PMV BLD AUTO: 10 FL (ref 8.9–12.9)
POTASSIUM SERPL-SCNC: 3.3 MMOL/L (ref 3.5–5.1)
POTASSIUM SERPL-SCNC: 4.2 MMOL/L (ref 3.5–5.1)
PROT SERPL-MCNC: 7.7 G/DL (ref 6.4–8.2)
PROT UR STRIP-MCNC: ABNORMAL MG/DL
RBC # BLD AUTO: 4.8 M/UL (ref 3.8–5.2)
RBC #/AREA URNS HPF: ABNORMAL /HPF (ref 0–5)
SAMPLES BEING HELD,HOLD: NORMAL
SODIUM SERPL-SCNC: 121 MMOL/L (ref 136–145)
SODIUM SERPL-SCNC: 124 MMOL/L (ref 136–145)
SODIUM UR-SCNC: 46 MMOL/L
SP GR UR REFRACTOMETRY: 1.01 (ref 1–1.03)
UR CULT HOLD, URHOLD: NORMAL
UROBILINOGEN UR QL STRIP.AUTO: 0.2 EU/DL (ref 0.2–1)
WBC # BLD AUTO: 9.6 K/UL (ref 3.6–11)
WBC URNS QL MICRO: ABNORMAL /HPF (ref 0–4)

## 2022-11-30 PROCEDURE — 51798 US URINE CAPACITY MEASURE: CPT

## 2022-11-30 PROCEDURE — 87205 SMEAR GRAM STAIN: CPT

## 2022-11-30 PROCEDURE — 81001 URINALYSIS AUTO W/SCOPE: CPT

## 2022-11-30 PROCEDURE — 36415 COLL VENOUS BLD VENIPUNCTURE: CPT

## 2022-11-30 PROCEDURE — 74011000250 HC RX REV CODE- 250: Performed by: FAMILY MEDICINE

## 2022-11-30 PROCEDURE — 99285 EMERGENCY DEPT VISIT HI MDM: CPT

## 2022-11-30 PROCEDURE — 65270000046 HC RM TELEMETRY

## 2022-11-30 PROCEDURE — 74011250636 HC RX REV CODE- 250/636: Performed by: STUDENT IN AN ORGANIZED HEALTH CARE EDUCATION/TRAINING PROGRAM

## 2022-11-30 PROCEDURE — 74011250637 HC RX REV CODE- 250/637: Performed by: NURSE PRACTITIONER

## 2022-11-30 PROCEDURE — 82550 ASSAY OF CK (CPK): CPT

## 2022-11-30 PROCEDURE — 74011250637 HC RX REV CODE- 250/637: Performed by: STUDENT IN AN ORGANIZED HEALTH CARE EDUCATION/TRAINING PROGRAM

## 2022-11-30 PROCEDURE — 76770 US EXAM ABDO BACK WALL COMP: CPT

## 2022-11-30 PROCEDURE — 84100 ASSAY OF PHOSPHORUS: CPT

## 2022-11-30 PROCEDURE — 83735 ASSAY OF MAGNESIUM: CPT

## 2022-11-30 PROCEDURE — 83935 ASSAY OF URINE OSMOLALITY: CPT

## 2022-11-30 PROCEDURE — 80053 COMPREHEN METABOLIC PANEL: CPT

## 2022-11-30 PROCEDURE — 85025 COMPLETE CBC W/AUTO DIFF WBC: CPT

## 2022-11-30 PROCEDURE — 84300 ASSAY OF URINE SODIUM: CPT

## 2022-11-30 RX ORDER — SODIUM CHLORIDE 0.9 % (FLUSH) 0.9 %
5-40 SYRINGE (ML) INJECTION AS NEEDED
Status: DISCONTINUED | OUTPATIENT
Start: 2022-11-30 | End: 2022-12-03 | Stop reason: HOSPADM

## 2022-11-30 RX ORDER — SODIUM CHLORIDE 0.9 % (FLUSH) 0.9 %
5-40 SYRINGE (ML) INJECTION EVERY 8 HOURS
Status: DISCONTINUED | OUTPATIENT
Start: 2022-11-30 | End: 2022-12-03 | Stop reason: HOSPADM

## 2022-11-30 RX ORDER — SODIUM CHLORIDE 9 MG/ML
50 INJECTION, SOLUTION INTRAVENOUS CONTINUOUS
Status: DISCONTINUED | OUTPATIENT
Start: 2022-11-30 | End: 2022-12-01

## 2022-11-30 RX ORDER — ACETAMINOPHEN 325 MG/1
650 TABLET ORAL
Status: DISCONTINUED | OUTPATIENT
Start: 2022-11-30 | End: 2022-12-03 | Stop reason: HOSPADM

## 2022-11-30 RX ORDER — PROMETHAZINE HYDROCHLORIDE 25 MG/1
25 TABLET ORAL
Status: COMPLETED | OUTPATIENT
Start: 2022-11-30 | End: 2022-11-30

## 2022-11-30 RX ORDER — PAROXETINE HYDROCHLORIDE 20 MG/1
20 TABLET, FILM COATED ORAL DAILY
Status: DISCONTINUED | OUTPATIENT
Start: 2022-12-01 | End: 2022-12-01

## 2022-11-30 RX ADMIN — PROMETHAZINE HYDROCHLORIDE 25 MG: 25 TABLET ORAL at 11:34

## 2022-11-30 RX ADMIN — SODIUM CHLORIDE, PRESERVATIVE FREE 10 ML: 5 INJECTION INTRAVENOUS at 21:07

## 2022-11-30 RX ADMIN — ACETAMINOPHEN 650 MG: 325 TABLET ORAL at 21:07

## 2022-11-30 RX ADMIN — SODIUM CHLORIDE 1000 ML: 9 INJECTION, SOLUTION INTRAVENOUS at 11:35

## 2022-11-30 NOTE — H&P
[address] [facility] Adult  Hospitalist Group  History and Physical    Date of Service:  11/30/2022  Primary Care Provider: Hari Beck MD  Source of information: The patient and Chart review    Chief Complaint: Nausea and Vomiting      History of Presenting Illness:   Taylor Mccrary is a 67 y.o. female who presents with nausea and vomiting. Patient came to the ER with nausea and vomiting, poor historian, reports that not been able to tolerate much p.o., was recently admitted to the hospital with hyponatremia, history of SIADH, came to the ER, was found to have a sodium of 121, was requested to be admitted to the hospitalist service, patient denies any other complaints or problems    The patient denies any headache, blurry vision, sore throat, trouble swallowing, trouble with speech, chest pain, SOB, cough, fever, chills, , urinary symptoms, constipation, recent travels, sick contacts, focal or generalized neurological symptoms, falls, injuries, rashes, contact with COVID-19 diagnosed patients, hematemesis, melena, hemoptysis, hematuria, rashes, denies starting any new medications and denies any other concerns or problems besides as mentioned above. REVIEW OF SYSTEMS:  A comprehensive review of systems was negative except for that written in the History of Present Illness. Past Medical History:   Diagnosis Date    Atrial fibrillation ([facility] 7/15/2011    Depression     HTN (hypertension) 7/14/2011    Paroxysmal atrial fibrillation (HCC)     SIADH (syndrome of inappropriate ADH production) ([facility] 1/14/2022    Thyroid ca ([facility] 2005    Papillary    Unspecified hypothyroidism 7/15/2011      Past Surgical History:   Procedure Laterality Date    ENDOSCOPY, COLON, DIAGNOSTIC  2003    neg. rep 10 yrs. [place] THYROIDECTOMY  11/05     Prior to Admission medications    Medication Sig Start Date End Date Taking?  Authorizing Provider   spironolactone (ALDACTONE) 25 mg tablet Take 25 mg by mouth daily. Provider, Historical   bumetanide (BUMEX) 1 mg tablet Take 1 Tablet by mouth daily. 11/26/22   Evelyn Chandler MD   ondansetron (ZOFRAN ODT) 4 mg disintegrating tablet Take 1 Tablet by mouth four (4) times daily as needed for Nausea or Vomiting. 11/26/22   Jose D Red MD   PARoxetine (PAXIL) 20 mg tablet Take 1 Tablet by mouth daily. 11/21/22   Padmini Schilling MD   diazePAM (VALIUM) 2 mg tablet TAKE 1 TABLET BY MOUTH EVERY DAY AS NEEDED FOR ANXIETY 11/9/22   Padmini Schilling MD   butalbital-acetaminophen-caffeine (FIORICET, ESGIC) -40 mg per tablet TAKE 1 TO 2 TABLETS BY MOUTH EVERY 6 HOURS AS NEEDED FOR HEADACHE 10/19/22   Padmini Schilling MD   triamcinolone acetonide (KENALOG) 0.1 % topical cream Apply  to affected area two (2) times a day. use thin layer along affected skin surrounding your wound 10/3/22   Kadeem Peguero DPM   traZODone (DESYREL) 50 mg tablet Take 1 Tablet by mouth nightly. 9/29/22   Padmini Schilling MD   levothyroxine (SYNTHROID) 175 mcg tablet TAKE 1 TABLET BY MOUTH EVERY DAY BEFORE BREAKFAST 8/21/22   Padmini Schilling MD   Xarelto 20 mg tab tablet TAKE 1 TABLET BY MOUTH EVERY DAY 7/3/22   Padmini Schilling MD   metoprolol succinate (TOPROL-XL) 50 mg XL tablet TAKE 1 TABLET BY MOUTH AFTER DINNER 2/10/22   Padmini Schilling MD   sacubitriL-valsartan Jimenez Stevens) 49-51 mg tab tablet Take 1 Tablet by mouth two (2) times a day. Provider, Historical     Allergies   Allergen Reactions    Penicillins Unknown (comments)     Patient reports allergy to penicillin with unknown reaction, but states she has tolerated amoxicillin    Opioids - Morphine Analogues Itching and Nausea and Vomiting    Percocet [Oxycodone-Acetaminophen] Rash     Per patient      Family History   Problem Relation Age of Onset    Cancer Mother         lung      Social History:  reports that she has never smoked.  She has never been exposed to tobacco smoke. She has never used smokeless tobacco. She reports current alcohol use. She reports that she does not use drugs. Family and social history were personally reviewed, all pertinent and relevant details are outlined as above. Objective:   Visit Vitals  BP (!) 161/95   Pulse 70   Temp 97.9 °F (36.6 °C)   Resp 15   SpO2 95%      O2 Device: None (Room air)    PHYSICAL EXAM:   General:awake, alert   HEENT: PEERL,, moist mucus membranes  Neck: Supple, no JVD,   Chest: Clear to auscultation bilaterally   CVS: RRR, S1 S2 heard, no murmurs/rubs/gallops  Abd: Soft, non-tender, non-distended, +bowel sounds   Ext: No clubbing, no cyanosis, no edema  Neuro/Psych: Patient not participating well in the exam, moves all 4  Cap refill: Brisk, less than 3 seconds  Pulses: 2+, symmetric in all extremities  Skin: Warm, dry, without rashes or lesions    Data Review: All diagnostic labs and studies have been reviewed.     Abnormal Labs Reviewed   CBC WITH AUTOMATED DIFF - Abnormal; Notable for the following components:       Result Value    RDW 14.6 (*)     NEUTROPHILS 77 (*)     All other components within normal limits   METABOLIC PANEL, COMPREHENSIVE - Abnormal; Notable for the following components:    Sodium 121 (*)     Chloride 84 (*)     Glucose 114 (*)     Creatinine 1.23 (*)     eGFR 46 (*)     All other components within normal limits       All Micro Results       None            IMAGING:   US RETROPERITONEUM COMP    (Results Pending)        ECG/ECHO:    Results for orders placed or performed during the hospital encounter of 11/16/21   EKG, 12 LEAD, INITIAL   Result Value Ref Range    Ventricular Rate 79 BPM    QRS Duration 84 ms    Q-T Interval 390 ms    QTC Calculation (Bezet) 447 ms    Calculated R Axis 92 degrees    Calculated T Axis 62 degrees    Diagnosis       Atrial fibrillation  Rightward axis    Abnormal ECG  When compared with ECG of 01-AUG-2017 13:20,    Nonspecific T wave abnormality no longer evident in Inferior leads  Confirmed by Carmelita Mccurdy MD. ([id number]) on 11/18/2021 11:06:13 AM     Results for orders placed or performed in visit on 04/08/16   AMB POC EKG ROUTINE W/ 12 LEADS, INTER & REP    Impression    Atrial fibrillation with controlled rate. No ischemic changes. Assessment:   Given the patient's current clinical presentation, there is a high level of concern for decompensation if discharged from the emergency department. Complex decision making was performed, which includes reviewing the patient's available past medical records, laboratory results, and imaging studies. Acute on chronic hyponatremia  SIADH  Acute kidney injury  Nausea vomiting  Paroxysmal atrial fibrillation  Hypertension      Plan:     Patient will be admitted to telemetry bed, likely hypovolemic as patient appears to be dehydrated, continues to be on Bumex, gentle IV hydration, nephrology consult, free water restriction orally, hold Bumex, BMP every 4 hours, close monitoring, further intervention per hospital course, fraction excretion of sodium, monitor  Likely prerenal, hold Bumex, avoid nephrotoxic medication, hold Entresto, very gentle IV hydration, close monitoring and further intervention per hospital course, renally dose all medication and trend creatinine  IV antiemetics, GI consult  Continue Xarelto  optimize blood pressure control        DIET: ADULT DIET Regular; 4 carb choices (60 gm/meal); Low Fat/Low Chol/High Fiber/CLEMENTINE; Low Sodium (2 gm); Low Potassium (Less than 3000 mg/day)   ISOLATION PRECAUTIONS: There are currently no Active Isolations  CODE STATUS: Full Code   DVT PROPHYLAXIS: SCDs  FUNCTIONAL STATUS PRIOR TO HOSPITALIZATION: Ambulatory and capable of self-care but relies on assistive devices (rolling walker/cane). Ambulatory status/function: By self   EARLY MOBILITY ASSESSMENT: Recommend a consult to the Mobility Team  ANTICIPATED DISCHARGE: 24-48 hours.   ANTICIPATED DISPOSITION: Home with Home Healthcare      Signed By: Melody Pelaez MD     November 30, 2022         Please note that this dictation may have been completed with Jake Huggins, the computer voice recognition software. Quite often unanticipated grammatical, syntax, homophones, and other interpretive errors are inadvertently transcribed by the computer software. Please disregard these errors. Please excuse any errors that have escaped final proofreading.

## 2022-11-30 NOTE — ED TRIAGE NOTES
Pt arrives from home after being discharged from here on Saturday. Pt was admitted low sodium. Pt has been nauseous and unable to eat and is concerned her sodium is low again.      Per discharge paperwork, pt has hx SIADH

## 2022-11-30 NOTE — ED PROVIDER NOTES
Patient is a 50-year-old female presenting to ED with continued fatigue, nausea and poor p.o. intake. I saw this patient on 25 November and she was admitted for hyponatremia. She has a history of chronic hyponatremia over the last 8 months her sodiums were in the low 130s. She went from 135 to 125 on last visit when she was admitted. Patient had been trying Zofran 4 mg every 4 hours or so and still feeling nauseous and unable to eat. Her Bumex was reduced to 1 mg daily in attempt to reduce sodium loss. Nephrology diagnosed her with SIADH and wanted to follow-up with her in clinic. Phenergan worked good for her nausea on last visit, will redose today, attempt p.o. challenge and get lab work. The history is provided by the patient and a relative. Nausea   This is a recurrent problem. The current episode started more than 1 week ago. The problem occurs continuously. The problem has not changed since onset. There has been no fever. Pertinent negatives include no chills, no fever, no sweats, no abdominal pain, no diarrhea and no myalgias. The patient is not pregnant. Her pertinent negatives include no DM. Past Medical History:   Diagnosis Date    Atrial fibrillation ([facility] 7/15/2011    Depression     HTN (hypertension) 7/14/2011    Paroxysmal atrial fibrillation (HCC)     SIADH (syndrome of inappropriate ADH production) ([facility] 1/14/2022    Thyroid ca ([facility] [address] 2005    Papillary    Unspecified hypothyroidism 7/15/2011       Past Surgical History:   Procedure Laterality Date    ENDOSCOPY, COLON, DIAGNOSTIC  2003    neg. rep 10 yrs.     [place] THYROIDECTOMY  11/05         Family History:   Problem Relation Age of Onset    Cancer Mother         lung       Social History     Socioeconomic History    Marital status: SINGLE     Spouse name: Not on file    Number of children: Not on file    Years of education: Not on file    Highest education level: Not on file   Occupational History    Not on file   Tobacco Use    Smoking status: Never     Passive exposure: Never    Smokeless tobacco: Never   Vaping Use    Vaping Use: Never used   Substance and Sexual Activity    Alcohol use: Yes     Comment: soc    Drug use: No    Sexual activity: Not on file   Other Topics Concern     Service Not Asked    Blood Transfusions Not Asked    Caffeine Concern Not Asked    Occupational Exposure Not Asked    Hobby Hazards Not Asked    Sleep Concern Not Asked    Stress Concern Not Asked    Weight Concern Not Asked    Special Diet Not Asked    Back Care Not Asked    Exercise Not Asked    Bike Helmet Not Asked    Seat Belt Not Asked    Self-Exams Not Asked   Social History Narrative    Not on file     Social Determinants of Health     Financial Resource Strain: Not on file   Food Insecurity: Not on file   Transportation Needs: Not on file   Physical Activity: Not on file   Stress: Not on file   Social Connections: Not on file   Intimate Partner Violence: Not on file   Housing Stability: Not on file         ALLERGIES: Penicillins, Opioids - morphine analogues, and Percocet [oxycodone-acetaminophen]    Review of Systems   Constitutional:  Positive for activity change, appetite change and fatigue. Negative for chills and fever. HENT: Negative. Eyes: Negative. Respiratory: Negative. Cardiovascular: Negative. Gastrointestinal:  Positive for nausea and vomiting. Negative for abdominal pain and diarrhea. Endocrine: Negative. Genitourinary: Negative. Musculoskeletal: Negative. Negative for myalgias. Skin: Negative. Allergic/Immunologic: Negative. Neurological: Negative. Hematological: Negative. Psychiatric/Behavioral: Negative. Vitals:    11/30/22 1033 11/30/22 1106 11/30/22 1115   BP: (!) 137/96 (!) 165/99 (!) 148/89   Pulse: 83 77 82   Resp: 15 21 17   Temp: 97.9 °F (36.6 °C)     SpO2: 98% 94% 94%            Physical Exam  Vitals and nursing note reviewed.    Constitutional:       General: She is not in acute distress. Appearance: Normal appearance. HENT:      Head: Normocephalic and atraumatic. Right Ear: External ear normal.      Left Ear: External ear normal.      Nose: Nose normal.   Eyes:      Extraocular Movements: Extraocular movements intact. Conjunctiva/sclera: Conjunctivae normal.   Cardiovascular:      Rate and Rhythm: Normal rate. Pulses: Normal pulses. Radial pulses are 2+ on the right side and 2+ on the left side. Heart sounds: Normal heart sounds. Pulmonary:      Effort: Pulmonary effort is normal.      Breath sounds: Normal breath sounds. Chest:      Chest wall: No deformity or tenderness. Abdominal:      General: Abdomen is flat. There is no distension. Tenderness: There is no abdominal tenderness. Musculoskeletal:         General: No deformity or signs of injury. Normal range of motion. Cervical back: Normal range of motion and neck supple. No tenderness. Skin:     General: Skin is warm and dry. Capillary Refill: Capillary refill takes less than 2 seconds. Neurological:      General: No focal deficit present. Mental Status: She is alert and oriented to person, place, and time.    Psychiatric:         Attention and Perception: Attention normal.         Mood and Affect: Mood normal.         Behavior: Behavior normal.        MDM     Amount and/or Complexity of Data Reviewed  Decide to obtain previous medical records or to obtain history from someone other than the patient: yes      ED Course as of 11/30/22 1246   Wed Nov 30, 2022   1157 Sodium(!): 121 [AL]   1157 Creatinine(!): 1.23 [AL]      ED Course User Index  [AL] Nas Begum MD       LABORATORY RESULTS:  Labs Reviewed   CBC WITH AUTOMATED DIFF - Abnormal; Notable for the following components:       Result Value    RDW 14.6 (*)     NEUTROPHILS 77 (*)     All other components within normal limits   METABOLIC PANEL, COMPREHENSIVE - Abnormal; Notable for the following components: Sodium 121 (*)     Chloride 84 (*)     Glucose 114 (*)     Creatinine 1.23 (*)     eGFR 46 (*)     All other components within normal limits   SAMPLES BEING HELD   MAGNESIUM       IMAGING RESULTS:  No orders to display       MEDICATIONS GIVEN:  Medications   promethazine (PHENERGAN) tablet 25 mg (25 mg Oral Given 11/30/22 1134)   sodium chloride 0.9 % bolus infusion 1,000 mL (1,000 mL IntraVENous New Bag 11/30/22 1135)       Differential diagnosis: Nausea, dehydration, electrolyte abnormality, hyponatremia, JANESSA    ED physician interpretation of laboratory results: New JANESSA with doubling of serum creatinine, acute on chronic hyponatremia now 121. Patient was this low back about a year ago in 2021. MDM: Patient is a 67-year-old female presented ED with greater than 10 days of nausea and poor p.o. intake. I saw this patient on 11/25/2022 when she was admitted for hyponatremia and nausea. Patient symptoms not improved. On recheck patient has worsening hyponatremia as well as a new JANESSA. Nausea is continued and patient has been unable to eat or drink much. Based on her worsening symptoms and JANESSA hospitalization is indicated. As patient has poor you and p.o. intake and had a reduction in her Bumex I would suspect that her sodium would increase but has not. DISPOSITION: Admitted    [facility] for Admission  11:17 AM    ED Room Number: LP00/88  Patient Name and age:  Jeannine Petty 67 y.o.  female  Working Diagnosis:   1. JANESSA (acute kidney injury) ([address]    2. Hyponatremia        COVID-19 Suspicion:  no  Sepsis present:  no  Reassessment needed: no  Code Status:  Full Code  Readmission: yes  Isolation Requirements:  no  Recommended Level of Care:  med/surg  Department: [facility] Adult ED - 21     Other: Acute on chronic hyponatremia, JANESSA, complex patient with heart history on diuretics      Contreras Burk MD        Procedures

## 2022-12-01 LAB
ANION GAP SERPL CALC-SCNC: 4 MMOL/L (ref 5–15)
ANION GAP SERPL CALC-SCNC: 5 MMOL/L (ref 5–15)
ANION GAP SERPL CALC-SCNC: 8 MMOL/L (ref 5–15)
ANION GAP SERPL CALC-SCNC: 9 MMOL/L (ref 5–15)
BASOPHILS # BLD: 0 K/UL (ref 0–0.1)
BASOPHILS NFR BLD: 1 % (ref 0–1)
BUN SERPL-MCNC: 12 MG/DL (ref 6–20)
BUN SERPL-MCNC: 13 MG/DL (ref 6–20)
BUN SERPL-MCNC: 15 MG/DL (ref 6–20)
BUN SERPL-MCNC: 18 MG/DL (ref 6–20)
BUN/CREAT SERPL: 13 (ref 12–20)
BUN/CREAT SERPL: 14 (ref 12–20)
BUN/CREAT SERPL: 19 (ref 12–20)
BUN/CREAT SERPL: 20 (ref 12–20)
CALCIUM SERPL-MCNC: 8.5 MG/DL (ref 8.5–10.1)
CALCIUM SERPL-MCNC: 8.6 MG/DL (ref 8.5–10.1)
CHLORIDE SERPL-SCNC: 89 MMOL/L (ref 97–108)
CHLORIDE SERPL-SCNC: 91 MMOL/L (ref 97–108)
CHLORIDE SERPL-SCNC: 92 MMOL/L (ref 97–108)
CHLORIDE SERPL-SCNC: 93 MMOL/L (ref 97–108)
CO2 SERPL-SCNC: 27 MMOL/L (ref 21–32)
CO2 SERPL-SCNC: 28 MMOL/L (ref 21–32)
CO2 SERPL-SCNC: 30 MMOL/L (ref 21–32)
CO2 SERPL-SCNC: 30 MMOL/L (ref 21–32)
CREAT SERPL-MCNC: 0.75 MG/DL (ref 0.55–1.02)
CREAT SERPL-MCNC: 0.89 MG/DL (ref 0.55–1.02)
CREAT SERPL-MCNC: 0.93 MG/DL (ref 0.55–1.02)
CREAT SERPL-MCNC: 0.95 MG/DL (ref 0.55–1.02)
DIFFERENTIAL METHOD BLD: NORMAL
EOSINOPHIL # BLD: 0 K/UL (ref 0–0.4)
EOSINOPHIL NFR BLD: 1 % (ref 0–7)
ERYTHROCYTE [DISTWIDTH] IN BLOOD BY AUTOMATED COUNT: 14.5 % (ref 11.5–14.5)
GLUCOSE SERPL-MCNC: 100 MG/DL (ref 65–100)
GLUCOSE SERPL-MCNC: 100 MG/DL (ref 65–100)
GLUCOSE SERPL-MCNC: 95 MG/DL (ref 65–100)
GLUCOSE SERPL-MCNC: 99 MG/DL (ref 65–100)
HCT VFR BLD AUTO: 35.6 % (ref 35–47)
HGB BLD-MCNC: 12 G/DL (ref 11.5–16)
IMM GRANULOCYTES # BLD AUTO: 0 K/UL (ref 0–0.04)
IMM GRANULOCYTES NFR BLD AUTO: 0 % (ref 0–0.5)
LYMPHOCYTES # BLD: 1.7 K/UL (ref 0.8–3.5)
LYMPHOCYTES NFR BLD: 23 % (ref 12–49)
MCH RBC QN AUTO: 28.6 PG (ref 26–34)
MCHC RBC AUTO-ENTMCNC: 33.7 G/DL (ref 30–36.5)
MCV RBC AUTO: 85 FL (ref 80–99)
MONOCYTES # BLD: 0.6 K/UL (ref 0–1)
MONOCYTES NFR BLD: 9 % (ref 5–13)
NEUTS SEG # BLD: 5 K/UL (ref 1.8–8)
NEUTS SEG NFR BLD: 66 % (ref 32–75)
NRBC # BLD: 0 K/UL (ref 0–0.01)
NRBC BLD-RTO: 0 PER 100 WBC
PLATELET # BLD AUTO: 212 K/UL (ref 150–400)
PMV BLD AUTO: 9.6 FL (ref 8.9–12.9)
POTASSIUM SERPL-SCNC: 3.3 MMOL/L (ref 3.5–5.1)
POTASSIUM SERPL-SCNC: 3.7 MMOL/L (ref 3.5–5.1)
POTASSIUM SERPL-SCNC: 4 MMOL/L (ref 3.5–5.1)
POTASSIUM SERPL-SCNC: 4.3 MMOL/L (ref 3.5–5.1)
RBC # BLD AUTO: 4.19 M/UL (ref 3.8–5.2)
SODIUM SERPL-SCNC: 125 MMOL/L (ref 136–145)
SODIUM SERPL-SCNC: 126 MMOL/L (ref 136–145)
SODIUM SERPL-SCNC: 127 MMOL/L (ref 136–145)
SODIUM SERPL-SCNC: 128 MMOL/L (ref 136–145)
WBC # BLD AUTO: 7.4 K/UL (ref 3.6–11)

## 2022-12-01 PROCEDURE — 36415 COLL VENOUS BLD VENIPUNCTURE: CPT

## 2022-12-01 PROCEDURE — 74011250637 HC RX REV CODE- 250/637: Performed by: FAMILY MEDICINE

## 2022-12-01 PROCEDURE — 74011000250 HC RX REV CODE- 250: Performed by: FAMILY MEDICINE

## 2022-12-01 PROCEDURE — 80048 BASIC METABOLIC PNL TOTAL CA: CPT

## 2022-12-01 PROCEDURE — 65270000046 HC RM TELEMETRY

## 2022-12-01 PROCEDURE — 74011250637 HC RX REV CODE- 250/637: Performed by: INTERNAL MEDICINE

## 2022-12-01 PROCEDURE — 74011250637 HC RX REV CODE- 250/637: Performed by: NURSE PRACTITIONER

## 2022-12-01 PROCEDURE — 85025 COMPLETE CBC W/AUTO DIFF WBC: CPT

## 2022-12-01 RX ORDER — POTASSIUM CHLORIDE 750 MG/1
20 TABLET, FILM COATED, EXTENDED RELEASE ORAL
Status: COMPLETED | OUTPATIENT
Start: 2022-12-01 | End: 2022-12-01

## 2022-12-01 RX ORDER — PROCHLORPERAZINE EDISYLATE 5 MG/ML
5 INJECTION INTRAMUSCULAR; INTRAVENOUS
Status: DISCONTINUED | OUTPATIENT
Start: 2022-12-01 | End: 2022-12-03 | Stop reason: HOSPADM

## 2022-12-01 RX ADMIN — SODIUM CHLORIDE, PRESERVATIVE FREE 10 ML: 5 INJECTION INTRAVENOUS at 05:02

## 2022-12-01 RX ADMIN — LEVOTHYROXINE SODIUM 175 MCG: 0.15 TABLET ORAL at 07:30

## 2022-12-01 RX ADMIN — SODIUM CHLORIDE, PRESERVATIVE FREE 10 ML: 5 INJECTION INTRAVENOUS at 22:27

## 2022-12-01 RX ADMIN — RIVAROXABAN 20 MG: 20 TABLET, FILM COATED ORAL at 09:00

## 2022-12-01 RX ADMIN — POTASSIUM CHLORIDE 20 MEQ: 750 TABLET, FILM COATED, EXTENDED RELEASE ORAL at 08:49

## 2022-12-01 RX ADMIN — ACETAMINOPHEN 650 MG: 325 TABLET ORAL at 17:04

## 2022-12-01 NOTE — DISCHARGE INSTRUCTIONS
Discharge Instructions       PATIENT ID: Laura Snowden  MRN: [id number]   YOB: 1950    DATE OF ADMISSION: [unfilled]    DATE OF DISCHARGE: 12/2/2022    PRIMARY CARE PROVIDER: @PCP@     ATTENDING PHYSICIAN: [unfilled]  DISCHARGING PROVIDER: Solo Capps MD    To contact this individual call [phone] and ask the  to page. If unavailable ask to be transferred the Adult Hospitalist Department. DISCHARGE DIAGNOSES Acute on chronic hyponatremia    CONSULTATIONS: Nephrology    PROCEDURES/SURGERIES: * No surgery found *    PENDING TEST RESULTS:   At the time of discharge the following test results are still pending: none    FOLLOW UP APPOINTMENTS:   PCP    ADDITIONAL CARE RECOMMENDATIONS:   Please expect ablAccessData work (BMP) when you see your PMD    DIET: Cardiac Diet    ACTIVITY: Activity as tolerated    DISCHARGE MEDICATIONS:   See Medication Reconciliation Form    It is important that you take the medication exactly as they are prescribed. Keep your medication in the bottles provided by the pharmacist and keep a list of the medication names, dosages, and times to be taken in your wallet. Do not take other medications without consulting your doctor. NOTIFY YOUR PHYSICIAN FOR ANY OF THE FOLLOWING:   Fever over 101 degrees for 24 hours. Chest pain, shortness of breath, fever, chills, nausea, vomiting, diarrhea, change in mentation, falling, weakness, bleeding. Severe pain or pain not relieved by medications. Or, any other signs or symptoms that you may have questions about.       DISPOSITION:  x  Home With:   OT  PT  HH  RN       SNF/Inpatient Rehab/LTAC    Independent/assisted living    Hospice    Other:     CDMP Checked:   Yes x     PROBLEM LIST Updated:  Yes x       Signed:   Solo Capps MD  12/2/2022  11:21 AM

## 2022-12-01 NOTE — CONSULTS
[address] [facility].   [address] [facility] [address]        GASTROENTEROLOGY CONSULTATION NOTE  Will Bj Membreno  [phone] office  [phone] NP/PA in-hospital cell phone M-F until 4:30PM  After 5PM or on weekends, please call  for physician on call        NAME:  Leo Mcclain   :   1950   MRN:   [id number]       Referring Physician: Dr. Radha Quan Date: 2022 9:50 AM     History of Present Illness:  Patient is a 67 y.o. who is seen in consultation at the request of Dr. Evelyn Donis for nausea/vomiting. Patient has a past medical history significant for SIADH. She presented to the ED for evaluation of nausea, poor PO intake, and fatigue. Patient was admitted to the hospital on 22 for acute on chronic hyponatremia, SIADH, acute kidney injury, nausea/vomiting, paroxysmal atrial fibrillation, and hypertension. Patient reports nausea for the last approximately 10 days that she attributed to new medications to include Viibryd and then Paxil. She also reports experiencing a headache and lack of appetite. No reflux, vomiting, or abdominal pain. No dysphagia or odynophagia. No change in bowel habits, melena, or hematochezia. No fevers or chills. Patient reports resolution of nausea at this time and an improved appetite (ate her entire breakfast). No NSAID use. Patient is on Xarelto. Occasional alcohol use. No tobacco use. No history of abdominal surgeries. No history of EGD. Remote history of colonoscopy 20 years ago. I have reviewed the emergency room note, hospital admission note, notes by all other clinicians who have seen the patient during this hospitalization to date. I have reviewed the problem list and the reason for this hospitalization. I have reviewed the allergies and the medications the patient was taking at home prior to this hospitalization.     PMH:  Past Medical History:   Diagnosis Date    Atrial fibrillation ([address] 7/15/2011    Depression     HTN (hypertension) 7/14/2011    Paroxysmal atrial fibrillation (HCC)     SIADH (syndrome of inappropriate ADH production) ([facility] 1/14/2022    Thyroid ca ([facility] 2005    Papillary    Unspecified hypothyroidism 7/15/2011       PSH:  Past Surgical History:   Procedure Laterality Date    ENDOSCOPY, COLON, DIAGNOSTIC  2003    neg. rep 10 yrs. [place] THYROIDECTOMY  11/05       Allergies: Allergies   Allergen Reactions    Penicillins Unknown (comments)     Patient reports allergy to penicillin with unknown reaction, but states she has tolerated amoxicillin    Opioids - Morphine Analogues Itching and Nausea and Vomiting    Percocet [Oxycodone-Acetaminophen] Rash     Per patient       Home Medications:  Prior to Admission Medications   Prescriptions Last Dose Informant Patient Reported? Taking? PARoxetine (PAXIL) 20 mg tablet Not Taking  No No   Sig: Take 1 Tablet by mouth daily. Patient not taking: Reported on 11/30/2022   Xarelto 20 mg tab tablet 11/30/2022  No Yes   Sig: TAKE 1 TABLET BY MOUTH EVERY DAY   bumetanide (BUMEX) 1 mg tablet 11/29/2022  No Yes   Sig: Take 1 Tablet by mouth daily. butalbital-acetaminophen-caffeine (FIORICET, ESGIC) -40 mg per tablet 11/23/2022  No Yes   Sig: TAKE 1 TO 2 TABLETS BY MOUTH EVERY 6 HOURS AS NEEDED FOR HEADACHE   diazePAM (VALIUM) 2 mg tablet 10/30/2022  No Yes   Sig: TAKE 1 TABLET BY MOUTH EVERY DAY AS NEEDED FOR ANXIETY   levothyroxine (SYNTHROID) 175 mcg tablet 11/30/2022  No Yes   Sig: TAKE 1 TABLET BY MOUTH EVERY DAY BEFORE BREAKFAST   metoprolol succinate (TOPROL-XL) 50 mg XL tablet 11/29/2022  No Yes   Sig: TAKE 1 TABLET BY MOUTH AFTER DINNER   ondansetron (ZOFRAN ODT) 4 mg disintegrating tablet 11/23/2022  No Yes   Sig: Take 1 Tablet by mouth four (4) times daily as needed for Nausea or Vomiting.   sacubitriL-valsartan (Entresto) 49-51 mg tab tablet 11/30/2022  Yes Yes   Sig: Take 1 Tablet by mouth two (2) times a day.    spironolactone (ALDACTONE) 25 mg tablet Not Taking  Yes No   Sig: Take 25 mg by mouth daily. Patient not taking: Reported on 11/30/2022   traZODone (DESYREL) 50 mg tablet 10/30/2022  No Yes   Sig: Take 1 Tablet by mouth nightly. triamcinolone acetonide (KENALOG) 0.1 % topical cream Not Taking  No No   Sig: Apply  to affected area two (2) times a day.  use thin layer along affected skin surrounding your wound   Patient not taking: Reported on 11/30/2022      Facility-Administered Medications: None       Hospital Medications:  Current Facility-Administered Medications   Medication Dose Route Frequency    prochlorperazine (COMPAZINE) injection 5 mg  5 mg IntraVENous Q4H PRN    levothyroxine (SYNTHROID) tablet 175 mcg  175 mcg Oral ACB    metoprolol succinate (TOPROL-XL) XL tablet 75 mg  75 mg Oral DAILY    PARoxetine (PAXIL) tablet 20 mg  20 mg Oral DAILY    rivaroxaban (XARELTO) tablet 20 mg  20 mg Oral DAILY    sodium chloride (NS) flush 5-40 mL  5-40 mL IntraVENous Q8H    sodium chloride (NS) flush 5-40 mL  5-40 mL IntraVENous PRN    0.9% sodium chloride infusion  50 mL/hr IntraVENous CONTINUOUS    acetaminophen (TYLENOL) tablet 650 mg  650 mg Oral Q6H PRN       Social History:  Social History     Tobacco Use    Smoking status: Never     Passive exposure: Never    Smokeless tobacco: Never   Substance Use Topics    Alcohol use: Yes     Comment: soc       Family History:  Family History   Problem Relation Age of Onset    Cancer Mother         lung     Review of Systems:  Constitutional: negative fever, negative chills, negative weight loss  Eyes:   negative visual changes  ENT:   negative sore throat, tongue or lip swelling  Respiratory:  negative cough, negative dyspnea  Cards:  negative for chest pain, palpitations, lower extremity edema  GI:   See HPI  :  negative for frequency, dysuria  Integument:  negative for rash and pruritus  Heme:  negative for easy bruising and gum/nose bleeding  Musculoskeletal:negative for myalgias, back pain and muscle weakness  Neuro:    negative for headaches, dizziness  Psych: negative for feelings of anxiety, depression     Objective:   Patient Vitals for the past 8 hrs:   BP Temp Pulse Resp SpO2   12/01/22 0846 -- -- 60 -- --   12/01/22 0640 (!) 158/101 98 °F (36.7 °C) 65 22 98 %   12/01/22 0600 -- -- 84 -- --   12/01/22 0448 (!) 171/96 97.8 °F (36.6 °C) 88 17 98 %   12/01/22 0400 -- -- 60 -- --   12/01/22 0200 -- -- 74 -- --     No intake/output data recorded. No intake/output data recorded. EXAM:     CONST:  Pleasant female sitting upright in bed, no acute distress   NEURO:  Alert and oriented   HEENT: EOMI, no scleral icterus   LUNGS: No acute respiratory distress   ABD:  Soft, non distended, no tenderness, no rebound, no guarding. EXT:  Warm   PSYCH: Full, not anxious or agitated     Data Review     Recent Labs     12/01/22  0012 11/30/22  1050   WBC 7.4 9.6   HGB 12.0 14.0   HCT 35.6 41.1    328     Recent Labs     12/01/22  0501 12/01/22  0012 11/30/22  1656 11/30/22  1050   * 125*   < > 121*   K 3.7 3.3*   < > 4.2   CL 91* 89*   < > 84*   CO2 28 27   < > 30   BUN 12 13   < > 17   CREA 0.89 0.93   < > 1.23*   GLU 95 100   < > 114*   PHOS  --   --   --  4.0   CA 8.6 8.5   < > 8.9    < > = values in this interval not displayed. Recent Labs     11/30/22  1050   AP 87   TP 7.7   ALB 4.2   GLOB 3.5     No results for input(s): INR, PTP, APTT, INREXT in the last 72 hours. Assessment:     Nausea and lack of appetite: WBC 7.4, Hgb 12.0, LFTs normal. CT abdomen/pelvis without contrast (11/25/22): no acute findings; incidental findings to include pancolonic diverticulosis, urinary bladder diverticulum, fat-containing umbilical hernia, and dilated cardiomyopathy. Viibryd (nausea 22-24% per UpToDate), Paxil (nausea 17-26% per UpToDate).    Acute on chronic hyponatremia: Na 127  SIADH  Paroxysmal atrial fibrillation  Hypertension     Patient Active Problem List   Diagnosis Code    HTN (hypertension) I10    Atrial fibrillation ([facility] I48.91    Acquired hypothyroidism E03.9    Chronic diastolic congestive heart failure ([facility] I50.32    Encounter for long-term (current) drug use Z79.899    Open wound of right lower leg S81.801A    SIADH (syndrome of inappropriate ADH production) ([facility]) E22.2    Stage 3a chronic kidney disease ([facility]) N18.31    Senile purpura ([facility]) D69.2    Secondary hypercoagulable state ([facility] D68.69    Chronic systolic (congestive) heart failure I50.22    Leg ulcer, right, with fat layer exposed ([facility] L97.912    Moderate episode of recurrent major depressive disorder ([facility] F33.1    Benzodiazepine dependence ([facility]) F13.20    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 She reports improvement in her appetite. Tolerating a regular diet. Patient attributes her symptoms to previous medications. Patient was discussed with Dr. Leotha Cockayne. We will sign off and be available again as needed. Please call us with any questions. Thank you for allowing me to participate in care of Navarro Aponte.      Signed By: EDUARDO Rodriguez     12/1/2022  9:50 AM        Agree with above  Will sign off

## 2022-12-01 NOTE — PROGRESS NOTES
1600 Bedside and Verbal shift change report given to Homer forest (oncoming nurse) by [facility] (offgoing nurse). Report included the following information SBAR, Kardex, ED Summary, Procedure Summary, Intake/Output, MAR, Accordion, and Recent Results.

## 2022-12-01 NOTE — PROGRESS NOTES
Med/PCP -- Courtesy. Events noted. I had started Paxil a couple of weeks ago at her request.  I had given her instructions on fluid restriction. She stopped the Paxil on her own after a week, so not sure how much this has contributed to current hyponatremia. In any event, would avoid further SSRI's. I am available if needed.     Mary Trevino MD

## 2022-12-01 NOTE — PROGRESS NOTES
Bedside and Verbal shift change report given to Angle (oncoming nurse) by Jacinto Dukes (offgoing nurse). Report included the following information SBAR, Kardex, MAR, Accordion, Recent Results, Med Rec Status, and Cardiac Rhythm Cathy .

## 2022-12-01 NOTE — PROGRESS NOTES
Transition of Care Plan  RUR  15%    Admission to [facility] 11-25-22--11-26-22    Disposition  Home pending medical progress and recommendation    [address]   will arrange if ordered--has had [place] in the past but cannot remember the name of the agency    Wound Care-- Seen at [facility]  [phone] --[address] follow up  PCP and specialist    Contact  Sister  Ambika Pascual  [phone]    Reason for Admission:   Hypovolemic   Medical hx  SIADI, A fib, HTN, Thyroid Ca  Seen in [address] [facility]  [phone] right lower ulcer wound. RUR Score:     15%             PCP: First and Last name:   Elizabeth Bourgeois MD     Name of Practice: [facility]   Are you a current patient: Yes/No: yes    Approximate date of last visit: 21/1/22 in [facility]    Can you participate in a virtual visit if needed:     Do you (patient/family) have any concerns for transition/discharge? no              Plan for utilizing home health:   not indicated at this time    Current Advanced Directive/Advance Care Plan:  Full Code      Healthcare Decision Maker:   Click here to complete [address] including selection of the Healthcare Decision Maker Relationship (ie \"Primary\")              Transition of Care Plan:      Home with family support and medical follow up    CM met with patient in her room to introduce self and explain role Patient was alert and oriented and confirmed demographics, PCP and insurance  [place] and [employer]. Secures medications at [facility]    Prior to admission  Self care and independent with adl's and iadl's  active and driving    Hx of HH   Yes but cannot remember the name of the agency  Hx of Rehab   Yes   [facility]  Hx of DME  none    Patient lives alone in a two bedroom condo with an elevator.   She is active, self care and independent-- Has no children but has a sister and brother in the area and good friends       Patient plans to return home when discharged and will make another wound care clinic appointment as she has one 12/2/22. .     1st Medicare letter signed 11/30/22    A second on will be provided when discharged. CM will follow and assist with any services that arise prior to discharge. Care Management Interventions  PCP Verified by CM: Yes  Mode of Transport at Discharge: Other (see comment) (family in car)  Transition of Care Consult (CM Consult):  Other  Discharge Durable Medical Equipment: No  Physical Therapy Consult: No  Occupational Therapy Consult: No  Speech Therapy Consult: No  Support Systems: Other Family Member(s), Friend/Neighbor  Confirm Follow Up Transport: Self (friend and family as needed)  Discharge Location  Patient Expects to be Discharged to[de-identified] Home     Readmission Assessment  Number of days since last admission?: 1-7 days  Previous disposition: Home Alone  Who is being interviewed?: Patient  What was the patient's/caregiver's perception as to why they think they needed to return back to the hospital?: Other (Comment) (felt poorly again)  Did you visit your Primary Care Physician after you left the hospital, before you returned this time?: No  Why weren't you able to visit your PCP?: Other (Comment) (not home long enough to keep appointment)  Did you see a specialist, such as Cardiac, Pulmonary, Orthopedic Physician, etc. after you left the hospital?: No  Who advised the patient to return to the hospital?: Self-referral  Does the patient report anything that got in the way of taking their medications?: No  In our efforts to provide the best possible care to you and others like you, can you think of anything that we could have done to help you after you left the hospital the first time, so that you might not have needed to return so soon?: Other (Comment)

## 2022-12-01 NOTE — PROGRESS NOTES
0800: Bedside and Verbal shift change report given to [address] [place] [address] (oncoming nurse) by Maria C Schafer RN (offgoing nurse). Report included the following information SBAR and Kardex. 0900: Dr. Brad Crandall at bedside, no new orders. Dr. Moriah Reed at bedside, orders for 1500 ml fluid restriction and draw BMP this afternoon. 1400: BMP sent. 1600: Bedside and Verbal shift change report given to Daniel Parham (oncoming nurse) by [address] [place] [address] (offgoing nurse). Report included the following information SBAR, Kardex, and ED Summary.

## 2022-12-01 NOTE — DISCHARGE SUMMARY
Discharge Summary       PATIENT ID: Jaki Larry  MRN: [id number]   YOB: 1950    DATE OF ADMISSION: 11/30/2022 10:59 AM    DATE OF DISCHARGE: 12/2/2022   PRIMARY CARE PROVIDER: Ji Jarrett MD     ATTENDING PHYSICIAN: Dr Yana Ward  DISCHARGING PROVIDER: Yana Ward MD    To contact this individual call [phone] and ask the  to page. If unavailable ask to be transferred the Adult Hospitalist Department. CONSULTATIONS: IP CONSULT TO HOSPITALIST  IP CONSULT TO GASTROENTEROLOGY  IP CONSULT TO NEPHROLOGY    PROCEDURES/SURGERIES: * No surgery found *    DISCHARGE DIAGNOSES:   Acute on chronic hyponatremia--improving  SIADH  Acute kidney injury--now resolved  -stopped fluids 12/1  -Fluid restriction  -Appreciate discussion with Nephrology, repeat Na, and if better, discharge home. Repeat sodium 128, ok to discharge    Nausea vomiting--improved  Paroxysmal atrial fibrillation on Xarelto/Metoprolol  Chronic diastolic CHF on Metoprolol/Entresto  Hypertension: continue home meds  Hypothyroidism: on synthroid  Anxiety/depression: Reviewed PMD note, will stop Paxil      ADDITIONAL CARE RECOMMENDATIONS:   Follow up with PMD, Nephrology    NOTIFY YOUR PHYSICIAN FOR ANY OF THE FOLLOWING:   Fever over 101 degrees for 24 hours. Chest pain, shortness of breath, fever, chills, nausea, vomiting, diarrhea, change in mentation, falling, weakness, bleeding. Severe pain or pain not relieved by medications, as well as any other signs or symptoms that you may have questions about.     FOLLOW UP APPOINTMENTS:    Follow-up Information       Follow up With Specialties Details Why Contact Info    Ji Jarrett MD Internal Medicine Physician Follow up in 1 week(s)  Pr-14  [phone]      Vandana Presley MD Nephrology Follow up in 1 week(s)  [address]  [phone]                 DIET: Cardiac Diet    ACTIVITY: Activity as tolerated    DISCHARGE MEDICATIONS:  Current Discharge Medication List        CONTINUE these medications which have NOT CHANGED    Details   ondansetron (ZOFRAN ODT) 4 mg disintegrating tablet Take 1 Tablet by mouth four (4) times daily as needed for Nausea or Vomiting. Qty: 30 Tablet, Refills: 0    Associated Diagnoses: Nausea      diazePAM (VALIUM) 2 mg tablet TAKE 1 TABLET BY MOUTH EVERY DAY AS NEEDED FOR ANXIETY  Qty: 30 Tablet, Refills: 2    Comments: Not to exceed 3 additional fills before 02/04/2023  Associated Diagnoses: Anxiety      butalbital-acetaminophen-caffeine (FIORICET, ESGIC) -40 mg per tablet TAKE 1 TO 2 TABLETS BY MOUTH EVERY 6 HOURS AS NEEDED FOR HEADACHE  Qty: 30 Tablet, Refills: 2      traZODone (DESYREL) 50 mg tablet Take 1 Tablet by mouth nightly. Qty: 90 Tablet, Refills: 3    Associated Diagnoses: Insomnia, unspecified type      levothyroxine (SYNTHROID) 175 mcg tablet TAKE 1 TABLET BY MOUTH EVERY DAY BEFORE BREAKFAST  Qty: 90 Tablet, Refills: 3    Associated Diagnoses: Acquired hypothyroidism      Xarelto 20 mg tab tablet TAKE 1 TABLET BY MOUTH EVERY DAY  Qty: 90 Tablet, Refills: 3      metoprolol succinate (TOPROL-XL) 50 mg XL tablet TAKE 1 TABLET BY MOUTH AFTER DINNER  Qty: 90 Tablet, Refills: 3      sacubitriL-valsartan (Entresto) 49-51 mg tab tablet Take 1 Tablet by mouth two (2) times a day.            STOP taking these medications       spironolactone (ALDACTONE) 25 mg tablet Comments:   Reason for Stopping:         PARoxetine (PAXIL) 20 mg tablet Comments:   Reason for Stopping:         triamcinolone acetonide (KENALOG) 0.1 % topical cream Comments:   Reason for Stopping:               DISPOSITION:  x  Home With:   OT  PT  HH  RN       Long term SNF/Inpatient Rehab    Independent/assisted living    Hospice    Other:       PATIENT CONDITION AT DISCHARGE:     Functional status    Poor     Deconditioned    x Independent      Cognition   x  Lucid     Forgetful Dementia      Catheters/lines (plus indication)    Morrison     PICC     PEG    x None      Code status    x Full code     DNR      PHYSICAL EXAMINATION AT DISCHARGE:  Please see progress note      CHRONIC MEDICAL DIAGNOSES:  Problem List as of 12/2/2022 Date Reviewed: 11/21/2022            Codes Class Noted - Resolved    Hyponatremia ICD-10-CM: E87.1  ICD-9-CM: 276.1  11/25/2022 - Present        Moderate episode of recurrent major depressive disorder ([facility] ICD-10-CM: F33.1  ICD-9-CM: 296.32  11/8/2022 - Present        Benzodiazepine dependence ([facility] ICD-10-CM: F13.20  ICD-9-CM: 304.10  11/8/2022 - Present        Tortuous aorta (HCC) ICD-10-CM: I77.1  ICD-9-CM: 447.1  11/8/2022 - Present        Leg ulcer, right, with fat layer exposed ([facility] ICD-10-CM: B38.252  ICD-9-CM: 707.10  7/15/2022 - Present        Chronic systolic (congestive) heart failure ICD-10-CM: I50.22  ICD-9-CM: 428.22, 428.0  6/13/2022 - Present        Secondary hypercoagulable state ([facility] ICD-10-CM: S54.65  ICD-9-CM: 289.82  5/18/2022 - Present        Senile purpura ([facility] ICD-10-CM: D69.2  ICD-9-CM: 287.2  5/17/2022 - Present        Stage 3a chronic kidney disease ([facility] ICD-10-CM: N18.31  ICD-9-CM: 585.3  5/16/2022 - Present        SIADH (syndrome of inappropriate ADH production) ([facility] ICD-10-CM: E22.2  ICD-9-CM: 253.6  1/14/2022 - Present        Open wound of right lower leg ICD-10-CM: S81.801A  ICD-9-CM: 891.0  11/17/2021 - Present        Chronic diastolic congestive heart failure (HCC) ICD-10-CM: I50.32  ICD-9-CM: 428.32, 428.0  8/22/2017 - Present        Encounter for long-term (current) drug use ICD-10-CM: Z79.899  ICD-9-CM: V58.69  8/22/2017 - Present        Acquired hypothyroidism ICD-10-CM: E03.9  ICD-9-CM: 244.9  4/8/2016 - Present        Atrial fibrillation (HCC) ICD-10-CM: I48.91  ICD-9-CM: 427.31  7/15/2011 - Present        HTN (hypertension) ICD-10-CM: I10  ICD-9-CM: 401.9  7/14/2011 - Present        RESOLVED: Mild depression ICD-10-CM: F32. A  ICD-9-CM: 240  1/25/2019 - 11/8/2022        RESOLVED: Hyponatremia ICD-10-CM: E87.1  ICD-9-CM: 276.1  8/2/2017 - 12/22/2021        RESOLVED: CHF (congestive heart failure) ([facility] ICD-10-CM: I50.9  ICD-9-CM: 428.0  8/1/2017 - 8/22/2017        RESOLVED: Chest pain ICD-10-CM: R07.9  ICD-9-CM: 786.50  8/1/2017 - 1/14/2022        RESOLVED: Encounter for long-term (current) use of other medications ICD-10-CM: Z79.899  ICD-9-CM: V58.69  7/15/2011 - 4/8/2016        RESOLVED: Unspecified hypothyroidism ICD-10-CM: E03.9  ICD-9-CM: 244.9  7/15/2011 - 4/8/2016           Greater than 37 minutes were spent with the patient on counseling and coordination of care    Signed:   Kiran Iverson MD  12/2/2022  11:22 AM    . Include Location In Plan?: No Detail Level: Zone

## 2022-12-01 NOTE — CONSULTS
Assessment:  Hyponatremia: Acute on chronic. Patient has chronic SIADH. Given her improvement from 121 to 127 with gentle saline suggests there is a component of hypovolemia during current exacerbation. Do not feel like Paxil played a huge role but agree probably best to avoid SSRI given her history with hyponatremia. Hypokalemia: 2 to n/v/poor intake    CHF: no clinical signs of decompensation    HTN: fair    Plan/Recommendations:  Gentle saline-> can stop by tomorrow  Holding oral Bumex   Can resume Entresto  Encourage solute intake  FR 1.5L/day  Repeat BMP later this afternoon  Agree with no SSRIs in the future  Treat underlying nausea  Strict I/Os  Am labs    Discussed with patient and RN    Thanks for the consultation. Renal service will follow patient with you. Please contact me with any questions or concerns. Initial Consult note         Patient name: Jonnie Powell  MR no: [id number]  Date of admission: 11/30/2022  Date of consultation: 12/1/2022  Requested by: Dr. Maverick Moore  Reason for consult: Hyponatremia    Patient seen and examined. History obtained from patient and chart review. Relevant labs, data and notes reviewed. HPI: Jonnie Powell is a 67 y.o. female with PMH significant for Afib, HTN, CHF, SIADH presented to the ER with N/V. ER labs significant for low serum Na level of 121. Nephrology consulted to evaluate and manage hyponatremia. Patient has been seen in the past for hyponatremia by our service-> most recently last week -> suspected Acute on chronic SIADH. PCP placed her on a trial of Paxil about 2wks ago at the request of the patient. She stopped it after 1wk. Admits to poor intake. +Generalized fatigue. Denies excessive water intake.      PMH:  Past Medical History:   Diagnosis Date    Atrial fibrillation ([facility] [address] 7/15/2011    Depression     HTN (hypertension) 7/14/2011    Paroxysmal atrial fibrillation (HCC)     SIADH (syndrome of inappropriate ADH production) ([facility] 1/14/2022    Thyroid ca ([facility] 2005    Papillary    Unspecified hypothyroidism 7/15/2011     PSH:  Past Surgical History:   Procedure Laterality Date    ENDOSCOPY, COLON, DIAGNOSTIC  2003    neg. rep 10 yrs. [place] THYROIDECTOMY  11/05       Social history:   Social History     Tobacco Use    Smoking status: Never     Passive exposure: Never    Smokeless tobacco: Never   Vaping Use    Vaping Use: Never used   Substance Use Topics    Alcohol use: Yes     Comment: soc    Drug use: No       Family history:  Not contributory    Allergies   Allergen Reactions    Penicillins Unknown (comments)     Patient reports allergy to penicillin with unknown reaction, but states she has tolerated amoxicillin    Opioids - Morphine Analogues Itching and Nausea and Vomiting    Percocet [Oxycodone-Acetaminophen] Rash     Per patient       Current Facility-Administered Medications   Medication Dose Route Frequency Last Admin    prochlorperazine (COMPAZINE) injection 5 mg  5 mg IntraVENous Q4H PRN      levothyroxine (SYNTHROID) tablet 175 mcg  175 mcg Oral  mcg at 12/01/22 0730    metoprolol succinate (TOPROL-XL) XL tablet 75 mg  75 mg Oral DAILY      PARoxetine (PAXIL) tablet 20 mg  20 mg Oral DAILY      rivaroxaban (XARELTO) tablet 20 mg  20 mg Oral DAILY 20 mg at 12/01/22 0900    sodium chloride (NS) flush 5-40 mL  5-40 mL IntraVENous Q8H 10 mL at 12/01/22 0502    sodium chloride (NS) flush 5-40 mL  5-40 mL IntraVENous PRN      0.9% sodium chloride infusion  50 mL/hr IntraVENous CONTINUOUS      acetaminophen (TYLENOL) tablet 650 mg  650 mg Oral Q6H  mg at 11/30/22 2107       ROS (besides HPI):    General: No fever. No weight changes  ENT: No hearing loss or visual changes  Cardiovascular: No Chest pain/SOB  Pulmonary: No SOB  GI: No abdominal pain. + Nausea/Vomiting. No Diarrhea. No blood in stool  : No blood in urine.  No foamy or cloudy urine  Musculoskeletal: No joint swelling or redness. No morning stiffness  Endocrine: no cold or heat intolerance  Psych: denies anxiety or depression  Neuro: No light headedness or dizziness    Objective   Visit Vitals  BP (!) 158/101 (BP 1 Location: Left upper arm, BP Patient Position: At rest;Lying)   Pulse 60   Temp 98 °F (36.7 °C)   Resp 22   SpO2 98%       Physical Exam:    Gen: NAD    HEENT: AT/NC, EOMI, moist mucous membrane, no scleral icterus    Neck: no JVD, no cervical lymphadenopathy, no carotid bruit    Lungs/Chest wall: Breath sounds normal. Symmetrical chest wall expansion. No accessory muscle use. Clear to auscultation    Cardiovascular: Normal S1/S2, normal rate, regular rhythm. Abdomen: soft, NT, ND, BS+, no HSM    Ext: no clubbing or cyanosis. No edema    Skin: warm and dry. No rashes    : no coe    CNS: alert awake. Answers appropriately.      Labs/Data:    Lab Results   Component Value Date/Time    Sodium 127 (L) 12/01/2022 05:01 AM    Potassium 3.7 12/01/2022 05:01 AM    Chloride 91 (L) 12/01/2022 05:01 AM    CO2 28 12/01/2022 05:01 AM    Anion gap 8 12/01/2022 05:01 AM    Glucose 95 12/01/2022 05:01 AM    BUN 12 12/01/2022 05:01 AM    Creatinine 0.89 12/01/2022 05:01 AM    BUN/Creatinine ratio 13 12/01/2022 05:01 AM    GFR est AA 51 (L) 01/14/2022 03:16 PM    GFR est non-AA 44 (L) 01/14/2022 03:16 PM    Calcium 8.6 12/01/2022 05:01 AM       Lab Results   Component Value Date/Time    WBC 7.4 12/01/2022 12:12 AM    HGB (POC) 15.1 01/28/2020 09:43 AM    HGB 12.0 12/01/2022 12:12 AM    HCT (POC) 44.5 01/28/2020 09:43 AM    HCT 35.6 12/01/2022 12:12 AM    PLATELET 140 78/21/6483 12:12 AM    MCV 85.0 12/01/2022 12:12 AM       Urine analysis:   Results for orders placed or performed in visit on 01/14/22   AMB POC URINALYSIS DIP STICK AUTO W/O MICRO     Status: None   Result Value Ref Range Status    Color (UA POC) Yellow  Final    Clarity (UA POC) Clear  Final    Glucose (UA POC) Negative Negative Final    Bilirubin (UA POC) Negative Negative Final    Ketones (UA POC) Negative Negative Final    Specific gravity (UA POC) 1.025 1.001 - 1.035 Final    Blood (UA POC) 3+ Negative Final     Comment: tmtc rbc    pH (UA POC) 5.5 4.6 - 8.0 Final    Protein (UA POC) Trace Negative Final    Urobilinogen (UA POC) 0.2 mg/dL 0.2 - 1 Final    Nitrites (UA POC) Positive Negative Final    Leukocyte esterase (UA POC) 3+ Negative Final     Comment: tmtc wbc         Urine sodium: 46  Urine Osm: 281      No components found for: SPEP, UPEP  No results found for: PUQ, PROTU2, PROTU1, BJP1, CPE1, IMEL1, MET2  No results found for: MCACR, MCA1, MCA2, MCA3, MCAU, MCAU2, MCALPOCT    No intake or output data in the 24 hours ending 12/01/22 0947    Wt Readings from Last 3 Encounters:   11/26/22 72 kg (158 lb 11.7 oz)   11/21/22 74.4 kg (164 lb)   11/08/22 73.9 kg (163 lb)       Signed by:  Maranda Ladd MD  Nephrology and Hypertension  [facility]

## 2022-12-01 NOTE — PROGRESS NOTES
Problem: Falls - Risk of  Goal: *Absence of Falls  Description: Document Alondra Alonzo Fall Risk and appropriate interventions in the flowsheet.   Outcome: Progressing Towards Goal  Note: Fall Risk Interventions:            Medication Interventions: Patient to call before getting OOB                   Problem: Patient Education: Go to Patient Education Activity  Goal: Patient/Family Education  Outcome: Progressing Towards Goal

## 2022-12-01 NOTE — PROGRESS NOTES
[address] [facility]  Hospitalist Group                                                                                          Hospitalist Progress Note  Flor Hines MD  Answering service: [phone] OR [phone] from in house phone        Date of Service:  2022  NAME:  Fernando Solo  :  1950  MRN:  [id number]      Admission Summary:   Fernando Solo is a 67 y.o. female who presents with nausea and vomiting. Patient came to the ER with nausea and vomiting, poor historian, reports that not been able to tolerate much p.o., was recently admitted to the hospital with hyponatremia, history of SIADH, came to the ER, was found to have a sodium of 121, was requested to be admitted to the hospitalist service, patient denies any other complaints or problems     The patient denies any headache, blurry vision, sore throat, trouble swallowing, trouble with speech, chest pain, SOB, cough, fever, chills, , urinary symptoms, constipation, recent travels, sick contacts, focal or generalized neurological symptoms, falls, injuries, rashes, contact with COVID-19 diagnosed patients, hematemesis, melena, hemoptysis, hematuria, rashes, denies starting any new medications and denies any other concerns or problems besides as mentioned above. Interval history / Subjective: Follow up Nausea and vomiting   Feels \"500%\" better  No nausea or vomiting  Tolerating diet  Assessment & Plan:     Acute on chronic hyponatremia--improving  SIADH  Acute kidney injury--now resolved  -Gentle IV fluids  -Fluid restriction  -Appreciate Nephrology    Nausea vomiting--improved  Paroxysmal atrial fibrillation on Xarelto/Metoprolol  Chronic diastolic CHF on Metoprolol/Entresto  Hypertension: continue home meds  Hypothyroidism: on synthroid  Anxiety/depression: Reviewed PMD note, will stop Paxil     Regular diet     Code status: FULL CODE  Prophylaxis: Xarelto    Plan: repeat BMP now.  Follow nephrology, discharge today if cleared  Care Plan discussed with: Patient  Anticipated Disposition: home     Hospital Problems  Date Reviewed: 11/21/2022            Codes Class Noted POA    Hyponatremia ICD-10-CM: E87.1  ICD-9-CM: 276.1  11/25/2022 Unknown             Review of Systems:   A comprehensive review of systems was negative except for that written in the HPI. Vital Signs:    Last 24hrs VS reviewed since prior progress note. Most recent are:  Visit Vitals  BP (!) 170/95 (BP 1 Location: Left upper arm, BP Patient Position: At rest;Semi fowlers)   Pulse 78   Temp 98 °F (36.7 °C)   Resp 20   Wt 71.2 kg (157 lb)   SpO2 98%   BMI 24.59 kg/m²       No intake or output data in the 24 hours ending 12/01/22 1045     Physical Examination:     I had a face to face encounter with this patient and independently examined them on 12/1/2022 as outlined below:          Constitutional:  No acute distress, cooperative, pleasant    ENT:  Oral mucosa moist, oropharynx benign. Resp:  CTA bilaterally. No wheezing/rhonchi/rales. No accessory muscle use. CV:  Regular rhythm, normal rate, no murmurs, gallops, rubs    GI:  Soft, non distended, non tender. normoactive bowel sounds, no hepatosplenomegaly     Musculoskeletal:  No edema, warm, 2+ pulses throughout    Neurologic:  Moves all extremities.   AAOx3, CN II-XII reviewed            Data Review:    Review and/or order of clinical lab test      Labs:     Recent Labs     12/01/22  0012 11/30/22  1050   WBC 7.4 9.6   HGB 12.0 14.0   HCT 35.6 41.1    328     Recent Labs     12/01/22  0501 12/01/22  0012 11/30/22  1656 11/30/22  1050   * 125* 124* 121*   K 3.7 3.3* 3.3* 4.2   CL 91* 89* 88* 84*   CO2 28 27 29 30   BUN 12 13 15 17   CREA 0.89 0.93 0.92 1.23*   GLU 95 100 102* 114*   CA 8.6 8.5 8.5 8.9   MG  --   --   --  1.9  1.8   PHOS  --   --   --  4.0     Recent Labs     11/30/22  1050   ALT 21   AP 87   TBILI 0.5   TP 7.7   ALB 4.2   GLOB 3.5     No results for input(s): INR, PTP, APTT, INREXT in the last 72 hours. No results for input(s): FE, TIBC, PSAT, FERR in the last 72 hours. Lab Results   Component Value Date/Time    Folate 22.5 (H) 11/22/2021 02:14 AM      No results for input(s): PH, PCO2, PO2 in the last 72 hours.   Recent Labs     11/30/22  1656 11/30/22  1050   CPK 58 105     Lab Results   Component Value Date/Time    Cholesterol, total 233 (H) 06/15/2021 12:03 PM    HDL Cholesterol 114 06/15/2021 12:03 PM    LDL, calculated 108 (H) 06/15/2021 12:03 PM    LDL, calculated 102 (H) 02/07/2014 11:06 AM    Triglyceride 66 06/15/2021 12:03 PM     No results found for: [facility]  Lab Results   Component Value Date/Time    Color YELLOW/STRAW 11/30/2022 01:26 PM    Appearance CLEAR 11/30/2022 01:26 PM    Specific gravity 1.011 11/30/2022 01:26 PM    pH (UA) 6.5 11/30/2022 01:26 PM    Protein TRACE (A) 11/30/2022 01:26 PM    Glucose Negative 11/30/2022 01:26 PM    Ketone TRACE (A) 11/30/2022 01:26 PM    Bilirubin Negative 11/30/2022 01:26 PM    Urobilinogen 0.2 11/30/2022 01:26 PM    Nitrites Negative 11/30/2022 01:26 PM    Leukocyte Esterase Negative 11/30/2022 01:26 PM    Epithelial cells FEW 11/30/2022 01:26 PM    Bacteria Negative 11/30/2022 01:26 PM    WBC 0-4 11/30/2022 01:26 PM    RBC 0-5 11/30/2022 01:26 PM         Medications Reviewed:     Current Facility-Administered Medications   Medication Dose Route Frequency    prochlorperazine (COMPAZINE) injection 5 mg  5 mg IntraVENous Q4H PRN    sacubitriL-valsartan (ENTRESTO) 49-51 mg tablet 1 Tablet  1 Tablet Oral Q12H    levothyroxine (SYNTHROID) tablet 175 mcg  175 mcg Oral ACB    metoprolol succinate (TOPROL-XL) XL tablet 75 mg  75 mg Oral DAILY    PARoxetine (PAXIL) tablet 20 mg  20 mg Oral DAILY    rivaroxaban (XARELTO) tablet 20 mg  20 mg Oral DAILY    sodium chloride (NS) flush 5-40 mL  5-40 mL IntraVENous Q8H    sodium chloride (NS) flush 5-40 mL  5-40 mL IntraVENous PRN    0.9% sodium chloride infusion  50 mL/hr IntraVENous CONTINUOUS    acetaminophen (TYLENOL) tablet 650 mg  650 mg Oral Q6H PRN     ______________________________________________________________________  EXPECTED LENGTH OF STAY: - - -  ACTUAL LENGTH OF STAY:          Adriana Osborne MD

## 2022-12-02 VITALS
HEART RATE: 78 BPM | OXYGEN SATURATION: 98 % | RESPIRATION RATE: 16 BRPM | SYSTOLIC BLOOD PRESSURE: 139 MMHG | WEIGHT: 157 LBS | BODY MASS INDEX: 24.59 KG/M2 | TEMPERATURE: 98.2 F | DIASTOLIC BLOOD PRESSURE: 86 MMHG

## 2022-12-02 LAB
ALBUMIN SERPL-MCNC: 3.6 G/DL (ref 3.5–5)
ALBUMIN/GLOB SERPL: 1.4 {RATIO} (ref 1.1–2.2)
ALP SERPL-CCNC: 71 U/L (ref 45–117)
ALT SERPL-CCNC: 18 U/L (ref 12–78)
ANION GAP SERPL CALC-SCNC: 5 MMOL/L (ref 5–15)
ANION GAP SERPL CALC-SCNC: 6 MMOL/L (ref 5–15)
AST SERPL-CCNC: 14 U/L (ref 15–37)
BILIRUB SERPL-MCNC: 0.3 MG/DL (ref 0.2–1)
BUN SERPL-MCNC: 19 MG/DL (ref 6–20)
BUN SERPL-MCNC: 21 MG/DL (ref 6–20)
BUN/CREAT SERPL: 21 (ref 12–20)
BUN/CREAT SERPL: 24 (ref 12–20)
CALCIUM SERPL-MCNC: 8.4 MG/DL (ref 8.5–10.1)
CALCIUM SERPL-MCNC: 8.8 MG/DL (ref 8.5–10.1)
CHLORIDE SERPL-SCNC: 92 MMOL/L (ref 97–108)
CHLORIDE SERPL-SCNC: 93 MMOL/L (ref 97–108)
CO2 SERPL-SCNC: 28 MMOL/L (ref 21–32)
CO2 SERPL-SCNC: 30 MMOL/L (ref 21–32)
CREAT SERPL-MCNC: 0.89 MG/DL (ref 0.55–1.02)
CREAT SERPL-MCNC: 0.91 MG/DL (ref 0.55–1.02)
GLOBULIN SER CALC-MCNC: 2.6 G/DL (ref 2–4)
GLUCOSE SERPL-MCNC: 102 MG/DL (ref 65–100)
GLUCOSE SERPL-MCNC: 99 MG/DL (ref 65–100)
MAGNESIUM SERPL-MCNC: 2 MG/DL (ref 1.6–2.4)
PHOSPHATE SERPL-MCNC: 3.6 MG/DL (ref 2.6–4.7)
POTASSIUM SERPL-SCNC: 3.8 MMOL/L (ref 3.5–5.1)
POTASSIUM SERPL-SCNC: 4.2 MMOL/L (ref 3.5–5.1)
PROT SERPL-MCNC: 6.2 G/DL (ref 6.4–8.2)
SODIUM SERPL-SCNC: 126 MMOL/L (ref 136–145)
SODIUM SERPL-SCNC: 128 MMOL/L (ref 136–145)

## 2022-12-02 PROCEDURE — 80053 COMPREHEN METABOLIC PANEL: CPT

## 2022-12-02 PROCEDURE — 36415 COLL VENOUS BLD VENIPUNCTURE: CPT

## 2022-12-02 PROCEDURE — 74011250637 HC RX REV CODE- 250/637: Performed by: NURSE PRACTITIONER

## 2022-12-02 PROCEDURE — 74011000250 HC RX REV CODE- 250: Performed by: FAMILY MEDICINE

## 2022-12-02 PROCEDURE — 84100 ASSAY OF PHOSPHORUS: CPT

## 2022-12-02 PROCEDURE — 83735 ASSAY OF MAGNESIUM: CPT

## 2022-12-02 RX ORDER — BUMETANIDE 1 MG/1
1 TABLET ORAL DAILY
Status: DISCONTINUED | OUTPATIENT
Start: 2022-12-02 | End: 2022-12-03 | Stop reason: HOSPADM

## 2022-12-02 RX ADMIN — ACETAMINOPHEN 650 MG: 325 TABLET ORAL at 02:39

## 2022-12-02 RX ADMIN — SODIUM CHLORIDE, PRESERVATIVE FREE 10 ML: 5 INJECTION INTRAVENOUS at 06:54

## 2022-12-02 NOTE — PROGRESS NOTES
[address] [facility]  Hospitalist Group                                                                                          Hospitalist Progress Note  Jayashree Clark MD  Answering service: [phone] OR [phone] from in house phone        Date of Service:  2022  NAME:  Iain Hooker  :  1950  MRN:  [id number]      Admission Summary:   Iain Hooker is a 67 y.o. female who presents with nausea and vomiting. Patient came to the ER with nausea and vomiting, poor historian, reports that not been able to tolerate much p.o., was recently admitted to the hospital with hyponatremia, history of SIADH, came to the ER, was found to have a sodium of 121, was requested to be admitted to the hospitalist service, patient denies any other complaints or problems     The patient denies any headache, blurry vision, sore throat, trouble swallowing, trouble with speech, chest pain, SOB, cough, fever, chills, , urinary symptoms, constipation, recent travels, sick contacts, focal or generalized neurological symptoms, falls, injuries, rashes, contact with COVID-19 diagnosed patients, hematemesis, melena, hemoptysis, hematuria, rashes, denies starting any new medications and denies any other concerns or problems besides as mentioned above. Interval history / Subjective:    Follow up Nausea and vomiting   Continues to feel fine  Wants to go home  Assessment & Plan:     Acute on chronic hyponatremia--improving  SIADH  Acute kidney injury--now resolved  -IV fluids stopped  -Fluid restriction  -Appreciate discussion with Nephrology, repeat sodium and if improving, discharge with outpatient follow up    Nausea vomiting--improved  Paroxysmal atrial fibrillation on Xarelto/Metoprolol  Chronic diastolic CHF on Metoprolol/Entresto  Hypertension: continue home meds  Hypothyroidism: on synthroid  Anxiety/depression: Reviewed PMD note, will stop Paxil     Regular diet     Code status: FULL CODE  Prophylaxis: Xarelto    Plan: repeat BMP now. Follow nephrology, discharge today if cleared  Care Plan discussed with: Patient  Anticipated Disposition: home     Hospital Problems  Date Reviewed: 11/21/2022            Codes Class Noted POA    Hyponatremia ICD-10-CM: E87.1  ICD-9-CM: 276.1  11/25/2022 Unknown           Review of Systems:   A comprehensive review of systems was negative except for that written in the HPI. Vital Signs:    Last 24hrs VS reviewed since prior progress note. Most recent are:  Visit Vitals  BP (!) 144/88 (BP 1 Location: Left upper arm, BP Patient Position: At rest;Sitting)   Pulse 74   Temp 97.9 °F (36.6 °C)   Resp 11   Wt 71.2 kg (157 lb)   SpO2 99%   BMI 24.59 kg/m²       No intake or output data in the 24 hours ending 12/02/22 1446     Physical Examination:     I had a face to face encounter with this patient and independently examined them on 12/2/2022 as outlined below:          Constitutional:  No acute distress, cooperative, pleasant    ENT:  Oral mucosa moist, oropharynx benign. Resp:  CTA bilaterally. No wheezing/rhonchi/rales. No accessory muscle use. CV:  Regular rhythm, normal rate, no murmurs, gallops, rubs    GI:  Soft, non distended, non tender. normoactive bowel sounds, no hepatosplenomegaly     Musculoskeletal:  No edema, warm, 2+ pulses throughout    Neurologic:  Moves all extremities.   AAOx3, CN II-XII reviewed            Data Review:    Review and/or order of clinical lab test      Labs:     Recent Labs     12/01/22  0012 11/30/22  1050   WBC 7.4 9.6   HGB 12.0 14.0   HCT 35.6 41.1    328       Recent Labs     12/02/22  0009 12/01/22  2017 12/01/22  1513 11/30/22  1656 11/30/22  1050   * 126* 128*   < > 121*   K 3.8 4.3 4.0   < > 4.2   CL 93* 92* 93*   < > 84*   CO2 28 30 30   < > 30   BUN 19 18 15   < > 17   CREA 0.91 0.95 0.75   < > 1.23*   * 100 99   < > 114*   CA 8.4* 8.5 8.5   < > 8.9   MG 2.0  --   --   --  1.9  1.8   PHOS 3.6 --   --   --  4.0    < > = values in this interval not displayed. Recent Labs     12/02/22  0009 11/30/22  1050   ALT 18 21   AP 71 87   TBILI 0.3 0.5   TP 6.2* 7.7   ALB 3.6 4.2   GLOB 2.6 3.5       No results for input(s): INR, PTP, APTT, INREXT, INREXT in the last 72 hours. No results for input(s): FE, TIBC, PSAT, FERR in the last 72 hours. Lab Results   Component Value Date/Time    Folate 22.5 (H) 11/22/2021 02:14 AM        No results for input(s): PH, PCO2, PO2 in the last 72 hours.   Recent Labs     11/30/22  1656 11/30/22  1050   CPK 58 105       Lab Results   Component Value Date/Time    Cholesterol, total 233 (H) 06/15/2021 12:03 PM    HDL Cholesterol 114 06/15/2021 12:03 PM    LDL, calculated 108 (H) 06/15/2021 12:03 PM    LDL, calculated 102 (H) 02/07/2014 11:06 AM    Triglyceride 66 06/15/2021 12:03 PM     No results found for: [facility]  Lab Results   Component Value Date/Time    Color YELLOW/STRAW 11/30/2022 01:26 PM    Appearance CLEAR 11/30/2022 01:26 PM    Specific gravity 1.011 11/30/2022 01:26 PM    pH (UA) 6.5 11/30/2022 01:26 PM    Protein TRACE (A) 11/30/2022 01:26 PM    Glucose Negative 11/30/2022 01:26 PM    Ketone TRACE (A) 11/30/2022 01:26 PM    Bilirubin Negative 11/30/2022 01:26 PM    Urobilinogen 0.2 11/30/2022 01:26 PM    Nitrites Negative 11/30/2022 01:26 PM    Leukocyte Esterase Negative 11/30/2022 01:26 PM    Epithelial cells FEW 11/30/2022 01:26 PM    Bacteria Negative 11/30/2022 01:26 PM    WBC 0-4 11/30/2022 01:26 PM    RBC 0-5 11/30/2022 01:26 PM         Medications Reviewed:     Current Facility-Administered Medications   Medication Dose Route Frequency    prochlorperazine (COMPAZINE) injection 5 mg  5 mg IntraVENous Q4H PRN    sacubitriL-valsartan (ENTRESTO) 49-51 mg tablet 1 Tablet  1 Tablet Oral Q12H    levothyroxine (SYNTHROID) tablet 175 mcg  175 mcg Oral ACB    metoprolol succinate (TOPROL-XL) XL tablet 75 mg  75 mg Oral DAILY    rivaroxaban (XARELTO) tablet 20 mg 20 mg Oral DAILY    sodium chloride (NS) flush 5-40 mL  5-40 mL IntraVENous Q8H    sodium chloride (NS) flush 5-40 mL  5-40 mL IntraVENous PRN    acetaminophen (TYLENOL) tablet 650 mg  650 mg Oral Q6H PRN     ______________________________________________________________________  EXPECTED LENGTH OF STAY: 3d 12h  ACTUAL LENGTH OF STAY:          2                 Karlie Thurman MD

## 2022-12-02 NOTE — PROGRESS NOTES
Problem: Falls - Risk of  Goal: *Absence of Falls  Description: Document Maribell Emanuel Fall Risk and appropriate interventions in the flowsheet. Outcome: Progressing Towards Goal  Note: Fall Risk Interventions:  Mobility Interventions: Communicate number of staff needed for ambulation/transfer         Medication Interventions: Teach patient to arise slowly                   Problem: Patient Education: Go to Patient Education Activity  Goal: Patient/Family Education  Outcome: Progressing Towards Goal     Problem: Risk for Spread of Infection  Goal: Prevent transmission of infectious organism to others  Description: Prevent the transmission of infectious organisms to other patients, staff members, and visitors.   Outcome: Progressing Towards Goal     Problem: Patient Education:  Go to Education Activity  Goal: Patient/Family Education  Outcome: Progressing Towards Goal

## 2022-12-02 NOTE — PROGRESS NOTES
0501:    Went to draw q4 sodium lab. Pt refused stating \"I haven't done anything since my last lab draw, I don't think this lab will make a difference. I do not want my blood drawn now\".

## 2022-12-02 NOTE — PROGRESS NOTES
Patient name: Bertin Ward  MRN: [id number]    Nephrology Progress note:    Assessment:  Hyponatremia: Acute on chronic. Patient has chronic SIADH. Given her improvement from 121 to 127 with gentle saline suggests there is a component of hypovolemia during current exacerbation. Did plateau at 018 and now down to 126. Saline off. Do not feel like Paxil played a huge role but agree probably best to avoid SSRI given her history with hyponatremia. Hypokalemia: 2 to n/v/poor intake     CHF: no clinical signs of decompensation     HTN: fair    Plan/Recommendations:  Repeat Na level-> if some improvement she is okay to go home with repeat lab work next week with PCP. Encourage solute intake  Resume Bumex  FR 1.5L/day  Agree with no SSRIs in the future  Strict I/Os  Am labs if she is still here        Subjective:  \" I feel fine. I need to go home\". Lobbying to go home. Getting emotional being here. ROS:   No more nausea, no vomiting  No chest pain, no shortness of breath    Exam:  Visit Vitals  BP (!) 146/90 (BP 1 Location: Left upper arm, BP Patient Position: Semi fowlers;Sitting)   Pulse 74   Temp 98.2 °F (36.8 °C)   Resp 15   Wt 71.2 kg (157 lb)   SpO2 99%   BMI 24.59 kg/m²     Wt Readings from Last 3 Encounters:   12/01/22 71.2 kg (157 lb)   11/26/22 72 kg (158 lb 11.7 oz)   11/21/22 74.4 kg (164 lb)     No intake or output data in the 24 hours ending 12/02/22 1624    Gen: NAD  HEENT: AT/NC  Lungs/Chest wall: Clear. No accessory muscle use.    Cardiovascular: Regular rate, normal rhythm. Abdomen/: Soft, NT, ND, BS+. No palpable organomegaly  Ext:  No peripheral edema  CNS: alert and awake. Answers appropriately      Current Facility-Administered Medications   Medication Dose Route Frequency Last Admin    prochlorperazine (COMPAZINE) injection 5 mg  5 mg IntraVENous Q4H PRN      sacubitriL-valsartan (ENTRESTO) 49-51 mg tablet 1 Tablet  1 Tablet Oral Q12H      levothyroxine (SYNTHROID) tablet 175 mcg  175 mcg Oral  mcg at 12/01/22 0730    metoprolol succinate (TOPROL-XL) XL tablet 75 mg  75 mg Oral DAILY      rivaroxaban (XARELTO) tablet 20 mg  20 mg Oral DAILY 20 mg at 12/01/22 0900    sodium chloride (NS) flush 5-40 mL  5-40 mL IntraVENous Q8H 10 mL at 12/02/22 0654    sodium chloride (NS) flush 5-40 mL  5-40 mL IntraVENous PRN      acetaminophen (TYLENOL) tablet 650 mg  650 mg Oral Q6H  mg at 12/02/22 0239       Labs/Data:    Lab Results   Component Value Date/Time    WBC 7.4 12/01/2022 12:12 AM    HGB (POC) 15.1 01/28/2020 09:43 AM    HGB 12.0 12/01/2022 12:12 AM    HCT (POC) 44.5 01/28/2020 09:43 AM    HCT 35.6 12/01/2022 12:12 AM    PLATELET 445 28/02/8730 12:12 AM    MCV 85.0 12/01/2022 12:12 AM       Lab Results   Component Value Date/Time    Sodium 128 (L) 12/02/2022 03:42 PM    Potassium 4.2 12/02/2022 03:42 PM    Chloride 92 (L) 12/02/2022 03:42 PM    CO2 30 12/02/2022 03:42 PM    Anion gap 6 12/02/2022 03:42 PM    Glucose 99 12/02/2022 03:42 PM    BUN 21 (H) 12/02/2022 03:42 PM    Creatinine 0.89 12/02/2022 03:42 PM    BUN/Creatinine ratio 24 (H) 12/02/2022 03:42 PM    GFR est AA 51 (L) 01/14/2022 03:16 PM    GFR est non-AA 44 (L) 01/14/2022 03:16 PM    Calcium 8.8 12/02/2022 03:42 PM       Patient seen and examined. Chart reviewed. Labs, data and other pertinent notes reviewed in last 24 hrs.     Discussed with patient and Dr. Carolynn Kang by:  Maximo Nguyễn MD  [address] [facility]

## 2022-12-03 NOTE — PROGRESS NOTES
Discharge Note    Pt discharged home with ride set up by case management. AVS explained with pt and all pt belongings gathered with pt. Time was given for questions. Pt had no questions at this time and is ready to be discharged. Pt was wheeled down to ER main entrance by tech to be picked up by her ride.

## 2022-12-09 ENCOUNTER — HOSPITAL ENCOUNTER (OUTPATIENT)
Dept: WOUND CARE | Age: 72
Discharge: HOME | End: 2022-12-09
Payer: MEDICARE

## 2022-12-09 VITALS
DIASTOLIC BLOOD PRESSURE: 82 MMHG | RESPIRATION RATE: 16 BRPM | HEART RATE: 94 BPM | TEMPERATURE: 98 F | SYSTOLIC BLOOD PRESSURE: 132 MMHG

## 2022-12-09 DIAGNOSIS — L97.912 LEG ULCER, RIGHT, WITH FAT LAYER EXPOSED (HCC): Primary | ICD-10-CM

## 2022-12-09 PROCEDURE — 99211 OFF/OP EST MAY X REQ PHY/QHP: CPT

## 2022-12-09 NOTE — DISCHARGE INSTRUCTIONS
Discharge Instructions/Wound Orders  [address], [address]  Telephone: [phone]    NAME:  Ruthy Fleischer OF BIRTH:  1950  MEDICAL RECORD NUMBER:  [id number]  DATE:  December 9, 2022    Wound Care Orders:  Congratulations!!! You are healed!!! Follow up in clinic as needed  No dressing needed at this time   Do not pick scab. Let it fall off on it's own. Dietary:  [x] Diet as tolerated: [] Calorie Diabetic Diet:Low carb and no Sugar [x] No Added Salt:[] Increase Protein: [] Other:Limit the amount of liquid you are drinking and avoid drinking in between meals   Activity:  [x] Activity as tolerated:  [] Patient has no activity restrictions     [] Strict Bedrest: [] Remain off Work:     [] May return to full duty work:                                   [] Return to work with restrictions:   Return Appointment:  [] Return Appointment: With Dr. Fatou Alvarez   [] Ordered tests:    Electronically signed Morgan Shaikh RN on 12/9/2022 at 11:09 AM     Kim Marin 281: Should you experience any significant changes in your wound(s) or have questions about your wound care, please contact the [address] [place] [address] at [address] [place] [address] 8:00 am - 4:30. If you need help with your wound outside these hours and cannot wait until we are again available, contact your PCP or go to the hospital emergency room. PLEASE NOTE: IF YOU ARE UNABLE TO OBTAIN WOUND SUPPLIES, CONTINUE TO USE THE SUPPLIES YOU HAVE AVAILABLE UNTIL YOU ARE ABLE TO REACH US. IT IS MOST IMPORTANT TO KEEP THE WOUND COVERED AT ALL TIMES.      Physician Signature:_______________________    Date: ___________ Time:  ____________

## 2022-12-09 NOTE — PROGRESS NOTES
Patient presents to wound clinic for: Allie Brown is a 67 y.o. female who presents for wound care of the following: right anterior leg ulcer. She notes that she had it surgically debrided and had been using a wound vac. She notes that there has been tremendous progress in resolution in terms of the depth of the wound. However, lately progress of the wound has slowed. She has been referred her for further treatment by her surgeon. Update 12/922: Patient presents today for follow-up. She believes that wound may be healed, but is afraid to go without a bandage. Pertinent Medical History:  Past Medical History:   Diagnosis Date    Atrial fibrillation ([facility] 7/15/2011    Depression     HTN (hypertension) 7/14/2011    Paroxysmal atrial fibrillation ([facility])     SIADH (syndrome of inappropriate ADH production) ([facility] 1/14/2022    Thyroid ca [facility] 2005    Papillary    Unspecified hypothyroidism 7/15/2011     Past Surgical History:   Procedure Laterality Date    ENDOSCOPY, COLON, DIAGNOSTIC  2003    neg. rep 10 yrs. [place] THYROIDECTOMY  11/05     Prior to Admission medications    Medication Sig Start Date End Date Taking? Authorizing Provider   bumetanide (BUMEX) 1 mg tablet Take 1 Tablet by mouth daily. 11/26/22   Flo Cardenas MD   ondansetron (ZOFRAN ODT) 4 mg disintegrating tablet Take 1 Tablet by mouth four (4) times daily as needed for Nausea or Vomiting. 11/26/22   Sarah Holloway MD   diazePAM (VALIUM) 2 mg tablet TAKE 1 TABLET BY MOUTH EVERY DAY AS NEEDED FOR ANXIETY 11/9/22   Silver Phelps MD   butalbital-acetaminophen-caffeine (FIORICET, ESGIC) -40 mg per tablet TAKE 1 TO 2 TABLETS BY MOUTH EVERY 6 HOURS AS NEEDED FOR HEADACHE 10/19/22   Silver Phelps MD   traZODone (DESYREL) 50 mg tablet Take 1 Tablet by mouth nightly.  9/29/22   Silver Phelps MD   levothyroxine (SYNTHROID) 175 mcg tablet TAKE 1 TABLET BY MOUTH EVERY DAY BEFORE BREAKFAST 8/21/22 Roney Blanco MD   Xarelto 20 mg tab tablet TAKE 1 TABLET BY MOUTH EVERY DAY 7/3/22   Roney Blanco MD   metoprolol succinate (TOPROL-XL) 50 mg XL tablet TAKE 1 TABLET BY MOUTH AFTER DINNER 2/10/22   Roney Blanco MD   sacubitriL-valsartan Elena Bhagat) 49-51 mg tab tablet Take 1 Tablet by mouth two (2) times a day.     Provider, Historical     Allergies   Allergen Reactions    Penicillins Unknown (comments)     Patient reports allergy to penicillin with unknown reaction, but states she has tolerated amoxicillin    Opioids - Morphine Analogues Itching and Nausea and Vomiting    Percocet [Oxycodone-Acetaminophen] Rash     Per patient     Family History   Problem Relation Age of Onset    Cancer Mother         lung     Social History     Socioeconomic History    Marital status: SINGLE     Spouse name: Not on file    Number of children: Not on file    Years of education: Not on file    Highest education level: Not on file   Occupational History    Not on file   Tobacco Use    Smoking status: Never     Passive exposure: Never    Smokeless tobacco: Never   Vaping Use    Vaping Use: Never used   Substance and Sexual Activity    Alcohol use: Yes     Comment: soc    Drug use: No    Sexual activity: Not on file   Other Topics Concern     Service Not Asked    Blood Transfusions Not Asked    Caffeine Concern Not Asked    Occupational Exposure Not Asked    Hobby Hazards Not Asked    Sleep Concern Not Asked    Stress Concern Not Asked    Weight Concern Not Asked    Special Diet Not Asked    Back Care Not Asked    Exercise Not Asked    Bike Helmet Not Asked    Seat Belt Not Asked    Self-Exams Not Asked   Social History Narrative    Not on file     Social Determinants of Health     Financial Resource Strain: Not on file   Food Insecurity: Not on file   Transportation Needs: Not on file   Physical Activity: Not on file   Stress: Not on file   Social Connections: Not on file   Intimate Partner Violence: Not on file   Housing Stability: Not on file       Vitals:    12/09/22 1053   BP: 132/82   Pulse: 94   Resp: 16   Temp: 98 °F (36.7 °C)       Review of Systems:    Gen: No fever, chills, malaise, weight loss/gain. Heent: No headache, rhinorrhea, epistaxis, ear pain, hearing loss, sinus pain, neck pain/stiffness, sore throat. Heart: No chest pain, palpitations, HERNDON, pnd, or orthopnea. Resp: No cough, hemoptysis, wheezing and shortness of breath. GI: No nausea, vomiting, diarrhea, constipation, melena or hematochezia. : No urinary obstruction, dysuria or hematuria. Derm: see below  Musc/skeletal: no bone or joint complains. Vasc: No edema, cyanosis or claudication. Endo: No heat/cold intolerance, no polyuria,polydipsia or polyphagia. Neuro: No unilateral weakness, numbness, tingling. No seizures. Heme: No easy bruising or bleeding.        12/09/22 1057   Right Leg Edema Point of Measurement   Leg circumference 35 cm   Ankle circumference 22.5 cm   RLE Peripheral Vascular    Capillary Refill Less than/equal to 3 seconds   Color Appropriate for race   Temperature Cool   Sensation Present   Pedal Pulse Present   Wound Leg lower Right   Date First Assessed/Time First Assessed: 07/15/22 1042   Present on Hospital Admission: Yes  Wound Approximate Age at First Assessment (Weeks): 56 weeks  Primary Wound Type: Arterial Ulcer  Location: Leg lower  Wound Location Orientation: Right   Wound Image    Wound Etiology Traumatic   Dressing Status    (open to air)   Cleansed Cleansed with saline   Wound Length (cm) 0 cm   Wound Width (cm) 0 cm   Wound Depth (cm) 0 cm   Wound Surface Area (cm^2) 0 cm^2   Change in Wound Size % 100   Wound Volume (cm^3) 0 cm^3   Wound Healing % 100   Drainage Amount None   Wound Odor None   Flora-Wound/Incision Assessment Blanchable erythema   Edges Attached edges   Wound Thickness Description Full thickness       Assessment:   Right anterior leg ulcer due to previous trauma    Plan:   -Wound is healed!  -Discussed with patient that wound is healed and that dressing is no longer needed.  -Patient is discharged from wound center. Follow-up as needed.

## 2022-12-09 NOTE — WOUND CARE
12/09/22 1057   Right Leg Edema Point of Measurement   Leg circumference 35 cm   Ankle circumference 22.5 cm   RLE Peripheral Vascular    Capillary Refill Less than/equal to 3 seconds   Color Appropriate for race   Temperature Cool   Sensation Present   Pedal Pulse Present   Wound Leg lower Right   Date First Assessed/Time First Assessed: 07/15/22 1042   Present on Hospital Admission: Yes  Wound Approximate Age at First Assessment (Weeks): 56 weeks  Primary Wound Type: Arterial Ulcer  Location: Leg lower  Wound Location Orientation: Right   Wound Image    Wound Etiology Traumatic   Dressing Status   (open to air)   Cleansed Cleansed with saline   Wound Length (cm) 0 cm   Wound Width (cm) 0 cm   Wound Depth (cm) 0 cm   Wound Surface Area (cm^2) 0 cm^2   Change in Wound Size % 100   Wound Volume (cm^3) 0 cm^3   Wound Healing % 100   Drainage Amount None   Wound Odor None   Flora-Wound/Incision Assessment Blanchable erythema   Edges Attached edges   Wound Thickness Description Full thickness   Visit Vitals  /82 (BP 1 Location: Left upper arm, BP Patient Position: At rest)   Pulse 94   Temp 98 °F (36.7 °C)   Resp 16

## 2023-03-28 PROBLEM — I50.22 CHRONIC SYSTOLIC (CONGESTIVE) HEART FAILURE (HCC): Status: RESOLVED | Noted: 2022-06-13 | Resolved: 2023-03-28

## 2023-03-29 PROBLEM — E87.1 HYPONATREMIA: Status: RESOLVED | Noted: 2022-11-25 | Resolved: 2023-03-29

## 2023-07-18 LAB
Lab: NORMAL
Lab: NORMAL
REFERENCE LAB,REFLB: NORMAL
REFERENCE LAB,REFLB: NORMAL
TEST DESCRIPTION:,ATST: NORMAL
TEST DESCRIPTION:,ATST: NORMAL

## 2023-09-26 PROBLEM — I50.22 CHRONIC SYSTOLIC (CONGESTIVE) HEART FAILURE (HCC): Status: ACTIVE | Noted: 2023-09-26

## 2024-04-09 PROBLEM — L97.915: Status: ACTIVE | Noted: 2024-04-09

## 2024-04-09 PROBLEM — S81.801A OPEN WOUND OF RIGHT LOWER LEG: Status: RESOLVED | Noted: 2021-11-17 | Resolved: 2024-04-09

## 2024-04-09 PROBLEM — L97.915: Status: RESOLVED | Noted: 2024-04-09 | Resolved: 2024-04-09

## 2024-04-09 PROBLEM — L97.912 LEG ULCER, RIGHT, WITH FAT LAYER EXPOSED (HCC): Status: RESOLVED | Noted: 2022-07-15 | Resolved: 2024-04-09

## 2024-04-09 PROBLEM — I50.22 CHRONIC SYSTOLIC (CONGESTIVE) HEART FAILURE (HCC): Status: RESOLVED | Noted: 2023-09-26 | Resolved: 2024-04-09

## 2024-10-15 ENCOUNTER — HOSPITAL ENCOUNTER (OUTPATIENT)
Facility: HOSPITAL | Age: 74
Discharge: HOME | End: 2024-10-18
Payer: MEDICARE

## 2024-10-15 DIAGNOSIS — R06.02 SOB (SHORTNESS OF BREATH): ICD-10-CM

## 2024-10-15 PROCEDURE — 71046 X-RAY EXAM CHEST 2 VIEWS: CPT

## (undated) DEVICE — HANDPIECE SET WITH BONE CLEANING TIP AND SUCTION TUBE: Brand: INTERPULSE

## (undated) DEVICE — SUTURE VCRL SZ 2-0 L27IN ABSRB UD L26MM SH 1/2 CIR J417H

## (undated) DEVICE — CANISTER WND VAC ASST CLSR --

## (undated) DEVICE — EXTREMITY - SMH: Brand: MEDLINE INDUSTRIES, INC.

## (undated) DEVICE — SOLUTION IRRIG 3000ML 0.9% SOD CHL USP UROMATIC PLAS CONT

## (undated) DEVICE — 4-PORT MANIFOLD: Brand: NEPTUNE 2

## (undated) DEVICE — SUTURE MCRYL SZ 4-0 L27IN ABSRB UD L19MM PS-2 1/2 CIR PRIM Y426H

## (undated) DEVICE — KIT NEG PRSS W15XH16XL26CM THN 1 PERF DSG 2 SHT STD DRP 1